# Patient Record
Sex: FEMALE | Race: WHITE | Employment: OTHER | URBAN - NONMETROPOLITAN AREA
[De-identification: names, ages, dates, MRNs, and addresses within clinical notes are randomized per-mention and may not be internally consistent; named-entity substitution may affect disease eponyms.]

---

## 2017-01-23 ENCOUNTER — NURSING HOME VISIT (OUTPATIENT)
Dept: FAMILY MEDICINE | Facility: OTHER | Age: 82
End: 2017-01-23
Payer: MEDICARE

## 2017-01-23 VITALS
HEART RATE: 67 BPM | OXYGEN SATURATION: 96 % | TEMPERATURE: 98.1 F | BODY MASS INDEX: 23.66 KG/M2 | WEIGHT: 129.4 LBS | RESPIRATION RATE: 16 BRPM | SYSTOLIC BLOOD PRESSURE: 107 MMHG | DIASTOLIC BLOOD PRESSURE: 57 MMHG

## 2017-01-23 DIAGNOSIS — Z78.9 NURSING HOME RESIDENT: Primary | ICD-10-CM

## 2017-01-23 PROCEDURE — 99308 SBSQ NF CARE LOW MDM 20: CPT | Performed by: FAMILY MEDICINE

## 2017-01-26 NOTE — PROGRESS NOTES
HISTORY OF PRESENT ILLNESS:  Britney is a 86 year old female (12/29/1929)  resident of [facility]  who is being seen today for a routine 30 day follow up. Patient has Parkinson's disease and she states she states she feels her trembling is getting worse. Her Sinemet was recently increased and she notes worsening nausea.  She requests second Neurology opinion. Patient offers no other complaint.  Staff notes no other issues.    Current medications, allergies, and interdisciplinary care plan are reviewed.      Patient Active Problem List    Diagnosis Date Noted     Parkinson disease (H) 08/10/2015     Priority: High     CHD (coronary heart disease) 06/15/2015     Priority: High     03/2015  NSTEMI S/P angioplasty and stenting (ELIZABETH) LAD and D1       HF (heart failure) (H) 06/15/2015     Priority: High     TIA (transient ischemic attack) 03/16/2015     Priority: High     Neuralgia, post-herpetic 03/19/2014     Priority: High     Cerebrovascular disease 04/10/2013     Priority: High     HTN (hypertension) 09/24/2006     Priority: High     Glaucoma 12/27/2016     Priority: Medium     History of poliomyelitis 12/27/2016     Priority: Medium     Osteoarthritis, multiple joints 12/13/2015     Priority: Medium     Fibrocystic breast disease, left 12/13/2015     Priority: Medium     Constipation, chronic 04/20/2015     Priority: Medium     Hiatal hernia 03/16/2015     Priority: Medium     Chronic cough 07/15/2014     Priority: Medium     OAB (overactive bladder) 03/19/2014     Priority: Medium     S/P InterStim placement and multiple botox injections       Dyslipidemia 04/10/2013     Priority: Medium     Hemorrhoids 04/10/2013     Priority: Medium     Perennial allergic rhinitis 04/10/2013     Priority: Medium     Asthma, mild persistent 04/10/2013     Priority: Medium              GERD (gastroesophageal reflux disease) 04/10/2013     Priority: Medium     Osteoporosis 04/10/2013     Priority: Medium      Kyphoscoliosis and scoliosis 04/10/2013     Priority: Medium     Insomnia, medical condition 04/10/2013     Priority: Medium     History of colonic polyps 04/10/2013     Priority: Medium     Nursing Home Visit 01/19/2016     Priority: Low     Health Care Home 12/09/2015     Priority: Low     Class: Chronic          Social History     Social History     Marital Status:      Spouse Name: N/A     Number of Children: N/A     Years of Education: N/A     Occupational History     Retired [employer]     Social History Main Topics     Smoking status: Passive Smoke Exposure - Never Smoker     Smokeless tobacco: Never Used      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 No     Social History Narrative        Current Outpatient Prescriptions   Medication Sig     metoprolol (LOPRESSOR) 25 MG tablet Take 0.5 tablets (12.5 mg) by mouth 2 times daily     pantoprazole (PROTONIX) 40 MG enteric coated tablet Take 1 tablet (40 mg) by mouth daily     calcium carbonate (TUMS) 500 MG chewable tablet Take 2 chew tab by mouth 2 times daily     mirabegron (MYRBETRIQ) 50 MG 24 hr tablet Take 1 tablet (50 mg) by mouth daily     clopidogrel (PLAVIX) 75 MG tablet Take 1 tablet (75 mg) by mouth daily     lidocaine (LIDODERM) 5 % patch APPLY UP TO 3 PATCHES TO PAINFUL AREA AT ONCE FOR UP TO 12 HOURS WITHIN A 24 HOURS PERIOD. REMOVE  AFTER 12 HOURS     Multiple Vitamins-Minerals (PRESERVISION AREDS) TABS Take 1 tablet by mouth 2 times daily     gabapentin (NEURONTIN) 100 MG capsule Take 1 capsule (100 mg) by mouth 2 times daily     olopatadine (PATANOL) 0.1 % ophthalmic solution INSTILL 1 DROP INTO BOTH EYES 2 TIMES DAILY     atorvastatin (LIPITOR) 40 MG tablet Take 1 tablet (40 mg) by mouth daily     aspirin 81 MG chewable tablet Take 1 tablet (81 mg) by mouth daily     Cholecalciferol (VITAMIN D3) 2000 UNITS CAPS Take 2,000 Units by mouth daily     spironolactone (ALDACTONE) 25 MG tablet Take 0.5 tablets (12.5 mg) by mouth daily     loratadine (ALLERGY RELIEF) 10 MG tablet Take 1 tablet (10 mg) by mouth daily     polyethylene glycol (MIRALAX) powder Take 17 g by mouth daily (Patient taking differently: Take 17 g by mouth 2 times daily )     carbidopa-levodopa (SINEMET)  MG per tablet Take 1 tablet by mouth 3 times daily (Patient taking differently: 2 tablets at 8am, 1 tablet at 1200, 1600, and 2000)     senna-docusate (SENNA PLUS) 8.6-50 MG per tablet Take 1 tablet by mouth 2 times daily (Patient taking differently: Take 2 tablets by mouth 2 times daily )     acetaminophen (TYLENOL) 500 MG tablet Take 2 tablets (1,000 mg) by mouth 2 times daily as needed for mild pain As needed     artificial saliva, BIOTENE MT, (BIOTENE MT) SOLN Swish and spit 5 mLs in mouth as needed for dry mouth     furosemide (LASIX) 20 MG tablet Take 1 tablet (20 mg) by mouth as needed     ondansetron (ZOFRAN) 4 MG tablet TAKE 1 TABLET BY MOUTH EVERY 8 HOURS AS NEEDED FOR NAUSEA AND VOMITING     polyethylene glycol 0.4%- propylene glycol 0.3% (SYSTANE) 0.4-0.3 % SOLN ophthalmic solution Place 2 drops into both eyes 4 times daily as needed for dry eyes     ketoconazole (NIZORAL) 2 % shampoo Wash hair, let set for 5 minutes, rinse 1 time per day every Sunday     nystatin (MYCOSTATIN) cream APPLY TO AFFECTED AREA 2 TIMES DAILY AS NEEDED     Lanolin (CORONA  MULTI-PURPOSE) 30 % OINT Externally apply 1 Application topically 2 times daily     cycloSPORINE (RESTASIS) 0.05 % ophthalmic emulsion Place 1 drop into both eyes 2 times daily     guaiFENesin-codeine (GUAIFENESIN AC) 100-10 MG/5ML SOLN TAKE 2 TEASPOONFULS (10ML) BY MOUTH EVERY 4 HOURS AS NEEDED FOR COUGH     order for DME Equipment being ordered: neoprene knee sleeve     No current facility-administered medications for this visit.       Allergies   Allergen Reactions     Ambien      Zolpidem Tartrate     Tramadol        I have reviewed the care plan and do agree with the plan.    ROS:  No chest pain, shortness of breath, fever, chills, headache, nausea, vomiting, dysuria, or changes in bowel habits.  Appetite is normal.  Left shoulder/back  pain noted.          OBJECTIVE:  /57 mmHg  Pulse 67  Temp(Src) 98.1  F (36.7  C)  Resp 16  Wt 129 lb 6.4 oz (58.695 kg)  SpO2 96%    GENERAL:  Chronically ill appearing, alert, and in no acute distress  RESP:  Lungs clear.  No rales, rhonchi, or wheezing  CV:  RRR.  S1 S2 without murmur. No clicks or rubs.  SKIN:  Age-related changes.  No suspicious lesions or rashes.  PSYCH:  Mentation intact, affect bright, and orientation intact.  EXTREM:  trace edema.  Pulses palpable.          Lab/Diagnostic data:    none    ASSESSMENT/ORDERS:  Nursing Home Visit  Will decrease Sinemet to previous dosage.  Other issues stable.  No changes in current medications or care plan.      Total time spent with patient visit was 25 min including patient visit, review of pertinent clinical information, and treatment plan.      Mario Fofana MD

## 2017-01-31 ENCOUNTER — TELEPHONE (OUTPATIENT)
Dept: FAMILY MEDICINE | Facility: OTHER | Age: 82
End: 2017-01-31

## 2017-01-31 NOTE — TELEPHONE ENCOUNTER
12:12 PM    Reason for Call: Phone Call    Description: Pt would like nurse to call. Has a bad cold for a couple of days and would like to know if Dr Fofana can prescribe something or if she needs to be seen.    Was an appointment offered for this call? Yes - pt declined    Preferred method for responding to this message: Telephone Call    If we cannot reach you directly, may we leave a detailed response at the number you provided? Yes    Can this message wait until your PCP/provider returns, if available today? N/a, provider is in today     Ramya Ortega

## 2017-02-01 ENCOUNTER — TRANSFERRED RECORDS (OUTPATIENT)
Dept: HEALTH INFORMATION MANAGEMENT | Facility: HOSPITAL | Age: 82
End: 2017-02-01

## 2017-02-06 ENCOUNTER — TELEPHONE (OUTPATIENT)
Dept: FAMILY MEDICINE | Facility: OTHER | Age: 82
End: 2017-02-06

## 2017-03-06 ENCOUNTER — NURSING HOME VISIT (OUTPATIENT)
Dept: FAMILY MEDICINE | Facility: OTHER | Age: 82
End: 2017-03-06
Payer: MEDICARE

## 2017-03-06 VITALS
RESPIRATION RATE: 16 BRPM | DIASTOLIC BLOOD PRESSURE: 44 MMHG | SYSTOLIC BLOOD PRESSURE: 109 MMHG | HEART RATE: 56 BPM | BODY MASS INDEX: 22.81 KG/M2 | WEIGHT: 124.7 LBS | TEMPERATURE: 97.6 F

## 2017-03-06 DIAGNOSIS — Z78.9 NURSING HOME RESIDENT: Primary | ICD-10-CM

## 2017-03-06 PROCEDURE — 99308 SBSQ NF CARE LOW MDM 20: CPT | Performed by: FAMILY MEDICINE

## 2017-03-06 RX ORDER — SODIUM FLUORIDE 5 MG/G
1 GEL, DENTIFRICE DENTAL AT BEDTIME
COMMUNITY
End: 2018-04-02

## 2017-03-06 NOTE — MR AVS SNAPSHOT
"              After Visit Summary   3/6/2017    Britney Schaeffer    MRN: [id number]           Patient Information     Date Of Birth          1929        Visit Information        Provider Department      3/6/2017 2:05 PM Mario Fofana MD [facility]        Today's [facility]     Nursing Home Visit    -  1       Follow-ups after your visit        Who to contact     If you have questions or need follow up information about today's clinic visit or your schedule please [facility] at [phone].  Normal or non-critical lab and imaging results will be communicated to you by MyChart, letter or phone within 4 business days after the clinic has received the results. If you do not hear from us within 7 days, please contact the clinic through [facility] or phone. If you have a critical or abnormal lab result, we will notify you by phone as soon as possible.  Submit refill requests through [employer] or call your pharmacy and they will forward the refill request to us. Please allow 3 business days for your refill to be completed.          Additional Information About Your Visit        MyChart Information     [employer] lets you send messages to your doctor, view your test results, renew your prescriptions, schedule appointments and more. To sign up, go to [email or web address] . Click on \"Log in\" on the left side of the screen, which will take you to the Welcome page. Then click on \"Sign up Now\" on the right side of the page.     You will be asked to enter the access code listed below, as well as some personal information. Please follow the directions to create your username and password.     Your access code is: [email or web address]  Expires: 3/27/2017  6:57 AM     Your access code will  in 90 days. If you need help or a new code, please call your [facility] or [phone].        Care EveryWhere ID     This is your Care EveryWhere ID. This could be used by other " organizations to access your [facility] medical records  [id number]        Your Vitals Were     Pulse Temperature Respirations BMI (Body Mass Index)          56 97.6  F (36.4  C) 16 22.81 kg/m2         Blood Pressure from Last 3 Encounters:   03/06/17 109/44   01/23/17 107/57   12/21/16 116/58    Weight from Last 3 Encounters:   03/06/17 124 lb 11.2 oz (56.6 kg)   01/23/17 129 lb 6.4 oz (58.7 kg)   12/21/16 128 lb 8 oz (58.3 kg)              Today, you had the following     No orders found for display         Today's Medication Changes          These changes are accurate as of: 3/6/17 11:59 PM.  If you have any questions, ask your nurse or doctor.               These medicines have changed or have updated prescriptions.        Dose/Directions    carbidopa-levodopa  MG per tablet   Commonly known as:  SINEMET   This may have changed:    - how much to take  - when to take this  - additional instructions   Used for:  Parkinsons (H)        Dose:  1 tablet   Take 1 tablet by mouth 3 times daily   Quantity:  270 tablet   Refills:  3       polyethylene glycol powder   Commonly known as:  MIRALAX   This may have changed:  when to take this   Used for:  Constipation        Dose:  17 g   Take 17 g by mouth daily   Quantity:  500 g   Refills:  3       senna-docusate 8.6-50 MG per tablet   Commonly known as:  SENNA PLUS   This may have changed:  how much to take   Used for:  Constipation        Dose:  1 tablet   Take 1 tablet by mouth 2 times daily   Quantity:  180 tablet   Refills:  3                Primary Care Provider Office Phone # Fax #    Mario Fofana [phone]       [facility] [address]  [place] [address]        Thank you!     Thank you for choosing [facility]  for your care. Our goal is always to provide you with excellent care. Hearing back from our patients is one way we can continue to improve our services. Please take a few minutes to complete the written survey  that you may receive in the mail after your visit with us. Thank you!             Your Updated Medication List - Protect others around you: Learn how to safely use, store and throw away your medicines at www.disposemymeds.org.          This list is accurate as of: 3/6/17 11:59 PM.  Always use your most recent med list.                   Brand Name Dispense Instructions for use    acetaminophen 500 MG tablet    TYLENOL    360 tablet    Take 2 tablets (1,000 mg) by mouth 2 times daily as needed for mild pain As needed       artificial saliva Soln solution     3 Bottle    Swish and spit 5 mLs in mouth as needed for dry mouth       aspirin 81 MG chewable tablet     90 tablet    Take 1 tablet (81 mg) by mouth daily       atorvastatin 40 MG tablet    LIPITOR    90 tablet    Take 1 tablet (40 mg) by mouth daily       calcium carbonate 500 MG chewable tablet    TUMS     Take 2 chew tab by mouth 2 times daily       carbidopa-levodopa  MG per tablet    SINEMET    270 tablet    Take 1 tablet by mouth 3 times daily       clopidogrel 75 MG tablet    PLAVIX    90 tablet    Take 1 tablet (75 mg) by mouth daily       cycloSPORINE 0.05 % ophthalmic emulsion    RESTASIS    3 each    Place 1 drop into both eyes 2 times daily       furosemide 20 MG tablet    LASIX    90 tablet    Take 1 tablet (20 mg) by mouth as needed       gabapentin 100 MG capsule    NEURONTIN    180 capsule    Take 1 capsule (100 mg) by mouth 2 times daily       guaiFENesin-codeine 100-10 MG/5ML Soln solution    guaiFENesin AC    473 mL    TAKE 2 TEASPOONFULS (10ML) BY MOUTH EVERY 4 HOURS AS NEEDED FOR COUGH       ketoconazole 2 % shampoo    NIZORAL    120 mL    Wash hair, let set for 5 minutes, rinse 1 time per day every Sunday       Lanolin 30 % Oint    CORONA MULTI-PURPOSE    3 Tube    Externally apply 1 Application topically 2 times daily       lidocaine 5 % Patch    LIDODERM    270 patch    APPLY UP TO 3 PATCHES TO PAINFUL AREA AT ONCE FOR UP TO 12  HOURS WITHIN A 24 HOURS PERIOD. REMOVE AFTER 12 HOURS       loratadine 10 MG tablet    ALLERGY RELIEF    90 tablet    Take 1 tablet (10 mg) by mouth daily       metoprolol 25 MG tablet    LOPRESSOR    90 tablet    Take 0.5 tablets (12.5 mg) by mouth 2 times daily       mirabegron 50 MG 24 hr tablet    MYRBETRIQ    90 tablet    Take 1 tablet (50 mg) by mouth daily       nystatin cream    MYCOSTATIN    30 g    APPLY TO AFFECTED AREA 2 TIMES DAILY AS NEEDED       olopatadine 0.1 % ophthalmic solution    PATANOL    3 Bottle    INSTILL 1 DROP INTO BOTH EYES 2 TIMES DAILY       ondansetron 4 MG tablet    ZOFRAN    30 tablet    TAKE 1 TABLET BY MOUTH EVERY 8 HOURS AS NEEDED FOR NAUSEA AND VOMITING       order for DME     1 Device    Equipment being ordered: neoprene knee sleeve       pantoprazole 40 MG EC tablet    PROTONIX    90 tablet    Take 1 tablet (40 mg) by mouth daily       polyethylene glycol 0.4%- propylene glycol 0.3% 0.4-0.3 % Soln ophthalmic solution    SYSTANE    3 Bottle    Place 2 drops into both eyes 4 times daily as needed for dry eyes       polyethylene glycol powder    MIRALAX    500 g    Take 17 g by mouth daily       PRESERVISION AREDS Tabs     180 tablet    Take 1 tablet by mouth 2 times daily       senna-docusate 8.6-50 MG per tablet    SENNA PLUS    180 tablet    Take 1 tablet by mouth 2 times daily       sodium fluoride dental gel 1.1 % Gel topical gel    PREVIDENT     Apply 1 applicator to affected area At Bedtime       spironolactone 25 MG tablet    ALDACTONE    45 tablet    Take 0.5 tablets (12.5 mg) by mouth daily       vitamin D3 2000 UNITS Caps     90 capsule    Take 2,000 Units by mouth daily

## 2017-03-07 NOTE — PROGRESS NOTES
HISTORY OF PRESENT ILLNESS:  Britney is a 87 year old female (12/29/1929)  resident of [facility]  who is being seen today for a routine 30 day follow up. Patient has Parkinson's disease and she states she states she feels her trembling is getting worse. Her Sinemet was recently increased and she notes worsening nausea.  She requests second Neurology opinion. Her Sinemet was reduced  and her nausea is improved. Patient offers no other complaint.  Staff notes no other issues.    Current medications, allergies, and interdisciplinary care plan are reviewed.      Patient Active Problem List    Diagnosis Date Noted     Parkinson disease (H) 08/10/2015     Priority: High     CHD (coronary heart disease) 06/15/2015     Priority: High     03/2015  NSTEMI S/P angioplasty and stenting (ELIZABETH) LAD and D1       HF (heart failure) (H) 06/15/2015     Priority: High     TIA (transient ischemic attack) 03/16/2015     Priority: High     Neuralgia, post-herpetic 03/19/2014     Priority: High     Cerebrovascular disease 04/10/2013     Priority: High     HTN (hypertension) 09/24/2006     Priority: High     Glaucoma 12/27/2016     Priority: Medium     History of poliomyelitis 12/27/2016     Priority: Medium     Osteoarthritis, multiple joints 12/13/2015     Priority: Medium     Fibrocystic breast disease, left 12/13/2015     Priority: Medium     Constipation, chronic 04/20/2015     Priority: Medium     Hiatal hernia 03/16/2015     Priority: Medium     Chronic cough 07/15/2014     Priority: Medium     OAB (overactive bladder) 03/19/2014     Priority: Medium     S/P InterStim placement and multiple botox injections       Dyslipidemia 04/10/2013     Priority: Medium     Hemorrhoids 04/10/2013     Priority: Medium     Perennial allergic rhinitis 04/10/2013     Priority: Medium     Asthma, mild persistent 04/10/2013     Priority: Medium              GERD (gastroesophageal reflux disease) 04/10/2013     Priority: Medium      Osteoporosis 04/10/2013     Priority: Medium     Kyphoscoliosis and scoliosis 04/10/2013     Priority: Medium     Insomnia, medical condition 04/10/2013     Priority: Medium     History of colonic polyps 04/10/2013     Priority: Medium     Nursing Home Visit 01/19/2016     Priority: Low     Health Care Home 12/09/2015     Priority: Low     Class: Chronic          Social History     Social History     Marital status:      Spouse name: N/A     Number of children: N/A     Years of education: N/A     Occupational History     Retired [employer]     Social History Main Topics     Smoking status: Passive Smoke Exposure - Never Smoker     Smokeless tobacco: Never Used      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 No     Social History Narrative        Current Outpatient Prescriptions   Medication Sig     sodium fluoride dental gel (PREVIDENT) 1.1 % GEL topical gel Apply 1 applicator to affected area At Bedtime     metoprolol (LOPRESSOR) 25 MG tablet Take 0.5 tablets (12.5 mg) by mouth 2 times daily     pantoprazole (PROTONIX) 40 MG enteric coated tablet Take 1 tablet (40 mg) by mouth daily     calcium carbonate (TUMS) 500 MG chewable tablet Take 2 chew tab by mouth 2 times daily     mirabegron (MYRBETRIQ) 50 MG 24 hr tablet Take 1 tablet (50 mg) by mouth daily     clopidogrel (PLAVIX) 75 MG tablet Take  1 tablet (75 mg) by mouth daily     lidocaine (LIDODERM) 5 % patch APPLY UP TO 3 PATCHES TO PAINFUL AREA AT ONCE FOR UP TO 12 HOURS WITHIN A 24 HOURS PERIOD. REMOVE AFTER 12 HOURS     Multiple Vitamins-Minerals (PRESERVISION AREDS) TABS Take 1 tablet by mouth 2 times daily     gabapentin (NEURONTIN) 100 MG capsule Take 1 capsule (100 mg) by mouth 2 times daily     olopatadine (PATANOL) 0.1 % ophthalmic solution INSTILL 1 DROP INTO BOTH EYES 2 TIMES DAILY     atorvastatin (LIPITOR) 40 MG tablet Take 1 tablet (40 mg) by mouth daily     aspirin 81 MG chewable tablet Take 1 tablet (81 mg) by mouth daily     Cholecalciferol (VITAMIN D3) 2000 UNITS CAPS Take 2,000 Units by mouth daily     spironolactone (ALDACTONE) 25 MG tablet Take 0.5 tablets (12.5 mg) by mouth daily     loratadine (ALLERGY RELIEF) 10 MG tablet Take 1 tablet (10 mg) by mouth daily     polyethylene glycol (MIRALAX) powder Take 17 g by mouth daily (Patient taking differently: Take 17 g by mouth 2 times daily )     carbidopa-levodopa (SINEMET)  MG per tablet Take 1 tablet by mouth 3 times daily (Patient taking differently: 2 tablets at 8am, 1 tablet at 1200, 1600, and 2000)     senna-docusate (SENNA PLUS) 8.6-50 MG per tablet Take 1 tablet by mouth 2 times daily (Patient taking differently: Take 2 tablets by mouth 2 times daily )     acetaminophen (TYLENOL) 500 MG tablet Take 2 tablets (1,000 mg) by mouth 2 times daily as needed for mild pain As needed     artificial saliva, BIOTENE MT, (BIOTENE MT) SOLN Swish and spit 5 mLs in mouth as needed for dry mouth     furosemide (LASIX) 20 MG tablet Take 1 tablet (20 mg) by mouth as needed     ondansetron (ZOFRAN) 4 MG tablet TAKE 1 TABLET BY MOUTH EVERY 8 HOURS AS NEEDED FOR NAUSEA AND VOMITING     polyethylene glycol 0.4%- propylene glycol 0.3% (SYSTANE) 0.4-0.3 % SOLN ophthalmic solution Place 2 drops into both eyes 4 times daily as needed for dry eyes     ketoconazole (NIZORAL) 2 % shampoo Wash hair,  let set for 5 minutes, rinse 1 time per day every Sunday     nystatin (MYCOSTATIN) cream APPLY TO AFFECTED AREA 2 TIMES DAILY AS NEEDED     Lanolin (CORONA MULTI-PURPOSE) 30 % OINT Externally apply 1 Application topically 2 times daily     cycloSPORINE (RESTASIS) 0.05 % ophthalmic emulsion Place 1 drop into both eyes 2 times daily     guaiFENesin-codeine (GUAIFENESIN AC) 100-10 MG/5ML SOLN TAKE 2 TEASPOONFULS (10ML) BY MOUTH EVERY 4 HOURS AS NEEDED FOR COUGH     order for DME Equipment being ordered: neoprene knee sleeve     No current facility-administered medications for this visit.        Allergies   Allergen Reactions     Ambien      Zolpidem Tartrate     Tramadol        I have reviewed the care plan and do agree with the plan.    ROS:  No chest pain, shortness of breath, fever, chills, headache, nausea, vomiting, dysuria, or changes in bowel habits.  Appetite is normal.  Left shoulder/back  pain noted.          OBJECTIVE:  /44  Pulse 56  Temp 97.6  F (36.4  C)  Resp 16  Wt 124 lb 11.2 oz (56.6 kg)  BMI 22.81 kg/m2    GENERAL:  Chronically ill appearing, alert, and in no acute distress  RESP:  Lungs clear.  No rales, rhonchi, or wheezing  CV:  RRR.  S1 S2 without murmur. No clicks or rubs.  SKIN:  Age-related changes.  No suspicious lesions or rashes.  PSYCH:  Mentation intact, affect bright, and orientation intact.  EXTREM:  trace edema.  Pulses palpable.          Lab/Diagnostic data:    none    ASSESSMENT/ORDERS:  Nursing Home Visit  Will decrease Sinemet to previous dosage.  Other issues stable.  No changes in current medications or care plan.      Total time spent with patient visit was 25 min including patient visit, review of pertinent clinical information, and treatment plan.      Mario Fofana MD

## 2017-03-24 DIAGNOSIS — N32.81 OAB (OVERACTIVE BLADDER): ICD-10-CM

## 2017-03-24 DIAGNOSIS — G20.A1 PARKINSONS (H): ICD-10-CM

## 2017-03-27 RX ORDER — CARBIDOPA AND LEVODOPA 25; 100 MG/1; MG/1
TABLET ORAL
Qty: 270 TABLET | Refills: 1 | Status: SHIPPED | OUTPATIENT
Start: 2017-03-27 | End: 2017-08-20

## 2017-03-27 RX ORDER — MIRABEGRON 50 MG/1
TABLET, FILM COATED, EXTENDED RELEASE ORAL
Qty: 31 TABLET | Refills: 0 | Status: SHIPPED | OUTPATIENT
Start: 2017-03-27 | End: 2018-08-20 | Stop reason: DRUGHIGH

## 2017-04-03 ENCOUNTER — HOSPITAL ENCOUNTER (OUTPATIENT)
Dept: ULTRASOUND IMAGING | Facility: HOSPITAL | Age: 82
Discharge: HOME | End: 2017-04-03
Attending: SURGERY | Admitting: SURGERY
Payer: MEDICARE

## 2017-04-03 PROCEDURE — 76642 ULTRASOUND BREAST LIMITED: CPT | Mod: TC,LT

## 2017-04-03 PROCEDURE — G0206 DX MAMMO INCL CAD UNI: HCPCS | Mod: TC,LT

## 2017-04-03 PROCEDURE — G0279 TOMOSYNTHESIS, MAMMO: HCPCS | Mod: TC,

## 2017-04-04 DIAGNOSIS — I50.1 LEFT HEART FAILURE (H): ICD-10-CM

## 2017-04-04 DIAGNOSIS — E78.2 MIXED HYPERLIPIDEMIA: Primary | ICD-10-CM

## 2017-04-04 DIAGNOSIS — E78.5 HYPERLIPIDEMIA: ICD-10-CM

## 2017-04-04 RX ORDER — SPIRONOLACTONE 25 MG/1
12.5 TABLET ORAL DAILY
Qty: 45 TABLET | Refills: 3 | Status: SHIPPED | OUTPATIENT
Start: 2017-04-04 | End: 2018-02-01

## 2017-04-04 RX ORDER — ATORVASTATIN CALCIUM 40 MG/1
40 TABLET, FILM COATED ORAL DAILY
Qty: 90 TABLET | Refills: 3 | Status: ON HOLD | OUTPATIENT
Start: 2017-04-04 | End: 2017-10-12

## 2017-04-10 ENCOUNTER — NURSING HOME VISIT (OUTPATIENT)
Dept: FAMILY MEDICINE | Facility: OTHER | Age: 82
End: 2017-04-10
Payer: MEDICARE

## 2017-04-10 VITALS
BODY MASS INDEX: 22.42 KG/M2 | SYSTOLIC BLOOD PRESSURE: 120 MMHG | RESPIRATION RATE: 16 BRPM | WEIGHT: 122.6 LBS | TEMPERATURE: 96.3 F | DIASTOLIC BLOOD PRESSURE: 60 MMHG | OXYGEN SATURATION: 97 % | HEART RATE: 76 BPM

## 2017-04-10 DIAGNOSIS — Z78.9 NURSING HOME RESIDENT: Primary | ICD-10-CM

## 2017-04-10 PROCEDURE — 99308 SBSQ NF CARE LOW MDM 20: CPT | Performed by: NURSE PRACTITIONER

## 2017-04-10 NOTE — MR AVS SNAPSHOT
"              After Visit Summary   4/10/2017    Britney Schaeffer    MRN: [id number]           Patient Information     Date Of Birth          1929        Visit Information        Provider Department      4/10/2017 4:04 PM Minerva Jimenez APRN [facility]NP [facility]        Today's Diagnoses     Nursing home resident    -  1       Follow-ups after your visit        Who to contact     If you have questions or need follow up information about today's clinic visit or your schedule please contact [facility] directly at [phone].  Normal or non-critical lab and imaging results will be communicated to you by Worldrathart, letter or phone within 4 business days after the clinic has received the results. If you do not hear from us within 7 days, please contact the clinic through Worldrathart or phone. If you have a critical or abnormal lab result, we will notify you by phone as soon as possible.  Submit refill requests through IdleAir or call your pharmacy and they will forward the refill request to us. Please allow 3 business days for your refill to be completed.          Additional Information About Your Visit        MyChart Information     IdleAir lets you send messages to your doctor, view your test results, renew your prescriptions, schedule appointments and more. To sign up, go to [email or web address] . Click on \"Log in\" on the left side of the screen, which will take you to the Welcome page. Then click on \"Sign up Now\" on the right side of the page.     You will be asked to enter the access code listed below, as well as some personal information. Please follow the directions to create your username and password.     Your access code is: [email or web address]  Expires: 2017  8:48 AM     Your access code will  in 90 days. If you need help or a new code, please call your [facility] or [phone].        Care EveryWhere ID     This is your Care EveryWhere ID. This could be used by " other organizations to access your [place] medical records  [id number]        Your Vitals Were     Pulse Temperature Respirations Pulse Oximetry BMI (Body Mass Index)       76 96.3  F (35.7  C) 16 97% 22.42 kg/m2        Blood Pressure from Last 3 Encounters:   04/10/17 120/60   03/06/17 109/44   01/23/17 107/57    Weight from Last 3 Encounters:   04/10/17 122 lb 9.6 oz (55.6 kg)   03/06/17 124 lb 11.2 oz (56.6 kg)   01/23/17 129 lb 6.4 oz (58.7 kg)              Today, you had the following     No orders found for display         Today's Medication Changes          These changes are accurate as of: 4/10/17 11:59 PM.  If you have any questions, ask your nurse or doctor.               These medicines have changed or have updated prescriptions.        Dose/Directions    carbidopa-levodopa  MG per tablet   Commonly known as:  SINEMET   This may have changed:  See the new instructions.   Used for:  Parkinsons (H)        TAKE 1 TABLET THREE TIMES A DAY   Quantity:  270 tablet   Refills:  1       polyethylene glycol powder   Commonly known as:  MIRALAX   This may have changed:  when to take this   Used for:  Constipation        Dose:  17 g   Take 17 g by mouth daily   Quantity:  500 g   Refills:  3       senna-docusate 8.6-50 MG per tablet   Commonly known as:  SENNA PLUS   This may have changed:  how much to take   Used for:  Constipation        Dose:  1 tablet   Take 1 tablet by mouth 2 times daily   Quantity:  180 tablet   Refills:  3                Primary Care Provider Office Phone # Fax #    Mario Fofana [phone]       [facility] [address] [place] [address]        Thank you!     Thank you for choosing [facility]  for your care. Our goal is always to provide you with excellent care. Hearing back from our patients is one way we can continue to improve our services. Please take a few minutes to complete the written survey that you may receive in the mail after  your visit with us. Thank you!             Your Updated Medication List - Protect others around you: Learn how to safely use, store and throw away your medicines at www.disposemymeds.org.          This list is accurate as of: 4/10/17 11:59 PM.  Always use your most recent med list.                   Brand Name Dispense Instructions for use    acetaminophen 500 MG tablet    TYLENOL    360 tablet    Take 2 tablets (1,000 mg) by mouth 2 times daily as needed for mild pain As needed       artificial saliva Soln solution     3 Bottle    Swish and spit 5 mLs in mouth as needed for dry mouth       aspirin 81 MG chewable tablet     90 tablet    Take 1 tablet (81 mg) by mouth daily       atorvastatin 40 MG tablet    LIPITOR    90 tablet    Take 1 tablet (40 mg) by mouth daily       calcium carbonate 500 MG chewable tablet    TUMS     Take 2 chew tab by mouth 2 times daily       carbidopa-levodopa  MG per tablet    SINEMET    270 tablet    TAKE 1 TABLET THREE TIMES A DAY       clopidogrel 75 MG tablet    PLAVIX    90 tablet    Take 1 tablet (75 mg) by mouth daily       cycloSPORINE 0.05 % ophthalmic emulsion    RESTASIS    3 each    Place 1 drop into both eyes 2 times daily       furosemide 20 MG tablet    LASIX    90 tablet    Take 1 tablet (20 mg) by mouth as needed       gabapentin 100 MG capsule    NEURONTIN    180 capsule    Take 1 capsule (100 mg) by mouth 2 times daily       guaiFENesin-codeine 100-10 MG/5ML Soln solution    guaiFENesin AC    473 mL    TAKE 2 TEASPOONFULS (10ML) BY MOUTH EVERY 4 HOURS AS NEEDED FOR COUGH       ketoconazole 2 % shampoo    NIZORAL    120 mL    Wash hair, let set for 5 minutes, rinse 1 time per day every Sunday       Lanolin 30 % Oint    CORONA MULTI-PURPOSE    3 Tube    Externally apply 1 Application topically 2 times daily       lidocaine 5 % Patch    LIDODERM    270 patch    APPLY UP TO 3 PATCHES TO PAINFUL AREA AT ONCE FOR UP TO 12 HOURS WITHIN A 24 HOURS PERIOD. REMOVE AFTER 12  HOURS       loratadine 10 MG tablet    ALLERGY RELIEF    90 tablet    Take 1 tablet (10 mg) by mouth daily       metoprolol 25 MG tablet    LOPRESSOR    90 tablet    Take 0.5 tablets (12.5 mg) by mouth 2 times daily       MYRBETRIQ 50 MG 24 hr tablet   Generic drug:  mirabegron     31 tablet    Take one (1) tablet BY MOUTH daily       nystatin cream    MYCOSTATIN    30 g    APPLY TO AFFECTED AREA 2 TIMES DAILY AS NEEDED       olopatadine 0.1 % ophthalmic solution    PATANOL    3 Bottle    INSTILL 1 DROP INTO BOTH EYES 2 TIMES DAILY       ondansetron 4 MG tablet    ZOFRAN    30 tablet    TAKE 1 TABLET BY MOUTH EVERY 8 HOURS AS NEEDED FOR NAUSEA AND VOMITING       order for DME     1 Device    Equipment being ordered: neoprene knee sleeve       pantoprazole 40 MG EC tablet    PROTONIX    90 tablet    Take 1 tablet (40 mg) by mouth daily       polyethylene glycol 0.4%- propylene glycol 0.3% 0.4-0.3 % Soln ophthalmic solution    SYSTANE    3 Bottle    Place 2 drops into both eyes 4 times daily as needed for dry eyes       polyethylene glycol powder    MIRALAX    500 g    Take 17 g by mouth daily       PRESERVISION AREDS Tabs     180 tablet    Take 1 tablet by mouth 2 times daily       senna-docusate 8.6-50 MG per tablet    SENNA PLUS    180 tablet    Take 1 tablet by mouth 2 times daily       sodium fluoride dental gel 1.1 % Gel topical gel    PREVIDENT     Apply 1 applicator to affected area At Bedtime       spironolactone 25 MG tablet    ALDACTONE    45 tablet    Take 0.5 tablets (12.5 mg) by mouth daily       vitamin D3 2000 UNITS Caps     90 capsule    Take 2,000 Units by mouth daily

## 2017-04-13 NOTE — PROGRESS NOTES
HISTORY OF PRESENT ILLNESS:  Britney is a 87 year old female (12/29/1929)  resident of [facility]  who is being seen today for a routine 30 day follow up. Patient has Parkinson's disease. She has been using Restasis and is now complaining burning in left eye. Patient offers no other complaint.  Staff notes no other issues.    Current medications, allergies, and interdisciplinary care plan are reviewed.      Patient Active Problem List    Diagnosis Date Noted     Glaucoma 12/27/2016     Priority: Medium     History of poliomyelitis 12/27/2016     Priority: Medium     Nursing Home Visit 01/19/2016     Priority: Medium     Osteoarthritis, multiple joints 12/13/2015     Priority: Medium     Fibrocystic breast disease, left 12/13/2015     Priority: Medium     Parkinson disease (H) 08/10/2015     Priority: Medium     Health Care Home 12/09/2015     Priority: Low     Class: Chronic     CHD (coronary heart disease) 06/15/2015     03/2015  NSTEMI S/P angioplasty and stenting (ELIZABETH) LAD and D1       HF (heart failure) (H) 06/15/2015     Constipation, chronic 04/20/2015     Hiatal hernia 03/16/2015     TIA (transient ischemic attack) 03/16/2015     Chronic cough 07/15/2014     OAB (overactive bladder) 03/19/2014     S/P InterStim placement and multiple botox injections       Neuralgia, post-herpetic 03/19/2014     Dyslipidemia 04/10/2013     Cerebrovascular disease 04/10/2013     Hemorrhoids 04/10/2013     Perennial allergic rhinitis 04/10/2013     Asthma, mild persistent 04/10/2013              GERD (gastroesophageal reflux disease) 04/10/2013     Osteoporosis 04/10/2013     Kyphoscoliosis and scoliosis 04/10/2013     Insomnia, medical condition 04/10/2013     History of colonic polyps 04/10/2013     HTN (hypertension) 09/24/2006          Social History     Social History     Marital status:      Spouse name: N/A     Number of children: N/A     Years of education: N/A     Occupational History      Retired [employer]     Social History Main Topics     Smoking status: Passive Smoke Exposure - Never Smoker     Smokeless tobacco: Never Used      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 No     Social History Narrative        Current Outpatient Prescriptions   Medication Sig     atorvastatin (LIPITOR) 40 MG tablet Take 1 tablet (40 mg) by mouth daily     spironolactone (ALDACTONE) 25 MG tablet Take 0.5 tablets (12.5 mg) by mouth daily     MYRBETRIQ 50 MG 24 hr tablet Take one (1) tablet BY MOUTH daily     carbidopa-levodopa (SINEMET)  MG per tablet TAKE 1 TABLET THREE TIMES A DAY (Patient taking differently: TAKE 1 TABLET FOUR TIMES A DAY)     sodium fluoride dental gel (PREVIDENT) 1.1 % GEL topical gel Apply 1 applicator to affected area At Bedtime     metoprolol (LOPRESSOR) 25 MG tablet Take 0.5 tablets (12.5 mg) by mouth 2 times daily     pantoprazole (PROTONIX) 40 MG enteric coated tablet Take 1 tablet (40 mg) by mouth daily     calcium carbonate (TUMS) 500 MG chewable tablet Take 2 chew tab by mouth 2 times daily     clopidogrel (PLAVIX) 75 MG tablet Take 1 tablet (75 mg) by mouth daily     lidocaine (LIDODERM) 5 % patch APPLY UP TO 3 PATCHES TO PAINFUL AREA AT ONCE FOR UP TO 12 HOURS WITHIN A 24 HOURS PERIOD. REMOVE AFTER 12 HOURS     Multiple Vitamins-Minerals (PRESERVISION AREDS) TABS Take 1  tablet by mouth 2 times daily     gabapentin (NEURONTIN) 100 MG capsule Take 1 capsule (100 mg) by mouth 2 times daily     olopatadine (PATANOL) 0.1 % ophthalmic solution INSTILL 1 DROP INTO BOTH EYES 2 TIMES DAILY     aspirin 81 MG chewable tablet Take 1 tablet (81 mg) by mouth daily     Cholecalciferol (VITAMIN D3) 2000 UNITS CAPS Take 2,000 Units by mouth daily     loratadine (ALLERGY RELIEF) 10 MG tablet Take 1 tablet (10 mg) by mouth daily     polyethylene glycol (MIRALAX) powder Take 17 g by mouth daily (Patient taking differently: Take 17 g by mouth 2 times daily )     senna-docusate (SENNA PLUS) 8.6-50 MG per tablet Take 1 tablet by mouth 2 times daily (Patient taking differently: Take 2 tablets by mouth 2 times daily )     acetaminophen (TYLENOL) 500 MG tablet Take 2 tablets (1,000 mg) by mouth 2 times daily as needed for mild pain As needed     artificial saliva, BIOTENE MT, (BIOTENE MT) SOLN Swish and spit 5 mLs in mouth as needed for dry mouth     furosemide (LASIX) 20 MG tablet Take 1 tablet (20 mg) by mouth as needed     ondansetron (ZOFRAN) 4 MG tablet TAKE 1 TABLET BY MOUTH EVERY 8 HOURS AS NEEDED FOR NAUSEA AND VOMITING     polyethylene glycol 0.4%- propylene glycol 0.3% (SYSTANE) 0.4-0.3 % SOLN ophthalmic solution Place 2 drops into both eyes 4 times daily as needed for dry eyes     ketoconazole (NIZORAL) 2 % shampoo Wash hair, let set for 5 minutes, rinse 1 time per day every Sunday     nystatin (MYCOSTATIN) cream APPLY TO AFFECTED AREA 2 TIMES DAILY AS NEEDED     Lanolin (CORONA MULTI-PURPOSE) 30 % OINT Externally apply 1 Application topically 2 times daily     cycloSPORINE (RESTASIS) 0.05 % ophthalmic emulsion Place 1 drop into both eyes 2 times daily     guaiFENesin-codeine (GUAIFENESIN AC) 100-10 MG/5ML SOLN TAKE 2 TEASPOONFULS (10ML) BY MOUTH EVERY 4 HOURS AS NEEDED FOR COUGH     order for DME Equipment being ordered: neoprene knee sleeve     No current facility-administered medications for  this visit.        Allergies   Allergen Reactions     Ambien      Zolpidem Tartrate     Tramadol        I have reviewed the care plan and do agree with the plan.      ROS:  No chest pain, shortness of breath, fever, chills, headache, nausea, vomiting, dysuria, or changes in bowel habits.  Appetite is normal.  No pain noted.          OBJECTIVE:  /60  Pulse 76  Temp 96.3  F (35.7  C)  Resp 16  Wt 122 lb 9.6 oz (55.6 kg)  SpO2 97%  BMI 22.42 kg/m2    GENERAL:  Chronically ill appearing, alert, and in no acute distress  RESP:  Lungs clear.  No rales, rhonchi, or wheezing  CV:  RRR.  S1 S2 without murmur. No clicks or rubs.  SKIN:  Age-related changes.  No suspicious lesions or rashes.  PSYCH:  Mentation intact, affect bright, and orientation intact.  EXTREM:  no edema.  Pulses palpable.          Lab/Diagnostic data:    none    ASSESSMENT/ORDERS:  Nursing home visit  Will hold Restasis for the next two days due to left eye irritation/burtning since starting medication. Staff to assess for improvement of symptoms.    Above issues stable.  No changes in current medications or care plan.      Total time spent with patient visit was 25 min including patient visit, review of pertinent clinical information, and treatment plan.      Minerva BLAIR, CNP

## 2017-05-03 DIAGNOSIS — I25.10 CORONARY ARTERY DISEASE INVOLVING NATIVE CORONARY ARTERY OF NATIVE HEART WITHOUT ANGINA PECTORIS: Primary | ICD-10-CM

## 2017-05-03 DIAGNOSIS — I25.10 CHD (CORONARY HEART DISEASE): ICD-10-CM

## 2017-05-03 DIAGNOSIS — M79.2 NEURALGIA AND NEURITIS: ICD-10-CM

## 2017-05-05 NOTE — TELEPHONE ENCOUNTER
gabapentin      Last Written Prescription Date: 2/23/16  Last Fill Quantity: 180,  # refills: 3   Last Office Visit with [facility], P or [facility] prescribing provider: 4/10/17

## 2017-05-06 RX ORDER — CLOPIDOGREL BISULFATE 75 MG/1
TABLET ORAL
Qty: 90 TABLET | Refills: 2 | Status: ON HOLD | OUTPATIENT
Start: 2017-05-06 | End: 2017-10-12

## 2017-05-06 RX ORDER — GABAPENTIN 100 MG/1
CAPSULE ORAL
Qty: 180 CAPSULE | Refills: 2 | Status: SHIPPED | OUTPATIENT
Start: 2017-05-06 | End: 2018-01-02

## 2017-05-15 ENCOUNTER — NURSING HOME VISIT (OUTPATIENT)
Dept: FAMILY MEDICINE | Facility: OTHER | Age: 82
End: 2017-05-15
Payer: MEDICARE

## 2017-05-15 VITALS
BODY MASS INDEX: 22.79 KG/M2 | SYSTOLIC BLOOD PRESSURE: 105 MMHG | TEMPERATURE: 97.6 F | DIASTOLIC BLOOD PRESSURE: 53 MMHG | HEART RATE: 77 BPM | WEIGHT: 124.6 LBS | RESPIRATION RATE: 16 BRPM | OXYGEN SATURATION: 91 %

## 2017-05-15 DIAGNOSIS — Z78.9 NURSING HOME RESIDENT: Primary | ICD-10-CM

## 2017-05-15 PROCEDURE — 99308 SBSQ NF CARE LOW MDM 20: CPT | Performed by: FAMILY MEDICINE

## 2017-05-15 NOTE — MR AVS SNAPSHOT
"              After Visit Summary   5/15/2017    Britney Schaeffer    MRN: [id number]           Patient Information     Date Of Birth          1929        Visit Information        Provider Department      5/15/2017 12:35 PM Mario Fofana MD [facility]        Today's [facility]     Nursing Home Visit    -  1       Follow-ups after your visit        Who to contact     If you have questions or need follow up information about today's clinic visit or your schedule [facility] at [phone].  Normal or non-critical lab and imaging results will be communicated to you by MyChart, letter or phone within 4 business days after the clinic has received the results. If you do not hear from us within 7 days, please contact the clinic through Icon Technologieshart or phone. If you have a critical or abnormal lab result, we will notify you by phone as soon as possible.  Submit refill requests through Front Desk HQ or call your pharmacy and they will forward the refill request to us. Please allow 3 business days for your refill to be completed.          Additional Information About Your Visit        MyChart Information     Front Desk HQ lets you send messages to your doctor, view your test results, renew your prescriptions, schedule appointments and more. To sign up, go to [email or web address] . Click on \"Log in\" on the left side of the screen, which will take you to the Welcome page. Then click on \"Sign up Now\" on the right side of the page.     You will be asked to enter the access code listed below, as well as some personal information. Please follow the directions to create your username and password.     Your access code is: [email or web address]  Expires: 2017  8:48 AM     Your access code will  in 90 days. If you need help or a new code, please call your [facility] or [phone].        Care EveryWhere ID     This is your Care EveryWhere ID. This could be used by other " organizations to access your [place] medical records  [id number]        Your Vitals Were     Pulse Temperature Respirations Pulse Oximetry BMI (Body Mass Index)       77 97.6  F (36.4  C) 16 91% 22.79 kg/m2        Blood Pressure from Last 3 Encounters:   05/15/17 105/53   04/10/17 120/60   03/06/17 109/44    Weight from Last 3 Encounters:   05/15/17 124 lb 9.6 oz (56.5 kg)   04/10/17 122 lb 9.6 oz (55.6 kg)   03/06/17 124 lb 11.2 oz (56.6 kg)              Today, you had the following     No orders found for display         Today's Medication Changes          These changes are accurate as of: 5/15/17 11:59 PM.  If you have any questions, ask your nurse or doctor.               These medicines have changed or have updated prescriptions.        Dose/Directions    carbidopa-levodopa  MG per tablet   Commonly known as:  SINEMET   This may have changed:  See the new instructions.   Used for:  Parkinsons (H)        TAKE 1 TABLET THREE TIMES A DAY   Quantity:  270 tablet   Refills:  1       polyethylene glycol powder   Commonly known as:  MIRALAX   This may have changed:  when to take this   Used for:  Constipation        Dose:  17 g   Take 17 g by mouth daily   Quantity:  500 g   Refills:  3       senna-docusate 8.6-50 MG per tablet   Commonly known as:  SENNA PLUS   This may have changed:  how much to take   Used for:  Constipation        Dose:  1 tablet   Take 1 tablet by mouth 2 times daily   Quantity:  180 tablet   Refills:  3                Primary Care Provider Office Phone # Fax #    Mario Fofana [phone]       [facility] [address] [place] [address]        Thank you!     Thank you for choosing [facility]  for your care. Our goal is always to provide you with excellent care. Hearing back from our patients is one way we can continue to improve our services. Please take a few minutes to complete the written survey that you may receive in the mail after your  visit with us. Thank you!             Your Updated Medication List - Protect others around you: Learn how to safely use, store and throw away your medicines at www.disposemymeds.org.          This list is accurate as of: 5/15/17 11:59 PM.  Always use your most recent med list.                   Brand Name Dispense Instructions for use    acetaminophen 500 MG tablet    TYLENOL    360 tablet    Take 2 tablets (1,000 mg) by mouth 2 times daily as needed for mild pain As needed       artificial saliva Soln solution     3 Bottle    Swish and spit 5 mLs in mouth as needed for dry mouth       aspirin 81 MG chewable tablet     90 tablet    Take 1 tablet (81 mg) by mouth daily       atorvastatin 40 MG tablet    LIPITOR    90 tablet    Take 1 tablet (40 mg) by mouth daily       calcium carbonate 500 MG chewable tablet    TUMS     Take 2 chew tab by mouth 2 times daily       carbidopa-levodopa  MG per tablet    SINEMET    270 tablet    TAKE 1 TABLET THREE TIMES A DAY       clopidogrel 75 MG tablet    PLAVIX    90 tablet    TAKE 1 TABLET DAILY       cycloSPORINE 0.05 % ophthalmic emulsion    RESTASIS    3 each    Place 1 drop into both eyes 2 times daily       furosemide 20 MG tablet    LASIX    90 tablet    Take 1 tablet (20 mg) by mouth as needed       gabapentin 100 MG capsule    NEURONTIN    180 capsule    TAKE 1 CAPSULE TWICE A DAY       guaiFENesin-codeine 100-10 MG/5ML Soln solution    guaiFENesin AC    473 mL    TAKE 2 TEASPOONFULS (10ML) BY MOUTH EVERY 4 HOURS AS NEEDED FOR COUGH       ketoconazole 2 % shampoo    NIZORAL    120 mL    Wash hair, let set for 5 minutes, rinse 1 time per day every Sunday       Lanolin 30 % Oint    CORONA MULTI-PURPOSE    3 Tube    Externally apply 1 Application topically 2 times daily       lidocaine 5 % Patch    LIDODERM    270 patch    APPLY UP TO 3 PATCHES TO PAINFUL AREA AT ONCE FOR UP TO 12 HOURS WITHIN A 24 HOURS PERIOD. REMOVE AFTER 12 HOURS       loratadine 10 MG tablet     ALLERGY RELIEF    90 tablet    Take 1 tablet (10 mg) by mouth daily       metoprolol 25 MG tablet    LOPRESSOR    90 tablet    Take 0.5 tablets (12.5 mg) by mouth 2 times daily       MYRBETRIQ 50 MG 24 hr tablet   Generic drug:  mirabegron     31 tablet    Take one (1) tablet BY MOUTH daily       nystatin cream    MYCOSTATIN    30 g    APPLY TO AFFECTED AREA 2 TIMES DAILY AS NEEDED       olopatadine 0.1 % ophthalmic solution    PATANOL    3 Bottle    INSTILL 1 DROP INTO BOTH EYES 2 TIMES DAILY       ondansetron 4 MG tablet    ZOFRAN    30 tablet    TAKE 1 TABLET BY MOUTH EVERY 8 HOURS AS NEEDED FOR NAUSEA AND VOMITING       order for DME     1 Device    Equipment being ordered: neoprene knee sleeve       pantoprazole 40 MG EC tablet    PROTONIX    90 tablet    Take 1 tablet (40 mg) by mouth daily       polyethylene glycol 0.4%- propylene glycol 0.3% 0.4-0.3 % Soln ophthalmic solution    SYSTANE    3 Bottle    Place 2 drops into both eyes 4 times daily as needed for dry eyes       polyethylene glycol powder    MIRALAX    500 g    Take 17 g by mouth daily       PRESERVISION AREDS Tabs     180 tablet    Take 1 tablet by mouth 2 times daily       senna-docusate 8.6-50 MG per tablet    SENNA PLUS    180 tablet    Take 1 tablet by mouth 2 times daily       sodium fluoride dental gel 1.1 % Gel topical gel    PREVIDENT     Apply 1 applicator to affected area At Bedtime       spironolactone 25 MG tablet    ALDACTONE    45 tablet    Take 0.5 tablets (12.5 mg) by mouth daily       vitamin D3 2000 UNITS Caps     90 capsule    Take 2,000 Units by mouth daily

## 2017-05-16 NOTE — PROGRESS NOTES
HISTORY OF PRESENT ILLNESS:  Britney is a 87 year old female (12/29/1929)  resident of [facility]  who is being seen today for a routine 30 day follow up. Patient has Parkinson's disease and she states she states she feels her trembling is getting worse. Her Sinemet was recently increased and she notes worsening nausea.  Her Sinemet was reduced  and her nausea is improved. Patient offers no other complaint.  Staff notes no other issues.    Current medications, allergies, and interdisciplinary care plan are reviewed.      Patient Active Problem List    Diagnosis Date Noted     Parkinson disease (H) 08/10/2015     Priority: High     CHD (coronary heart disease) 06/15/2015     Priority: High     03/2015  NSTEMI S/P angioplasty and stenting (ELIZABETH) LAD and D1       HF (heart failure) (H) 06/15/2015     Priority: High     TIA (transient ischemic attack) 03/16/2015     Priority: High     Neuralgia, post-herpetic 03/19/2014     Priority: High     Cerebrovascular disease 04/10/2013     Priority: High     HTN (hypertension) 09/24/2006     Priority: High     Glaucoma 12/27/2016     Priority: Medium     History of poliomyelitis 12/27/2016     Priority: Medium     Osteoarthritis, multiple joints 12/13/2015     Priority: Medium     Fibrocystic breast disease, left 12/13/2015     Priority: Medium     Constipation, chronic 04/20/2015     Priority: Medium     Hiatal hernia 03/16/2015     Priority: Medium     Chronic cough 07/15/2014     Priority: Medium     OAB (overactive bladder) 03/19/2014     Priority: Medium     S/P InterStim placement and multiple botox injections       Dyslipidemia 04/10/2013     Priority: Medium     Hemorrhoids 04/10/2013     Priority: Medium     Perennial allergic rhinitis 04/10/2013     Priority: Medium     Asthma, mild persistent 04/10/2013     Priority: Medium              GERD (gastroesophageal reflux disease) 04/10/2013     Priority: Medium     Osteoporosis 04/10/2013     Priority:  Medium     Kyphoscoliosis and scoliosis 04/10/2013     Priority: Medium     Insomnia, medical condition 04/10/2013     Priority: Medium     History of colonic polyps 04/10/2013     Priority: Medium     Nursing Home Visit 01/19/2016     Priority: Low     Health Care Home 12/09/2015     Priority: Low     Class: Chronic          Social History     Social History     Marital status:      Spouse name: N/A     Number of children: N/A     Years of education: N/A     Occupational History     Retired [employer]     Social History Main Topics     Smoking status: Passive Smoke Exposure - Never Smoker     Smokeless tobacco: Never Used      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 No     Social History Narrative        Current Outpatient Prescriptions   Medication Sig     gabapentin (NEURONTIN) 100 MG capsule TAKE 1 CAPSULE TWICE A DAY     clopidogrel (PLAVIX) 75 MG tablet TAKE 1 TABLET DAILY     atorvastatin (LIPITOR) 40 MG tablet Take 1 tablet (40 mg) by mouth daily     spironolactone (ALDACTONE) 25 MG tablet Take 0.5 tablets (12.5 mg) by mouth daily     MYRBETRIQ 50 MG 24 hr tablet Take one (1) tablet BY MOUTH daily     carbidopa-levodopa (SINEMET)  MG per tablet TAKE 1 TABLET THREE TIMES A DAY (Patient taking differently: TAKE 1 TABLET FOUR TIMES A DAY)     sodium fluoride dental gel (PREVIDENT) 1.1 %  GEL topical gel Apply 1 applicator to affected area At Bedtime     metoprolol (LOPRESSOR) 25 MG tablet Take 0.5 tablets (12.5 mg) by mouth 2 times daily     pantoprazole (PROTONIX) 40 MG enteric coated tablet Take 1 tablet (40 mg) by mouth daily     calcium carbonate (TUMS) 500 MG chewable tablet Take 2 chew tab by mouth 2 times daily     lidocaine (LIDODERM) 5 % patch APPLY UP TO 3 PATCHES TO PAINFUL AREA AT ONCE FOR UP TO 12 HOURS WITHIN A 24 HOURS PERIOD. REMOVE AFTER 12 HOURS     Multiple Vitamins-Minerals (PRESERVISION AREDS) TABS Take 1 tablet by mouth 2 times daily     olopatadine (PATANOL) 0.1 % ophthalmic solution INSTILL 1 DROP INTO BOTH EYES 2 TIMES DAILY     aspirin 81 MG chewable tablet Take 1 tablet (81 mg) by mouth daily     Cholecalciferol (VITAMIN D3) 2000 UNITS CAPS Take 2,000 Units by mouth daily     loratadine (ALLERGY RELIEF) 10 MG tablet Take 1 tablet (10 mg) by mouth daily     polyethylene glycol (MIRALAX) powder Take 17 g by mouth daily (Patient taking differently: Take 17 g by mouth 2 times daily )     senna-docusate (SENNA PLUS) 8.6-50 MG per tablet Take 1 tablet by mouth 2 times daily (Patient taking differently: Take 2 tablets by mouth 2 times daily )     acetaminophen (TYLENOL) 500 MG tablet Take 2 tablets (1,000 mg) by mouth 2 times daily as needed for mild pain As needed     artificial saliva, BIOTENE MT, (BIOTENE MT) SOLN Swish and spit 5 mLs in mouth as needed for dry mouth     furosemide (LASIX) 20 MG tablet Take 1 tablet (20 mg) by mouth as needed     ondansetron (ZOFRAN) 4 MG tablet TAKE 1 TABLET BY MOUTH EVERY 8 HOURS AS NEEDED FOR NAUSEA AND VOMITING     polyethylene glycol 0.4%- propylene glycol 0.3% (SYSTANE) 0.4-0.3 % SOLN ophthalmic solution Place 2 drops into both eyes 4 times daily as needed for dry eyes     ketoconazole (NIZORAL) 2 % shampoo Wash hair, let set for 5 minutes, rinse 1 time per day every Sunday     nystatin (MYCOSTATIN) cream APPLY TO AFFECTED AREA 2 TIMES  DAILY AS NEEDED     Lanolin (CORONA MULTI-PURPOSE) 30 % OINT Externally apply 1 Application topically 2 times daily     cycloSPORINE (RESTASIS) 0.05 % ophthalmic emulsion Place 1 drop into both eyes 2 times daily     guaiFENesin-codeine (GUAIFENESIN AC) 100-10 MG/5ML SOLN TAKE 2 TEASPOONFULS (10ML) BY MOUTH EVERY 4 HOURS AS NEEDED FOR COUGH     order for DME Equipment being ordered: neoprene knee sleeve     No current facility-administered medications for this visit.        Allergies   Allergen Reactions     Ambien      Zolpidem Tartrate     Tramadol        I have reviewed the care plan and do agree with the plan.    ROS:  No chest pain, shortness of breath, fever, chills, headache, nausea, vomiting, dysuria, or changes in bowel habits.  Appetite is normal.  Left shoulder/back  pain noted.          OBJECTIVE:  /53  Pulse 77  Temp 97.6  F (36.4  C)  Resp 16  Wt 124 lb 9.6 oz (56.5 kg)  SpO2 91%  BMI 22.79 kg/m2    GENERAL:  Chronically ill appearing, alert, and in no acute distress  RESP:  Lungs clear.  No rales, rhonchi, or wheezing  CV:  RRR.  S1 S2 without murmur. No clicks or rubs.  SKIN:  Age-related changes.  No suspicious lesions or rashes.  PSYCH:  Mentation intact, affect bright, and orientation intact.  EXTREM:  trace edema.  Pulses palpable.          Lab/Diagnostic data:    none    ASSESSMENT/ORDERS:  Nursing Home Visit  Will continue Sinemet at previous dosage.  Other issues stable.  No changes in current medications or care plan.      Total time spent with patient visit was 25 min including patient visit, review of pertinent clinical information, and treatment plan.      Mario Fofana MD

## 2017-06-07 DIAGNOSIS — A80.9 POLIO: ICD-10-CM

## 2017-06-07 DIAGNOSIS — K21.9 GASTROESOPHAGEAL REFLUX DISEASE, ESOPHAGITIS PRESENCE NOT SPECIFIED: ICD-10-CM

## 2017-06-07 RX ORDER — LIDOCAINE 50 MG/G
PATCH TOPICAL
Qty: 270 PATCH | Refills: 3 | Status: SHIPPED | OUTPATIENT
Start: 2017-06-07 | End: 2018-01-29

## 2017-06-08 ENCOUNTER — HOSPITAL ENCOUNTER (EMERGENCY)
Facility: HOSPITAL | Age: 82
Discharge: HOME | End: 2017-06-08
Attending: PHYSICIAN ASSISTANT | Admitting: PHYSICIAN ASSISTANT
Payer: MEDICARE

## 2017-06-08 ENCOUNTER — MEDICAL CORRESPONDENCE (OUTPATIENT)
Dept: HEALTH INFORMATION MANAGEMENT | Facility: HOSPITAL | Age: 82
End: 2017-06-08

## 2017-06-08 VITALS
SYSTOLIC BLOOD PRESSURE: 137 MMHG | RESPIRATION RATE: 16 BRPM | HEIGHT: 61 IN | OXYGEN SATURATION: 97 % | DIASTOLIC BLOOD PRESSURE: 57 MMHG | TEMPERATURE: 97.9 F

## 2017-06-08 DIAGNOSIS — N30.01 ACUTE CYSTITIS WITH HEMATURIA: ICD-10-CM

## 2017-06-08 LAB
ALBUMIN SERPL-MCNC: 3 G/DL (ref 3.4–5)
ALBUMIN UR-MCNC: 300 MG/DL
ALP SERPL-CCNC: 64 U/L (ref 40–150)
ALT SERPL W P-5'-P-CCNC: 8 U/L (ref 0–50)
ANION GAP SERPL CALCULATED.3IONS-SCNC: 6 MMOL/L (ref 3–14)
APPEARANCE UR: ABNORMAL
AST SERPL W P-5'-P-CCNC: 16 U/L (ref 0–45)
BACTERIA #/AREA URNS HPF: ABNORMAL /HPF
BASOPHILS # BLD AUTO: 0 10E9/L (ref 0–0.2)
BASOPHILS NFR BLD AUTO: 0.3 %
BILIRUB SERPL-MCNC: 0.4 MG/DL (ref 0.2–1.3)
BILIRUB UR QL STRIP: NEGATIVE
BUN SERPL-MCNC: 19 MG/DL (ref 7–30)
CALCIUM SERPL-MCNC: 8.7 MG/DL (ref 8.5–10.1)
CHLORIDE SERPL-SCNC: 108 MMOL/L (ref 94–109)
CO2 SERPL-SCNC: 28 MMOL/L (ref 20–32)
COLOR UR AUTO: YELLOW
CREAT SERPL-MCNC: 0.8 MG/DL (ref 0.52–1.04)
DIFFERENTIAL METHOD BLD: ABNORMAL
EOSINOPHIL # BLD AUTO: 0.2 10E9/L (ref 0–0.7)
EOSINOPHIL NFR BLD AUTO: 1.2 %
ERYTHROCYTE [DISTWIDTH] IN BLOOD BY AUTOMATED COUNT: 14 % (ref 10–15)
GFR SERPL CREATININE-BSD FRML MDRD: 68 ML/MIN/1.7M2
GLUCOSE SERPL-MCNC: 89 MG/DL (ref 70–99)
GLUCOSE UR STRIP-MCNC: NEGATIVE MG/DL
HCT VFR BLD AUTO: 36.1 % (ref 35–47)
HGB BLD-MCNC: 12.1 G/DL (ref 11.7–15.7)
HGB UR QL STRIP: ABNORMAL
IMM GRANULOCYTES # BLD: 0.1 10E9/L (ref 0–0.4)
IMM GRANULOCYTES NFR BLD: 0.4 %
KETONES UR STRIP-MCNC: 10 MG/DL
LEUKOCYTE ESTERASE UR QL STRIP: ABNORMAL
LYMPHOCYTES # BLD AUTO: 1.4 10E9/L (ref 0.8–5.3)
LYMPHOCYTES NFR BLD AUTO: 9.6 %
MCH RBC QN AUTO: 30.6 PG (ref 26.5–33)
MCHC RBC AUTO-ENTMCNC: 33.5 G/DL (ref 31.5–36.5)
MCV RBC AUTO: 91 FL (ref 78–100)
MONOCYTES # BLD AUTO: 0.9 10E9/L (ref 0–1.3)
MONOCYTES NFR BLD AUTO: 6 %
MUCOUS THREADS #/AREA URNS LPF: PRESENT /LPF
NEUTROPHILS # BLD AUTO: 11.7 10E9/L (ref 1.6–8.3)
NEUTROPHILS NFR BLD AUTO: 82.5 %
NITRATE UR QL: NEGATIVE
NRBC # BLD AUTO: 0 10*3/UL
NRBC BLD AUTO-RTO: 0 /100
PH UR STRIP: 5.5 PH (ref 4.7–8)
PLATELET # BLD AUTO: 137 10E9/L (ref 150–450)
POTASSIUM SERPL-SCNC: 3.9 MMOL/L (ref 3.4–5.3)
PROT SERPL-MCNC: 6.2 G/DL (ref 6.8–8.8)
RBC # BLD AUTO: 3.96 10E12/L (ref 3.8–5.2)
RBC #/AREA URNS AUTO: >182 /HPF (ref 0–2)
SODIUM SERPL-SCNC: 142 MMOL/L (ref 133–144)
SP GR UR STRIP: 1.02 (ref 1–1.03)
URN SPEC COLLECT METH UR: ABNORMAL
UROBILINOGEN UR STRIP-MCNC: NORMAL MG/DL (ref 0–2)
WBC # BLD AUTO: 14.2 10E9/L (ref 4–11)
WBC #/AREA URNS AUTO: >182 /HPF (ref 0–2)

## 2017-06-08 PROCEDURE — 80053 COMPREHEN METABOLIC PANEL: CPT | Performed by: PHYSICIAN ASSISTANT

## 2017-06-08 PROCEDURE — 87186 SC STD MICRODIL/AGAR DIL: CPT | Performed by: PHYSICIAN ASSISTANT

## 2017-06-08 PROCEDURE — 87086 URINE CULTURE/COLONY COUNT: CPT | Performed by: PHYSICIAN ASSISTANT

## 2017-06-08 PROCEDURE — 36415 COLL VENOUS BLD VENIPUNCTURE: CPT | Performed by: PHYSICIAN ASSISTANT

## 2017-06-08 PROCEDURE — 99284 EMERGENCY DEPT VISIT MOD MDM: CPT | Performed by: PHYSICIAN ASSISTANT

## 2017-06-08 PROCEDURE — 25000128 H RX IP 250 OP 636: Performed by: PHYSICIAN ASSISTANT

## 2017-06-08 PROCEDURE — 99284 EMERGENCY DEPT VISIT MOD MDM: CPT | Mod: 25

## 2017-06-08 PROCEDURE — 87088 URINE BACTERIA CULTURE: CPT | Performed by: PHYSICIAN ASSISTANT

## 2017-06-08 PROCEDURE — 81001 URINALYSIS AUTO W/SCOPE: CPT | Performed by: PHYSICIAN ASSISTANT

## 2017-06-08 PROCEDURE — 85025 COMPLETE CBC W/AUTO DIFF WBC: CPT | Performed by: PHYSICIAN ASSISTANT

## 2017-06-08 PROCEDURE — 96365 THER/PROPH/DIAG IV INF INIT: CPT

## 2017-06-08 RX ORDER — SODIUM CHLORIDE/ALOE VERA
GEL (GRAM) NASAL AT BEDTIME
Status: ON HOLD | COMMUNITY
End: 2017-10-12

## 2017-06-08 RX ORDER — CEFTRIAXONE SODIUM 1 G/50ML
1 INJECTION, SOLUTION INTRAVENOUS ONCE
Status: COMPLETED | OUTPATIENT
Start: 2017-06-08 | End: 2017-06-08

## 2017-06-08 RX ORDER — SULFAMETHOXAZOLE/TRIMETHOPRIM 800-160 MG
1 TABLET ORAL 2 TIMES DAILY
Qty: 10 TABLET | Refills: 0 | Status: SHIPPED | OUTPATIENT
Start: 2017-06-08 | End: 2017-06-13

## 2017-06-08 RX ORDER — PANTOPRAZOLE SODIUM 40 MG/1
TABLET, DELAYED RELEASE ORAL
Qty: 90 TABLET | Refills: 1 | Status: SHIPPED | OUTPATIENT
Start: 2017-06-08 | End: 2018-05-07

## 2017-06-08 RX ADMIN — SODIUM CHLORIDE 500 ML: 9 INJECTION, SOLUTION INTRAVENOUS at 14:10

## 2017-06-08 RX ADMIN — CEFTRIAXONE SODIUM 1 G: 1 INJECTION, SOLUTION INTRAVENOUS at 14:55

## 2017-06-08 ASSESSMENT — ENCOUNTER SYMPTOMS
ANAL BLEEDING: 0
APPETITE CHANGE: 0
FREQUENCY: 0
HEMATURIA: 0
CHILLS: 0
SORE THROAT: 0
ABDOMINAL DISTENTION: 0
DIARRHEA: 0
BLOOD IN STOOL: 0
NAUSEA: 0
MUSCULOSKELETAL NEGATIVE: 1
SHORTNESS OF BREATH: 0
DYSURIA: 1
VOMITING: 0
FEVER: 0
DIFFICULTY URINATING: 0
ABDOMINAL PAIN: 1
UNEXPECTED WEIGHT CHANGE: 0
CONSTIPATION: 0
FLANK PAIN: 0
NEUROLOGICAL NEGATIVE: 1

## 2017-06-08 NOTE — ED PROVIDER NOTES
History     Chief Complaint   Patient presents with     Dysuria     HPI  Britney Schaeffer is a 87 year old female who was sent from the nursing home for dysuria x 2 days. A urinalysis has not been done yet at the nursing home. Pt states it burns when she urinates. She denies fevers/chills or n/v. She has some mild suprapubic pain as well.     I have reviewed the Medications, Allergies, Past Medical and Surgical History, and Social History in the Epic system.    Review of Systems   Constitutional: Negative for appetite change, chills, fever and unexpected weight change.   HENT: Negative for sore throat.    Respiratory: Negative for shortness of breath.    Cardiovascular: Negative for chest pain.   Gastrointestinal: Positive for abdominal pain (Suprapubic. ). Negative for abdominal distention, anal bleeding, blood in stool, constipation, diarrhea, nausea and vomiting.   Genitourinary: Positive for dysuria. Negative for difficulty urinating, dyspareunia, flank pain, frequency, genital sores, hematuria, pelvic pain, urgency, vaginal bleeding, vaginal discharge and vaginal pain.   Musculoskeletal: Negative.    Skin: Negative.    Neurological: Negative.    All other systems reviewed and are negative.    Past Medical History:   Past Medical History:   Diagnosis Date     Allergic rhinitis, Seasonal 06/19/2000     Colonic polyps 09/28/2000     Corns and callosities 01/20/2004     dyslipidemia 09/28/2000     Fibrocystic Breast Disease 03/01/2011     GERD 03/01/2011     hemorrhoids 03/01/2011     hypertension, benign 11/22/1999     Insomnia, unspecified 02/05/2004     Osteoarthritis, generalized 11/07/2008     Osteoporosis, unspecified 04/20/2009     Polio 1931     Scoliosis (and kyphoscoliosis), idiopathic 03/01/2011     TIA 03/09/2005     Unspecified asthma(493.90) 03/11/2003       Past Surgical History:   Procedure Laterality Date     BREAST BIOPSY, RT/LT       BUNIONECTOMY      Bunion     COLONOSCOPY  2007      GENITOURINARY SURGERY      botox     JOINT REPLACEMENT      LT     JOINT REPLACEMENT, HIP RT/LT  2010     KERATOPLASTY PENETRATING, VITRECTOMY ANTERIOR, EXTRACAPSULAR CATARACT EXTRACTION, COMBINED  2010    LT, Cataracts       Social History     Social History     Marital status:      Spouse name: N/A     Number of children: N/A     Years of education: N/A     Occupational History     Retired [employer]     Social History Main Topics     Smoking status: Passive Smoke Exposure - Never Smoker     Smokeless tobacco: Never Used      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 No     Social History Narrative       Patient's Medications   New Prescriptions    SULFAMETHOXAZOLE-TRIMETHOPRIM (BACTRIM DS) 800-160 MG PER TABLET    Take 1 tablet by mouth 2 times daily for 5 days   Previous Medications    ACETAMINOPHEN (TYLENOL) 500 MG TABLET    Take 2 tablets (1,000 mg) by mouth 2 times daily as needed for mild pain As needed    ARTIFICIAL SALIVA, BIOTENE MT, (BIOTENE MT) SOLN    Swish and spit 5 mLs in mouth as needed for dry mouth    ASPIRIN 81 MG CHEWABLE TABLET    Take 1 tablet (81 mg) by mouth daily    ATORVASTATIN (LIPITOR) 40 MG TABLET    Take 1 tablet (40 mg) by mouth daily    CALCIUM CARBONATE (TUMS) 500 MG CHEWABLE TABLET    Take 2 chew tab by mouth 2 times daily    CARBIDOPA-LEVODOPA (SINEMET)  MG  PER TABLET    TAKE 1 TABLET THREE TIMES A DAY    CHOLECALCIFEROL (VITAMIN D3) 2000 UNITS CAPS    Take 2,000 Units by mouth daily    CLOPIDOGREL (PLAVIX) 75 MG TABLET    TAKE 1 TABLET DAILY    CYCLOSPORINE (RESTASIS) 0.05 % OPHTHALMIC EMULSION    Place 1 drop into both eyes 2 times daily    FUROSEMIDE (LASIX) 20 MG TABLET    Take 1 tablet (20 mg) by mouth as needed    GABAPENTIN (NEURONTIN) 100 MG CAPSULE    TAKE 1 CAPSULE TWICE A DAY    GUAIFENESIN-CODEINE (GUAIFENESIN AC) 100-10 MG/5ML SOLN    TAKE 2 TEASPOONFULS (10ML) BY MOUTH EVERY 4 HOURS AS NEEDED FOR COUGH    KETOCONAZOLE (NIZORAL) 2 % SHAMPOO    Wash hair, let set for 5 minutes, rinse 1 time per day every Sunday    LANOLIN (CORONA MULTI-PURPOSE) 30 % OINT    Externally apply 1 Application topically 2 times daily    LIDOCAINE (LIDODERM) 5 % PATCH    APPLY UP TO 3 PATCHES TO PAINFUL AREA AT ONCE FOR UP TO 12 HOURS WITHIN A 24 HOURS PERIOD. REMOVE AFTER 12 HOURS    LORATADINE (ALLERGY RELIEF) 10 MG TABLET    Take 1 tablet (10 mg) by mouth daily    METOPROLOL (LOPRESSOR) 25 MG TABLET    Take 0.5 tablets (12.5 mg) by mouth 2 times daily    MULTIPLE VITAMINS-MINERALS (PRESERVISION AREDS) TABS    Take 1 tablet by mouth 2 times daily    MYRBETRIQ 50 MG 24 HR TABLET    Take one (1) tablet BY MOUTH daily    NYSTATIN (MYCOSTATIN) CREAM    APPLY TO AFFECTED AREA 2 TIMES DAILY AS NEEDED    OLOPATADINE (PATANOL) 0.1 % OPHTHALMIC SOLUTION    INSTILL 1 DROP INTO BOTH EYES 2 TIMES DAILY    ONDANSETRON (ZOFRAN) 4 MG TABLET    TAKE 1 TABLET BY MOUTH EVERY 8 HOURS AS NEEDED FOR NAUSEA AND VOMITING    ORDER FOR DME    Equipment being ordered: neoprene knee sleeve    PANTOPRAZOLE (PROTONIX) 40 MG EC TABLET    TAKE 1 TABLET DAILY    POLYETHYLENE GLYCOL (MIRALAX) POWDER    Take 17 g by mouth daily    POLYETHYLENE GLYCOL 0.4%- PROPYLENE GLYCOL 0.3% (SYSTANE) 0.4-0.3 % SOLN OPHTHALMIC SOLUTION    Place 2 drops into both eyes 4 times daily as needed for dry eyes    SALINE NASAL (AYR  "SALINE) GEL TOPICAL GEL    Apply into each nare At Bedtime    SENNA-DOCUSATE (SENNA PLUS) 8.6-50 MG PER TABLET    Take 1 tablet by mouth 2 times daily    SODIUM FLUORIDE DENTAL GEL (PREVIDENT) 1.1 % GEL TOPICAL GEL    Apply 1 applicator to affected area At Bedtime    SPIRONOLACTONE (ALDACTONE) 25 MG TABLET    Take 0.5 tablets (12.5 mg) by mouth daily   Modified Medications    No medications on file   Discontinued Medications    AMOXICILLIN PO    Take 500 mg by mouth 4 times daily as needed       Allergies: Ambien and Tramadol    Physical Exam   BP: (!) 127/49  Heart Rate: 52  Temp: 97.6  F (36.4  C)  Resp: 16  Height: 154.9 cm (5' 1\")  SpO2: 95 %  Physical Exam   Constitutional: She is oriented to person, place, and time. Vital signs are normal. She appears well-developed and well-nourished.  Non-toxic appearance. She does not have a sickly appearance. She does not appear ill. No distress.   HENT:   Head: Normocephalic and atraumatic.   Right Ear: External ear normal.   Left Ear: External ear normal.   Nose: Nose normal.   Mouth/Throat: Oropharynx is clear and moist. No oropharyngeal exudate.   Eyes: Conjunctivae are normal. Pupils are equal, round, and reactive to light. Right eye exhibits no discharge. Left eye exhibits no discharge. No scleral icterus.   Neck: Normal range of motion. Neck supple.   Cardiovascular: Normal rate, regular rhythm, normal heart sounds and intact distal pulses.  Exam reveals no gallop and no friction rub.    No murmur heard.  Pulmonary/Chest: Effort normal and breath sounds normal. No respiratory distress. She has no wheezes. She has no rales. She exhibits no tenderness.   Abdominal: Soft. Bowel sounds are normal. She exhibits no distension and no mass. There is tenderness in the suprapubic area. There is no rebound and no guarding.   Musculoskeletal: Normal range of motion. She exhibits no edema.   Lymphadenopathy:     She has no cervical adenopathy.   Neurological: She is alert and " oriented to person, place, and time. No cranial nerve deficit.   Skin: Skin is warm and dry. No rash noted. She is not diaphoretic. No erythema. No pallor.   Psychiatric: She has a normal mood and affect. Her behavior is normal. Judgment and thought content normal.   Nursing note and vitals reviewed.      ED Course     ED Course     Procedures             Labs Ordered and Resulted from Time of ED Arrival Up to the Time of Departure from the ED   UA MACROSCOPIC WITH REFLEX TO MICRO AND CULTURE - Abnormal; Notable for the following:        Result Value    Ketones Urine 10 (*)     Blood Urine Moderate (*)     Protein Albumin Urine 300 (*)     Leukocyte Esterase Urine Large (*)     RBC Urine >182 (*)     WBC Urine >182 (*)     Bacteria Urine None (*)     Mucous Urine Present (*)     All other components within normal limits   CBC WITH PLATELETS DIFFERENTIAL - Abnormal; Notable for the following:     WBC 14.2 (*)     Platelet Count 137 (*)     Absolute Neutrophil 11.7 (*)     All other components within normal limits   COMPREHENSIVE METABOLIC PANEL - Abnormal; Notable for the following:     Albumin 3.0 (*)     Protein Total 6.2 (*)     All other components within normal limits   PERIPHERAL IV CATHETER   URINE CULTURE AEROBIC BACTERIAL       Assessments & Plan (with Medical Decision Making)   Britney is in NAD and is non-toxic appearing. VS are WNL, afebrile. She has some mild suprapubic tenderness on exam and UA is consistent with UTI. She was given a 500ml bolus of NS and Rocephin 1g IV in ED for her UTI. RX for Bactrim DS BID x 5 days (sensitive on her last urine culture in 2015), and f/u with PCP in 5 days for re-check. She was discharged back to the nursing home in stable condition.         Plan: Increase fluids. Take the Bactrim as prescribed for you bladder infection. Return here with any new or worsening symptoms.     I have reviewed the nursing notes.    I have reviewed the findings, diagnosis, plan and need for  follow up with the patient.    New Prescriptions    SULFAMETHOXAZOLE-TRIMETHOPRIM (BACTRIM DS) 800-160 MG PER TABLET    Take 1 tablet by mouth 2 times daily for 5 days       Final diagnoses:   Acute cystitis with hematuria       6/8/2017   [facility]     Sofía Kohli PA-C  06/08/17 [id number]

## 2017-06-08 NOTE — ED AVS SNAPSHOT
[facility]    [address]    [facility] [address]    Phone:  [phone]                                       Britney Schaeffer   MRN: [id number]    Department:  [facility]   Date of Visit:  6/8/2017           Patient Information     Date Of Birth          12/29/1929        Your diagnoses for this visit were:     Acute cystitis with hematuria        You were seen by Sofía Kohli PA-C.      Follow-up Information     Follow up with Mario Fofana MD In 5 days.    Specialty:  Family Practice    Contact information:    [facility]  [address]  [facility] [address] [phone]          Follow up with [facility].    Specialty:  EMERGENCY MEDICINE    Why:  If symptoms worsen    Contact information:    [address]  [facility] [address] [phone]    Additional information:    From [facility]: Take [address]. Turn left at [address]. Turn left at the first stoplight on [address]. At the first stop sign, take a right onto [address]. Take a left into the parking lot and continue through until you reach the North enterance of the building.       From [place]: Take [address]. Take the [address] ramp towards [place]. Turn left onto [address]. Take a slight right onto [address]. Turn left at the first stoplight on [address]. At the first stop sign, take a right onto [address]. Take a left into the parking lot and continue through until you reach the North enterance of the building.       From [place]: Take [address]. Take a right at [address]. At the first stop sign, take a right onto [address]. Take a left into the parking lot and continue through until you reach the North enterance of the building.         Discharge Instructions       Increase fluids. Take the Bactrim as prescribed for you bladder infection. Return here with any new or worsening  symptoms.     Understanding Urinary Tract Infections (UTIs)  Most UTIs are caused by bacteria, although they may also be caused by viruses or fungi. Bacteria from the bowel are the most common source of infection. The infection may begin because of any of the following:    Sexual activity. During sex, germs can travel from the penis, vagina, or rectum into the urethra.     Germs on the skin outside the rectum may travel into the urethra. This is more common in women since the rectum and urethra are closer to each other than in men. Wiping from front to back after using the toilet and keeping the area clean can help prevent germs from getting to the urethra.    Blockage of urine flow through the urinary tract. If urine sits too long, germs may begin to grow out of control.      Parts of the urinary tract  The infection can occur in any part of the urinary tract.    The kidneys collect and store urine.    The ureters carry urine from the kidneys to the bladder.    The bladder holds urine until you are ready to let it out.    The urethra carries urine from the bladder out of the body. It is shorter in women, so bacteria can move through it more easily. The urethra is longer in men, so a UTI is less likely to reach the bladder or kidneys in men.    0467-5861 [employer]. [address] [place] [address], [place] [address]. All rights reserved. This information is not intended as a substitute for professional medical care. Always follow your healthcare professional's instructions.             Review of your medicines      START taking        Dose / Directions Last dose taken    sulfamethoxazole-trimethoprim 800-160 MG per tablet   Commonly known as:  BACTRIM DS   Dose:  1 tablet   Quantity:  10 tablet        Take 1 tablet by mouth 2 times daily for 5 days   Refills:  0          Our records show that you are taking the medicines listed below. If these are incorrect, please call your family doctor or clinic.         Dose / Directions Last dose taken    acetaminophen 500 MG tablet   Commonly known as:  TYLENOL   Dose:  1000 mg   Quantity:  360 tablet        Take 2 tablets (1,000 mg) by mouth 2 times daily as needed for mild pain As needed   Refills:  1        artificial saliva Soln solution   Dose:  5 mL   Quantity:  3 Bottle        Swish and spit 5 mLs in mouth as needed for dry mouth   Refills:  3        aspirin 81 MG chewable tablet   Dose:  81 mg   Quantity:  90 tablet        Take 1 tablet (81 mg) by mouth daily   Refills:  3        atorvastatin 40 MG tablet   Commonly known as:  LIPITOR   Dose:  40 mg   Quantity:  90 tablet        Take 1 tablet (40 mg) by mouth daily   Refills:  3        calcium carbonate 500 MG chewable tablet   Commonly known as:  TUMS   Dose:  2 chew tab        Take 2 chew tab by mouth 2 times daily   Refills:  0        carbidopa-levodopa  MG per tablet   Commonly known as:  SINEMET   Quantity:  270 tablet        TAKE 1 TABLET THREE TIMES A DAY   Refills:  1        clopidogrel 75 MG tablet   Commonly known as:  PLAVIX   Quantity:  90 tablet        TAKE 1 TABLET DAILY   Refills:  2        cycloSPORINE 0.05 % ophthalmic emulsion   Commonly known as:  RESTASIS   Dose:  1 drop   Quantity:  3 each        Place 1 drop into both eyes 2 times daily   Refills:  3        furosemide 20 MG tablet   Commonly known as:  LASIX   Dose:  20 mg   Quantity:  90 tablet        Take 1 tablet (20 mg) by mouth as needed   Refills:  1        gabapentin 100 MG capsule   Commonly known as:  NEURONTIN   Quantity:  180 capsule        TAKE 1 CAPSULE TWICE A DAY   Refills:  2        guaiFENesin-codeine 100-10 MG/5ML Soln solution   Commonly known as:  guaiFENesin AC   Quantity:  473 mL        TAKE 2 TEASPOONFULS (10ML) BY MOUTH EVERY 4 HOURS AS NEEDED FOR COUGH   Refills:  0        ketoconazole 2 % shampoo   Commonly known as:  NIZORAL   Quantity:  120 mL        Wash hair, let set for 5 minutes, rinse 1 time per day every Sunday    Refills:  2        Lanolin 30 % Oint   Commonly known as:  CORONA MULTI-PURPOSE   Dose:  1 Application   Quantity:  3 Tube        Externally apply 1 Application topically 2 times daily   Refills:  3        lidocaine 5 % Patch   Commonly known as:  LIDODERM   Quantity:  270 patch        APPLY UP TO 3 PATCHES TO PAINFUL AREA AT ONCE FOR UP TO 12 HOURS WITHIN A 24 HOURS PERIOD. REMOVE AFTER 12 HOURS   Refills:  3        loratadine 10 MG tablet   Commonly known as:  ALLERGY RELIEF   Dose:  1 tablet   Quantity:  90 tablet        Take 1 tablet (10 mg) by mouth daily   Refills:  3        metoprolol 25 MG tablet   Commonly known as:  LOPRESSOR   Dose:  12.5 mg   Quantity:  90 tablet        Take 0.5 tablets (12.5 mg) by mouth 2 times daily   Refills:  3        MYRBETRIQ 50 MG 24 hr tablet   Quantity:  31 tablet   Generic drug:  mirabegron        Take one (1) tablet BY MOUTH daily   Refills:  0        nystatin cream   Commonly known as:  MYCOSTATIN   Quantity:  30 g        APPLY TO AFFECTED AREA 2 TIMES DAILY AS NEEDED   Refills:  2        olopatadine 0.1 % ophthalmic solution   Commonly known as:  PATANOL   Quantity:  3 Bottle        INSTILL 1 DROP INTO BOTH EYES 2 TIMES DAILY   Refills:  3        ondansetron 4 MG tablet   Commonly known as:  ZOFRAN   Quantity:  30 tablet        TAKE 1 TABLET BY MOUTH EVERY 8 HOURS AS NEEDED FOR NAUSEA AND VOMITING   Refills:  2        order for DME   Quantity:  1 Device        Equipment being ordered: neoprene knee sleeve   Refills:  0        pantoprazole 40 MG EC tablet   Commonly known as:  PROTONIX   Quantity:  90 tablet        TAKE 1 TABLET DAILY   Refills:  1        polyethylene glycol 0.4%- propylene glycol 0.3% 0.4-0.3 % Soln ophthalmic solution   Commonly known as:  SYSTANE   Dose:  2 drop   Quantity:  3 Bottle        Place 2 drops into both eyes 4 times daily as needed for dry eyes   Refills:  1        polyethylene glycol powder   Commonly known as:  MIRALAX   Dose:  17 g    Quantity:  500 g        Take 17 g by mouth daily   Refills:  3        PRESERVISION AREDS Tabs   Dose:  1 tablet   Quantity:  180 tablet        Take 1 tablet by mouth 2 times daily   Refills:  3        saline nasal Gel topical gel        Apply into each nare At Bedtime   Refills:  0        senna-docusate 8.6-50 MG per tablet   Commonly known as:  SENNA PLUS   Dose:  1 tablet   Quantity:  180 tablet        Take 1 tablet by mouth 2 times daily   Refills:  3        sodium fluoride dental gel 1.1 % Gel topical gel   Commonly known as:  PREVIDENT   Dose:  1 applicator        Apply 1 applicator to affected area At Bedtime   Refills:  0        spironolactone 25 MG tablet   Commonly known as:  ALDACTONE   Dose:  12.5 mg   Quantity:  45 tablet        Take 0.5 tablets (12.5 mg) by mouth daily   Refills:  3        vitamin D3 2000 UNITS Caps   Dose:  2000 Units   Quantity:  90 capsule        Take 2,000 Units by mouth daily   Refills:  3                Prescriptions were sent or printed at these locations (1 Prescription)                   [facility]  [address]   [address] [place] [address]    Telephone:  [phone]   Fax:  [phone]   Hours:                  Printed at Department/Unit printer (1 of 1)         sulfamethoxazole-trimethoprim (BACTRIM DS) 800-160 MG per tablet                Procedures and tests performed during your visit     CBC with platelets differential    Comprehensive metabolic panel    Peripheral IV catheter    UA reflex to Microscopic and Culture    Urine Culture Aerobic Bacterial      Orders Needing Specimen Collection     None      Pending Results     Date and Time Order Name Status Description    6/8/2017 1436 Urine Culture Aerobic Bacterial In process             Pending Culture Results     Date and Time Order Name Status Description    6/8/2017 1436 Urine Culture Aerobic Bacterial In process             Thank you for choosing [facility]       Thank you for  "choosing [facility] for your care. Our goal is always to provide you with excellent care. Hearing back from our patients is one way we can continue to improve our services. Please take a few minutes to complete the written survey that you may receive in the mail after you visit with us. Thank you!        [facility] Information     [employer] lets you send messages to your doctor, view your test results, renew your prescriptions, schedule appointments and more. To sign up, go to [email or web address] . Click on \"Log in\" on the left side of the screen, which will take you to the Welcome page. Then click on \"Sign up Now\" on the right side of the page.     You will be asked to enter the access code listed below, as well as some personal information. Please follow the directions to create your username and password.     Your access code is: [email or web address]  Expires: 2017  8:48 AM     Your access code will  in 90 days. If you need help or a new code, please call your [facility] clinic or [phone].        Care EveryWhere ID     This is your Care EveryWhere ID. This could be used by other organizations to access your [facility] medical records  [id number]        After Visit Summary       This is your record. Keep this with you and show to your community pharmacist(s) and doctor(s) at your next visit.                  "

## 2017-06-08 NOTE — ED NOTES
Pt brought in by [facility]. Pt lives at [facility] and has been c/o pain with urination starting on 6/7/2017. BP 80/60's and feels she is dehydrated and needs fluids and pt requested to be seen in ER.

## 2017-06-08 NOTE — DISCHARGE INSTRUCTIONS
Increase fluids. Take the Bactrim as prescribed for you bladder infection. Return here with any new or worsening symptoms.     Understanding Urinary Tract Infections (UTIs)  Most UTIs are caused by bacteria, although they may also be caused by viruses or fungi. Bacteria from the bowel are the most common source of infection. The infection may begin because of any of the following:    Sexual activity. During sex, germs can travel from the penis, vagina, or rectum into the urethra.     Germs on the skin outside the rectum may travel into the urethra. This is more common in women since the rectum and urethra are closer to each other than in men. Wiping from front to back after using the toilet and keeping the area clean can help prevent germs from getting to the urethra.    Blockage of urine flow through the urinary tract. If urine sits too long, germs may begin to grow out of control.      Parts of the urinary tract  The infection can occur in any part of the urinary tract.    The kidneys collect and store urine.    The ureters carry urine from the kidneys to the bladder.    The bladder holds urine until you are ready to let it out.    The urethra carries urine from the bladder out of the body. It is shorter in women, so bacteria can move through it more easily. The urethra is longer in men, so a UTI is less likely to reach the bladder or kidneys in men.    5155-8391 [employer]. [address] [place] [address], [place] [address]. All rights reserved. This information is not intended as a substitute for professional medical care. Always follow your healthcare professional's instructions.

## 2017-06-08 NOTE — ED AVS SNAPSHOT
[facility]    [address]    [place] [address]    Phone:  [phone]                                       Britney Schaeffer   MRN: [id number]    Department:  [facility]   Date of Visit:  6/8/2017           After Visit Summary Signature Page     I have received my discharge instructions, and my questions have been answered. I have discussed any challenges I see with this plan with the nurse or doctor.    ..........................................................................................................................................  Patient/Patient Representative Signature      ..........................................................................................................................................  Patient Representative Print Name and Relationship to Patient    ..................................................               ................................................  Date                                            Time    ..........................................................................................................................................  Reviewed by Signature/Title    ...................................................              ..............................................  Date                                                            Time

## 2017-06-08 NOTE — ED NOTES
Per the nurse at nursing home. Pt with burning with urination must be offered fluids for 5 days before urine is checked.

## 2017-06-12 LAB
BACTERIA SPEC CULT: ABNORMAL
MICRO REPORT STATUS: ABNORMAL
MICROORGANISM SPEC CULT: ABNORMAL
SPECIMEN SOURCE: ABNORMAL

## 2017-06-12 NOTE — PROGRESS NOTES
Urine culture report routed to Dr Fofana and reviewed with Dr Dianelys Oneil. Per Dr Oneil, no change in antibiotic required as Bactrim DS had been prescribed.   DATE:  6/12/2017   TIME OF RECEIPT FROM LAB:  0803  LAB TEST:  0820  LAB VALUE:  Positive urine culture for E-Coli ESBL.   RESULTS GIVEN WITH READ-BACK TO (PROVIDER):  Dr Dianelys Oneil  TIME LAB VALUE REPORTED TO PROVIDER: 0820

## 2017-06-19 ENCOUNTER — NURSING HOME VISIT (OUTPATIENT)
Dept: FAMILY MEDICINE | Facility: OTHER | Age: 82
End: 2017-06-19
Payer: MEDICARE

## 2017-06-19 DIAGNOSIS — Z78.9 NURSING HOME RESIDENT: Primary | ICD-10-CM

## 2017-06-19 PROCEDURE — 99308 SBSQ NF CARE LOW MDM 20: CPT | Performed by: NURSE PRACTITIONER

## 2017-06-19 NOTE — MR AVS SNAPSHOT
"              After Visit Summary   2017    Britney Schaeffer    MRN: [id number]           Patient Information     Date Of Birth          1929        Visit Information        Provider Department      2017 9:45 AM Minerva Jimenez APRN CNP [facility]        Today's Diagnoses     Nursing home resident    -  1       Follow-ups after your visit        Follow-up notes from your care team     Return in about 4 weeks (around 2017).      Who to contact     If you have questions or need follow up information about today's clinic visit or your schedule please [facility] at [phone].  Normal or non-critical lab and imaging results will be communicated to you by MyChart, letter or phone within 4 business days after the clinic has received the results. If you do not hear from us within 7 days, please contact the clinic through MyChart or phone. If you have a critical or abnormal lab result, we will notify you by phone as soon as possible.  Submit refill requests through [employer] or call your pharmacy and they will forward the refill request to us. Please allow 3 business days for your refill to be completed.          Additional Information About Your Visit        MyChart Information     [employer] lets you send messages to your doctor, view your test results, renew your prescriptions, schedule appointments and more. To sign up, go to [email or web address] . Click on \"Log in\" on the left side of the screen, which will take you to the Welcome page. Then click on \"Sign up Now\" on the right side of the page.     You will be asked to enter the access code listed below, as well as some personal information. Please follow the directions to create your username and password.     Your access code is: [email or web address]  Expires: 2017  8:48 AM     Your access code will  in 90 days. If you need help or a new code, please call your [facility] clinic or " [phone].        Care EveryWhere ID     This is your Care EveryWhere ID. This could be used by other organizations to access your [facility] medical records  [id number]        Your Vitals Were     Pulse Temperature Respirations Pulse Oximetry BMI (Body Mass Index)       56 97.2  F (36.2  C) 16 92% 24.34 kg/m2        Blood Pressure from Last 3 Encounters:   06/21/17 98/52   06/08/17 137/57   05/15/17 105/53    Weight from Last 3 Encounters:   06/21/17 128 lb 12.8 oz (58.4 kg)   05/15/17 124 lb 9.6 oz (56.5 kg)   04/10/17 122 lb 9.6 oz (55.6 kg)              Today, you had the following     No orders found for display         Today's Medication Changes          These changes are accurate as of: 6/19/17 11:59 PM.  If you have any questions, ask your nurse or doctor.               These medicines have changed or have updated prescriptions.        Dose/Directions    carbidopa-levodopa  MG per tablet   Commonly known as:  SINEMET   This may have changed:  See the new instructions.   Used for:  Parkinsons (H)        TAKE 1 TABLET THREE TIMES A DAY   Quantity:  270 tablet   Refills:  1       polyethylene glycol powder   Commonly known as:  MIRALAX   This may have changed:  when to take this   Used for:  Constipation        Dose:  17 g   Take 17 g by mouth daily   Quantity:  500 g   Refills:  3       senna-docusate 8.6-50 MG per tablet   Commonly known as:  SENNA PLUS   This may have changed:  how much to take   Used for:  Constipation        Dose:  1 tablet   Take 1 tablet by mouth 2 times daily   Quantity:  180 tablet   Refills:  3                Primary Care Provider Office Phone # Fax #    Mario Fofana [phone]       [facility] [address]  [facility] [address]        Equal Access to Services     DES LEES AH: Amy Carrillo, wapaulda luqminnie, qaybta kaalmada tana, celena sandoval. So [facility] [phone].    ATENCIÓN: Si fran wade,  tiene a [place] disposición servicios gratuitos de asistencia lingüística. Lucia ng [phone].    We comply with applicable federal civil rights laws and [place] laws. We do not discriminate on the basis of race, color, national origin, age, disability sex, sexual orientation or gender identity.            Thank you!     Thank you for choosing [facility]  for your care. Our goal is always to provide you with excellent care. Hearing back from our patients is one way we can continue to improve our services. Please take a few minutes to complete the written survey that you may receive in the mail after your visit with us. Thank you!             Your Updated Medication List - Protect others around you: Learn how to safely use, store and throw away your medicines at www.disposemymeds.org.          This list is accurate as of: 6/19/17 11:59 PM.  Always use your most recent med list.                   Brand Name Dispense Instructions for use Diagnosis    acetaminophen 500 MG tablet    TYLENOL    360 tablet    Take 2 tablets (1,000 mg) by mouth 2 times daily as needed for mild pain As needed    Osteoarthritis       artificial saliva Soln solution     3 Bottle    Swish and spit 5 mLs in mouth as needed for dry mouth    Dry mouth       aspirin 81 MG chewable tablet     90 tablet    Take 1 tablet (81 mg) by mouth daily    Health care maintenance       atorvastatin 40 MG tablet    LIPITOR    90 tablet    Take 1 tablet (40 mg) by mouth daily    Mixed hyperlipidemia       calcium carbonate 500 MG chewable tablet    TUMS     Take 2 chew tab by mouth 2 times daily        carbidopa-levodopa  MG per tablet    SINEMET    270 tablet    TAKE 1 TABLET THREE TIMES A DAY    Parkinsons (H)       clopidogrel 75 MG tablet    PLAVIX    90 tablet    TAKE 1 TABLET DAILY    Coronary artery disease involving native coronary artery of native heart without angina pectoris       cycloSPORINE 0.05 % ophthalmic emulsion    RESTASIS     3 each    Place 1 drop into both eyes 2 times daily    Dry eyes, bilateral       furosemide 20 MG tablet    LASIX    90 tablet    Take 1 tablet (20 mg) by mouth as needed    Edema       gabapentin 100 MG capsule    NEURONTIN    180 capsule    TAKE 1 CAPSULE TWICE A DAY    Neuralgia and neuritis       guaiFENesin-codeine 100-10 MG/5ML Soln solution    guaiFENesin AC    473 mL    TAKE 2 TEASPOONFULS (10ML) BY MOUTH EVERY 4 HOURS AS NEEDED FOR COUGH    Cough       ketoconazole 2 % shampoo    NIZORAL    120 mL    Wash hair, let set for 5 minutes, rinse 1 time per day every Sunday    Seborrheic dermatitis       Lanolin 30 % Oint    CORONA MULTI-PURPOSE    3 Tube    Externally apply 1 Application topically 2 times daily    Skin irritation       lidocaine 5 % Patch    LIDODERM    270 patch    APPLY UP TO 3 PATCHES TO PAINFUL AREA AT ONCE FOR UP TO 12 HOURS WITHIN A 24 HOURS PERIOD. REMOVE AFTER 12 HOURS    Polio       loratadine 10 MG tablet    ALLERGY RELIEF    90 tablet    Take 1 tablet (10 mg) by mouth daily    Allergic rhinitis, unspecified allergic rhinitis type       metoprolol 25 MG tablet    LOPRESSOR    90 tablet    Take 0.5 tablets (12.5 mg) by mouth 2 times daily    NSTEMI (non-ST elevated myocardial infarction) (H)       MYRBETRIQ 50 MG 24 hr tablet   Generic drug:  mirabegron     31 tablet    Take one (1) tablet BY MOUTH daily    OAB (overactive bladder)       nystatin cream    MYCOSTATIN    30 g    APPLY TO AFFECTED AREA 2 TIMES DAILY AS NEEDED    Candidiasis of skin and nails       olopatadine 0.1 % ophthalmic solution    PATANOL    3 Bottle    INSTILL 1 DROP INTO BOTH EYES 2 TIMES DAILY    Allergic conjunctivitis, unspecified laterality       ondansetron 4 MG tablet    ZOFRAN    30 tablet    TAKE 1 TABLET BY MOUTH EVERY 8 HOURS AS NEEDED FOR NAUSEA AND VOMITING    Nausea       order for DME     1 Device    Equipment being ordered: neoprene knee sleeve    Primary osteoarthritis involving multiple joints        pantoprazole 40 MG EC tablet    PROTONIX    90 tablet    TAKE 1 TABLET DAILY    Gastroesophageal reflux disease, esophagitis presence not specified       polyethylene glycol 0.4%- propylene glycol 0.3% 0.4-0.3 % Soln ophthalmic solution    SYSTANE    3 Bottle    Place 2 drops into both eyes 4 times daily as needed for dry eyes    Allergic conjunctivitis, unspecified laterality       polyethylene glycol powder    MIRALAX    500 g    Take 17 g by mouth daily    Constipation       PRESERVISION AREDS Tabs     180 tablet    Take 1 tablet by mouth 2 times daily    Health care maintenance       saline nasal Gel topical gel      Apply into each nare At Bedtime        senna-docusate 8.6-50 MG per tablet    SENNA PLUS    180 tablet    Take 1 tablet by mouth 2 times daily    Constipation       sodium fluoride dental gel 1.1 % Gel topical gel    PREVIDENT     Apply 1 applicator to affected area At Bedtime        spironolactone 25 MG tablet    ALDACTONE    45 tablet    Take 0.5 tablets (12.5 mg) by mouth daily    Left heart failure (H)       vitamin D3 2000 UNITS Caps     90 capsule    Take 2,000 Units by mouth daily    Osteoporosis

## 2017-06-21 VITALS
WEIGHT: 128.8 LBS | SYSTOLIC BLOOD PRESSURE: 98 MMHG | HEART RATE: 56 BPM | RESPIRATION RATE: 16 BRPM | OXYGEN SATURATION: 92 % | TEMPERATURE: 97.2 F | BODY MASS INDEX: 24.34 KG/M2 | DIASTOLIC BLOOD PRESSURE: 52 MMHG

## 2017-06-22 NOTE — PROGRESS NOTES
HISTORY OF PRESENT ILLNESS:  Britney is a 87 year old female (12/29/1929)  resident of [facility]  who is being seen today for a routine 30 day follow up. Patient has Parkinson's disease. She feels her trembling is worse, however at todays visit her trembling is almost absent. Patient states she was recently seen at the oral surgeon and had a left upper tooth extracted. She was also recently seen in the ER and treated for a urinary tract infection. She states she is feeling better today. She has had some pain in the area of her tooth extraction and will be following up with the oral surgeon. She continues with chronic left shoulder pain. Patient offers no other complaint.  Staff notes no other issues.    Current medications, allergies, and interdisciplinary care plan are reviewed.      Patient Active Problem List    Diagnosis Date Noted     Glaucoma 12/27/2016     Priority: Medium     History of poliomyelitis 12/27/2016     Priority: Medium     Nursing Home Visit 01/19/2016     Priority: Medium     Osteoarthritis, multiple joints 12/13/2015     Priority: Medium     Fibrocystic breast disease, left 12/13/2015     Priority: Medium     Parkinson disease (H) 08/10/2015     Priority: Medium     Health Care Home 12/09/2015     Priority: Low     Class: Chronic     CHD (coronary heart disease) 06/15/2015     03/2015  NSTEMI S/P angioplasty and stenting (ELIZABETH) LAD and D1       HF (heart failure) (H) 06/15/2015     Constipation, chronic 04/20/2015     Hiatal hernia 03/16/2015     TIA (transient ischemic attack) 03/16/2015     Chronic cough 07/15/2014     OAB (overactive bladder) 03/19/2014     S/P InterStim placement and multiple botox injections       Neuralgia, post-herpetic 03/19/2014     Dyslipidemia 04/10/2013     Cerebrovascular disease 04/10/2013     Hemorrhoids 04/10/2013     Perennial allergic rhinitis 04/10/2013     Asthma, mild persistent 04/10/2013              GERD (gastroesophageal reflux  disease) 04/10/2013     Osteoporosis 04/10/2013     Kyphoscoliosis and scoliosis 04/10/2013     Insomnia, medical condition 04/10/2013     History of colonic polyps 04/10/2013     HTN (hypertension) 09/24/2006          Social History     Social History     Marital status:      Spouse name: N/A     Number of children: N/A     Years of education: N/A     Occupational History     Retired [employer]     Social History Main Topics     Smoking status: Passive Smoke Exposure - Never Smoker     Smokeless tobacco: Never Used      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 No     Social History Narrative        Current Outpatient Prescriptions   Medication Sig     pantoprazole (PROTONIX) 40 MG EC tablet TAKE 1 TABLET DAILY     saline nasal (AYR SALINE) GEL topical gel Apply into each nare At Bedtime     lidocaine (LIDODERM) 5 % Patch APPLY UP TO 3 PATCHES TO PAINFUL AREA AT ONCE FOR UP TO 12 HOURS WITHIN A 24 HOURS PERIOD. REMOVE AFTER 12 HOURS     gabapentin (NEURONTIN) 100 MG capsule TAKE 1 CAPSULE TWICE A DAY     clopidogrel (PLAVIX) 75 MG tablet TAKE 1 TABLET DAILY     atorvastatin (LIPITOR) 40 MG tablet Take 1 tablet (40 mg) by mouth daily     spironolactone (ALDACTONE) 25 MG tablet Take 0.5 tablets (12.5 mg) by mouth daily     MYRBETRIQ 50 MG 24 hr tablet Take one (1) tablet BY MOUTH daily      carbidopa-levodopa (SINEMET)  MG per tablet TAKE 1 TABLET THREE TIMES A DAY (Patient taking differently: TAKE 1 TABLET FOUR TIMES A DAY)     sodium fluoride dental gel (PREVIDENT) 1.1 % GEL topical gel Apply 1 applicator to affected area At Bedtime     metoprolol (LOPRESSOR) 25 MG tablet Take 0.5 tablets (12.5 mg) by mouth 2 times daily     calcium carbonate (TUMS) 500 MG chewable tablet Take 2 chew tab by mouth 2 times daily     Multiple Vitamins-Minerals (PRESERVISION AREDS) TABS Take 1 tablet by mouth 2 times daily     olopatadine (PATANOL) 0.1 % ophthalmic solution INSTILL 1 DROP INTO BOTH EYES 2 TIMES DAILY     aspirin 81 MG chewable tablet Take 1 tablet (81 mg) by mouth daily     Cholecalciferol (VITAMIN D3) 2000 UNITS CAPS Take 2,000 Units by mouth daily     loratadine (ALLERGY RELIEF) 10 MG tablet Take 1 tablet (10 mg) by mouth daily     polyethylene glycol (MIRALAX) powder Take 17 g by mouth daily (Patient taking differently: Take 17 g by mouth 2 times daily )     senna-docusate (SENNA PLUS) 8.6-50 MG per tablet Take 1 tablet by mouth 2 times daily (Patient taking differently: Take 2 tablets by mouth 2 times daily )     acetaminophen (TYLENOL) 500 MG tablet Take 2 tablets (1,000 mg) by mouth 2 times daily as needed for mild pain As needed     artificial saliva, BIOTENE MT, (BIOTENE MT) SOLN Swish and spit 5 mLs in mouth as needed for dry mouth     furosemide (LASIX) 20 MG tablet Take 1 tablet (20 mg) by mouth as needed     ondansetron (ZOFRAN) 4 MG tablet TAKE 1 TABLET BY MOUTH EVERY 8 HOURS AS NEEDED FOR NAUSEA AND VOMITING     polyethylene glycol 0.4%- propylene glycol 0.3% (SYSTANE) 0.4-0.3 % SOLN ophthalmic solution Place 2 drops into both eyes 4 times daily as needed for dry eyes     ketoconazole (NIZORAL) 2 % shampoo Wash hair, let set for 5 minutes, rinse 1 time per day every Sunday     nystatin (MYCOSTATIN) cream APPLY TO AFFECTED AREA 2 TIMES DAILY AS NEEDED     Lanolin (CORONA MULTI-PURPOSE)  30 % OINT Externally apply 1 Application topically 2 times daily     cycloSPORINE (RESTASIS) 0.05 % ophthalmic emulsion Place 1 drop into both eyes 2 times daily     guaiFENesin-codeine (GUAIFENESIN AC) 100-10 MG/5ML SOLN TAKE 2 TEASPOONFULS (10ML) BY MOUTH EVERY 4 HOURS AS NEEDED FOR COUGH     order for DME Equipment being ordered: neoprene knee sleeve     No current facility-administered medications for this visit.        Allergies   Allergen Reactions     Ambien      Zolpidem Tartrate     Tramadol        I have reviewed the care plan and do agree with the plan.      ROS:  No chest pain, shortness of breath, fever, chills, headache, nausea, vomiting, dysuria, or changes in bowel habits.  Appetite is normal.  Left shoulder pain noted.          OBJECTIVE:  BP 98/52  Pulse 56  Temp 97.2  F (36.2  C)  Resp 16  Wt 128 lb 12.8 oz (58.4 kg)  SpO2 92%  BMI 24.34 kg/m2    GENERAL:  Chronically ill appearing, alert, and in no acute distress  RESP:  Lungs clear.  No rales, rhonchi, or wheezing  CV:  RRR.  S1 S2 without murmur. No clicks or rubs.  SKIN:  Age-related changes.  No suspicious lesions or rashes.  PSYCH:  Mentation intact, affect bright, and orientation intact.  EXTREM:  Trace lower extremity edema.  Pulses palpable.          Lab/Diagnostic data:    none    ASSESSMENT/ORDERS:  Nursing home visit    Above issues stable.  No changes in current medications or care plan.      Total time spent with patient visit was 25 min including patient visit, review of pertinent clinical information, and treatment plan.      Minerva Jimenez APRCHELO, CNP

## 2017-07-06 ENCOUNTER — TRANSFERRED RECORDS (OUTPATIENT)
Dept: HEALTH INFORMATION MANAGEMENT | Facility: HOSPITAL | Age: 82
End: 2017-07-06

## 2017-07-06 LAB
CREAT SERPL-MCNC: 0.81 MG/DL (ref 0.4–1)
GLUCOSE SERPL-MCNC: 75 MG/DL (ref 70–100)
POTASSIUM SERPL-SCNC: 3.9 MEQ/L (ref 3.4–5.1)

## 2017-07-07 DIAGNOSIS — H04.123 BILATERAL DRY EYES: ICD-10-CM

## 2017-07-07 RX ORDER — CYCLOSPORINE 0.5 MG/ML
EMULSION OPHTHALMIC
Qty: 30 EACH | Refills: 0 | Status: SHIPPED | OUTPATIENT
Start: 2017-07-07 | End: 2017-09-29

## 2017-07-07 NOTE — TELEPHONE ENCOUNTER
Restasis      Last Written Prescription Date: 2/23/16  Last Fill Quantity: 30,  # refills: 0   Last Office Visit with G, P or [facility] prescribing provider: 5/15/17

## 2017-07-24 ENCOUNTER — NURSING HOME VISIT (OUTPATIENT)
Dept: FAMILY MEDICINE | Facility: OTHER | Age: 82
End: 2017-07-24
Payer: MEDICARE

## 2017-07-24 VITALS
WEIGHT: 128.4 LBS | HEART RATE: 56 BPM | DIASTOLIC BLOOD PRESSURE: 60 MMHG | TEMPERATURE: 98 F | OXYGEN SATURATION: 95 % | SYSTOLIC BLOOD PRESSURE: 116 MMHG | RESPIRATION RATE: 16 BRPM | BODY MASS INDEX: 24.26 KG/M2

## 2017-07-24 DIAGNOSIS — Z78.9 NURSING HOME RESIDENT: Primary | ICD-10-CM

## 2017-07-24 DIAGNOSIS — K59.00 CONSTIPATION: ICD-10-CM

## 2017-07-24 PROCEDURE — 99308 SBSQ NF CARE LOW MDM 20: CPT | Performed by: FAMILY MEDICINE

## 2017-07-24 RX ORDER — CHLORHEXIDINE GLUCONATE ORAL RINSE 1.2 MG/ML
15 SOLUTION DENTAL 2 TIMES DAILY
COMMUNITY
End: 2017-10-31

## 2017-07-24 NOTE — MR AVS SNAPSHOT
"              After Visit Summary   2017    Britney Schaeffer    MRN: [id number]           Patient Information     Date Of Birth          1929        Visit Information        Provider Department      2017 3:14 PM Mario Fofana MD [facility]        Today's [facility]     Nursing Home Visit    -  1       Follow-ups after your visit        Who to contact     If you have questions or need follow up information about today's clinic visit or your schedule please contact [facility] directly at [phone].  Normal or non-critical lab and imaging results will be communicated to you by MyChart, letter or phone within 4 business days after the clinic has received the results. If you do not hear from us within 7 days, please contact the clinic through [email or web address] or phone. If you have a critical or abnormal lab result, we will notify you by phone as soon as possible.  Submit refill requests through YaKlass or call your pharmacy and they will forward the refill request to us. Please allow 3 business days for your refill to be completed.          Additional Information About Your Visit        MyChart Information     YaKlass lets you send messages to your doctor, view your test results, renew your prescriptions, schedule appointments and more. To sign up, go to [email or web address] . Click on \"Log in\" on the left side of the screen, which will take you to the Welcome page. Then click on \"Sign up Now\" on the right side of the page.     You will be asked to enter the access code listed below, as well as some personal information. Please follow the directions to create your username and password.     Your access code is: [email or web address]  Expires: 10/24/2017  5:35 AM     Your access code will  in 90 days. If you need help or a new code, please call your [facility] or [phone].        Care EveryWhere ID     This is your Care EveryWhere ID. This could be used by other " organizations to access your [place] medical records  [id number]        Your Vitals Were     Pulse Temperature Respirations Pulse Oximetry BMI (Body Mass Index)       56 98  F (36.7  C) 16 95% 24.26 kg/m2        Blood Pressure from Last 3 Encounters:   07/24/17 116/60   06/21/17 98/52   06/08/17 137/57    Weight from Last 3 Encounters:   07/24/17 128 lb 6.4 oz (58.2 kg)   06/21/17 128 lb 12.8 oz (58.4 kg)   05/15/17 124 lb 9.6 oz (56.5 kg)              Today, you had the following     No orders found for display         Today's Medication Changes          These changes are accurate as of: 7/24/17 11:59 PM.  If you have any questions, ask your nurse or doctor.               These medicines have changed or have updated prescriptions.        Dose/Directions    carbidopa-levodopa  MG per tablet   Commonly known as:  SINEMET   This may have changed:  See the new instructions.   Used for:  Parkinsons (H)        TAKE 1 TABLET THREE TIMES A DAY   Quantity:  270 tablet   Refills:  1       polyethylene glycol powder   Commonly known as:  MIRALAX   This may have changed:  when to take this   Used for:  Constipation        Dose:  17 g   Take 17 g by mouth daily   Quantity:  500 g   Refills:  3       senna-docusate 8.6-50 MG per tablet   Commonly known as:  SENNA PLUS   This may have changed:  how much to take   Used for:  Constipation        Dose:  1 tablet   Take 1 tablet by mouth 2 times daily   Quantity:  180 tablet   Refills:  3                Primary Care Provider Office Phone # Fax #    Mario Fofana [phone]       [place] [address] [facility] [address]        Equal Access to Services     [facility] NABEEL AH: Hadii josé farfan hadjacinta Soad, waaxda luqadaha, qaybta kaalmada tana, celena sandoval. So [facility] [phone].    ATENCIÓN: Si habla español, tiene a holbrook disposición servicios gratuitos de asistencia lingüística. Llame al [phone].    We comply  with applicable federal civil rights laws and [place] laws. We do not discriminate on the basis of race, color, national origin, age, disability sex, sexual orientation or gender identity.            Thank you!     [facility] you for [facility]  [facility] your [facility]. Our goal is always to provide you with excellent care. Hearing back from our patients is one way we can continue to improve our services. Please take a few minutes to complete the written survey that you may receive in the mail after your visit with us. Thank you!             Your Updated Medication List - Protect others around you: Learn how to safely use, store and throw away your medicines at www.disposemymeds.org.          This list is accurate as of: 7/24/17 11:59 PM.  Always use your most recent med list.                   Brand Name Dispense Instructions for use Diagnosis    acetaminophen 500 MG tablet    TYLENOL    360 tablet    Take 2 tablets (1,000 mg) by mouth 2 times daily as needed for mild pain As needed    Osteoarthritis       artificial saliva Soln solution     3 Bottle    Swish and spit 5 mLs in mouth as needed for dry mouth    Dry mouth       aspirin 81 MG chewable tablet     90 tablet    Take 1 tablet (81 mg) by mouth daily    Health care maintenance       atorvastatin 40 MG tablet    LIPITOR    90 tablet    Take 1 tablet (40 mg) by mouth daily    Mixed hyperlipidemia       calcium carbonate 500 MG chewable tablet    TUMS     Take 2 chew tab by mouth 2 times daily        carbidopa-levodopa  MG per tablet    SINEMET    270 tablet    TAKE 1 TABLET THREE TIMES A DAY    Parkinsons (H)       chlorhexidine 0.12 % solution    PERIDEX     Swish and spit 15 mLs in mouth 2 times daily        clopidogrel 75 MG tablet    PLAVIX    90 tablet    TAKE 1 TABLET DAILY    Coronary artery disease involving native coronary artery of native heart without angina pectoris       * cycloSPORINE 0.05 % ophthalmic emulsion    RESTASIS    3  each    Place 1 drop into both eyes 2 times daily    Dry eyes, bilateral       * RESTASIS 0.05 % ophthalmic emulsion   Generic drug:  cycloSPORINE     30 each    INSTILL 1 DROP INTO AFFECTED EYE EVERY 12 HOURS    Bilateral dry eyes       furosemide 20 MG tablet    LASIX    90 tablet    Take 1 tablet (20 mg) by mouth as needed    Edema       gabapentin 100 MG capsule    NEURONTIN    180 capsule    TAKE 1 CAPSULE TWICE A DAY    Neuralgia and neuritis       guaiFENesin-codeine 100-10 MG/5ML Soln solution    guaiFENesin AC    473 mL    TAKE 2 TEASPOONFULS (10ML) BY MOUTH EVERY 4 HOURS AS NEEDED FOR COUGH    Cough       ketoconazole 2 % shampoo    NIZORAL    120 mL    Wash hair, let set for 5 minutes, rinse 1 time per day every Sunday    Seborrheic dermatitis       Lanolin 30 % Oint    CORONA MULTI-PURPOSE    3 Tube    Externally apply 1 Application topically 2 times daily    Skin irritation       lidocaine 5 % Patch    LIDODERM    270 patch    APPLY UP TO 3 PATCHES TO PAINFUL AREA AT ONCE FOR UP TO 12 HOURS WITHIN A 24 HOURS PERIOD. REMOVE AFTER 12 HOURS    Polio       loratadine 10 MG tablet    ALLERGY RELIEF    90 tablet    Take 1 tablet (10 mg) by mouth daily    Allergic rhinitis, unspecified allergic rhinitis type       metoprolol 25 MG tablet    LOPRESSOR    90 tablet    Take 0.5 tablets (12.5 mg) by mouth 2 times daily    NSTEMI (non-ST elevated myocardial infarction) (H)       MYRBETRIQ 50 MG 24 hr tablet   Generic drug:  mirabegron     31 tablet    Take one (1) tablet BY MOUTH daily    OAB (overactive bladder)       nystatin cream    MYCOSTATIN    30 g    APPLY TO AFFECTED AREA 2 TIMES DAILY AS NEEDED    Candidiasis of skin and nails       olopatadine 0.1 % ophthalmic solution    PATANOL    3 Bottle    INSTILL 1 DROP INTO BOTH EYES 2 TIMES DAILY    Allergic conjunctivitis, unspecified laterality       ondansetron 4 MG tablet    ZOFRAN    30 tablet    TAKE 1 TABLET BY MOUTH EVERY 8 HOURS AS NEEDED FOR NAUSEA AND  VOMITING    Nausea       order for DME     1 Device    Equipment being ordered: neoprene knee sleeve    Primary osteoarthritis involving multiple joints       pantoprazole 40 MG EC tablet    PROTONIX    90 tablet    TAKE 1 TABLET DAILY    Gastroesophageal reflux disease, esophagitis presence not specified       polyethylene glycol 0.4%- propylene glycol 0.3% 0.4-0.3 % Soln ophthalmic solution    SYSTANE    3 Bottle    Place 2 drops into both eyes 4 times daily as needed for dry eyes    Allergic conjunctivitis, unspecified laterality       polyethylene glycol powder    MIRALAX    500 g    Take 17 g by mouth daily    Constipation       PRESERVISION AREDS Tabs     180 tablet    Take 1 tablet by mouth 2 times daily    Health care maintenance       saline nasal Gel topical gel      Apply into each nare At Bedtime        senna-docusate 8.6-50 MG per tablet    SENNA PLUS    180 tablet    Take 1 tablet by mouth 2 times daily    Constipation       sodium fluoride dental gel 1.1 % Gel topical gel    PREVIDENT     Apply 1 applicator to affected area At Bedtime        spironolactone 25 MG tablet    ALDACTONE    45 tablet    Take 0.5 tablets (12.5 mg) by mouth daily    Left heart failure (H)       vitamin D3 2000 UNITS Caps     90 capsule    Take 2,000 Units by mouth daily    Osteoporosis       * Notice:  This list has 2 medication(s) that are the same as other medications prescribed for you. Read the directions carefully, and ask your doctor or other care provider to review them with you.

## 2017-07-26 NOTE — PROGRESS NOTES
HISTORY OF PRESENT ILLNESS:  Britney is a 87 year old female (12/29/1929)  resident of [facility]  who is being seen today for a routine 30 day follow up. Previously her Sinemet was reduced  and her nausea is improved. She has questions about her tremor.  In addition, she notes left foot pain.Patient offers no other complaint.  Staff notes no other issues.    Current medications, allergies, and interdisciplinary care plan are reviewed.      Patient Active Problem List    Diagnosis Date Noted     Parkinson disease (H) 08/10/2015     Priority: High     CHD (coronary heart disease) 06/15/2015     Priority: High     03/2015  NSTEMI S/P angioplasty and stenting (ELIZABETH) LAD and D1       HF (heart failure) (H) 06/15/2015     Priority: High     TIA (transient ischemic attack) 03/16/2015     Priority: High     Neuralgia, post-herpetic 03/19/2014     Priority: High     Cerebrovascular disease 04/10/2013     Priority: High     HTN (hypertension) 09/24/2006     Priority: High     Glaucoma 12/27/2016     Priority: Medium     History of poliomyelitis 12/27/2016     Priority: Medium     Osteoarthritis, multiple joints 12/13/2015     Priority: Medium     Fibrocystic breast disease, left 12/13/2015     Priority: Medium     Constipation, chronic 04/20/2015     Priority: Medium     Hiatal hernia 03/16/2015     Priority: Medium     Chronic cough 07/15/2014     Priority: Medium     OAB (overactive bladder) 03/19/2014     Priority: Medium     S/P InterStim placement and multiple botox injections       Dyslipidemia 04/10/2013     Priority: Medium     Hemorrhoids 04/10/2013     Priority: Medium     Perennial allergic rhinitis 04/10/2013     Priority: Medium     Asthma, mild persistent 04/10/2013     Priority: Medium              GERD (gastroesophageal reflux disease) 04/10/2013     Priority: Medium     Osteoporosis 04/10/2013     Priority: Medium     Kyphoscoliosis and scoliosis 04/10/2013     Priority: Medium      Insomnia, medical condition 04/10/2013     Priority: Medium     History of colonic polyps 04/10/2013     Priority: Medium     Nursing Home Visit 01/19/2016     Priority: Low     Health Care Home 12/09/2015     Priority: Low     Class: Chronic          Social History     Social History     Marital status:      Spouse name: N/A     Number of children: N/A     Years of education: N/A     Occupational History     Retired [employer]     Social History Main Topics     Smoking status: Passive Smoke Exposure - Never Smoker     Smokeless tobacco: Never Used      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 No     Social History Narrative        Current Outpatient Prescriptions   Medication Sig     chlorhexidine (PERIDEX) 0.12 % solution Swish and spit 15 mLs in mouth 2 times daily     RESTASIS 0.05 % ophthalmic emulsion INSTILL 1 DROP INTO AFFECTED EYE EVERY 12 HOURS     pantoprazole (PROTONIX) 40 MG EC tablet TAKE 1 TABLET DAILY     saline nasal (AYR SALINE) GEL topical gel Apply into each nare At Bedtime     lidocaine (LIDODERM) 5 % Patch APPLY UP TO 3 PATCHES TO PAINFUL AREA AT ONCE FOR UP TO 12 HOURS WITHIN A 24 HOURS PERIOD. REMOVE AFTER 12 HOURS     gabapentin (NEURONTIN) 100 MG capsule TAKE 1 CAPSULE TWICE A DAY     clopidogrel (PLAVIX) 75 MG tablet TAKE 1 TABLET DAILY     atorvastatin (LIPITOR) 40 MG  tablet Take 1 tablet (40 mg) by mouth daily     spironolactone (ALDACTONE) 25 MG tablet Take 0.5 tablets (12.5 mg) by mouth daily     MYRBETRIQ 50 MG 24 hr tablet Take one (1) tablet BY MOUTH daily     carbidopa-levodopa (SINEMET)  MG per tablet TAKE 1 TABLET THREE TIMES A DAY (Patient taking differently: TAKE 1 TABLET FOUR TIMES A DAY)     sodium fluoride dental gel (PREVIDENT) 1.1 % GEL topical gel Apply 1 applicator to affected area At Bedtime     metoprolol (LOPRESSOR) 25 MG tablet Take 0.5 tablets (12.5 mg) by mouth 2 times daily     calcium carbonate (TUMS) 500 MG chewable tablet Take 2 chew tab by mouth 2 times daily     Multiple Vitamins-Minerals (PRESERVISION AREDS) TABS Take 1 tablet by mouth 2 times daily     olopatadine (PATANOL) 0.1 % ophthalmic solution INSTILL 1 DROP INTO BOTH EYES 2 TIMES DAILY     aspirin 81 MG chewable tablet Take 1 tablet (81 mg) by mouth daily     Cholecalciferol (VITAMIN D3) 2000 UNITS CAPS Take 2,000 Units by mouth daily     loratadine (ALLERGY RELIEF) 10 MG tablet Take 1 tablet (10 mg) by mouth daily     polyethylene glycol (MIRALAX) powder Take 17 g by mouth daily (Patient taking differently: Take 17 g by mouth 2 times daily )     senna-docusate (SENNA PLUS) 8.6-50 MG per tablet Take 1 tablet by mouth 2 times daily (Patient taking differently: Take 2 tablets by mouth 2 times daily )     acetaminophen (TYLENOL) 500 MG tablet Take 2 tablets (1,000 mg) by mouth 2 times daily as needed for mild pain As needed     artificial saliva, BIOTENE MT, (BIOTENE MT) SOLN Swish and spit 5 mLs in mouth as needed for dry mouth     furosemide (LASIX) 20 MG tablet Take 1 tablet (20 mg) by mouth as needed     ondansetron (ZOFRAN) 4 MG tablet TAKE 1 TABLET BY MOUTH EVERY 8 HOURS AS NEEDED FOR NAUSEA AND VOMITING     polyethylene glycol 0.4%- propylene glycol 0.3% (SYSTANE) 0.4-0.3 % SOLN ophthalmic solution Place 2 drops into both eyes 4 times daily as needed for dry eyes     ketoconazole  (NIZORAL) 2 % shampoo Wash hair, let set for 5 minutes, rinse 1 time per day every Sunday     nystatin (MYCOSTATIN) cream APPLY TO AFFECTED AREA 2 TIMES DAILY AS NEEDED     Lanolin (CORONA MULTI-PURPOSE) 30 % OINT Externally apply 1 Application topically 2 times daily     cycloSPORINE (RESTASIS) 0.05 % ophthalmic emulsion Place 1 drop into both eyes 2 times daily     guaiFENesin-codeine (GUAIFENESIN AC) 100-10 MG/5ML SOLN TAKE 2 TEASPOONFULS (10ML) BY MOUTH EVERY 4 HOURS AS NEEDED FOR COUGH     order for DME Equipment being ordered: neoprene knee sleeve     No current facility-administered medications for this visit.        Allergies   Allergen Reactions     Ambien      Zolpidem Tartrate     Tramadol        I have reviewed the care plan and do agree with the plan.    ROS:  No chest pain, shortness of breath, fever, chills, headache, nausea, vomiting, dysuria, or changes in bowel habits.  Appetite is normal.  Left shoulder/back  pain noted.          OBJECTIVE:  /60  Pulse 56  Temp 98  F (36.7  C)  Resp 16  Wt 128 lb 6.4 oz (58.2 kg)  SpO2 95%  BMI 24.26 kg/m2    GENERAL:  Chronically ill appearing, alert, and in no acute distress  RESP:  Lungs clear.  No rales, rhonchi, or wheezing  CV:  RRR.  S1 S2 without murmur. No clicks or rubs.  SKIN:  Age-related changes.  No suspicious lesions or rashes.  PSYCH:  Mentation intact, affect bright, and orientation intact.  EXTREM:  trace edema.  Pulses palpable.          Lab/Diagnostic data:    none    ASSESSMENT/ORDERS:  Nursing Home Visit  Will continue Sinemet at previous dosage.  Other issues stable.  No changes in current medications or care plan.      Total time spent with patient visit was 25 min including patient visit, review of pertinent clinical information, and treatment plan.      Mraio Fofana MD

## 2017-08-19 ENCOUNTER — MEDICAL CORRESPONDENCE (OUTPATIENT)
Dept: HEALTH INFORMATION MANAGEMENT | Facility: HOSPITAL | Age: 82
End: 2017-08-19

## 2017-08-20 DIAGNOSIS — G20.A1 PARKINSONS (H): ICD-10-CM

## 2017-08-21 NOTE — TELEPHONE ENCOUNTER
carbidopa-levodopa (SINEMET)  MG per tablet     Last Written Prescription Date:  3/27/17  Last Fill Quantity: 270,   # refills: 0  Last Office Visit with [facility], [facility] or [facility] prescribing provider: 7/24/17  Future Office visit:       Routing refill request to provider for review/approval because:  Drug not on the [facility], [facility] or [facility] refill protocol or controlled substance

## 2017-08-22 RX ORDER — CARBIDOPA AND LEVODOPA 25; 100 MG/1; MG/1
TABLET ORAL
Qty: 270 TABLET | Refills: 1 | Status: SHIPPED | OUTPATIENT
Start: 2017-08-22 | End: 2018-02-01

## 2017-08-24 DIAGNOSIS — Z00.00 HEALTH CARE MAINTENANCE: ICD-10-CM

## 2017-08-24 RX ORDER — VIT A/VIT C/VIT E/ZINC/COPPER 2148-113
TABLET ORAL
Qty: 120 TABLET | Refills: 5 | Status: SHIPPED | OUTPATIENT
Start: 2017-08-24 | End: 2018-04-02

## 2017-08-28 ENCOUNTER — NURSING HOME VISIT (OUTPATIENT)
Dept: FAMILY MEDICINE | Facility: OTHER | Age: 82
End: 2017-08-28

## 2017-08-28 ENCOUNTER — NURSING HOME VISIT (OUTPATIENT)
Dept: FAMILY MEDICINE | Facility: OTHER | Age: 82
End: 2017-08-28
Payer: MEDICARE

## 2017-08-28 VITALS
TEMPERATURE: 98 F | BODY MASS INDEX: 24.43 KG/M2 | SYSTOLIC BLOOD PRESSURE: 118 MMHG | DIASTOLIC BLOOD PRESSURE: 60 MMHG | WEIGHT: 129.3 LBS | OXYGEN SATURATION: 95 % | HEART RATE: 56 BPM | RESPIRATION RATE: 16 BRPM

## 2017-08-28 DIAGNOSIS — Z53.9 ERRONEOUS ENCOUNTER--DISREGARD: Primary | ICD-10-CM

## 2017-08-28 DIAGNOSIS — Z78.9 NURSING HOME RESIDENT: Primary | ICD-10-CM

## 2017-08-28 PROCEDURE — 99308 SBSQ NF CARE LOW MDM 20: CPT | Performed by: NURSE PRACTITIONER

## 2017-08-28 NOTE — MR AVS SNAPSHOT
"              After Visit Summary   2017    Britney Schaeffer    MRN: [id number]           Patient Information     Date Of Birth          1929        Visit Information        Provider Department      2017 3:35 PM [facility]        Today's Diagnoses     ERRONEOUS ENCOUNTER--DISREGARD    -  1       Follow-ups after your visit        Who to contact     If you have questions or need follow up information about today's clinic visit or your schedule please [facility] at [phone].  Normal or non-critical lab and imaging results will be communicated to you by LiftMet[employer]hart, letter or phone within 4 business days after the clinic has received the results. If you do not hear from us within 7 days, please contact the clinic through L[employer]Met[employer] or phone. If you have a critical or abnormal lab result, we will notify you by phone as soon as possible.  Submit refill requests through [employer] or call your pharmacy and they will forward the refill request to us. Please allow 3 business days for your refill to be completed.          Additional Information About Your Visit        MyChart Information     [employer] lets you send messages to your doctor, view your test results, renew your prescriptions, schedule appointments and more. To sign up, go to [email or web address] . Click on \"Log in\" on the left side of the screen, which will take you to the Welcome page. Then click on \"Sign up Now\" on the right side of the page.     You will be asked to enter the access code listed below, as well as some personal information. Please follow the directions to create your username and password.     Your access code is: [email or web address]  Expires: 10/24/2017  5:35 AM     Your access code will  in 90 days. If you need help or a new code, please call your [facility] or [phone].        Care EveryWhere ID     This is your Care EveryWhere ID. This could be used by " other organizations to access your [place] medical records  [id number]        Your Vitals Were     Pulse Temperature Respirations Pulse Oximetry BMI (Body Mass Index)       56 98  F (36.7  C) (Tympanic) 16 95% 24.43 kg/m2        Blood Pressure from Last 3 Encounters:   08/28/17 118/60   09/01/17 118/60   07/24/17 116/60    Weight from Last 3 Encounters:   08/28/17 129 lb 4.8 oz (58.7 kg)   09/01/17 129 lb (58.5 kg)   07/24/17 128 lb 6.4 oz (58.2 kg)              Today, you had the following     No orders found for display         Today's Medication Changes          These changes are accurate as of: 8/28/17 11:59 PM.  If you have any questions, ask your nurse or doctor.               These medicines have changed or have updated prescriptions.        Dose/Directions    polyethylene glycol powder   Commonly known as:  MIRALAX   This may have changed:  when to take this   Used for:  Constipation        Dose:  17 g   Take 17 g by mouth daily   Quantity:  500 g   Refills:  3                Primary Care Provider Office Phone # Fax #    Mario Fofana [phone]       [facility] [address] [address]        Equal Access to Services     DES LEES AH: Hadii josé colono Somichelleali, waaxda luqadaha, qaybta kaalmada adeegyada, celena sandoval. So [facility] [phone].    ATENCIÓN: Si habla español, tiene a holbrook disposición servicios gratuitos de asistencia lingüística. Llame al [phone].    We comply with applicable federal civil rights laws and [place] laws. We do not discriminate on the basis of race, color, national origin, age, disability sex, sexual orientation or gender identity.            Thank you!     Thank you for choosing [facility]  for your care. Our goal is always to provide you with excellent care. Hearing back from our patients is one way we can continue to improve our services. Please take a few minutes to complete the written  survey that you may receive in the mail after your visit with us. Thank you!             Your Updated Medication List - Protect others around you: Learn how to safely use, store and throw away your medicines at www.disposemymeds.org.          This list is accurate as of: 8/28/17 11:59 PM.  Always use your most recent med list.                   Brand Name Dispense Instructions for use Diagnosis    acetaminophen 500 MG tablet    TYLENOL    360 tablet    Take 2 tablets (1,000 mg) by mouth 2 times daily as needed for mild pain As needed    Osteoarthritis       artificial saliva Soln solution     3 Bottle    Swish and spit 5 mLs in mouth as needed for dry mouth    Dry mouth       aspirin 81 MG chewable tablet     90 tablet    Take 1 tablet (81 mg) by mouth daily    Health care maintenance       atorvastatin 40 MG tablet    LIPITOR    90 tablet    Take 1 tablet (40 mg) by mouth daily    Mixed hyperlipidemia       calcium carbonate 500 MG chewable tablet    TUMS     Take 2 chew tab by mouth 2 times daily        carbidopa-levodopa  MG per tablet    SINEMET    270 tablet    TAKE 1 TABLET THREE TIMES A DAY    Parkinsons (H)       chlorhexidine 0.12 % solution    PERIDEX     Swish and spit 15 mLs in mouth 2 times daily        clopidogrel 75 MG tablet    PLAVIX    90 tablet    TAKE 1 TABLET DAILY    Coronary artery disease involving native coronary artery of native heart without angina pectoris       * cycloSPORINE 0.05 % ophthalmic emulsion    RESTASIS    3 each    Place 1 drop into both eyes 2 times daily    Dry eyes, bilateral       * RESTASIS 0.05 % ophthalmic emulsion   Generic drug:  cycloSPORINE     30 each    INSTILL 1 DROP INTO AFFECTED EYE EVERY 12 HOURS    Bilateral dry eyes       furosemide 20 MG tablet    LASIX    90 tablet    Take 1 tablet (20 mg) by mouth as needed    Edema       gabapentin 100 MG capsule    NEURONTIN    180 capsule    TAKE 1 CAPSULE TWICE A DAY    Neuralgia and neuritis        guaiFENesin-codeine 100-10 MG/5ML Soln solution    guaiFENesin AC    473 mL    TAKE 2 TEASPOONFULS (10ML) BY MOUTH EVERY 4 HOURS AS NEEDED FOR COUGH    Cough       ketoconazole 2 % shampoo    NIZORAL    120 mL    Wash hair, let set for 5 minutes, rinse 1 time per day every Sunday    Seborrheic dermatitis       Lanolin 30 % Oint    CORONA MULTI-PURPOSE    3 Tube    Externally apply 1 Application topically 2 times daily    Skin irritation       lidocaine 5 % Patch    LIDODERM    270 patch    APPLY UP TO 3 PATCHES TO PAINFUL AREA AT ONCE FOR UP TO 12 HOURS WITHIN A 24 HOURS PERIOD. REMOVE AFTER 12 HOURS    Polio       loratadine 10 MG tablet    ALLERGY RELIEF    90 tablet    Take 1 tablet (10 mg) by mouth daily    Allergic rhinitis, unspecified allergic rhinitis type       metoprolol 25 MG tablet    LOPRESSOR    90 tablet    Take 0.5 tablets (12.5 mg) by mouth 2 times daily    NSTEMI (non-ST elevated myocardial infarction) (H)       MYRBETRIQ 50 MG 24 hr tablet   Generic drug:  mirabegron     31 tablet    Take one (1) tablet BY MOUTH daily    OAB (overactive bladder)       nystatin cream    MYCOSTATIN    30 g    APPLY TO AFFECTED AREA 2 TIMES DAILY AS NEEDED    Candidiasis of skin and nails       olopatadine 0.1 % ophthalmic solution    PATANOL    3 Bottle    INSTILL 1 DROP INTO BOTH EYES 2 TIMES DAILY    Allergic conjunctivitis, unspecified laterality       ondansetron 4 MG tablet    ZOFRAN    30 tablet    TAKE 1 TABLET BY MOUTH EVERY 8 HOURS AS NEEDED FOR NAUSEA AND VOMITING    Nausea       order for DME     1 Device    Equipment being ordered: neoprene knee sleeve    Primary osteoarthritis involving multiple joints       pantoprazole 40 MG EC tablet    PROTONIX    90 tablet    TAKE 1 TABLET DAILY    Gastroesophageal reflux disease, esophagitis presence not specified       polyethylene glycol 0.4%- propylene glycol 0.3% 0.4-0.3 % Soln ophthalmic solution    SYSTANE    3 Bottle    Place 2 drops into both eyes 4 times  daily as needed for dry eyes    Allergic conjunctivitis, unspecified laterality       polyethylene glycol powder    MIRALAX    500 g    Take 17 g by mouth daily    Constipation       PRESERVISION AREDS Tabs     120 tablet    TAKE 1 TABLET BY MOUTH 2 TIMES DAILY    Health care maintenance       saline nasal Gel topical gel      Apply into each nare At Bedtime        SENNALAX-S 8.6-50 MG per tablet   Generic drug:  senna-docusate     100 tablet    TAKE 2 TABLETS BY MOUTH 2 TIMES PER DAY    Constipation       sodium fluoride dental gel 1.1 % Gel topical gel    PREVIDENT     Apply 1 applicator to affected area At Bedtime        spironolactone 25 MG tablet    ALDACTONE    45 tablet    Take 0.5 tablets (12.5 mg) by mouth daily    Left heart failure (H)       vitamin D3 2000 UNITS Caps     90 capsule    Take 2,000 Units by mouth daily    Osteoporosis       * Notice:  This list has 2 medication(s) that are the same as other medications prescribed for you. Read the directions carefully, and ask your doctor or other care provider to review them with you.

## 2017-08-28 NOTE — MR AVS SNAPSHOT
"              After Visit Summary   2017    Britney Schaeffer    MRN: [id number]           Patient Information     Date Of Birth          1929        Visit Information        Provider Department      2017 10:37 AM Minerva Bhand[facility]        Today's Diagnoses     Nursing home resident    -  1       Follow-ups after your visit        Who to contact     If you have questions or need follow up information about today's clinic visit or your schedule please contact [facility] directly at [phone].  Normal or non-critical lab and imaging results will be communicated to you by Ship[employer], letter or phone within 4 business days after the clinic has received the results. If you do not hear from us within 7 days, please contact the clinic through Shipsterhart or phone. If you have a critical or abnormal lab result, we will notify you by phone as soon as possible.  Submit refill requests through [employer] or call your pharmacy and they will forward the refill request to us. Please allow 3 business days for your refill to be completed.          Additional Information About Your Visit        MyChart Information     [employer] lets you send messages to your doctor, view your test results, renew your prescriptions, schedule appointments and more. To sign up, go to [email or web address] . Click on \"Log in\" on the left side of the screen, which will take you to the Welcome page. Then click on \"Sign up Now\" on the right side of the page.     You will be asked to enter the access code listed below, as well as some personal information. Please follow the directions to create your username and password.     Your access code is: [email or web address]  Expires: 10/24/2017  5:35 AM     Your access code will  in 90 days. If you need help or a new code, please call your [facility] or [phone].        Care EveryWhere ID     This is your Care EveryWhere ID. This could be used by " other organizations to access your [place] medical records  [id number]        Your Vitals Were     Pulse Temperature Respirations Pulse Oximetry BMI (Body Mass Index)       56 98  F (36.7  C) (Tympanic) 16 95% 24.37 kg/m2        Blood Pressure from Last 3 Encounters:   08/28/17 118/60   09/01/17 118/60   07/24/17 116/60    Weight from Last 3 Encounters:   08/28/17 129 lb 4.8 oz (58.7 kg)   09/01/17 129 lb (58.5 kg)   07/24/17 128 lb 6.4 oz (58.2 kg)              Today, you had the following     No orders found for display         Today's Medication Changes          These changes are accurate as of: 8/28/17 11:59 PM.  If you have any questions, ask your nurse or doctor.               These medicines have changed or have updated prescriptions.        Dose/Directions    polyethylene glycol powder   Commonly known as:  MIRALAX   This may have changed:  when to take this   Used for:  Constipation        Dose:  17 g   Take 17 g by mouth daily   Quantity:  500 g   Refills:  3                Primary Care Provider Office Phone # Fax #    Mario Fofana [phone]       [facility] [address] [address]        Equal Access to Services     DES LEES AH: Hadii josé colono Somichelleali, waaxda luqadaha, qaybta kaalmada adeegyada, celena sandoval. So [facility] [phone].    ATENCIÓN: Si habla español, tiene a holbroko disposición servicios gratuitos de asistencia lingüística. Llame al [phone].    We comply with applicable federal civil rights laws and [place] laws. We do not discriminate on the basis of race, color, national origin, age, disability sex, sexual orientation or gender identity.            Thank you!     Thank you for choosing [facility]  for your care. Our goal is always to provide you with excellent care. Hearing back from our patients is one way we can continue to improve our services. Please take a few minutes to complete the written  survey that you may receive in the mail after your visit with us. Thank you!             Your Updated Medication List - Protect others around you: Learn how to safely use, store and throw away your medicines at www.disposemymeds.org.          This list is accurate as of: 8/28/17 11:59 PM.  Always use your most recent med list.                   Brand Name Dispense Instructions for use Diagnosis    acetaminophen 500 MG tablet    TYLENOL    360 tablet    Take 2 tablets (1,000 mg) by mouth 2 times daily as needed for mild pain As needed    Osteoarthritis       artificial saliva Soln solution     3 Bottle    Swish and spit 5 mLs in mouth as needed for dry mouth    Dry mouth       aspirin 81 MG chewable tablet     90 tablet    Take 1 tablet (81 mg) by mouth daily    Health care maintenance       atorvastatin 40 MG tablet    LIPITOR    90 tablet    Take 1 tablet (40 mg) by mouth daily    Mixed hyperlipidemia       calcium carbonate 500 MG chewable tablet    TUMS     Take 2 chew tab by mouth 2 times daily        carbidopa-levodopa  MG per tablet    SINEMET    270 tablet    TAKE 1 TABLET THREE TIMES A DAY    Parkinsons (H)       chlorhexidine 0.12 % solution    PERIDEX     Swish and spit 15 mLs in mouth 2 times daily        clopidogrel 75 MG tablet    PLAVIX    90 tablet    TAKE 1 TABLET DAILY    Coronary artery disease involving native coronary artery of native heart without angina pectoris       * cycloSPORINE 0.05 % ophthalmic emulsion    RESTASIS    3 each    Place 1 drop into both eyes 2 times daily    Dry eyes, bilateral       * RESTASIS 0.05 % ophthalmic emulsion   Generic drug:  cycloSPORINE     30 each    INSTILL 1 DROP INTO AFFECTED EYE EVERY 12 HOURS    Bilateral dry eyes       furosemide 20 MG tablet    LASIX    90 tablet    Take 1 tablet (20 mg) by mouth as needed    Edema       gabapentin 100 MG capsule    NEURONTIN    180 capsule    TAKE 1 CAPSULE TWICE A DAY    Neuralgia and neuritis        guaiFENesin-codeine 100-10 MG/5ML Soln solution    guaiFENesin AC    473 mL    TAKE 2 TEASPOONFULS (10ML) BY MOUTH EVERY 4 HOURS AS NEEDED FOR COUGH    Cough       ketoconazole 2 % shampoo    NIZORAL    120 mL    Wash hair, let set for 5 minutes, rinse 1 time per day every Sunday    Seborrheic dermatitis       Lanolin 30 % Oint    CORONA MULTI-PURPOSE    3 Tube    Externally apply 1 Application topically 2 times daily    Skin irritation       lidocaine 5 % Patch    LIDODERM    270 patch    APPLY UP TO 3 PATCHES TO PAINFUL AREA AT ONCE FOR UP TO 12 HOURS WITHIN A 24 HOURS PERIOD. REMOVE AFTER 12 HOURS    Polio       loratadine 10 MG tablet    ALLERGY RELIEF    90 tablet    Take 1 tablet (10 mg) by mouth daily    Allergic rhinitis, unspecified allergic rhinitis type       metoprolol 25 MG tablet    LOPRESSOR    90 tablet    Take 0.5 tablets (12.5 mg) by mouth 2 times daily    NSTEMI (non-ST elevated myocardial infarction) (H)       MYRBETRIQ 50 MG 24 hr tablet   Generic drug:  mirabegron     31 tablet    Take one (1) tablet BY MOUTH daily    OAB (overactive bladder)       nystatin cream    MYCOSTATIN    30 g    APPLY TO AFFECTED AREA 2 TIMES DAILY AS NEEDED    Candidiasis of skin and nails       olopatadine 0.1 % ophthalmic solution    PATANOL    3 Bottle    INSTILL 1 DROP INTO BOTH EYES 2 TIMES DAILY    Allergic conjunctivitis, unspecified laterality       ondansetron 4 MG tablet    ZOFRAN    30 tablet    TAKE 1 TABLET BY MOUTH EVERY 8 HOURS AS NEEDED FOR NAUSEA AND VOMITING    Nausea       order for DME     1 Device    Equipment being ordered: neoprene knee sleeve    Primary osteoarthritis involving multiple joints       pantoprazole 40 MG EC tablet    PROTONIX    90 tablet    TAKE 1 TABLET DAILY    Gastroesophageal reflux disease, esophagitis presence not specified       polyethylene glycol 0.4%- propylene glycol 0.3% 0.4-0.3 % Soln ophthalmic solution    SYSTANE    3 Bottle    Place 2 drops into both eyes 4 times  daily as needed for dry eyes    Allergic conjunctivitis, unspecified laterality       polyethylene glycol powder    MIRALAX    500 g    Take 17 g by mouth daily    Constipation       PRESERVISION AREDS Tabs     120 tablet    TAKE 1 TABLET BY MOUTH 2 TIMES DAILY    Health care maintenance       saline nasal Gel topical gel      Apply into each nare At Bedtime        SENNALAX-S 8.6-50 MG per tablet   Generic drug:  senna-docusate     100 tablet    TAKE 2 TABLETS BY MOUTH 2 TIMES PER DAY    Constipation       sodium fluoride dental gel 1.1 % Gel topical gel    PREVIDENT     Apply 1 applicator to affected area At Bedtime        spironolactone 25 MG tablet    ALDACTONE    45 tablet    Take 0.5 tablets (12.5 mg) by mouth daily    Left heart failure (H)       vitamin D3 2000 UNITS Caps     90 capsule    Take 2,000 Units by mouth daily    Osteoporosis       * Notice:  This list has 2 medication(s) that are the same as other medications prescribed for you. Read the directions carefully, and ask your doctor or other care provider to review them with you.

## 2017-09-01 VITALS
DIASTOLIC BLOOD PRESSURE: 60 MMHG | HEART RATE: 56 BPM | SYSTOLIC BLOOD PRESSURE: 118 MMHG | OXYGEN SATURATION: 95 % | TEMPERATURE: 98 F | RESPIRATION RATE: 16 BRPM | BODY MASS INDEX: 24.37 KG/M2 | WEIGHT: 129 LBS

## 2017-09-07 NOTE — PROGRESS NOTES
HISTORY OF PRESENT ILLNESS:  Britney is a 87 year old female (12/29/1929)  resident of [facility]  who is being seen today for a routine 30 day follow up. Patient has Parkinson's disease. She feels her trembling is worse, however at todays visit her trembling seems to be stable. Patient would like to increase her Sinlemet. This was done in the past and patient experienced nausea, however patient is concerned about her tremors and would like to try this increase again. Patient has a scabbed area on the left lateral foot. She has a padded dressing over the area.  Patient offers no other complaints.  Staff notes no other issues.    Current medications, allergies, and interdisciplinary care plan are reviewed.      Patient Active Problem List    Diagnosis Date Noted     Glaucoma 12/27/2016     Priority: Medium     History of poliomyelitis 12/27/2016     Priority: Medium     Nursing Home Visit 01/19/2016     Priority: Medium     Osteoarthritis, multiple joints 12/13/2015     Priority: Medium     Fibrocystic breast disease, left 12/13/2015     Priority: Medium     Parkinson disease (H) 08/10/2015     Priority: Medium     CHD (coronary heart disease) 06/15/2015     Priority: Medium     03/2015  NSTEMI S/P angioplasty and stenting (ELIZABETH) LAD and D1       HF (heart failure) (H) 06/15/2015     Priority: Medium     Constipation, chronic 04/20/2015     Priority: Medium     Hiatal hernia 03/16/2015     Priority: Medium     TIA (transient ischemic attack) 03/16/2015     Priority: Medium     Chronic cough 07/15/2014     Priority: Medium     OAB (overactive bladder) 03/19/2014     Priority: Medium     S/P InterStim placement and multiple botox injections       Neuralgia, post-herpetic 03/19/2014     Priority: Medium     Dyslipidemia 04/10/2013     Priority: Medium     Cerebrovascular disease 04/10/2013     Priority: Medium     Hemorrhoids 04/10/2013     Priority: Medium     Perennial allergic rhinitis 04/10/2013      Priority: Medium     Asthma, mild persistent 04/10/2013     Priority: Medium              GERD (gastroesophageal reflux disease) 04/10/2013     Priority: Medium     Osteoporosis 04/10/2013     Priority: Medium     Kyphoscoliosis and scoliosis 04/10/2013     Priority: Medium     Insomnia, medical condition 04/10/2013     Priority: Medium     History of colonic polyps 04/10/2013     Priority: Medium     HTN (hypertension) 09/24/2006     Priority: Medium     Health Care Home 12/09/2015     Priority: Low     Class: Chronic          Social History     Social History     Marital status:      Spouse name: N/A     Number of children: N/A     Years of education: N/A     Occupational History     Retired [employer]     Social History Main Topics     Smoking status: Passive Smoke Exposure - Never Smoker     Smokeless tobacco: Never Used      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 No     Social History Narrative        Current Outpatient Prescriptions   Medication Sig     Multiple Vitamins-Minerals (PRESERVISION AREDS) TABS TAKE 1 TABLET BY MOUTH 2 TIMES DAILY     carbidopa-levodopa (SINEMET)  MG per tablet TAKE 1 TABLET THREE TIMES A DAY     SENNALAX-S 8.6-50 MG per tablet TAKE 2 TABLETS BY MOUTH 2 TIMES PER DAY     chlorhexidine (PERIDEX) 0.12 % solution Swish and spit 15 mLs in mouth 2 times daily      RESTASIS 0.05 % ophthalmic emulsion INSTILL 1 DROP INTO AFFECTED EYE EVERY 12 HOURS     pantoprazole (PROTONIX) 40 MG EC tablet TAKE 1 TABLET DAILY     saline nasal (AYR SALINE) GEL topical gel Apply into each nare At Bedtime     lidocaine (LIDODERM) 5 % Patch APPLY UP TO 3 PATCHES TO PAINFUL AREA AT ONCE FOR UP TO 12 HOURS WITHIN A 24 HOURS PERIOD. REMOVE AFTER 12 HOURS     gabapentin (NEURONTIN) 100 MG capsule TAKE 1 CAPSULE TWICE A DAY     clopidogrel (PLAVIX) 75 MG tablet TAKE 1 TABLET DAILY     atorvastatin (LIPITOR) 40 MG tablet Take 1 tablet (40 mg) by mouth daily     spironolactone (ALDACTONE) 25 MG tablet Take 0.5 tablets (12.5 mg) by mouth daily     MYRBETRIQ 50 MG 24 hr tablet Take one (1) tablet BY MOUTH daily     sodium fluoride dental gel (PREVIDENT) 1.1 % GEL topical gel Apply 1 applicator to affected area At Bedtime     metoprolol (LOPRESSOR) 25 MG tablet Take 0.5 tablets (12.5 mg) by mouth 2 times daily     calcium carbonate (TUMS) 500 MG chewable tablet Take 2 chew tab by mouth 2 times daily     olopatadine (PATANOL) 0.1 % ophthalmic solution INSTILL 1 DROP INTO BOTH EYES 2 TIMES DAILY     aspirin 81 MG chewable tablet Take 1 tablet (81 mg) by mouth daily     Cholecalciferol (VITAMIN D3) 2000 UNITS CAPS Take 2,000 Units by mouth daily     loratadine (ALLERGY RELIEF) 10 MG tablet Take 1 tablet (10 mg) by mouth daily     polyethylene glycol (MIRALAX) powder Take 17 g by mouth daily (Patient taking differently: Take 17 g by mouth 2 times daily )     acetaminophen (TYLENOL) 500 MG tablet Take 2 tablets (1,000 mg) by mouth 2 times daily as needed for mild pain As needed     artificial saliva, BIOTENE MT, (BIOTENE MT) SOLN Swish and spit 5 mLs in mouth as needed for dry mouth     furosemide (LASIX) 20 MG tablet Take 1 tablet (20 mg) by mouth as needed     ondansetron (ZOFRAN) 4 MG tablet TAKE 1 TABLET BY MOUTH EVERY 8 HOURS AS NEEDED FOR NAUSEA AND VOMITING     polyethylene glycol 0.4%- propylene  glycol 0.3% (SYSTANE) 0.4-0.3 % SOLN ophthalmic solution Place 2 drops into both eyes 4 times daily as needed for dry eyes     ketoconazole (NIZORAL) 2 % shampoo Wash hair, let set for 5 minutes, rinse 1 time per day every Sunday     nystatin (MYCOSTATIN) cream APPLY TO AFFECTED AREA 2 TIMES DAILY AS NEEDED     Lanolin (CORONA MULTI-PURPOSE) 30 % OINT Externally apply 1 Application topically 2 times daily     cycloSPORINE (RESTASIS) 0.05 % ophthalmic emulsion Place 1 drop into both eyes 2 times daily     guaiFENesin-codeine (GUAIFENESIN AC) 100-10 MG/5ML SOLN TAKE 2 TEASPOONFULS (10ML) BY MOUTH EVERY 4 HOURS AS NEEDED FOR COUGH     order for DME Equipment being ordered: neoprene knee sleeve     No current facility-administered medications for this visit.        Allergies   Allergen Reactions     Ambien      Zolpidem Tartrate     Tramadol        I have reviewed the care plan and do agree with the plan.      ROS:  No chest pain, shortness of breath, fever, chills, headache, nausea, vomiting, dysuria, or changes in bowel habits.  Appetite is normal.  Left shoulder pain noted.          OBJECTIVE:  /60  Pulse 56  Temp 98  F (36.7  C) (Tympanic)  Resp 16  Wt 129 lb (58.5 kg)  SpO2 95%  BMI 24.37 kg/m2    GENERAL:  Chronically ill appearing, alert, and in no acute distress  RESP:  Lungs clear.  No rales, rhonchi, or wheezing  CV:  RRR.  S1 S2 without murmur. No clicks or rubs.  SKIN:  Age-related changes.  No suspicious lesions or rashes.  PSYCH:  Mentation intact, affect bright, and orientation intact.  EXTREM:  no edema.  Pulses palapable. Small scabbed area on left lateal foot. No erythema, warmth or signs of infection          Lab/Diagnostic data:    none    ASSESSMENT/ORDERS:  Nursing Home Visit  Will increase Sinemet per previous orders and have patient take 2 tabs at her morning dose and continue with 1 tab all other doses. Order was written on MAR. Staff to monitor for for side effects of increase  Will  have staff continue to monitor scab on left lateral foot and notify provider of any changes    Total time spent with patient visit was 25 min including patient visit, review of pertinent clinical information, and treatment plan    Minerva BLAIR CNP

## 2017-09-29 VITALS
HEART RATE: 57 BPM | OXYGEN SATURATION: 95 % | DIASTOLIC BLOOD PRESSURE: 58 MMHG | TEMPERATURE: 97.4 F | SYSTOLIC BLOOD PRESSURE: 122 MMHG | BODY MASS INDEX: 24.01 KG/M2 | WEIGHT: 127.06 LBS | RESPIRATION RATE: 16 BRPM

## 2017-09-29 RX ORDER — CARBIDOPA AND LEVODOPA 25; 100 MG/1; MG/1
TABLET ORAL
Status: ON HOLD | COMMUNITY
Start: 2015-12-10 | End: 2017-10-08

## 2017-10-02 ENCOUNTER — NURSING HOME VISIT (OUTPATIENT)
Dept: FAMILY MEDICINE | Facility: OTHER | Age: 82
End: 2017-10-02
Payer: MEDICARE

## 2017-10-02 DIAGNOSIS — Z78.9 NURSING HOME RESIDENT: Primary | ICD-10-CM

## 2017-10-02 PROCEDURE — 99308 SBSQ NF CARE LOW MDM 20: CPT | Performed by: FAMILY MEDICINE

## 2017-10-03 ENCOUNTER — MEDICAL CORRESPONDENCE (OUTPATIENT)
Dept: HEALTH INFORMATION MANAGEMENT | Facility: HOSPITAL | Age: 82
End: 2017-10-03

## 2017-10-04 DIAGNOSIS — M25.562 LEFT KNEE PAIN: Primary | ICD-10-CM

## 2017-10-05 ENCOUNTER — HOSPITAL ENCOUNTER (OUTPATIENT)
Dept: GENERAL RADIOLOGY | Facility: HOSPITAL | Age: 82
Discharge: HOME | DRG: 392 | End: 2017-10-05
Attending: FAMILY MEDICINE | Admitting: FAMILY MEDICINE
Payer: MEDICARE

## 2017-10-05 DIAGNOSIS — M25.562 LEFT KNEE PAIN: ICD-10-CM

## 2017-10-06 ENCOUNTER — TELEPHONE (OUTPATIENT)
Dept: FAMILY MEDICINE | Facility: OTHER | Age: 82
End: 2017-10-06

## 2017-10-06 NOTE — TELEPHONE ENCOUNTER
Please schedule patient for date/time: Open appointment for xray results .... Please call after 1200    Have patient go to ER/Urgent Care Center. Urgent Care hours are 9:30 am to 8 pm, open 7 days a week. No.    Provider will call patient.No.    Other:

## 2017-10-07 ENCOUNTER — HOSPITAL ENCOUNTER (INPATIENT)
Facility: HOSPITAL | Age: 82
LOS: 5 days | Discharge: NURSING, REHAB OR LONG-TERM CARE | DRG: 392 | End: 2017-10-12
Attending: PHYSICIAN ASSISTANT | Admitting: HOSPITALIST
Payer: MEDICARE

## 2017-10-07 ENCOUNTER — APPOINTMENT (OUTPATIENT)
Dept: CT IMAGING | Facility: HOSPITAL | Age: 82
DRG: 392 | End: 2017-10-07
Attending: PHYSICIAN ASSISTANT
Payer: MEDICARE

## 2017-10-07 ENCOUNTER — MEDICAL CORRESPONDENCE (OUTPATIENT)
Dept: HEALTH INFORMATION MANAGEMENT | Facility: HOSPITAL | Age: 82
End: 2017-10-07

## 2017-10-07 ENCOUNTER — APPOINTMENT (OUTPATIENT)
Dept: GENERAL RADIOLOGY | Facility: HOSPITAL | Age: 82
DRG: 392 | End: 2017-10-07
Attending: PHYSICIAN ASSISTANT
Payer: MEDICARE

## 2017-10-07 DIAGNOSIS — R05.3 CHRONIC COUGH: Primary | ICD-10-CM

## 2017-10-07 DIAGNOSIS — Z00.00 HEALTH CARE MAINTENANCE: ICD-10-CM

## 2017-10-07 DIAGNOSIS — M19.90 OSTEOARTHRITIS: ICD-10-CM

## 2017-10-07 DIAGNOSIS — R11.10 VOMITING AND DIARRHEA: ICD-10-CM

## 2017-10-07 DIAGNOSIS — R19.7 VOMITING AND DIARRHEA: ICD-10-CM

## 2017-10-07 DIAGNOSIS — I25.10 CORONARY ARTERY DISEASE INVOLVING NATIVE CORONARY ARTERY OF NATIVE HEART WITHOUT ANGINA PECTORIS: ICD-10-CM

## 2017-10-07 DIAGNOSIS — K51.00 PANCOLITIS (H): ICD-10-CM

## 2017-10-07 PROBLEM — K52.9 COLITIS: Status: ACTIVE | Noted: 2017-10-07

## 2017-10-07 LAB
ALBUMIN SERPL-MCNC: 3.8 G/DL (ref 3.4–5)
ALP SERPL-CCNC: 55 U/L (ref 40–150)
ALT SERPL W P-5'-P-CCNC: 9 U/L (ref 0–50)
ANION GAP SERPL CALCULATED.3IONS-SCNC: 6 MMOL/L (ref 3–14)
AST SERPL W P-5'-P-CCNC: 17 U/L (ref 0–45)
BASOPHILS # BLD AUTO: 0 10E9/L (ref 0–0.2)
BASOPHILS NFR BLD AUTO: 0.3 %
BILIRUB SERPL-MCNC: 0.5 MG/DL (ref 0.2–1.3)
BUN SERPL-MCNC: 24 MG/DL (ref 7–30)
C DIFF TOX B STL QL: NEGATIVE
CALCIUM SERPL-MCNC: 9.2 MG/DL (ref 8.5–10.1)
CHLORIDE SERPL-SCNC: 108 MMOL/L (ref 94–109)
CO2 SERPL-SCNC: 27 MMOL/L (ref 20–32)
CREAT SERPL-MCNC: 0.68 MG/DL (ref 0.52–1.04)
CRP SERPL-MCNC: <2.9 MG/L (ref 0–8)
DIFFERENTIAL METHOD BLD: ABNORMAL
EOSINOPHIL # BLD AUTO: 0.1 10E9/L (ref 0–0.7)
EOSINOPHIL NFR BLD AUTO: 0.8 %
ERYTHROCYTE [DISTWIDTH] IN BLOOD BY AUTOMATED COUNT: 13.5 % (ref 10–15)
GFR SERPL CREATININE-BSD FRML MDRD: 82 ML/MIN/1.7M2
GLUCOSE SERPL-MCNC: 129 MG/DL (ref 70–99)
HCT VFR BLD AUTO: 41.6 % (ref 35–47)
HEMOCCULT STL QL IA: POSITIVE
HGB BLD-MCNC: 13.5 G/DL (ref 11.7–15.7)
IMM GRANULOCYTES # BLD: 0 10E9/L (ref 0–0.4)
IMM GRANULOCYTES NFR BLD: 0.3 %
LIPASE SERPL-CCNC: 199 U/L (ref 73–393)
LYMPHOCYTES # BLD AUTO: 1 10E9/L (ref 0.8–5.3)
LYMPHOCYTES NFR BLD AUTO: 8.1 %
MCH RBC QN AUTO: 30.2 PG (ref 26.5–33)
MCHC RBC AUTO-ENTMCNC: 32.5 G/DL (ref 31.5–36.5)
MCV RBC AUTO: 93 FL (ref 78–100)
MONOCYTES # BLD AUTO: 0.4 10E9/L (ref 0–1.3)
MONOCYTES NFR BLD AUTO: 3.6 %
NEUTROPHILS # BLD AUTO: 10.3 10E9/L (ref 1.6–8.3)
NEUTROPHILS NFR BLD AUTO: 86.9 %
NRBC # BLD AUTO: 0 10*3/UL
NRBC BLD AUTO-RTO: 0 /100
PLATELET # BLD AUTO: 165 10E9/L (ref 150–450)
POTASSIUM SERPL-SCNC: 3.7 MMOL/L (ref 3.4–5.3)
PROT SERPL-MCNC: 7.2 G/DL (ref 6.8–8.8)
RBC # BLD AUTO: 4.47 10E12/L (ref 3.8–5.2)
SODIUM SERPL-SCNC: 141 MMOL/L (ref 133–144)
SPECIMEN SOURCE: NORMAL
WBC # BLD AUTO: 11.9 10E9/L (ref 4–11)

## 2017-10-07 PROCEDURE — 99285 EMERGENCY DEPT VISIT HI MDM: CPT | Mod: 25

## 2017-10-07 PROCEDURE — 74177 CT ABD & PELVIS W/CONTRAST: CPT | Mod: TC

## 2017-10-07 PROCEDURE — 96361 HYDRATE IV INFUSION ADD-ON: CPT

## 2017-10-07 PROCEDURE — 25000128 H RX IP 250 OP 636: Performed by: PHYSICIAN ASSISTANT

## 2017-10-07 PROCEDURE — 86140 C-REACTIVE PROTEIN: CPT | Performed by: PHYSICIAN ASSISTANT

## 2017-10-07 PROCEDURE — 12000000 ZZH R&B MED SURG/OB

## 2017-10-07 PROCEDURE — 25000128 H RX IP 250 OP 636: Performed by: HOSPITALIST

## 2017-10-07 PROCEDURE — 96375 TX/PRO/DX INJ NEW DRUG ADDON: CPT

## 2017-10-07 PROCEDURE — 96376 TX/PRO/DX INJ SAME DRUG ADON: CPT

## 2017-10-07 PROCEDURE — 82274 ASSAY TEST FOR BLOOD FECAL: CPT | Performed by: PHYSICIAN ASSISTANT

## 2017-10-07 PROCEDURE — 87045 FECES CULTURE AEROBIC BACT: CPT | Performed by: PHYSICIAN ASSISTANT

## 2017-10-07 PROCEDURE — 87046 STOOL CULTR AEROBIC BACT EA: CPT | Performed by: PHYSICIAN ASSISTANT

## 2017-10-07 PROCEDURE — 99284 EMERGENCY DEPT VISIT MOD MDM: CPT | Performed by: PHYSICIAN ASSISTANT

## 2017-10-07 PROCEDURE — 40000786 ZZHCL STATISTIC ACTIVE MRSA SURVEILLANCE CULTURE: Performed by: HOSPITALIST

## 2017-10-07 PROCEDURE — 85025 COMPLETE CBC W/AUTO DIFF WBC: CPT | Performed by: PHYSICIAN ASSISTANT

## 2017-10-07 PROCEDURE — 96374 THER/PROPH/DIAG INJ IV PUSH: CPT

## 2017-10-07 PROCEDURE — 87493 C DIFF AMPLIFIED PROBE: CPT | Performed by: PHYSICIAN ASSISTANT

## 2017-10-07 PROCEDURE — 74020 XR ABDOMEN 2 VW: CPT | Mod: TC

## 2017-10-07 PROCEDURE — 99223 1ST HOSP IP/OBS HIGH 75: CPT | Mod: AI | Performed by: HOSPITALIST

## 2017-10-07 PROCEDURE — 87015 SPECIMEN INFECT AGNT CONCNTJ: CPT | Performed by: PHYSICIAN ASSISTANT

## 2017-10-07 PROCEDURE — 80053 COMPREHEN METABOLIC PANEL: CPT | Performed by: PHYSICIAN ASSISTANT

## 2017-10-07 PROCEDURE — 83690 ASSAY OF LIPASE: CPT | Performed by: PHYSICIAN ASSISTANT

## 2017-10-07 PROCEDURE — 87899 AGENT NOS ASSAY W/OPTIC: CPT | Performed by: PHYSICIAN ASSISTANT

## 2017-10-07 RX ORDER — OLOPATADINE HYDROCHLORIDE 1 MG/ML
1 SOLUTION/ DROPS OPHTHALMIC 2 TIMES DAILY
Status: DISCONTINUED | OUTPATIENT
Start: 2017-10-07 | End: 2017-10-12 | Stop reason: HOSPADM

## 2017-10-07 RX ORDER — LIDOCAINE 50 MG/G
1 PATCH TOPICAL
Status: DISCONTINUED | OUTPATIENT
Start: 2017-10-08 | End: 2017-10-12 | Stop reason: HOSPADM

## 2017-10-07 RX ORDER — FENTANYL CITRATE 50 UG/ML
25 INJECTION, SOLUTION INTRAMUSCULAR; INTRAVENOUS ONCE
Status: COMPLETED | OUTPATIENT
Start: 2017-10-07 | End: 2017-10-07

## 2017-10-07 RX ORDER — HYDROMORPHONE HYDROCHLORIDE 1 MG/ML
.3-.5 INJECTION, SOLUTION INTRAMUSCULAR; INTRAVENOUS; SUBCUTANEOUS
Status: DISCONTINUED | OUTPATIENT
Start: 2017-10-07 | End: 2017-10-12 | Stop reason: HOSPADM

## 2017-10-07 RX ORDER — NALOXONE HYDROCHLORIDE 0.4 MG/ML
.1-.4 INJECTION, SOLUTION INTRAMUSCULAR; INTRAVENOUS; SUBCUTANEOUS
Status: DISCONTINUED | OUTPATIENT
Start: 2017-10-07 | End: 2017-10-12 | Stop reason: HOSPADM

## 2017-10-07 RX ORDER — ONDANSETRON 2 MG/ML
4 INJECTION INTRAMUSCULAR; INTRAVENOUS EVERY 30 MIN PRN
Status: DISCONTINUED | OUTPATIENT
Start: 2017-10-07 | End: 2017-10-07

## 2017-10-07 RX ORDER — CIPROFLOXACIN 2 MG/ML
INJECTION, SOLUTION INTRAVENOUS
Status: COMPLETED
Start: 2017-10-07 | End: 2017-10-08

## 2017-10-07 RX ORDER — CARBIDOPA AND LEVODOPA 25; 100 MG/1; MG/1
1 TABLET ORAL 4 TIMES DAILY
Status: DISCONTINUED | OUTPATIENT
Start: 2017-10-08 | End: 2017-10-12 | Stop reason: HOSPADM

## 2017-10-07 RX ORDER — CIPROFLOXACIN 2 MG/ML
400 INJECTION, SOLUTION INTRAVENOUS EVERY 12 HOURS
Status: DISCONTINUED | OUTPATIENT
Start: 2017-10-07 | End: 2017-10-11

## 2017-10-07 RX ORDER — AMOXICILLIN 250 MG
1 CAPSULE ORAL 2 TIMES DAILY
COMMUNITY

## 2017-10-07 RX ORDER — IOPAMIDOL 612 MG/ML
100 INJECTION, SOLUTION INTRAVASCULAR ONCE
Status: COMPLETED | OUTPATIENT
Start: 2017-10-07 | End: 2017-10-07

## 2017-10-07 RX ORDER — ONDANSETRON 2 MG/ML
4 INJECTION INTRAMUSCULAR; INTRAVENOUS EVERY 6 HOURS PRN
Status: DISCONTINUED | OUTPATIENT
Start: 2017-10-07 | End: 2017-10-12 | Stop reason: HOSPADM

## 2017-10-07 RX ORDER — SODIUM CHLORIDE/ALOE VERA
GEL (GRAM) NASAL AT BEDTIME
Status: DISCONTINUED | OUTPATIENT
Start: 2017-10-07 | End: 2017-10-09 | Stop reason: RX

## 2017-10-07 RX ORDER — NYSTATIN 100000 U/G
CREAM TOPICAL 2 TIMES DAILY
Status: DISCONTINUED | OUTPATIENT
Start: 2017-10-07 | End: 2017-10-12 | Stop reason: HOSPADM

## 2017-10-07 RX ORDER — SODIUM CHLORIDE 9 MG/ML
INJECTION, SOLUTION INTRAVENOUS CONTINUOUS
Status: ACTIVE | OUTPATIENT
Start: 2017-10-07 | End: 2017-10-09

## 2017-10-07 RX ORDER — GABAPENTIN 100 MG/1
100 CAPSULE ORAL 2 TIMES DAILY
Status: DISCONTINUED | OUTPATIENT
Start: 2017-10-07 | End: 2017-10-12 | Stop reason: HOSPADM

## 2017-10-07 RX ORDER — DIPHENHYDRAMINE HYDROCHLORIDE 50 MG/ML
12.5 INJECTION INTRAMUSCULAR; INTRAVENOUS ONCE
Status: COMPLETED | OUTPATIENT
Start: 2017-10-07 | End: 2017-10-07

## 2017-10-07 RX ADMIN — ONDANSETRON 4 MG: 2 INJECTION INTRAMUSCULAR; INTRAVENOUS at 16:40

## 2017-10-07 RX ADMIN — SODIUM CHLORIDE 1000 ML: 9 INJECTION, SOLUTION INTRAVENOUS at 21:20

## 2017-10-07 RX ADMIN — FENTANYL CITRATE 25 MCG: 50 INJECTION, SOLUTION INTRAMUSCULAR; INTRAVENOUS at 15:28

## 2017-10-07 RX ADMIN — SODIUM CHLORIDE: 9 INJECTION, SOLUTION INTRAVENOUS at 23:40

## 2017-10-07 RX ADMIN — ONDANSETRON 4 MG: 2 INJECTION INTRAMUSCULAR; INTRAVENOUS at 15:22

## 2017-10-07 RX ADMIN — DIPHENHYDRAMINE HYDROCHLORIDE 12.5 MG: 50 INJECTION, SOLUTION INTRAMUSCULAR; INTRAVENOUS at 18:58

## 2017-10-07 RX ADMIN — IOPAMIDOL 100 ML: 612 INJECTION, SOLUTION INTRAVENOUS at 19:23

## 2017-10-07 RX ADMIN — DIATRIZOATE MEGLUMINE AND DIATRIZOATE SODIUM 30 ML: 660; 100 SOLUTION ORAL; RECTAL at 19:22

## 2017-10-07 RX ADMIN — PROCHLORPERAZINE EDISYLATE 5 MG: 5 INJECTION INTRAMUSCULAR; INTRAVENOUS at 19:02

## 2017-10-07 RX ADMIN — FENTANYL CITRATE 25 MCG: 50 INJECTION, SOLUTION INTRAMUSCULAR; INTRAVENOUS at 16:47

## 2017-10-07 ASSESSMENT — ENCOUNTER SYMPTOMS
FLANK PAIN: 0
FEVER: 0
CONSTITUTIONAL NEGATIVE: 1
VOMITING: 1
FREQUENCY: 0
RESPIRATORY NEGATIVE: 1
CARDIOVASCULAR NEGATIVE: 1
CHILLS: 0
DIFFICULTY URINATING: 0
DIARRHEA: 1
DYSURIA: 1
ABDOMINAL PAIN: 1
NAUSEA: 1
HEMATURIA: 0

## 2017-10-07 ASSESSMENT — ACTIVITIES OF DAILY LIVING (ADL)
CHANGE_IN_FUNCTIONAL_STATUS_SINCE_ONSET_OF_CURRENT_ILLNESS/INJURY: NO
EATING: 0 - INDEPENDENT
AMBULATION: 3-->ASSISTIVE EQUIPMENT AND PERSON
BATHING: 2-->ASSISTIVE PERSON
RETIRED_COMMUNICATION: 0-->UNDERSTANDS/COMMUNICATES WITHOUT DIFFICULTY
COGNITION: 1 - ATTENTION OR MEMORY DEFICITS
SWALLOWING: 0-->SWALLOWS FOODS/LIQUIDS WITHOUT DIFFICULTY
SWALLOWING: 0 - SWALLOWS FOODS/LIQUIDS WITHOUT DIFFICULTY
COMMUNICATION: 0 - UNDERSTANDS/COMMUNICATES WITHOUT DIFFICULTY
DRESS: 3 - ASSISTIVE EQUIPMENT AND PERSON
NUMBER_OF_TIMES_PATIENT_HAS_FALLEN_WITHIN_LAST_SIX_MONTHS: 1
RETIRED_EATING: 0-->INDEPENDENT
FALL_HISTORY_WITHIN_LAST_SIX_MONTHS: YES
TRANSFERRING: 3-->ASSISTIVE EQUIPMENT AND PERSON
DRESS: 2-->ASSISTIVE PERSON
TOILETING: 3-->ASSISTIVE EQUIPMENT AND PERSON
TOILETING: 3 - ASSISTIVE EQUIPMENT AND PERSON
AMBULATION: 3 - ASSISTIVE EQUIPMENT AND PERSON
TRANSFERRING: 3 - ASSISTIVE EQUIPMENT AND PERSON
BATHING: 3 - ASSISTIVE EQUIPMENT AND PERSON

## 2017-10-07 NOTE — ED NOTES
Patient being evaluated today for nausea, vomiting, diarrhea, and abdominal pain. Patient resides at a nursing home. Her symptoms started today. Patient was constipated yesterday and received and oral stool softener. This morning she reports a large BM. Patient alert and oriented. She is resting on ED cart. Call light in reach.

## 2017-10-07 NOTE — IP AVS SNAPSHOT
` ` Patient Information     Patient Name Sex     Britney Schaeffer ([id number]) Female 1929       Room Bed    3214 3214-1      Patient Demographics     Address Phone    [address]  [facility] [address] [facility] [address] [phone] (Home) *Preferred*  [phone] (Mobile)      Patient Ethnicity & Race     Ethnic Group Patient Race    American White      Emergency Contact(s)     Name Relation Home Work Mobile    JACK SHETH Daughter [phone]      NO SECONDARY CONTACT Other NONE        Documents on File        Status Date Received Description       Documents for the Patient    Insurance Card Received 06 TRI/BXMN    Face Sheet  07     Privacy Notice - [place]  06     Consent for Services - Hospital/Clinic-ESign Received () 14     [place] Privacy Notice-ESign Received 13     Patient ID Received () 13 MN DL    Consent for EHR Access-Received-ESign       Consent for EHR Access-Decline-ESign  13     Consent for EHR Access Received 13     Consent for Services - Hospital/Clinic Received () 13     HIM ELIZABETH Authorization  10/09/13     HIM ELIZABETH Authorization  14     Physical Therapy Certification Received 04/15/14 4-14-14    Physical Therapy Re-Certification Received () 14 ESIGN 14    Physical Therapy Certification Received 10/08/14 10-7-14    Consent to Communicate   AUTHORIZATION TO DISCUSS PROTECTED HEALTH INFORMATION    HIM ELIZABETH Authorization - File Only   AUTHORIZATION FOR RELEASE OF PROTECTED HEALTH INFORMATION    HIM ELIZABETH Authorization - File Only  () 14 AUTHORIZATION FOR RELEASE OF PHI    Physical Therapy Certification Received 01/29/15     HIM ELIZABETH Authorization  01/23/15 [facility]    HIM ELIZABETH Authorization  03/09/15 [facility]    Consent for Services - Hospital/Clinic Received () 03/27/15     HIM ELIZABETH Authorization  05/26/15 [facility]    Advance Directives and Living Will Received  07/21/15 POLST 2015    Consent for Services/Privacy Notice - Hospital/Clinic-[facility]sign Received () 16     HIM ELIZABETH Authorization  16 [facility]    HIM ELIZABETH Authorization  16 [facility]    HIM ELIZABETH Authorization  10/28/16 [facility]    Consent for Services/Privacy Notice - Hospital/Clinic Received 17     HIM ELIZABETH Authorization  17     Advance Directives and Living Will Received 17 POLST 2016    HIM ELIZABETH Authorization  17     Power of  Received (Deleted) 13     Advance Directives and Living Will Received (Deleted) 13        Documents for the Encounter    CMS IM for Patient Signature Received 10/08/17 reviewed /w dtr Joslyn kirby/t cognition    CMS IM for Patient Signature -  Received 10/08/17 reviewed /w dtr Joslyn kirby/hollis cognition    Assessment/Questionnaire  10/09/17 CONTRAST QUESTIONNAIRE      Admission Information     Attending Provider Admitting Provider Admission Type Admission Date/Time    Wu Do MD Pitafi, Ali, MD Emergency 10/07/17  1445    Discharge Date Hospital Service Auth/Cert Status Service Area     General Medicine Incomplete [facility]    Unit Room/Bed Admission Status       [facility] 3214/3214-1 Admission (Confirmed)       Admission     Complaint    Colitis      Hospital Account     Name Acct ID Class Status Primary Coverage    Britney Schaeffer [id number] Inpatient Open MEDICARE - MEDICARE            Guarantor Account (for Hospital Account #[id number])     Name Relation to Pt Service Area Active? Acct Type    Britney Schaeffer Self [facility] Yes Personal/Family    Address Phone          [address]  [address] [place] [address] [phone](H)              Coverage Information (for Hospital Account #[id number])     1. MEDICARE/MEDICARE     F/O Payor/Plan Precert #    MEDICARE/MEDICARE     Subscriber Subscriber #    Britney Schaeffer [id number]     Address Phone    [employer]  [address]  [place] [address] [phone]          2. // FOR LIFE     F/O Payor/Plan Precert #    // FOR LIFE     Subscriber Subscriber #    Britney Schaeffer [id number]    Address Phone     [employer]  [id number]  [place] [address]

## 2017-10-07 NOTE — IP AVS SNAPSHOT
Britney Schaeffer #[id number] (CSN: [id number])  (87 year old F)  (Adm: 10/07/17)     [id number]               [facility]: [phone]            Patient Demographics     Patient Name Sex          Age SSN Address Phone    LouannBritney jenkins Female 1929 (87 year old) [id number] [facility]  [address] [place] [id number] [phone] (Home) *Preferred*  [phone] (Mobile)      Emergency Contact(s)     Name Relation Home Work Mobile    JACK SHETH Daughter [phone]      NO SECONDARY CONTACT Other NONE        Admission Information     Attending Provider Admitting Provider Admission Type Admission Date/Time    Wu Do MD Pitafi, Ali, MD Emergency 10/07/17  1445    Discharge Date Hospital Service Auth/Cert Status Service Area     General Medicine Incomplete [facility]    Unit Room/Bed Admission Status       [facility] 3214-1 Admission (Confirmed)       Admission     Complaint    Colitis      Hospital Account     Name Acct ID Class Status Primary Coverage    Britney Schaeffer [id number] Inpatient Open MEDICARE - MEDICARE            Guarantor Account (for Hospital Account #[id number])     Name Relation to Pt Service Area Active? Acct Type    Britney Schaeffer Self [facility] Yes Personal/Family    Address Phone          [facility]  [address] [place] [id number] [phone](H)              Coverage Information (for Hospital Account #[id number])     1. MEDICARE/MEDICARE     F/O Payor/Plan Precert #    MEDICARE/MEDICARE     Subscriber Subscriber #    Britney Schaeffer [id number]    Address Phone    ATTN CLAIMS  [address]  [facility] [place] [address] [phone]          2. // FOR LIFE     F/O Payor/Plan Precert #    // FOR LIFE     Subscriber Subscriber #    Britney Schaeffer [id number]    Address Phone     FOR LIFE  [address]  [place]  [place] [address]                                                       INTERAGENCY TRANSFER FORM - PHYSICIAN ORDERS   10/7/2017                       [facility]: [phone]            Attending Provider: Wu Do MD     Allergies:  Ambien, Tramadol    Infection:  ESBL   Service:  GENERAL MEDI    Ht:  1.575 m (5' 2\")   Wt:  60.8 kg (134 lb 0.6 oz)   Admission Wt:  57.4 kg (126 lb 8.7 oz)    BMI:  24.52 kg/m 2   BSA:  1.63 m 2            ED Clinical Impression     Diagnosis Description Comment Added By Time Added    Pancolitis (H) [K51.00] Pancolitis (H) [K51.00]  Carlos Doll PA 10/7/2017  8:32 PM    Vomiting and diarrhea [R11.10, R19.7] Vomiting and diarrhea [R11.10, R19.7]  Carlos Doll PA 10/7/2017  8:50 PM      Hospital Problems as of 10/12/2017              Priority Class Noted POA    HTN (hypertension) Medium  9/24/2006 Yes    Dyslipidemia Medium  4/10/2013 Yes    Cerebrovascular disease Medium  4/10/2013 Yes    Asthma, mild persistent Medium  4/10/2013 Yes    GERD (gastroesophageal reflux disease) Medium  4/10/2013 Yes    HF (heart failure) (H) Medium  6/15/2015 Yes    Parkinson disease (H) Medium  8/10/2015 Yes    * (Principal)Colitis Medium  10/7/2017 Unknown      Non-Hospital Problems as of 10/12/2017              Priority Class Noted    Hemorrhoids Medium  4/10/2013    Perennial allergic rhinitis Medium  4/10/2013    Osteoporosis Medium  4/10/2013    Kyphoscoliosis and scoliosis Medium  4/10/2013    Insomnia, medical condition Medium  4/10/2013    History of colonic polyps Medium  4/10/2013    OAB (overactive bladder) Medium  3/19/2014    Neuralgia, post-herpetic Medium  3/19/2014    Chronic cough Medium  7/15/2014    Hiatal hernia Medium  3/16/2015    TIA (transient ischemic attack) Medium  3/16/2015    Constipation, chronic Medium  4/20/2015    CHD (coronary heart disease) Medium  6/15/2015    Health Care Home Low Chronic 12/9/2015    Osteoarthritis, multiple joints Medium  " 12/13/2015    Fibrocystic breast disease, left Medium  12/13/2015    Nursing Home Visit Medium  1/19/2016    Glaucoma Medium  12/27/2016    History of poliomyelitis Medium  12/27/2016      Code Status History     Date Active Date Inactive Code Status Order ID Comments User Context    10/12/2017 11:17 AM  DNR/DNI [id number]  Wu Do MD Outpatient    10/7/2017 11:20 PM 10/12/2017 11:17 AM DNR/DNI [id number]  Sally Mabry MD Inpatient    12/16/2015 10:44 AM 10/7/2017 11:20 PM DNR [id number]  Cresencio Sanchez MD Outpatient    12/11/2015  8:33 PM 12/16/2015 10:44 AM DNR [id number]  Amberly Lopez MD Inpatient    3/16/2015  4:15 PM 3/16/2015  9:07 PM DNR/DNI [id number]  Cresencio Sanchez MD Inpatient      Current Code Status     Date Active Code Status Order ID Comments User Context       Prior      Summary of Visit     Reason for your hospital stay       Britney Schaeffer is a 87 year old woman who was admitted on 10/7/2017. Her history is signficant for hypertension, Parkinson's disease, TIA, and CAD  She was sent to the ED with a complaint of abdominal pain, nausea vomiting and diarrhea after several days' constipation and abdominal pain; she received some extra stool softeners. She had a large bowel movement the morning of admission followed by nausea vomiting and diarrhea.  The patient had diffuse crampy abdominal pain. Abdominal CT showed colitis with regions of colon and small bowel involved.  She was treated preemptively use for an infectious process, although no specific cause identified.  She slowly improved been able to tolerate a soft diet by the time of discharge.  In other respects, she showed diffuse weakness and gait disturbance.  Physical therapy will be continued.  Aspirin/Plavix to be resumed in approximately 10 days as she had some  Occult blood in her stool.  Imaging also showed the possibility of a sigmoid mass, although whether further investigation would be fruitful is uncertain and  deferred to outpatient reevaluation.                Medication Review      CONTINUE these medications which may have CHANGED, or have new prescriptions. If we are uncertain of the size of tablets/capsules you have at home, strength may be listed as something that might have changed.        Dose / Directions Comments    * ACETAMINOPHEN PO   This may have changed:  Another medication with the same name was changed. Make sure you understand how and when to take each.        Dose:  1000 mg   Take 1,000 mg by mouth daily as needed for pain IN ADDITION TO BID DOSE   Refills:  0        * acetaminophen 500 MG tablet   Commonly known as:  TYLENOL   This may have changed:    - when to take this  - reasons to take this  - additional instructions   Used for:  Health care maintenance        Dose:  1000 mg   Take 2 tablets (1,000 mg) by mouth 2 times daily as needed for mild pain As needed   Quantity:  360 tablet   Refills:  1        * carbidopa-levodopa  MG per tablet   Commonly known as:  SINEMET   Indication:  Parkinson's Disease   This may have changed:  Another medication with the same name was changed. Make sure you understand how and when to take each.        Dose:  2 tablet   Take 2 tablets by mouth every morning   Refills:  0        * carbidopa-levodopa  MG per tablet   Commonly known as:  SINEMET   This may have changed:  See the new instructions.   Used for:  Parkinsons (H)        TAKE 1 TABLET THREE TIMES A DAY   Quantity:  270 tablet   Refills:  1        clopidogrel 75 MG tablet   Commonly known as:  PLAVIX   This may have changed:  See the new instructions.   Used for:  Coronary artery disease involving native coronary artery of native heart without angina pectoris        Dose:  75 mg   Start taking on:  10/23/2017   Take 1 tablet (75 mg) by mouth daily   Quantity:  90 tablet   Refills:  2        furosemide 20 MG tablet   Commonly known as:  LASIX   This may have changed:    - when to take this  -  reasons to take this   Used for:  Edema        Dose:  20 mg   Take 1 tablet (20 mg) by mouth as needed   Quantity:  90 tablet   Refills:  1        lidocaine 5 % Patch   Commonly known as:  LIDODERM   This may have changed:    - how much to take  - how to take this  - when to take this  - additional instructions   Used for:  Polio        APPLY UP TO 3 PATCHES TO PAINFUL AREA AT ONCE FOR UP TO 12 HOURS WITHIN A 24 HOURS PERIOD. REMOVE AFTER 12 HOURS   Quantity:  270 patch   Refills:  3        polyethylene glycol 0.4%- propylene glycol 0.3% 0.4-0.3 % Soln ophthalmic solution   Commonly known as:  SYSTANE   This may have changed:  when to take this   Used for:  Allergic conjunctivitis, unspecified laterality        Dose:  2 drop   Place 2 drops into both eyes 4 times daily as needed for dry eyes   Quantity:  3 Bottle   Refills:  1        polyethylene glycol powder   Commonly known as:  MIRALAX   This may have changed:    - when to take this  - reasons to take this   Used for:  Constipation        Dose:  17 g   Take 17 g by mouth daily   Quantity:  500 g   Refills:  3        saline nasal Gel topical gel   This may have changed:    - how to take this  - when to take this  - additional instructions   Used for:  Chronic cough        Apply into each naris At Bedtime   Refills:  0        * Notice:  This list has 4 medication(s) that are the same as other medications prescribed for you. Read the directions carefully, and ask your doctor or other care provider to review them with you.      CONTINUE these medications which have NOT CHANGED        Dose / Directions Comments    AMOXICILLIN PO        Dose:  500 mg   Take 500 mg by mouth as needed (GIVE ONE HOUR PRIOIR TO DENTAL APTS FOR ISCHEMIC HEART DISEASE) Give 4 tablets as needed   Refills:  0        artificial saliva Soln solution   Used for:  Dry mouth        Dose:  5 mL   Swish and spit 5 mLs in mouth as needed for dry mouth   Quantity:  3 Bottle   Refills:  3         aspirin 81 MG chewable tablet   Used for:  Health care maintenance        Dose:  81 mg   Start taking on:  10/23/2017   Take 1 tablet (81 mg) by mouth daily   Quantity:  90 tablet   Refills:  3        calcium carbonate 500 MG chewable tablet   Commonly known as:  TUMS   Indication:  OSTEOPOROSIS        Dose:  2 chew tab   Take 2 chew tab by mouth 2 times daily   Refills:  0        chlorhexidine 0.12 % solution   Commonly known as:  PERIDEX        Dose:  15 mL   Swish and spit 15 mLs in mouth 2 times daily   Refills:  0        gabapentin 100 MG capsule   Commonly known as:  NEURONTIN   Used for:  Neuralgia and neuritis        TAKE 1 CAPSULE TWICE A DAY   Quantity:  180 capsule   Refills:  2        guaiFENesin-codeine 100-10 MG/5ML Soln solution   Commonly known as:  guaiFENesin AC   Used for:  Cough        TAKE 2 TEASPOONFULS (10ML) BY MOUTH EVERY 4 HOURS AS NEEDED FOR COUGH   Quantity:  473 mL   Refills:  0        ketoconazole 2 % shampoo   Commonly known as:  NIZORAL   Used for:  Seborrheic dermatitis        Wash hair, let set for 5 minutes, rinse 1 time per day every Sunday   Quantity:  120 mL   Refills:  2        Lanolin 30 % Oint   Commonly known as:  CORONA MULTI-PURPOSE   Used for:  Skin irritation        Dose:  1 Application   Externally apply 1 Application topically 2 times daily   Quantity:  3 Tube   Refills:  3        loratadine 10 MG tablet   Commonly known as:  ALLERGY RELIEF   Used for:  Allergic rhinitis, unspecified allergic rhinitis type        Dose:  1 tablet   Take 1 tablet (10 mg) by mouth daily   Quantity:  90 tablet   Refills:  3        metoprolol 25 MG tablet   Commonly known as:  LOPRESSOR   Used for:  NSTEMI (non-ST elevated myocardial infarction) (H)        Dose:  12.5 mg   Take 0.5 tablets (12.5 mg) by mouth 2 times daily   Quantity:  90 tablet   Refills:  3        MYRBETRIQ 50 MG 24 hr tablet   Used for:  OAB (overactive bladder)   Generic drug:  mirabegron        Take one (1) tablet BY  MOUTH daily   Quantity:  31 tablet   Refills:  0        nystatin cream   Commonly known as:  MYCOSTATIN   Used for:  Candidiasis of skin and nails        APPLY TO AFFECTED AREA 2 TIMES DAILY AS NEEDED   Quantity:  30 g   Refills:  2        olopatadine 0.1 % ophthalmic solution   Commonly known as:  PATANOL   Used for:  Allergic conjunctivitis, unspecified laterality        INSTILL 1 DROP INTO BOTH EYES 2 TIMES DAILY   Quantity:  3 Bottle   Refills:  3        ondansetron 4 MG tablet   Commonly known as:  ZOFRAN   Used for:  Nausea        TAKE 1 TABLET BY MOUTH EVERY 8 HOURS AS NEEDED FOR NAUSEA AND VOMITING   Quantity:  30 tablet   Refills:  2        order for DME   Used for:  Primary osteoarthritis involving multiple joints        Equipment being ordered: neoprene knee sleeve   Quantity:  1 Device   Refills:  0        pantoprazole 40 MG EC tablet   Commonly known as:  PROTONIX   Used for:  Gastroesophageal reflux disease, esophagitis presence not specified        TAKE 1 TABLET DAILY   Quantity:  90 tablet   Refills:  1        PRESERVISION AREDS Tabs   Used for:  Health care maintenance        TAKE 1 TABLET BY MOUTH 2 TIMES DAILY   Quantity:  120 tablet   Refills:  5        senna-docusate 8.6-50 MG per tablet   Commonly known as:  SENOKOT-S;PERICOLACE        Dose:  1 tablet   Take 1 tablet by mouth 2 times daily   Refills:  0        sodium fluoride dental gel 1.1 % Gel topical gel   Commonly known as:  PREVIDENT        Dose:  1 applicator   Apply 1 applicator to affected area At Bedtime   Refills:  0        spironolactone 25 MG tablet   Commonly known as:  ALDACTONE   Used for:  Left heart failure (H)        Dose:  12.5 mg   Take 0.5 tablets (12.5 mg) by mouth daily   Quantity:  45 tablet   Refills:  3        vitamin D3 2000 UNITS Caps   Used for:  Osteoporosis        Dose:  2000 Units   Take 2,000 Units by mouth daily   Quantity:  90 capsule   Refills:  3          STOP taking     atorvastatin 40 MG tablet   Commonly  "known as:  LIPITOR                   After Care     Activity - Up with assistive device           Activity - Up with nursing assistance           Advance Diet as Tolerated       Follow this diet upon discharge: Orders Placed This Encounter      Begin with soft diet, advanding to Regular Diet as tolerated       Fall precautions           General info for SNF       Length of Stay Estimate: Long Term Care  Condition at Discharge: Improving  Level of care:skilled   Rehabilitation Potential: Fair  Admission H&P remains valid and up-to-date: Yes  Recent Chemotherapy: N/A  Use Nursing Home Standing Orders: Yes       Mantoux instructions       Give two-step Mantoux (PPD) Per Facility Policy Yes             Referrals     Occupational Therapy Adult Consult       Evaluate and treat as clinically indicated.    Reason:  generalized weakness, gait disturbance, balance limitation. 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    Reason:  generalized weakness, gait disturbance, balance limitation. 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  EFFECTIVE NOW     Approved and electronically signed by:  Wu Do MD                                                 INTERAGENCY TRANSFER FORM - NURSING   10/7/2017                       [facility]: [phone]            Attending Provider: Wu Do MD     Allergies:  Ambien, Tramadol    Infection:  ESBL   Service:  GENERAL [facility]    Ht:  1.575 m (5' 2\")   Wt:  60.8 kg (134 lb 0.6 oz)   Admission Wt:  57.4 kg (126 lb 8.7 oz)    BMI:  24.52 kg/m 2   BSA:  1.63 m 2            Advance Directives        Does patient have a scanned Advance Directive/ACP document in EPIC?           Yes        Immunizations     Name " Date      Influenza (High Dose) 3 valent vaccine 10/09/17     Influenza (High Dose) 3 valent vaccine 10/21/15     Influenza (High Dose) 3 valent vaccine 10/29/14     Influenza (High Dose) 3 valent vaccine 10/16/13     Influenza (High Dose) 3 valent vaccine 10/16/13     Influenza (High Dose) 3 valent vaccine 09/28/11     Influenza (IIV3) 10/03/12     Influenza (IIV3) 09/23/09     Influenza (IIV3) 10/25/07     Influenza (IIV3) 10/19/06     Influenza (IIV3) 11/15/05     Influenza (IIV3) 10/21/04     Influenza (IIV3) 11/04/03     Influenza (IIV3) 11/20/01     Influenza (IIV3) 11/22/00     Pneumococcal 23 valent 12/02/02     Pneumococcal 23 valent 12/09/99     Poliovirus, inactivated (IPV) 04/13/61     Poliovirus, inactivated (IPV) 09/02/59     Poliovirus, inactivated (IPV) 07/28/57     Poliovirus, inactivated (IPV) 03/25/57     TD (ADULT, 7+) 06/12/07     TD (ADULT, 7+) 08/05/96     TD (ADULT, 7+) 08/27/75     TD (ADULT, 7+) 12/08/58       ASSESSMENT     Discharge Profile Flowsheet     DISCHARGE NEEDS ASSESSMENT     All Quadrants Palpation  tender 10/07/17 1613    Concerns Comments  -- (Admitted to hospital 12/11/2015) 12/14/15 1606   Last Bowel Movement  10/10/17 10/10/17 1704    Equipment Currently Used at Home  walker, rolling 10/11/17 0936   GI Signs/Symptoms  abdominal discomfort 10/12/17 0005    Transportation Available  agency transportation 10/11/17 0936   Passing flatus  yes 10/10/17 1025    Equipment Used at Home  walker, rolling 12/12/15 1010   COMMUNICATION ASSESSMENT      FUNCTIONAL LEVEL CURRENT     Patient's communication style  spoken language (English or Bilingual) 10/07/17 1449    Ambulation  3 - assistive equipment and person 10/11/17 0852   SKIN      Transferring  3 - assistive equipment and person 10/11/17 0852   Inspection of bony prominences  Full except (identify areas not inspected) 10/12/17 0847    Toileting  3 - assistive equipment and person 10/11/17 0852   Full except areas not inspected    "Hip, left;Hip, right;Buttock, left;Buttock, right;Sacrum;Coccyx 10/12/17 0847    Bathing  2 - assistive person 10/11/17 0852   Inspection under devices  Full 10/11/17 1832    Dressing  2 - assistive person 10/11/17 0852   Skin WDL  ex 10/12/17 0847    Eating  0 - independent 10/11/17 0852   Skin Color/Characteristics  bruised (ecchymotic) 10/12/17 0847    Communication  0 - understands/communicates without difficulty 10/11/17 0852   Skin Temperature  warm 10/12/17 0847    Swallowing  0 - swallows foods/liquids without difficulty 10/11/17 0852   Skin Moisture  dry 10/12/17 0847    Change in Functional Status Since Onset of Current Illness/Injury  no 10/07/17 2327   Skin Elasticity  quick return to original state 10/12/17 0847    GASTROINTESTINAL (ADULT,PEDIATRIC,OB)     Skin Integrity  bruise(s) 10/12/17 0847    GI WDL  WDL 10/12/17 0847   SAFETY      Abdominal Appearance  nondistended 10/12/17 0005   Safety WDL  WDL 10/12/17 0847    All Quadrants Bowel Sounds  audible and active in all quadrants 10/12/17 0847   All Alarms  alarm(s) activated and audible 10/12/17 0847                 Assessment WDL (Within Defined Limits) Definitions           Safety WDL     Effective: 09/28/15    Row Information: <b>WDL Definition:</b> Bed in low position, wheels locked; call light in reach; upper side rails up x 2; ID band on<br> <font color=\"gray\"><i>Item=AS safety wdl>>List=AS safety wdl>>Version=F14</i></font>      Skin WDL     Effective: 09/28/15    Row Information: <b>WDL Definition:</b> Warm; dry; intact; elastic; without discoloration; pressure points without redness<br> <font color=\"gray\"><i>Item=AS skin wdl>>List=AS skin wdl>>Version=F14</i></font>      Vitals     Vital Signs Flowsheet     VITAL SIGNS     ANALGESIA SIDE EFFECTS MONITORING      Temp  98  F (36.7  C) 10/12/17 0747   Side Effects Monitoring: Respiratory Quality  R 10/10/17 1045    Temp src  Tympanic 10/12/17 0747   Side Effects Monitoring: Respiratory Depth  " "N 10/10/17 1045    Resp  16 10/12/17 0231   Side Effects Monitoring: Sedation Level  1 10/10/17 1045    Pulse  53 10/10/17 1147   HEIGHT AND WEIGHT      Heart Rate  64 10/12/17 0750   Height  1.575 m (5' 2\") 10/07/17 2322    Pulse/Heart Rate Source  Monitor 10/12/17 0231   Weight  60.8 kg (134 lb 0.6 oz) 10/12/17 0614    BP  162/71 10/12/17 0750   Weight Method  Bed scale 10/12/17 0614    OXYGEN THERAPY     Bed Scale  Standard (fitted sheet, draw sheet/ pad, cover/flat sheet, blanket, two pillows) 10/09/17 0614    SpO2  95 % 10/12/17 0750   BSA (Calculated - sq m)  1.58 10/07/17 2322    O2 Device  None (Room air) 10/12/17 0747   BMI (Calculated)  23.19 10/07/17 2322    PAIN/COMFORT     YENNIFER COMA SCALE      Patient Currently in Pain  denies 10/12/17 0747   Best Eye Response  4-->(E4) spontaneous 10/12/17 0847    Preferred Pain Scale  word (verbal rating pain scale) 10/11/17 0022   Best Motor Response  6-->(M6) obeys commands 10/12/17 0847    Patient's Stated Pain Goal  No pain 10/10/17 1045   Best Verbal Response  5-->(V5) oriented 10/12/17 0847    0-10 Pain Scale  3 10/11/17 0452   [place] Coma Scale Score  15 10/12/17 0847    Word Pain Scale  2 10/11/17 0022   POSITIONING      FACES Pain Rating  2-->hurts little bit 10/09/17 0459   Body Position  supine, head elevated 10/12/17 0842    Pain Location  Abdomen 10/11/17 0452   Head of Bed (HOB)  HOB at 60-90 degrees 10/12/17 0842    Pain Orientation  -- (all over) 10/10/17 1045   Positioning/Transfer Devices  pillows 10/12/17 0211    Pain Descriptors  Cramping;Aching 10/11/17 0452   DAILY CARE      Pain Intervention(s)  Medication (See eMAR) 10/11/17 0451   Activity Management  bedrest 10/12/17 0211    Response to Interventions  Decrease in pain 10/11/17 0452   Activity Assistance Provided  assistance, 2 people 10/12/17 0211            Patient Lines/Drains/Airways Status    Active LINES/DRAINS/AIRWAYS     Name: Placement date: Placement time: Site: Days: Last " dressing change:    Peripheral IV 10/09/17 Right Lower forearm 10/09/17   0556   Lower forearm   3             Patient Lines/Drains/Airways Status    Active PICC/CVC     None            Intake/Output Detail Report     Date Intake     Output Net    Shift P.O. I.V. IV Piggyback Total Urine Total       Noc 10/10/17 2300 - 10/11/17 0659 60 407 -- 467 -- -- 467    Day 10/11/17 0700 - 10/11/17 1459 -- -- -- -- -- -- 0    Corrine 10/11/17 1500 - 10/11/17 2259 100 -- -- 100 -- -- 100    Noc 10/11/17 2300 - 10/12/17 0659 -- -- -- -- -- -- 0    Day 10/12/17 0700 - 10/12/17 1459 -- -- -- -- -- -- 0      Last Void/BM       Most Recent Value    Urine Occurrence 1 at 10/12/2017 0600    Stool Occurrence 1 at 10/12/2017 0600      Case Management/Discharge Planning     Case Management/Discharge Planning Flowsheet     REFERRAL INFORMATION     Support Assessment  Adequate family and caregiver support;Adequate social supports 10/08/17 0957    Did the Initial Social Work Assessment result in a Social Work Case?  No 10/08/17 0957   COPING/STRESS      Admission Type  inpatient 10/08/17 0957   Major Change/Loss/Stressor  denies 10/07/17 2331    Arrived From  nursing facility 10/08/17 0957   ASSESSMENT/CONCERNS TO BE ADDRESSED      Referral Source  admission list 10/08/17 0957   Concerns Comments  -- (Admitted to hospital 12/11/2015) 12/14/15 1606    Reason For Consult  discharge planning 10/08/17 0957   DISCHARGE PLANNING      Record Reviewed  medical record 10/08/17 0957   Transportation Available  agency transportation 10/11/17 0936    CTS Assigned to Case  Diane Love RN 10/08/17 0957   Equipment Used at Home  walker, rolling 12/12/15 1010    Primary Care Clinic Name  [facility] 10/08/17 0957   FINAL RESOURCES      Primary Care MD Name  Dr Fofana 10/08/17 0957   Equipment Currently Used at Home  walker, rolling 10/11/17 0936    LIVING ENVIRONMENT     ABUSE RISK SCREEN      Lives With  facility resident 10/11/17 0936   QUESTION TO PATIENT:  Has  a member of your family or a partner(now or in the past) intimidated, hurt, manipulated, or controlled you in any way?  no 10/07/17 2328    Living Arrangements  extended care facility 10/11/17 0936   QUESTION TO PATIENT: Do you feel safe going back to the place where you are living?  yes 10/07/17 2328    Able to Return to Prior Living Arrangements  yes 10/08/17 0957   OBSERVATION: Is there reason to believe there has been maltreatment of a vulnerable adult (ie. Physical/Sexual/Emotional abuse, self neglect, lack of adequate food, shelter, medical care, or financial exploitation)?  no 10/07/17 2328    ASSESSMENT OF FAMILY/SOCIAL SUPPORT     (R) MENTAL HEALTH SUICIDE RISK      Who is your support system?  Facility resident(s)/Staff 10/08/17 0957   Are you depressed or being treated for depression?  No 10/07/17 2328    Description of Support System  Supportive;Involved 10/08/17 0957                       [facility]: [phone]            Medication Administration Report for Britney Schaeffer as of 10/12/17 1149   Legend:    Given Hold Not Given Due Canceled Entry Other Actions    Time Time (Time) Time  Time-Action       Inactive    Active    Linked        Medications 10/06/17 10/07/17 10/08/17 10/09/17 10/10/17 10/11/17 10/12/17    acetaminophen (TYLENOL) tablet 650 mg  Dose: 650 mg Freq: EVERY 4 HOURS PRN Route: PO  PRN Reason: mild pain  Start: 10/08/17 1952   Admin Instructions: Maximum acetaminophen dose from all sources = 75 mg/kg/day not to exceed 4 grams/day.       2015 (650 mg)-Given        0103 (650 mg)-Given       2045 (650 mg)-Given        0915 (650 mg)-Given       2057 (650 mg)-Given            Or  acetaminophen (TYLENOL) Suppository 650 mg  Dose: 650 mg Freq: EVERY 4 HOURS PRN Route: RE  PRN Reason: mild pain  Start: 10/08/17 1952   Admin Instructions: Maximum acetaminophen dose from all sources = 75 mg/kg/day not to exceed 4 grams/day.                                                   carbidopa-levodopa (SINEMET)  MG per tablet 1 tablet  Dose: 1 tablet Freq: 4 TIMES DAILY Route: PO  Start: 10/08/17 0800      0945 (1 tablet)-Given       1305 (1 tablet)-Given       1732 (1 tablet)-Given       2139 (1 tablet)-Given        0850 (1 tablet)-Given       1421 (1 tablet)-Given       1804 (1 tablet)-Given       2045 (1 tablet)-Given        0915 (1 tablet)-Given       1212 (1 tablet)-Given       1705 (1 tablet)-Given       2057 (1 tablet)-Given        0913 (1 tablet)-Given       1212 (1 tablet)-Given              1622 (1 tablet)-Given       2153 (1 tablet)-Given        0750 (1 tablet)-Given       [ ] 1200       [ ] 1600       [ ] 2000           gabapentin (NEURONTIN) capsule 100 mg  Dose: 100 mg Freq: 2 TIMES DAILY Route: PO  Start: 10/07/17 2330      (0415)-Not Given [C]       0945 (100 mg)-Given       2015 (100 mg)-Given        0850 (100 mg)-Given       2045 (100 mg)-Given        0914 (100 mg)-Given       2057 (100 mg)-Given        0913 (100 mg)-Given       2153 (100 mg)-Given        0751 (100 mg)-Given              [ ] 2100           HYDROmorphone (PF) (DILAUDID) injection 0.3-0.5 mg  Dose: 0.3-0.5 mg Freq: EVERY 2 HOURS PRN Route: IV  PRN Reason: severe pain  Start: 10/07/17 2320   Admin Instructions: Hold while on PCA.       2034 (0.5 mg)-Given        0608 (0.5 mg)-Given       0850 (0.5 mg)-Given       1438 (0.5 mg)-Given       2119 (0.5 mg)-Given        1039 (0.5 mg)-Given       1705 (0.5 mg)-Given        0218 (0.5 mg)-Given            lidocaine (LIDODERM) 5 % Patch 1 patch  Dose: 1 patch Freq: EVERY 24 HOURS 0800 Route: TD  Start: 10/08/17 0800   Admin Instructions: Apply patch(s) to the affected site. To prevent lidocaine toxicity, patient should be patch free for 12 hrs daily. Patches may be cut to smaller size prior to removing release liner.  NEVER APPLY HEAT OVER PATCH which will increase absorption and may lead to risk of local anesthetic toxicity.  Do not apply over area where liposomal  bupivacaine was injected for 96 hours post injection.               0849 (1 patch)-Given        0917 (1 patch)-Given        0913 (1 patch)-Given        0751 (1 patch)-Given           lidocaine (LIDODERM) patch in PLACE  Freq: EVERY 8 HOURS Route: TD  Start: 10/07/17 2345   Admin Instructions: Chart every shift, confirming that patch is still in place on patient (no barcode scan needed). See patch order for dose information.  NEVER APPLY HEAT OVER PATCH which will increase absorption and may lead to risk of local anesthetic toxicity. Do not apply over area where liposomal bupivacaine injected for 96 hours.      2354 ( )-Patch in Place [C]        0951 ( )-Given       1610 ( )-Patch in Place        (0134)-Not Given [C]       0849 ( )-Patch in Place       1619 ( )-Patch in Place        0102 ( )-Patch in Place       0913 ( )-Given       1645 ( )-Patch in Place       (2353)-Not Given [C]        0919 ( )-Given       1547 ( )-Patch in Place        (0005)-Not Given [C]       0754 ( )-Patch in Place       [ ] 1600           lidocaine (LIDODERM) patch REMOVAL  Freq: EVERY 24 HOURS 2000 Route: TD  Start: 10/08/17 2000   Admin Instructions: Remove lidocaine Patch.       2022 ( )-Patch Removed        2045 ( )-Patch Removed        2108 ( )-Patch Removed        2158 ( )-Patch Removed        [ ] 2000           metoprolol (LOPRESSOR) half-tab 12.5 mg  Dose: 12.5 mg Freq: 2 TIMES DAILY Route: PO  Start: 10/07/17 2330      (0416)-Not Given [C]       0945 (12.5 mg)-Given       2015 (12.5 mg)-Given        0850 (12.5 mg)-Given       2045 (12.5 mg)-Given        0913 (12.5 mg)-Given       2056 (12.5 mg)-Given        0913 (12.5 mg)-Given       2153 (12.5 mg)-Given        0750 (12.5 mg)-Given              [ ] 2100           naloxone (NARCAN) injection 0.1-0.4 mg  Dose: 0.1-0.4 mg Freq: EVERY 2 MIN PRN Route: IV  PRN Reason: opioid reversal  Start: 10/07/17 2320   Admin Instructions: For respiratory rate LESS than or EQUAL to 8.  Partial  reversal dose:  0.1 mg titrated q 2 minutes for Analgesia Side Effects Monitoring Sedation Level of 3 (frequently drowsy, arousable, drifts to sleep during conversation).Full reversal dose:  0.4 mg bolus for Analgesia Side Effects Monitoring Sedation Level of 4 (somnolent, minimal or no response to stimulation).               nystatin (MYCOSTATIN) cream  Freq: 2 TIMES DAILY Route: Top  Start: 10/07/17 2330   Admin Instructions: Apply to the affected site      (2354)-Not Given        1104 ( )-Given       2141 ( )-Given        1004 ( )-Given       2052 ( )-Given        0920 ( )-Given       2109 ( )-Given        0919 ( )-Given       2158 ( )-Given        0908 ( )-Given       [ ] 2100           olopatadine (PATANOL) 0.1 % ophthalmic solution 1 drop  Dose: 1 drop Freq: 2 TIMES DAILY Route: Both Eyes  Start: 10/07/17 2330     (2354)-Not Given        1305 (1 drop)-Given       2023 (1 drop)-Given        1004 (1 drop)-Given       2052 (1 drop)-Given        0920 (1 drop)-Given       2109 (1 drop)-Given        0918 (1 drop)-Given       2159 (1 drop)-Given        0753 (1 drop)-Given              [ ] 2100           ondansetron (ZOFRAN) injection 4 mg  Dose: 4 mg Freq: EVERY 6 HOURS PRN Route: IV  PRN Reasons: nausea,vomiting  Start: 10/07/17 2320   Admin Instructions: This is Step 1 of nausea and vomiting management.  If nausea not resolved in 15 minutes, go to Step 2 prochlorperazine (COMPAZINE).  Irritant.       1447 (4 mg)-Given        0103 (4 mg)-Given       1438 (4 mg)-Given         0218 (4 mg)-Given            pantoprazole (PROTONIX) EC tablet 40 mg  Dose: 40 mg Freq: EVERY MORNING Route: PO  Start: 10/12/17 0730   Admin Instructions: DO NOT CRUSH.           0751 (40 mg)-Given           potassium chloride SA (K-DUR/KLOR-CON M) CR tablet 40 mEq  Dose: 40 mEq Freq: 3 TIMES DAILY Route: PO  Start: 10/11/17 1400   End: 10/13/17 1359   Admin Instructions: DO NOT CRUSH          1353 (40 mEq)-Given       2153 (10 mEq)-Given  [C]        0755 (40 mEq)-Given              [ ] 1400       [ ] 2100           sodium chloride (OCEAN) 0.65 % nasal spray 1 spray  Dose: 1 spray Freq: AT BEDTIME Route: BOTH NOSTRIL  Start: 10/09/17 2200   Admin Instructions: Alternative for saline nasal gel into each nare at bedtime (Patient's Home dose).        2122 (1 spray)-Given        2109 (1 spray)-Given        (2159)-Not Given        [ ] 2200           spironolactone (ALDACTONE) half-tab 12.5 mg  Dose: 12.5 mg Freq: DAILY Route: PO  Start: 10/12/17 0900          0908 (12.5 mg)-Given          Completed Medications  Medications 10/06/17 10/07/17 10/08/17 10/09/17 10/10/17 10/11/17 10/12/17         Dose: 0.5 mL Freq: PRIOR TO DISCHARGE Route: IM  Start: 10/09/17 1000   End: 10/09/17 1439   Admin Instructions: Administer when afebrile (less than 100.4F) x 24 hours, or Prior to Discharge.  If not administering when scheduled , change the due time by following the instructions in the reference link below.  If patient refuses vaccine, chart as Vaccine Refused.        1439 (0.5 mL)-Given [C]             Discontinued Medications  Medications 10/06/17 10/07/17 10/08/17 10/09/17 10/10/17 10/11/17 10/12/17         Rate: 100 mL/hr Freq: CONTINUOUS Route: IV  Start: 10/07/17 2330   End: 10/09/17 2329     2340 ( )-New Bag        1404 ( )-New Bag        0551 ( )-New Bag       1804 ( )-New Bag       2329-Med Discontinued            Dose: 400 mg Freq: EVERY 12 HOURS Route: IV  Indications Comment: Colitis  Last Dose: 400 mg (10/09/17 0147)  Start: 10/07/17 2330   End: 10/11/17 0631   Admin Instructions: Irritant.       0135 (400 mg)-New Bag       1404 (400 mg)-New Bag        0147 (400 mg)-New Bag       1421 (400 mg)-New Bag        0053 (400 mg)-New Bag       1343 (400 mg)-New Bag        0127 (400 mg)-New Bag       0631-Med Discontinued          Dose: 500 mg Freq: EVERY 6 HOURS Route: IV  Indications Comment: Colitis  Last Dose: 500 mg (10/09/17 1230)  Start: 10/07/17 7911    End: 10/09/17 1554      0009 (500 mg)-New Bag       0636 (500 mg)-New Bag       1247 (500 mg)-New Bag       1732 (500 mg)-New Bag        0039 (500 mg)-New Bag       0608 (500 mg)-New Bag       1230 (500 mg)-New Bag       1554-Med Discontinued            Dose: 40 mg Freq: EVERY MORNING BEFORE BREAKFAST Route: IV  Start: 10/08/17 0730   End: 10/11/17 1336   Admin Instructions: Reconstitute vial with 10mLs Saline and administer IV Push  Irritant.       0636 (40 mg)-Given        0640 (40 mg)-Given        0809 (40 mg)-Given        0639 (40 mg)-Given       1336-Med Discontinued                 INTERAGENCY TRANSFER FORM - NOTES (H&P, Discharge Summary, Consults, Procedures, Therapies)   10/7/2017                       [facility]: [phone]               History & Physicals      H&P by Sally Mabry MD at 10/7/2017  9:22 PM     Author:  Sally Mabry MD Service:  Hospitalist Author Type:  Physician    Filed:  10/8/2017  2:06 AM Date of Service:  10/7/2017  9:22 PM Creation Time:  10/7/2017  9:21 PM    Status:  Signed :  Sally Mabry MD (Physician)         [facility]    History and Physical  Hospitalist       Date of Admission:  10/7/2017[AP1.1]    Assessment & Plan[AP1.2]   Britney Schaeffer is a 87 year old female[AP1.1] History of hypertension, Parkinson's disease, TIA, CAD  Was sent to the ED with a complaint of abdominal pain, nausea vomiting and diarrhea was found to have colitis with a possible mass in the descending colon. Her expected length of stay will be > 2 midnights. Her issues are the following:     Colitis: In the differential diagnoses I would include infectious colitis, ischemic colitis and malignancy.  There is a suspicion of mass in the descending colon highly suspicious for malignancy.  The patient is covered with ciprofloxacin and Flagyl.  C. Difficile is negative.  Stool cultures have been sent.  I will keep the patient nothing by mouth for now as she is still having  abdominal pain.  She will probably need a colonoscopy with biopsy of the inflamed part once infection is ruled out.  At the moment I will hold aspirin and Plavix due to bloody stools.  Hemoglobin is relatively stable.  I will recheck hemoglobin in the morning.    Parkinson's disease: Continue Sinemet.    Hypertension: continue on Lopressor.  Monitor blood pressure closely.  The patient is nothing by mouth    Hyperlipidemia: Continue statins but hold for now as the patient is nothing by mouth.    Coronary artery disease: Hold aspirin and Plavix due to increased risk of bleeding.  Continue on beta blockers.    GERD: Continue on IV Protonixduring inpatient stay.    History of CVA: Holding aspirin and Plavix[AP1.3]    DVT Prophylaxis:[AP1.1] Pneumatic Compression Devices[AP1.3]  Code Status:[AP1.1] DNR / DNI[AP1.3]    Disposition: Expected discharge in[AP1.1] 2-3[AP1.3] days once[AP1.1] abdominal pain and colitis resolves.[AP1.3]     Sally Mabry    Primary Care Physician   Mario Fofana    Chief Complaint[AP1.2]   Abdominal pain, Nausea, vomiting and diarrhea    History is obtained from the patient and medical records[AP1.3]    History of Present Illness[AP1.2]   Britney Schaeffer is a 87 year old female[AP1.1] History of hypertension, Parkinson's disease, TIA, CAD  Was sent to the ED with a complaint of abdominal pain, nausea vomiting and diarrhea.  The patient had been complaining about constipation and abdominal pain for couple of days received some extra stool softeners had a large bowel movement this morning followed by nausea vomiting and diarrhea.  The patient had diffuse abdominal pain, crampy to sharp in nature.  Also specks of blood were noted in the stools.  There were no complaints of fevers or chills.  The patient probably had received some antibiotics during the last 2 months.I'm not sure whether she had recent weight loss.  The patient was given pain medications and at the time of history taking she is  feeling much better with improvement in abdominal pain.  Still feels a bit nauseous.  Denies chest pain or shortness of breath.  No urinary complaints.  No complaint of significant cough.      [AP1.3]  Past Medical History[AP1.2]    I have reviewed this patient's medical history and updated it with pertinent information if needed.[AP1.1]   Past Medical History:   Diagnosis Date     Allergic rhinitis, Seasonal 06/19/2000     Colonic polyps 09/28/2000     Corns and callosities 01/20/2004     dyslipidemia 09/28/2000     Fibrocystic Breast Disease 03/01/2011     GERD 03/01/2011     hemorrhoids 03/01/2011     hypertension, benign 11/22/1999     Insomnia, unspecified 02/05/2004     Osteoarthritis, generalized 11/07/2008     Osteoporosis, unspecified 04/20/2009     Polio 1931     Scoliosis (and kyphoscoliosis), idiopathic 03/01/2011     TIA 03/09/2005     Unspecified asthma(493.90) 03/11/2003       Past Surgical History[AP1.2]   I have reviewed this patient's surgical history and updated it with pertinent information if needed.[AP1.1]  Past Surgical History:   Procedure Laterality Date     BREAST BIOPSY, RT/LT       BUNIONECTOMY      Bunion     COLONOSCOPY  2007     GENITOURINARY SURGERY      botox     JOINT REPLACEMENT      LT     JOINT REPLACEMENT, HIP RT/LT  2010     KERATOPLASTY PENETRATING, VITRECTOMY ANTERIOR, EXTRACAPSULAR CATARACT EXTRACTION, COMBINED  2010    LT, Cataracts       Prior to Admission Medications   Prior to Admission Medications   Prescriptions Last Dose Informant Patient Reported? Taking?   AMOXICILLIN PO   Yes Yes   Sig: Take 500 mg by mouth Give 4 tablets as needed   Cholecalciferol (VITAMIN D3) 2000 UNITS CAPS   No Yes   Sig: Take 2,000 Units by mouth daily   Lanolin (CORONA MULTI-PURPOSE) 30 % OINT   No Yes   Sig: Externally apply 1 Application topically 2 times daily   MYRBETRIQ 50 MG 24 hr tablet   No Yes   Sig: Take one (1) tablet BY MOUTH daily   Multiple Vitamins-Minerals (PRESERVISION  AREDS) TABS   No No   Sig: TAKE 1 TABLET BY MOUTH 2 TIMES DAILY   acetaminophen (TYLENOL) 500 MG tablet   No Yes   Sig: Take 2 tablets (1,000 mg) by mouth 2 times daily as needed for mild pain As needed   Patient taking differently: Take 1,000 mg by mouth 2 times daily And 2 tabs PRN one time a day   artificial saliva, BIOTENE MT, (BIOTENE MT) SOLN   No Yes   Sig: Swish and spit 5 mLs in mouth as needed for dry mouth   aspirin 81 MG chewable tablet   No Yes   Sig: Take 1 tablet (81 mg) by mouth daily   atorvastatin (LIPITOR) 40 MG tablet   No Yes   Sig: Take 1 tablet (40 mg) by mouth daily   calcium carbonate (TUMS) 500 MG chewable tablet   Yes Yes   Sig: Take 2 chew tab by mouth 2 times daily   carbidopa-levodopa (SINEMET)  MG per tablet   No Yes   Sig: TAKE 1 TABLET THREE TIMES A DAY   Patient taking differently: TAKE 1 TABLET THREE TIMES A DAY at 1200,1600 and 2000 and 1 tablet at 0800   carbidopa-levodopa (SINEMET)  MG per tablet   Yes Yes   Sig: TAKE 1 TABLET THREE TIMES A DAY   chlorhexidine (PERIDEX) 0.12 % solution   Yes Yes   Sig: Swish and spit 15 mLs in mouth 2 times daily   clopidogrel (PLAVIX) 75 MG tablet 10/7/2017 at Unknown time  No Yes   Sig: TAKE 1 TABLET DAILY   furosemide (LASIX) 20 MG tablet   No Yes   Sig: Take 1 tablet (20 mg) by mouth as needed   gabapentin (NEURONTIN) 100 MG capsule   No Yes   Sig: TAKE 1 CAPSULE TWICE A DAY   guaiFENesin-codeine (GUAIFENESIN AC) 100-10 MG/5ML SOLN   No Yes   Sig: TAKE 2 TEASPOONFULS (10ML) BY MOUTH EVERY 4 HOURS AS NEEDED FOR COUGH   ketoconazole (NIZORAL) 2 % shampoo Unknown at Unknown time  No No   Sig: Wash hair, let set for 5 minutes, rinse 1 time per day every Sunday   lidocaine (LIDODERM) 5 % Patch   No Yes   Sig: APPLY UP TO 3 PATCHES TO PAINFUL AREA AT ONCE FOR UP TO 12 HOURS WITHIN A 24 HOURS PERIOD. REMOVE AFTER 12 HOURS   Patient taking differently: Place 1 patch onto the skin Apply BID at 0800 and 2000   loratadine (ALLERGY RELIEF)  10 MG tablet   No Yes   Sig: Take 1 tablet (10 mg) by mouth daily   metoprolol (LOPRESSOR) 25 MG tablet   No Yes   Sig: Take 0.5 tablets (12.5 mg) by mouth 2 times daily   nystatin (MYCOSTATIN) cream Unknown at Unknown time  No No   Sig: APPLY TO AFFECTED AREA 2 TIMES DAILY AS NEEDED   olopatadine (PATANOL) 0.1 % ophthalmic solution   No Yes   Sig: INSTILL 1 DROP INTO BOTH EYES 2 TIMES DAILY   ondansetron (ZOFRAN) 4 MG tablet   No Yes   Sig: TAKE 1 TABLET BY MOUTH EVERY 8 HOURS AS NEEDED FOR NAUSEA AND VOMITING   order for DME   No Yes   Sig: Equipment being ordered: neoprene knee sleeve   pantoprazole (PROTONIX) 40 MG EC tablet   No Yes   Sig: TAKE 1 TABLET DAILY   polyethylene glycol (MIRALAX) powder   No Yes   Sig: Take 17 g by mouth daily   Patient taking differently: Take 17 g by mouth 2 times daily    polyethylene glycol 0.4%- propylene glycol 0.3% (SYSTANE) 0.4-0.3 % SOLN ophthalmic solution   No Yes   Sig: Place 2 drops into both eyes 4 times daily as needed for dry eyes   Patient taking differently: Place 2 drops into both eyes daily    saline nasal (AYR SALINE) GEL topical gel   Yes Yes   Sig: Apply into each nare At Bedtime   senna-docusate (SENOKOT-S;PERICOLACE) 8.6-50 MG per tablet   Yes Yes   Sig: Take 1 tablet by mouth 2 times daily   sodium fluoride dental gel (PREVIDENT) 1.1 % GEL topical gel Unknown at Unknown time  Yes No   Sig: Apply 1 applicator to affected area At Bedtime   spironolactone (ALDACTONE) 25 MG tablet   No Yes   Sig: Take 0.5 tablets (12.5 mg) by mouth daily      Facility-Administered Medications: None     Allergies   Allergies   Allergen Reactions     Ambien      Zolpidem Tartrate     Tramadol        Social History[AP1.2]   I have reviewed this patient's social history and updated it with pertinent information if needed. Britney PATEL Maksim[AP1.1]  reports that she is a non-smoker but has been exposed to tobacco smoke. She has never used smokeless tobacco. She reports that she  drinks alcohol. She reports that she does not use illicit drugs.    Family History[AP1.2]   I have reviewed this patient's family history and updated it with pertinent information if needed.[AP1.1]   Family History   Problem Relation Age of Onset     CEREBROVASCULAR DISEASE Mother      CVA (cause of death)     CEREBROVASCULAR DISEASE Father      CVA     Asthma No family hx of        Review of Systems[AP1.2]   GENERAL/CONSTITUTIONAL: The patient denies fever,weight gain or weight loss.  HEAD, EYES, EARS, NOSE AND THROAT: Eyes - The patient denies pain, redness, loss of vision,,Ears, nose, mouth and throat. The patient denies ringing in the ears, loss of hearing,   CARDIOVASCULAR: The patient denies chest pain, irregular heartbeats, palpitation, shortness of breath, orthopnea or PND  RESPIRATORY: The patient denies chronic dry cough, Hemoptysis,  repeated pneumonias, wheezing or night sweats.  GASTROINTESTINAL:[AP1.1] as per HPI otherwise negative[AP1.3]  GENITOURINARY: The patient denies difficult urination, pain or burning with urination, blood in the urine,  MUSCULOSKELETAL: The patient denies muscle cramps.   SKIN : The patient denies easy bruising, skin redness, skin rash,   NEUROLOGIC:[AP1.1] the patient has Parkinson's disease due to which she has rigidity, tremors and bradykinesia.[AP1.3]  PSYCHIATRIC: The patient denies depression with thoughts of suicide,  ENDOCRINE: The patient denies intolerance to hot or cold temperature,  HEMATOLOGIC/LYMPHATIC:[AP1.1] reported blood in stool[AP1.3]  ALLERGIC/IMMUNOLOGIC: The patient denies rhinitis, skin sensitivity,[AP1.1]    Physical Exam   Temp: 97.5  F (36.4  C) Temp src: Oral BP: 169/84 Pulse: 69 Heart Rate: 91 Resp: 16 SpO2: 97 % O2 Device: None (Room air)[AP1.2]    Vital Signs with Ranges[AP1.1]  Temp:  [97.5  F (36.4  C)] 97.5  F (36.4  C)  Pulse:  [69] 69  Heart Rate:  [66-96] 91  Resp:  [16] 16  BP: (127-176)/(54-85) 169/84  SpO2:  [93 %-98 %] 97 %  0 lbs 0  oz[AP1.2]  GENERAL APPEARANCE:[AP1.1] he said is an elderly lean built white female who has resting tremors otherwise in no acute distress[AP1.3]  HEENT: Normocephalic and atraumatic. No scleral icterus.PERRLA. No conjunctival injection is noted. No oropharyngeal lesions.  NECK: Supple. Trachea is midline. No evidence of thyroid enlargement. No lymphadenopathy or tenderness.  CHEST: Symmetric. Nontender to palpation.  LUNGS: Breath sounds are equal and clear bilaterally. No wheezes, rhonchi, or rales.  HEART: Regular rate and rhythm with normal S1 and S2. No murmurs, gallops, or rubs.  ABDOMEN: Soft, flat,[AP1.1]diffusely tender, bowel sounds are normal[AP1.3]  RECTAL: Deferred  EXTREMITIES: No cyanosis, clubbing, or edema.  NEUROLOGIC:[AP1.1] resting tremors, rigidity and bradykinesia[AP1.3]  PSYCHIATRIC:[AP1.1] flat affect due to Parkinson's[AP1.3]  SKIN: Warm, dry, and well perfused. Good turgor. No lesions, nodules or rashes are noted.[AP1.1]       Data[AP1.2]   Data reviewed today:  I personally reviewed[AP1.1] CT scan report[AP1.3]    Recent Labs  Lab 10/07/17  1516   WBC 11.9*   HGB 13.5   MCV 93         POTASSIUM 3.7   CHLORIDE 108   CO2 27   BUN 24   CR 0.68   ANIONGAP 6   BAKARI 9.2   *   ALBUMIN 3.8   PROTTOTAL 7.2   BILITOTAL 0.5   ALKPHOS 55   ALT 9   AST 17   LIPASE 199       Recent Results (from the past 24 hour(s))   XR Abdomen 2 Views    Narrative    EXAM:XR ABDOMEN 2 VW     CLINICAL HISTORY: Patient Age  87 years Additional clinical info:  n/v/d, r/o obstruction    COMPARISON: 7/2/2015      TECHNIQUE: 2 views      Impression    IMPRESSION: Gas in normal caliber loops of large and small bowel. No  free air. Very large esophageal hiatal hernia. Gas is seen in the  rectum. Elevated right hemidiaphragm. Right KE. Implanted electrode  with electrode pack projecting over the left lower quadrant. Vascular  calcifications.    LUCHO LUJAN MD   CT Abdomen Pelvis w Contrast    Narrative     EXAMINATION: CT ABDOMEN PELVIS W CONTRAST, 10/7/2017 7:35 PM    HISTORY: r/o obstruction GI bleed.    COMPARISON: 8/9/2016    TECHNIQUE:  Helical CT images from the lung bases through the  symphysis pubis were obtained with IV contrast. Contrast dose: isovue  300, 100 ml    FINDINGS:    Pancreatico hepatobiliary: 1.3 cm peripherally enhancing area in the  inferior right hepatic lobe, adjacent to the gallbladder fossa. This  is best seen on axial series 2 images 41 through 5. There is mild  associated bile duct dilatation associated with this. This was present  on the ninth 2016 exam but is slightly more conspicuous today due to  differences in the phase of contrast. It likely represents transient  perfusion anomaly.    Gallbladder is contracted. Bile ducts are slightly prominent, likely  normal for age.. Spleen appears normal.    Genitourinary:  Areas of cortical scarring in both kidneys. 2 small,  approximately 4 mm nonobstructing stones in lower pole of the left  kidney. The adrenal glands, kidneys, ureters, and bladder otherwise  appear normal. Bladder is obscured by metallic artifact from a right  KE. Uterus and adnexa appear grossly normal.    Gastrointestinal:  Huge hiatal hernia. Most of the stomach is  herniated above the diaphragm on the left posteriorly. The upper  portion of the stomach is incompletely visualized.    Mild thickening and enhancement of the wall colon extending from the  descending colon down to the sigmoid colon. The wall of the sigmoid  colon appears more irregular but there is a more focal area of  thickening and irregularity of the upper to midportion of the sigmoid,  measuring approximately 3 cm in length, best seen on axial series 2  images 102 through 105. The findings of the colon are suspicious for  colitis but this focal area of thickening of the sigmoid could be a  mass. There is a small amount of high density material in the lower  sigmoid near the rectosigmoid junction best  seen on axial series 2  image 112. This could represent acute blood.    There are several loops of small bowel in the right lower quadrant  which demonstrates soft tissue stranding around the suspicious for  enteritis.    No evidence of obstruction. No fluid collection identified and no free  air identified    Lymph nodes:  No enlarged lymph nodes are identified but there is mild  mesenteric stranding in the mesentery in the right lower quadrant  surrounding several loops of small bowel.    Vasculature:  Very dense vascular calcifications including coronary  artery calcifications. No aneurysm identified. Very dense  calcifications at the origin of the renal arteries.    Lung bases: Atelectasis left lower lobe around the large esophageal  hiatal hernia. No infiltrate identified. Small left pleural effusion    Musculoskeletal: Advanced degenerative arthritis throughout the spine  and pelvis. The bones are demineralized. Right KE causes artifact  that obscures detail in the pelvis.. Electrode pack embedded in the  subcutaneous fat of the left buttock stimulator wires anterior to the  mid sacrum. Benign-appearing calcification in the right breast.      Impression    IMPRESSION:   1. There is bowel wall thickening and enhancement of the descending  colon, especially the sigmoid colon, suspicious for colitis with  possible mass in the upper to mid sigmoid colon. Portions of the  sigmoid colon and rectum are difficult to visualize due to metallic  artifact from right KE.  2. Small amount of high density material within the lumen of the lower  sigmoid and rectum, suspicious for acute hemorrhage  3. Probable focal enteritis several loops of small bowel in the right  lower quadrant  4. Other incidental findings as described.    Findings were discussed with Dr. Carlos Doll at 2015 hours on  10/7/2017    LUCHO LUJAN MD[AP1.2]       Total time spent in patient care was[AP1.1] 55[AP1.3] minutes. >  50% of time was spent in  coordination of care and patient counseling.[AP1.1]     Revision History        User Key Date/Time User Provider Type Action    > AP1.3 10/8/2017  2:06 AM Sally Mabry MD Physician Sign     AP1.2 10/7/2017  9:22 PM Sally Mabry MD Physician      AP1.1 10/7/2017  9:21 PM Sally Mabry MD Physician                      Discharge Summaries      Discharge Summaries by Wu Bunch MD at 10/12/2017 11:44 AM     Author:  Wu Bunch MD Service:  Hospitalist Author Type:  Physician    Filed:  10/12/2017 11:44 AM Date of Service:  10/12/2017 11:44 AM Creation Time:  10/12/2017 11:29 AM    Status:  Signed :  Wu Bunch MD (Physician)         [facility]    Discharge Summary  Hospitalist    Date of Admission:  10/7/2017  Date of Discharge:[AM1.1]  10/12/2017[AM1.2]  Discharging Provider:[AM1.1] Wu Bunch[AM1.3]  Date of Service (when I saw the patient):[AM1.1] 10/12/17[AM1.2]    Discharge Diagnoses[AM1.3]   Sigmoid colitis and small bowel enteritis, possibly infectious.  Generalized weakness and gait disturbance with limited balance.  Established post polio syndrome  Established large hiatal hernia.  Established Gastroesophageal reflux  Established dyslipidemia.  Established coronary disease and prior CVA.[AM1.1]      History of Present Illness[AM1.3]   Britnye Schaeffer is a 87 year old woman who was admitted on 10/7/2017. Her history is signficant for hypertension, Parkinson's disease, TIA, and CAD  She was sent to the ED with a complaint of abdominal pain, nausea vomiting and diarrhea; she had noted constipation and abdominal pain for couple of days and received some extra stool softeners. She had a large bowel movement the morning of admission followed by nausea vomiting and diarrhea.  The patient had diffuse abdominal pain, crampy to sharp in nature.[AM1.1]    Hospital Course[AM1.3]   Britney Schaeffer was admitted on 10/7/2017.  The following problems were addressed  during her hospitalization:     1.  Colitis:     Slow resolution of symptoms including nausea, vomiting, and abdominal pain.  She had persistent abdominal pain, although in the latter several days of hospitalization.  Impression was more of abdominal wall pain then of true intestinal viscus abnormality or any peritoneal irritation.  Preemptively treated with antibiotics with subsequent pro calcitonin being low, suggesting at least resolution of any bacterial process. Total of 3 days of antibiotic treatment. May be reasonable to consider a possible viral colitis or a noninfectious generally pancolitis, although more marked in colon.  in any event, she has shown slow although real improvement largely with supportive management. Doubt ischemia or other surgical concern.     She initially presented to ER with N/V and some diarrhea along with abdominal pain.  She had been having some constipation issues which is not uncommon.  She had taken extra stool softener  and a big BM followed by the above symptoms.  No fevers.  WBC mildly elevated.  CT done which shows suspicious colitis wit thickened walls of the sigmoid colon. As above, possible mass but a lot of artifact from the KE.  Also some small gut enteritis findings R lower quadrant.  Stool sent off and C diff negative. Others pending. Lives in [place]  no outbreaks there.  Was put on cipro flagyl on admission.      2.  Possible proximal sigmoid mass  Possible mid to high sigmoid mass. Somewhat difficult to distinguish from signal artifact from her total hip prosthesis.  Depending on her course, could consider further evaluation such as endoscopy, although it is unlikely that any finding result in a meaningful intervention even if malignancy is similar process were identified.   2.  Parkinson's disease:   Continue Sinemet.  Has tremor but otherwise relatively stable.      3.  Primary Hypertension:   Continuing metoprolol.  BP stable.  Appears euvolemic at the time of  discharge.      4.  Hyperlipidemia:   Has chronically been treated with Lipitor.  Most recent lipid panel showed  Quite low.  Lipid levels.  At her age its increasingly less likely that statins.  Provide  Benefit greater than risk and therefore Lipitor discontinued. If indicated later, a lower dose might be considered      5.  Coronary artery disease:   Have held aspirin and Plavix due to increased risk of bleeding through this hospitalization.  Likely any GI bleeding was from her enteritis or colitis.  Have requested resumption of both of these in approximately 10 days.  Continued beta blockers with spironolactone resumed at the day of discharge .Her last stent was greater than 1 year ago in 2015, and Plavix unlikely to be needed on this basis, although with a background history of stroke resumption not unreasonable.  If she does have further episodes of bleeding.  However, discontinuation of both her antiplatelet agents would be quite reasonable..      6.  GERD:   Changed IV to oral Protonix during inpatient stay. Has a tremendous hiatal hernia with most of her stomach in  thoracic cavity.      7.  History of CVA:   As above resumed aspirin and Plavix in approximately 10 days. No neurological issues.      8.  Generalized weakness.  Suspect a degree of this is been present for longer than this hospitalization. Likely combination of generalized deconditioning.  She also carries a history of post polio syndrome.  She alsorelates some concern with left hip pain especially in the region of her total hip replacement. Evaluated by PT who recommended continued physical therapy.  This arranged to be continued after discharge.    Wu Bunch[AM1.1]    Significant Results and Procedures[AM1.3]   CT of abdomen/pelvis showing both a localized area of enteritis as well as sigmoid colitis.  Possible proximal sigmoid mass.[AM1.1]      Code Status[AM1.3]   DNR / DNI[AM1.1]       Primary Care Physician   Mario  "Brigido[AM1.3]    Vital signs:[AM1.1]  Temp: 98  F (36.7  C) Temp src: Tympanic BP: 162/71   Heart Rate: 64 Resp: 16 SpO2: 95 % O2 Device: None (Room air)   Height: 157.5 cm (5' 2\") Weight: 60.8 kg (134 lb 0.6 oz)  Estimated body mass index is 24.52 kg/(m^2) as calculated from the following:    Height as of this encounter: 1.575 m (5' 2\").    Weight as of this encounter: 60.8 kg (134 lb 0.6 oz).[AM1.3]        Awake, alert, frail woman lying in bed, a medical wards.  Speech is clear.  Pleasant and interactive.HEENT: Pupils equal, conjugate. No icterus or nystagmus. Oral mucosa moist. No facial asymmetry.   Neck: Supple, jugular veins not elevated. Trachea midline   Chest: No chest wall movement asymmetry. Aeration preserved to bases. Accessory muscles not in use. Expiratory time not increased. No tidal wheezes. Few scattered rhonchi. No discrete crackles.   Cardiac: PMI not displaced. S1, S2 unremarkable. No S3, S4. P2 not accentuated. No murmurs. Carotid upstroke preserved. Carotid amplitude preserved.   Abdomen: Soft. No deep palpation or percussion tenderness. Minimal palpation tenderness isolated to right mid abdominal wall. No distention. Normoactive bowel sounds. Liver and spleen not increased in size. No bruits, masses, or pulsations.   Extremities: No lower extremity edema. Extremities warm distally.No eccymoses, clubbing.   Neurologic: Mental state above. Motor 5/5 and bilaterally equal. DTR 2/4 and bilaterally equal.[AM1.1]     Discharge Disposition[AM1.3]   Discharged to nursing home  Condition at discharge: Stable[AM1.1]    Consultations This Hospital Stay   PHYSICAL THERAPY ADULT IP CONSULT  PHYSICAL THERAPY ADULT IP CONSULT  OCCUPATIONAL THERAPY ADULT IP CONSULT    Time Spent on this Encounter[AM1.3]   Wu ESCOBAR, personally saw the patient today and spent greater than 30 minutes discharging this patient.[AM1.1]    Discharge Orders     General info for SNF   Length of Stay Estimate: Long Term " Care  Condition at Discharge: Improving  Level of care:skilled   Rehabilitation Potential: Fair  Admission H&P remains valid and up-to-date: Yes  Recent Chemotherapy: N/A  Use Nursing Home Standing Orders: Yes     Mantoux instructions   Give two-step Mantoux (PPD) Per Facility Policy Yes     Reason for your hospital stay   Britney Schaeffer is a 87 year old woman who was admitted on 10/7/2017. Her history is signficant for hypertension, Parkinson's disease, TIA, and CAD  She was sent to the ED with a complaint of abdominal pain, nausea vomiting and diarrhea after several days' constipation and abdominal pain; she received some extra stool softeners. She had a large bowel movement the morning of admission followed by nausea vomiting and diarrhea.  The patient had diffuse crampy abdominal pain. Abdominal CT showed colitis with regions of colon and small bowel involved.  She was treated preemptively use for an infectious process, although no specific cause identified.  She slowly improved been able to tolerate a soft diet by the time of discharge.  In other respects, she showed diffuse weakness and gait disturbance.  Physical therapy will be continued.  Aspirin/Plavix to be resumed in approximately 10 days as she had some  Occult blood in her stool.  Imaging also showed the possibility of a sigmoid mass, although whether further investigation would be fruitful is uncertain and deferred to outpatient reevaluation.     Activity - Up with assistive device     Activity - Up with nursing assistance     DNR/DNI     Physical Therapy Adult Consult   Evaluate and treat as clinically indicated.    Reason:  generalized weakness, gait disturbance, balance limitation. Chronic weakness related to post-polio syndrome     Occupational Therapy Adult Consult   Evaluate and treat as clinically indicated.    Reason:  generalized weakness, gait disturbance, balance limitation. Chronic weakness related to post-polio syndrome     Fall  precautions     Advance Diet as Tolerated   Follow this diet upon discharge: Orders Placed This Encounter     Begin with soft diet, advanding to Regular Diet as tolerated       Discharge Medications   Current Discharge Medication List      CONTINUE these medications which have CHANGED    Details   !! acetaminophen (TYLENOL) 500 MG tablet Take 2 tablets (1,000 mg) by mouth 2 times daily as needed for mild pain As needed  Qty: 360 tablet, Refills: 1    Associated Diagnoses: Health care maintenance      aspirin 81 MG chewable tablet Take 1 tablet (81 mg) by mouth daily  Qty: 90 tablet, Refills: 3    Associated Diagnoses: Health care maintenance      saline nasal (AYR SALINE) GEL topical gel Apply into each naris At Bedtime  Refills: 0    Associated Diagnoses: Chronic cough      clopidogrel (PLAVIX) 75 MG tablet Take 1 tablet (75 mg) by mouth daily  Qty: 90 tablet, Refills: 2    Associated Diagnoses: Coronary artery disease involving native coronary artery of native heart without angina pectoris       !! - Potential duplicate medications found. Please discuss with provider.      CONTINUE these medications which have NOT CHANGED    Details   !! ACETAMINOPHEN PO Take 1,000 mg by mouth daily as needed for pain IN ADDITION TO BID DOSE      !! carbidopa-levodopa (SINEMET)  MG per tablet Take 2 tablets by mouth every morning      senna-docusate (SENOKOT-S;PERICOLACE) 8.6-50 MG per tablet Take 1 tablet by mouth 2 times daily      Multiple Vitamins-Minerals (PRESERVISION AREDS) TABS TAKE 1 TABLET BY MOUTH 2 TIMES DAILY  Qty: 120 tablet, Refills: 5    Associated Diagnoses: Health care maintenance      !! carbidopa-levodopa (SINEMET)  MG per tablet TAKE 1 TABLET THREE TIMES A DAY  Qty: 270 tablet, Refills: 1    Associated Diagnoses: Parkinsons (H)      chlorhexidine (PERIDEX) 0.12 % solution Swish and spit 15 mLs in mouth 2 times daily      pantoprazole (PROTONIX) 40 MG EC tablet TAKE 1 TABLET DAILY  Qty: 90 tablet,  Refills: 1    Associated Diagnoses: Gastroesophageal reflux disease, esophagitis presence not specified      lidocaine (LIDODERM) 5 % Patch APPLY UP TO 3 PATCHES TO PAINFUL AREA AT ONCE FOR UP TO 12 HOURS WITHIN A 24 HOURS PERIOD. REMOVE AFTER 12 HOURS  Qty: 270 patch, Refills: 3    Associated Diagnoses: Polio      gabapentin (NEURONTIN) 100 MG capsule TAKE 1 CAPSULE TWICE A DAY  Qty: 180 capsule, Refills: 2    Associated Diagnoses: Neuralgia and neuritis      spironolactone (ALDACTONE) 25 MG tablet Take 0.5 tablets (12.5 mg) by mouth daily  Qty: 45 tablet, Refills: 3    Associated Diagnoses: Left heart failure (H)      MYRBETRIQ 50 MG 24 hr tablet Take one (1) tablet BY MOUTH daily  Qty: 31 tablet, Refills: 0    Associated Diagnoses: OAB (overactive bladder)      sodium fluoride dental gel (PREVIDENT) 1.1 % GEL topical gel Apply 1 applicator to affected area At Bedtime      metoprolol (LOPRESSOR) 25 MG tablet Take 0.5 tablets (12.5 mg) by mouth 2 times daily  Qty: 90 tablet, Refills: 3    Associated Diagnoses: NSTEMI (non-ST elevated myocardial infarction) (H)      calcium carbonate (TUMS) 500 MG chewable tablet Take 2 chew tab by mouth 2 times daily       olopatadine (PATANOL) 0.1 % ophthalmic solution INSTILL 1 DROP INTO BOTH EYES 2 TIMES DAILY  Qty: 3 Bottle, Refills: 3    Associated Diagnoses: Allergic conjunctivitis, unspecified laterality      Cholecalciferol (VITAMIN D3) 2000 UNITS CAPS Take 2,000 Units by mouth daily  Qty: 90 capsule, Refills: 3    Associated Diagnoses: Osteoporosis      loratadine (ALLERGY RELIEF) 10 MG tablet Take 1 tablet (10 mg) by mouth daily  Qty: 90 tablet, Refills: 3    Associated Diagnoses: Allergic rhinitis, unspecified allergic rhinitis type      polyethylene glycol (MIRALAX) powder Take 17 g by mouth daily  Qty: 500 g, Refills: 3    Associated Diagnoses: Constipation      artificial saliva, BIOTENE MT, (BIOTENE MT) SOLN Swish and spit 5 mLs in mouth as needed for dry mouth  Qty:  3 Bottle, Refills: 3    Associated Diagnoses: Dry mouth      furosemide (LASIX) 20 MG tablet Take 1 tablet (20 mg) by mouth as needed  Qty: 90 tablet, Refills: 1    Associated Diagnoses: Edema      polyethylene glycol 0.4%- propylene glycol 0.3% (SYSTANE) 0.4-0.3 % SOLN ophthalmic solution Place 2 drops into both eyes 4 times daily as needed for dry eyes  Qty: 3 Bottle, Refills: 1    Associated Diagnoses: Allergic conjunctivitis, unspecified laterality      Lanolin (CORONA MULTI-PURPOSE) 30 % OINT Externally apply 1 Application topically 2 times daily  Qty: 3 Tube, Refills: 3    Associated Diagnoses: Skin irritation      order for DME Equipment being ordered: neoprene knee sleeve  Qty: 1 Device, Refills: 0    Associated Diagnoses: Primary osteoarthritis involving multiple joints      AMOXICILLIN PO Take 500 mg by mouth as needed (GIVE ONE HOUR PRIOIR TO DENTAL APTS FOR ISCHEMIC HEART DISEASE) Give 4 tablets as needed       ondansetron (ZOFRAN) 4 MG tablet TAKE 1 TABLET BY MOUTH EVERY 8 HOURS AS NEEDED FOR NAUSEA AND VOMITING  Qty: 30 tablet, Refills: 2    Associated Diagnoses: Nausea      ketoconazole (NIZORAL) 2 % shampoo Wash hair, let set for 5 minutes, rinse 1 time per day every Sunday  Qty: 120 mL, Refills: 2    Associated Diagnoses: Seborrheic dermatitis      nystatin (MYCOSTATIN) cream APPLY TO AFFECTED AREA 2 TIMES DAILY AS NEEDED  Qty: 30 g, Refills: 2    Associated Diagnoses: Candidiasis of skin and nails      guaiFENesin-codeine (GUAIFENESIN AC) 100-10 MG/5ML SOLN TAKE 2 TEASPOONFULS (10ML) BY MOUTH EVERY 4 HOURS AS NEEDED FOR COUGH  Qty: 473 mL, Refills: 0    Associated Diagnoses: Cough       !! - Potential duplicate medications found. Please discuss with provider.      STOP taking these medications       atorvastatin (LIPITOR) 40 MG tablet Comments:   Reason for Stopping:             Allergies   Allergies   Allergen Reactions     Ambien      Zolpidem Tartrate     Tramadol      Data[AM1.3]   Most Recent  3 CBC's:[AM1.1]  Recent Labs   Lab Test  10/12/17   0516  10/11/17   0535  10/10/17   0535   WBC  8.1  8.5  8.8   HGB  12.1  12.0  11.7   MCV  90  91  91   PLT  144*  119*  119*[AM1.3]      Most Recent 3 BMP's:[AM1.1]  Recent Labs   Lab Test  10/12/17   0516  10/11/17   0535  10/10/17   0535   NA  145*  144  141   POTASSIUM  3.5  3.0*  3.0*   CHLORIDE  113*  112*  113*   CO2  24  23  20   BUN  6*  8  11   CR  0.58  0.59  0.55   ANIONGAP  8  9  8   BAKARI  8.2*  8.0*  7.5*   GLC  81  78  76[AM1.3]     Most Recent 2 LFT's:[AM1.1]  Recent Labs   Lab Test  10/10/17   0535  10/07/17   1516   AST  10  17   ALT  <6  9   ALKPHOS  41  55   BILITOTAL  0.4  0.5[AM1.3]     Most Recent INR's and Anticoagulation Dosing History:  Anticoagulation Dose History     Recent Dosing and Labs Latest Ref Rng & Units 3/25/2013 3/16/2015 5/6/2015 12/11/2015    INR 0.80 - 1.20 1.09 1.02 1.13 1.26(H)        Most Recent 3 Troponin's:[AM1.1]  Recent Labs   Lab Test  12/13/15   0702  12/12/15   1252  12/12/15   1040   TROPI  0.259*  0.505*  0.515*[AM1.3]     Most Recent Cholesterol Panel:[AM1.1]  Recent Labs   Lab Test  12/12/15   0758   CHOL  83   LDL  17   HDL  53   TRIG  65[AM1.3]     Most Recent 6 Bacteria Isolates From Any Culture (See EPIC Reports for Culture Details):[AM1.1]  Recent Labs   Lab Test  10/07/17   2349  10/07/17   1535  06/08/17   1355  12/11/15   2021  12/11/15   1840  12/11/15   1836   CULT  No MRSA isolated  No Salmonella, Shigella, Campylobacter, E. coli O157, Aeromonas, or Plesiomonas isolated.  No Yersinia enterocolitica isolated    50,000 to 100,000 colonies/mL Escherichia coli ESBL  Critical Value called to and read back by Rhea Jacobson 0803 6/12/17 by Ivone Herman  *  No MRSA isolated  2 of 2 bottles Klebsiella pneumoniae  Critical Value: Gram stain bottle one called to and read back by Tierney Ramírez at 0744 on 12/12/15 by YAMILEX  Critical Value: Gram stain bottle two called to and read back by Danny Manriquez at    0944 on 12/12/15 by YAMILEX  *  Duplicate request  Canceled, Test credited[AM1.3]       Most Recent TSH, T4 and A1c Labs:[AM1.1]  Recent Labs   Lab Test  03/13/15   1547   TSH  2.44     Results for orders placed or performed during the hospital encounter of 10/07/17   XR Abdomen 2 Views    Narrative    EXAM:XR ABDOMEN 2 VW     CLINICAL HISTORY: Patient Age  87 years Additional clinical info:  n/v/d, r/o obstruction    COMPARISON: 7/2/2015      TECHNIQUE: 2 views      Impression    IMPRESSION: Gas in normal caliber loops of large and small bowel. No  free air. Very large esophageal hiatal hernia. Gas is seen in the  rectum. Elevated right hemidiaphragm. Right KE. Implanted electrode  with electrode pack projecting over the left lower quadrant. Vascular  calcifications.    LUCHO LUJAN MD   CT Abdomen Pelvis w Contrast    Narrative    EXAMINATION: CT ABDOMEN PELVIS W CONTRAST, 10/7/2017 7:35 PM    HISTORY: r/o obstruction GI bleed.    COMPARISON: 8/9/2016    TECHNIQUE:  Helical CT images from the lung bases through the  symphysis pubis were obtained with IV contrast. Contrast dose: isovue  300, 100 ml    FINDINGS:    Pancreatico hepatobiliary: 1.3 cm peripherally enhancing area in the  inferior right hepatic lobe, adjacent to the gallbladder fossa. This  is best seen on axial series 2 images 41 through 5. There is mild  associated bile duct dilatation associated with this. This was present  on the ninth 2016 exam but is slightly more conspicuous today due to  differences in the phase of contrast. It likely represents transient  perfusion anomaly.    Gallbladder is contracted. Bile ducts are slightly prominent, likely  normal for age.. Spleen appears normal.    Genitourinary:  Areas of cortical scarring in both kidneys. 2 small,  approximately 4 mm nonobstructing stones in lower pole of the left  kidney. The adrenal glands, kidneys, ureters, and bladder otherwise  appear normal. Bladder is obscured by metallic artifact  from a right  KE. Uterus and adnexa appear grossly normal.    Gastrointestinal:  Huge hiatal hernia. Most of the stomach is  herniated above the diaphragm on the left posteriorly. The upper  portion of the stomach is incompletely visualized.    Mild thickening and enhancement of the wall colon extending from the  descending colon down to the sigmoid colon. The wall of the sigmoid  colon appears more irregular but there is a more focal area of  thickening and irregularity of the upper to midportion of the sigmoid,  measuring approximately 3 cm in length, best seen on axial series 2  images 102 through 105. The findings of the colon are suspicious for  colitis but this focal area of thickening of the sigmoid could be a  mass. There is a small amount of high density material in the lower  sigmoid near the rectosigmoid junction best seen on axial series 2  image 112. This could represent acute blood.    There are several loops of small bowel in the right lower quadrant  which demonstrates soft tissue stranding around the suspicious for  enteritis.    No evidence of obstruction. No fluid collection identified and no free  air identified    Lymph nodes:  No enlarged lymph nodes are identified but there is mild  mesenteric stranding in the mesentery in the right lower quadrant  surrounding several loops of small bowel.    Vasculature:  Very dense vascular calcifications including coronary  artery calcifications. No aneurysm identified. Very dense  calcifications at the origin of the renal arteries.    Lung bases: Atelectasis left lower lobe around the large esophageal  hiatal hernia. No infiltrate identified. Small left pleural effusion    Musculoskeletal: Advanced degenerative arthritis throughout the spine  and pelvis. The bones are demineralized. Right KE causes artifact  that obscures detail in the pelvis.. Electrode pack embedded in the  subcutaneous fat of the left buttock stimulator wires anterior to the  mid  sacrum. Benign-appearing calcification in the right breast.      Impression    IMPRESSION:   1. There is bowel wall thickening and enhancement of the descending  colon, especially the sigmoid colon, suspicious for colitis with  possible mass in the upper to mid sigmoid colon. Portions of the  sigmoid colon and rectum are difficult to visualize due to metallic  artifact from right KE.  2. Small amount of high density material within the lumen of the lower  sigmoid and rectum, suspicious for acute hemorrhage  3. Probable focal enteritis several loops of small bowel in the right  lower quadrant  4. Other incidental findings as described.    Findings were discussed with Dr. Carlos Doll at 2015 hours on  10/7/2017    LUCHO LUJAN MD   XR Abdomen 2 Views    Narrative    PROCEDURE:  XR ABDOMEN 2 VW    HISTORY:  Colitis, diarrhea.    TECHNIQUE:  Upright and supine views of the abdomen were obtained.    COMPARISON:  CT abdomen and pelvis 10/7/2017    FINDINGS:     There is a nonspecific, but nonobstructive bowel gas pattern. No free  air is seen. Stimulator device is present in the sacrum. Right hip  prosthesis is noted. There are degenerative changes in the spine.      Impression    IMPRESSION:    Nonspecific but nonobstructive bowel gas pattern.    CYNDI JEFFERSON MD   XR Abdomen Series Portable    Narrative    XR ABDOMEN SERIES PORTABLE   10/12/2017 9:03 AM    History:Female,age  87 years, colitis    Comparison: 10/9/2017    FINDINGS: Two views are submitted. Moderate volume of gas is seen  within the large and small bowel. Gaseous distention has decreased  when compared to the earlier study. There is no evidence of free air  or evidence of obstruction.       Impression    IMPRESSION:  1.   Moderate volume of gas and stool, no acute abnormality.    MARISA REYES MD[AM1.3]        Revision History        User Key Date/Time User Provider Type Action    > AM1.2 10/12/2017 11:44 AM Wu Do MD Physician Sign      AM1.3 10/12/2017 11:30 AM Wu Do MD Physician      AM1.1 10/12/2017 11:29 AM Wu Do MD Physician                   Consult Notes     No notes of this type exist for this encounter.         Progress Notes - Physician (Notes for yesterday and today)      Progress Notes by Wu Do MD at 10/11/2017  2:38 PM     Author:  Wu Do MD Service:  Hospitalist Author Type:  Physician    Filed:  10/11/2017  2:43 PM Date of Service:  10/11/2017  2:38 PM Creation Time:  10/11/2017  2:38 PM    Status:  Signed :  Wu Do MD (Physician)         [facility]    Hospitalist Progress Note    Date of Service (when I saw the patient): 10/11/2017    Assessment & Plan   Britney Schaeffer is a 87 year old female who was admitted on 10/7/2017. Her history is signficant for hypertension, Parkinson's disease, TIA, and CAD  She was sent to the ED with a complaint of abdominal pain, nausea vomiting and diarrhea; she had noted constipation and abdominal pain for couple of days and received some extra stool softeners. She had a large bowel movement the morning of admission followed by nausea vomiting and diarrhea.  The patient had diffuse abdominal pain, crampy to sharp in nature.       1.  Colitis:     Low pro-calcitonin.  We will discontinue ciprofloxacin today.  Still may be reasonable to consider a possible viral colitis or a noninfectious generally pancolitis, although more marked in colon.  In general, however, she shows slow although real improvement.  Some abdominal discomfort although it appears more  Isolated to abdominal wall and certainly does not appear to be peritoneal discomfort.  Trial of advancing oral intake cautiously.   As above.  Not certainly infectious, although appropriately she was treated preemptively for a possible infectious bacterial colitis with ciprofloxacin.  We will continue this. Doubt ischemia or other surgical concern. Possible mid  to high sigmoid mass.  Depending on her course, could consider further evaluation such as endoscopy, although it is unlikely that any finding result in a meaningful intervention even if malignancy is similar process were identified. She does have some persistent abdominal pain, although her report of this is somewhat variable.  Possible percussion tenderness but difficult to be certain her discomfort is not from abdominal wall rather than true viscous pain.    She initially presented to ER with N/V and some diarrhea along with abdominal pain.  She had been having some constipation issues which is not uncommon.  She had taken extra stool softener  and a big BM followed by the above symptoms.  No fevers.  WBC mildly elevated.  CT done which shows suspicious colitis wit thickened walls of the sigmoid colon. As above, possible mass but a lot of artifact from the KE.  Also some small gut enteritis findings R lower quadrant.  Stool sent off and C diff negative. Others pending. Lives [place]  no outbreaks there.  Was put on cipro flagyl on admission.       2.  Parkinson's disease:   Continue Sinemet.  Has tremor but otherwise relatively stable.      3.  Hypertension:   Continuing metoprolol.  BP stable.  Volume appears controlled.     4.  Hyperlipidemia:   Continue statins but hold for now as the patient is nothing by mouth.      5.  Coronary artery disease:   Have held aspirin and Plavix due to increased risk of bleeding.  Continue on beta blockers .Her last stent was grater than 1 year ago in 2015.      6.  GERD:   Changed IV to oral Protonix during inpatient stay. Has a tremendous hiatal hernia with most of her stomach in  thoracic cavity.      7.  History of CVA: H  Holding aspirin and Plavix. No neurological issues. At this point, would be included to discontinue antilplatelet agents at least until abdominal issues under good control for several weeks. Best approach may be to discontinue permanantly    8.  Generalized  weakness.  Suspect a degree of this is been present for longer than this hospitalization.   She relates some concern with left hip pain especially in the region of her total hip replacement.She has been able to stand and walk several steps.  Have asked PT involvement for more precise evaluation of her functional status and requirements.  Would plan continued physical therapy at the time of her discharge.       DVT Prophylaxis: Pneumatic Compression Devices  Code Status: DNR/DNI    Disposition: Expected discharge once abdominal pain resolved and eating anticipate at least another 1-2 days hospitalization.    Wu ETIENNEFabio Bunch    Interval History   Awake and alert. Pleasant and interactive.  Mildly distractible. Moderate abdominal discomfort but tolerating clear liquids without difficulty.  No dyspnea.  -Data reviewed today: I reviewed all new labs over the last 24 hours.    Physical Exam   Temp: 97.7  F (36.5  C) Temp src: Tympanic BP: 153/56   Heart Rate: 63 Resp: 20 SpO2: 93 % O2 Device: None (Room air)    Vitals:    10/10/17 0652 10/11/17 0610 10/11/17 0806   Weight: 60.5 kg (133 lb 6.1 oz) 61.8 kg (136 lb 3.9 oz) 60.8 kg (134 lb 0.6 oz)     Vital Signs with Ranges  Temp:  [97.7  F (36.5  C)-98.8  F (37.1  C)] 97.7  F (36.5  C)  Heart Rate:  [55-63] 63  Resp:  [16-20] 20  BP: (115-153)/(47-56) 153/56  SpO2:  [93 %-95 %] 93 %  I/O last 3 completed shifts:  In: 647 [P.O.:240; I.V.:407]  Out: -     Peripheral IV 10/09/17 Right Lower forearm (Active)   Site Assessment WDL 10/11/2017  2:00 PM   Line Status Saline locked 10/11/2017  2:00 PM   Phlebitis Scale 0-->no symptoms 10/11/2017  2:00 PM   Infiltration Scale 0 10/11/2017  2:00 PM   Dressing Intervention New dressing  10/10/2017  4:45 PM   Number of days:2         Constitutional: Alert and oriented x3. No distress    HEENT: NC/AT, PERRL, EOMI, mouth moist , neck supple, no LN.     Cardiovascular: RRR. no Murmur, no  rubs, or gallops.  PMI not displaced. JVD flat.   Bruits no.  Pulses 2+    Respiratory: Aeration to bases. Clear to auscultation bilaterally    Abdomen: Soft, with bowel sounds in all quadrants.  No masses  No organomegaly. Abdominal tenderness appears isolated to abdominal wall.  Right greater than left.  No clear percussion tenderness.  No consistent percussion tenderness.    Extremities: Warm/dry. Brisk capillary refill distally.No edema    Neuro:   Non focal, cranial nerves intact, Moves all extremities.    Medications        potassium chloride  40 mEq Oral TID     [START ON 10/12/2017] pantoprazole  40 mg Oral QAM     sodium chloride  1 spray Both Nostrils At Bedtime     carbidopa-levodopa  1 tablet Oral 4x Daily     gabapentin  100 mg Oral BID     lidocaine  1 patch Transdermal Q24H     metoprolol  12.5 mg Oral BID     nystatin   Topical BID     olopatadine  1 drop Both Eyes BID     lidocaine   Transdermal Q24h     lidocaine   Transdermal Q8H       Data     Recent Labs  Lab 10/11/17  0535 10/10/17  0535 10/09/17  0555  10/07/17  1516   WBC 8.5 8.8 8.0  < > 11.9*   HGB 12.0 11.7 11.9  < > 13.5   MCV 91 91 92  < > 93   * 119* 114*  < > 165    141 143  < > 141   POTASSIUM 3.0* 3.0* 3.3*  < > 3.7   CHLORIDE 112* 113* 114*  < > 108   CO2 23 20 19*  < > 27   BUN 8 11 15  < > 24   CR 0.59 0.55 0.56  < > 0.68   ANIONGAP 9 8 10  < > 6   BAKARI 8.0* 7.5* 8.0*  < > 9.2   GLC 78 76 77  < > 129*   ALBUMIN  --  2.5*  --   --  3.8   PROTTOTAL  --  5.1*  --   --  7.2   BILITOTAL  --  0.4  --   --  0.5   ALKPHOS  --  41  --   --  55   ALT  --  <6  --   --  9   AST  --  10  --   --  17   LIPASE  --   --   --   --  199   < > = values in this interval not displayed.  Lactic Acid   Date Value Ref Range Status   12/14/2015 0.9 0.4 - 2.0 mmol/L Final   12/12/2015 1.6 0.4 - 2.0 mmol/L Final   12/12/2015 2.2 (H) 0.4 - 2.0 mmol/L Final       No results found for this or any previous visit (from the past 24 hour(s)).[AM1.1]           Revision History        User Key Date/Time  User Provider Type Action    > AM1.1 10/11/2017  2:43 PM Wu Do MD Physician Sign            Progress Notes by Wu Do MD at 10/10/2017  4:37 PM     Author:  Wu Do MD Service:  Hospitalist Author Type:  Physician    Filed:  10/11/2017  2:38 PM Date of Service:  10/10/2017  4:37 PM Creation Time:  10/10/2017  4:37 PM    Status:  Signed :  Wu Do MD (Physician)         [facility]    Hospitalist Progress Note    Date of Service (when I saw the patient): 10/10/2017    Assessment & Plan   Britney Schaeffer is a 87 year old female who was admitted on 10/7/2017.[AM1.1] Her history is signficant for hypertension, Parkinson's disease, TIA, and CAD  She was sent to the ED with a complaint of abdominal pain, nausea vomiting and diarrhea; she had noted constipation and abdominal pain for couple of days and received some extra stool softeners. She had a large bowel movement the morning of admission followed by nausea vomiting and diarrhea.  The patient had diffuse abdominal pain, crampy to sharp in nature.[AM1.2]       1.  Colitis:[AM1.1]     Not, certainly infectious, although appropriately[AM1.2] s[AM1.3]he was treated preemptively for a possible infectious bacterial colitis with ciprofloxacin.  We will continue this.  If pro-calcitonin and another day is low  De-escalation or discontinuation may be reasonable.  Suspect noninfectious inflammatory colitis or possibly a viral process.  In any event, all her pain has been persistent although somewhat variable.  Overall clinically she appears to show some improvement.  Doubt ischemia or other surgical concern. Possible mid to high sigmoid mass.  Depending on her course.  Could consider further evaluation such as endoscopy, although it is unlikely that any finding result in a meaningful intervention even if malignancy is similar process were identified. She does have some persistent abdominal pain, although her  report of this is somewhat variable.  Possible percussion tenderness but difficult to be certain her discomfort is not from abdominal wall rather than true viscous pain.    She initially[AM1.2] presented to ER with N/V and some diarrhea along with abdominal pain.  She had been having some constipation issues which is not uncommon[AM1.1].  She had taken[AM1.2] extra stool softener  and a big BM followed by the above symptoms.  No fevers.  WBC mildly elevated.  CT done which shows suspicious colitis wit thickened walls of the sigmoid colon[AM1.1]. As above, possible[AM1.2] mass but a lot of artifact from the KE.  Also some[AM1.1] small gut[AM1.2] enteritis findings R lower quadrant.  Stool sent off and C diff negative. Others pending. Lives [place]  no outbreaks there.  Was put on cipro flagyl on admission.       2.  Parkinson's disease:   Continue Sinemet.  Has tremor but otherwise relatively stable[AM1.1].[AM1.2]      3.  Hypertension:   Continuing[AM1.1] metoprolol[AM1.3].  BP stable.[AM1.1]  Volume appears controlled[AM1.3].     4.  Hyperlipidemia:   Continue statins but hold for now as the patient is nothing by mouth.      5.  Coronary artery disease:[AM1.1]   Have held[AM1.3] aspirin and Plavix due to increased risk of bleeding.  Continue on beta blockers .Her last stent was grater than 1 year ago in 2015.      6.  GERD:[AM1.1]   Changed IV to oral[AM1.3] Protonix during inpatient stay. Has a tremendous hiatal hernia with most of her stomach in  thoracic cavity.      7.  History of CVA: H[AM1.1]  H[AM1.3]olding aspirin and Plavix. No neurological issues.[AM1.1] At this point, would be included to discontinue antilplatelet agents at least until abdominal issues under good control for several weeks. Best approach may be to discontinue permanantly[AM1.3]       DVT Prophylaxis: Pneumatic Compression Devices  Code Status: DNR/DNI    Disposition: Expected discharge once abdominal pain resolved and eating[AM1.1]  anticipate at least[AM1.3].    Wu CLIFTONFabio Alivia    Interval History[AM1.1]   Awake and alert. Pleasant and interactive.  Mildly distractible. Moderate abdominal discomfort but tolerating clear liquids without difficulty.  No dyspnea.[AM1.3]  -Data reviewed today: I reviewed all new labs over the last 24 hours.    Physical Exam   Temp: 99  F (37.2  C) Temp src: Tympanic BP: 116/52 Pulse: 53 Heart Rate: 69 Resp: 18 SpO2: 94 % (pt O2 sat range 92-94. pt did deep breathing and coughing) O2 Device: None (Room air)    Vitals:    10/08/17 0416 10/09/17 0614 10/10/17 0652   Weight: 57 kg (125 lb 10.6 oz) 59.2 kg (130 lb 8.2 oz) 60.5 kg (133 lb 6.1 oz)     Vital Signs with Ranges  Temp:  [96  F (35.6  C)-99.3  F (37.4  C)] 99  F (37.2  C)  Pulse:  [53-81] 53  Heart Rate:  [69] 69  Resp:  [16-20] 18  BP: (116-171)/(46-76) 116/52  SpO2:  [92 %-96 %] 94 %  I/O last 3 completed shifts:  In: 854 [P.O.:60; I.V.:794]  Out: -     Peripheral IV 10/09/17 Right Lower forearm (Active)   Site Assessment WDL 10/10/2017  8:00 AM   Line Status Saline locked 10/10/2017  8:00 AM   Phlebitis Scale 0-->no symptoms 10/10/2017  8:00 AM   Infiltration Scale 0 10/10/2017  8:00 AM   Number of days:1         Constitutional: Alert and oriented x3. No distress    HEENT: NC/AT, PERRL, EOMI, mouth moist , neck supple, no LN.     Cardiovascular: RRR. no Murmur, no  rubs, or gallops.[AM1.1]  PMI not displaced.[AM1.3] JVD flat.  Bruits no.  Pulses 2+    Respiratory:[AM1.1] Aeration to bases.[AM1.3] Clear to auscultation bilaterally    Abdomen: Soft, tender Right lower abdomen No masses[AM1.1]  Minimal percussion tenderness with lower quadrant palpation tenderness, right greater than left. N[AM1.3]o organomegaly. Bowel sounds present    Extremities: Warm/dry. No edema    Neuro:   Non focal, cranial nerves intact, Moves all extremities.    Medications        sodium chloride  1 spray Both Nostrils At Bedtime     carbidopa-levodopa  1 tablet Oral 4x Daily      gabapentin  100 mg Oral BID     lidocaine  1 patch Transdermal Q24H     metoprolol  12.5 mg Oral BID     nystatin   Topical BID     olopatadine  1 drop Both Eyes BID     pantoprazole  40 mg Intravenous QAM AC     ciprofloxacin  400 mg Intravenous Q12H     lidocaine   Transdermal Q24h     lidocaine   Transdermal Q8H       Data     Recent Labs  Lab 10/10/17  0535 10/09/17  0555 10/08/17  0555 10/07/17  1516   WBC 8.8 8.0 15.4* 11.9*   HGB 11.7 11.9 13.1 13.5   MCV 91 92 90 93   * 114* 135* 165    143 144 141   POTASSIUM 3.0* 3.3* 3.4 3.7   CHLORIDE 113* 114* 109 108   CO2 20 19* 22 27   BUN 11 15 21 24   CR 0.55 0.56 0.60 0.68   ANIONGAP 8 10 13 6   BAKARI 7.5* 8.0* 8.6 9.2   GLC 76 77 120* 129*   ALBUMIN 2.5*  --   --  3.8   PROTTOTAL 5.1*  --   --  7.2   BILITOTAL 0.4  --   --  0.5   ALKPHOS 41  --   --  55   ALT <6  --   --  9   AST 10  --   --  17   LIPASE  --   --   --  199     Lactic Acid   Date Value Ref Range Status   12/14/2015 0.9 0.4 - 2.0 mmol/L Final   12/12/2015 1.6 0.4 - 2.0 mmol/L Final   12/12/2015 2.2 (H) 0.4 - 2.0 mmol/L Final       No results found for this or any previous visit (from the past 24 hour(s)).[AM1.1]           Revision History        User Key Date/Time User Provider Type Action    > AM1.3 10/11/2017  2:38 PM Wu Do MD Physician Sign     AM1.2 10/11/2017  1:37 PM Wu oD MD Physician      AM1.1 10/10/2017  4:37 PM Wu Do MD Physician                   Procedure Notes     No notes of this type exist for this encounter.         Progress Notes - Therapies (Notes from 10/09/17 through 10/12/17)      Progress Notes by Natividad Joshi PT at 10/11/2017 11:30 AM     Author:  Natividad Joshi, PT Service:  (none) Author Type:  Physical Therapist    Filed:  10/11/2017 11:32 AM Date of Service:  10/11/2017 11:30 AM Creation Time:  10/11/2017 11:30 AM    Status:  Signed :  Natividad Joshi PT (Physical Therapist)         Discharge Planner  PT   Patient plan for discharge: Back to [facility]  Current status: Ambulated 50ft x4 c FWW and min A with verbal cues. Demonstrates kyphosis, flexed posture, needed cues for safety. Distance limited by weakness and fatigue.Demonstrates c poor balance  Barriers to return to prior living situation: None  Recommendations for discharge: Back to [facility] with PT  Rationale for recommendations: Would benefit from PT for weakness, balance, impaired gait        Entered by: Natividad Joshi 10/11/2017 11:30 AM[NK1.1]          Revision History        User Key Date/Time User Provider Type Action    > NK1.1 10/11/2017 11:32 AM Natividad Joshi, PT Physical Therapist Sign            Progress Notes by Natividad Joshi PT at 10/11/2017 11:27 AM     Author:  Natividad Joshi PT Service:  (none) Author Type:  Physical Therapist    Filed:  10/11/2017 11:27 AM Date of Service:  10/11/2017 11:27 AM Creation Time:  10/11/2017 11:27 AM    Status:  Signed :  Natividad Joshi PT (Physical Therapist)          10/11/17 0900   Quick Adds   Type of Visit Initial PT Evaluation   Living Environment   Lives With facility resident   Living Arrangements extended care facility   Home Accessibility no concerns   Number of Stairs to Enter Home 0   Number of Stairs Within Home 0   Transportation Available agency transportation   Living Environment Comment Lives at [facility]   Self-Care   Usual Activity Tolerance fair   Current Activity Tolerance fair   Equipment Currently Used at Home walker, rolling   Functional Level Prior   Ambulation 3-->assistive equipment and person   Transferring 3-->assistive equipment and person   Toileting 3-->assistive equipment and person   Bathing 3-->assistive equipment and person   Cognition 1 - attention or memory deficits   Fall history within last six months yes   Number of times patient has fallen within last six months 1   Which of the above functional risks had a recent onset or change?  ambulation   Prior Functional Level Comment Reports she walks short distances at [facility] with assistance. She wants to do PT when she gets back   General Information   Onset of Illness/Injury or Date of Surgery - Date 10/07/17   Referring Physician Dr. Do   Pertinent History of Current Problem (include personal factors and/or comorbidities that impact the POC) Pt hosptitalized c Collitis. PMH includes Parkinsons, HTN, hyperlipidemia, CAD/GERD, CVA   Precautions/Limitations fall precautions   Weight-Bearing Status - LLE weight-bearing as tolerated   Weight-Bearing Status - RLE weight-bearing as tolerated   Cognitive Status Examination   Orientation orientation to person, place and time   Level of Consciousness alert   Follows Commands and Answers Questions 100% of the time   Personal Safety and Judgment at risk behaviors demonstrated   Memory intact   Pain Assessment   Patient Currently in Pain Yes, see Vital Sign flowsheet  (c/o abdominal pain)   Integumentary/Edema   Integumentary/Edema no deficits were identifed   Posture    Posture Kyphosis;Protracted shoulders   Range of Motion (ROM)   ROM Quick Adds No deficits were identified   Strength   Strength Comments Gross BLE weakness 3+ to 4-/5   Transfer Skills   Transfer Transfer Skill: Sit to Stand   Transfer Skill:  Sit to Stand   Level of [place]: Sit/Stand minimum assist (75% patients effort)   Physical Assist/Nonphysical Assist: Sit/Stand 1 person assist   Weightbearing Restrictions: Sit/Stand full weight-bearing   Assistive Device for Transfer: Sit/Stand rolling walker   Gait   Gait Gait Skill   Gait Skills   Level of [place]: Gait minimum assist (75% patients effort)   Physical Assist/Nonphysical Assist: Gait 1 person + 1 person to manage equipment   Weight-Bearing Restrictions: Gait full weight-bearing   Assistive Device for Transfer: Gait rolling walker   Gait Distance 50 feet   Balance   Balance other (describe)   Sit-to-Stand Balance  "poor balance   Standing Balance: Static poor balance   Standing Balance: Dynamic poor balance   Balance Quick Add Sit to stand balance;Standing balance: static;Standing balance: dynamic   Sensory Examination   Sensory Perception no deficits were identified   Coordination   Coordination no deficits were identified   Muscle Tone   Muscle Tone no deficits were identified   General Therapy Interventions   Planned Therapy Interventions balance training;bed mobility training;gait training;neuromuscular re-education;strengthening;transfer training   Clinical Impression   Criteria for Skilled Therapeutic Intervention yes, treatment indicated   PT Diagnosis Weakness, impaired balance, impaired gait, decreased activity tolerance   Influenced by the following impairments Weakness, impaired balance, impaired gait, decreased activity tolerance   Functional limitations due to impairments Decreased safety and independence c functional mobility, fall risk   Clinical Presentation Stable/Uncomplicated   Clinical Presentation Rationale Currently stable   Clinical Decision Making (Complexity) Low complexity   Therapy Frequency` daily   Predicted Duration of Therapy Intervention (days/wks) 7 days   Anticipated Discharge Disposition Long Term Care Facility  (With PT)   Risk & Benefits of therapy have been explained Yes   Patient, Family & other staff in agreement with plan of care Yes   [facility] [employer] TM \"6 Clicks\"   2016, Trustees of [facility], under license to [employer].  All rights reserved.   6 Clicks Short Forms Basic Mobility Inpatient Short Form   [facility] [employer]  \"6 Clicks\" V.2 Basic Mobility Inpatient Short Form   1. Turning from your back to your side while in a flat bed without using bedrails? 3 - A Little   2. Moving from lying on your back to sitting on the side of a flat bed without using bedrails? 2 - A Lot   3. Moving to and from a bed to a chair (including a wheelchair)? 3 - A Little   4. " Standing up from a chair using your arms (e.g., wheelchair, or bedside chair)? 3 - A Little   5. To walk in hospital room? 3 - A Little   6. Climbing 3-5 steps with a railing? 2 - A Lot   Basic Mobility Raw Score (Score out of 24.Lower scores equate to lower levels of function) 16   Total Evaluation Time   Total Evaluation Time (Minutes) 15[NK1.1]        Revision History        User Key Date/Time User Provider Type Action    > NK1.1 10/11/2017 11:27 AM Natividad Joshi, PT Physical Therapist Sign                                                      INTERAGENCY TRANSFER FORM - LAB / IMAGING / EKG / EMG RESULTS   10/7/2017                       [facility]: [phone]            Unresulted Labs     None         Lab Results - 3 Days      Basic metabolic panel [[id number]] (Abnormal)  Resulted: 10/12/17 0540, Result status: Final result    Ordering provider: Wu Do MD  10/11/17 2300 Resulting lab: [facility]    Specimen Information    Type Source Collected On   Blood  10/12/17 0516          Components       Value Reference Range Flag Lab   Sodium 145 133 - 144 mmol/L H HI   Potassium 3.5 3.4 - 5.3 mmol/L  HI   Chloride 113 94 - 109 mmol/L H HI   Carbon Dioxide 24 20 - 32 mmol/L  HI   Anion Gap 8 3 - 14 mmol/L  HI   Glucose 81 70 - 99 mg/dL  HI   Urea Nitrogen 6 7 - 30 mg/dL L HI   Creatinine 0.58 0.52 - 1.04 mg/dL  HI   GFR Estimate >90 >60 mL/min/1.7m2  HI   Comment:  Non  GFR Calc   GFR Estimate If Black >90 >60 mL/min/1.7m2  HI   Comment:  African American GFR Calc   Calcium 8.2 8.5 - 10.1 mg/dL L HI            Magnesium [[id number]]  Resulted: 10/12/17 0540, Result status: Final result    Ordering provider: Wu Do MD  10/11/17 2300 Resulting lab: [facility]    Specimen Information    Type Source Collected On   Blood  10/12/17 0516          Components       Value Reference Range Flag Lab   Magnesium 1.7 1.6 - 2.3 mg/dL  HI             CBC with platelets differential [[id number]] (Abnormal)  Resulted: 10/12/17 0526, Result status: Final result    Ordering provider: Wu Do MD  10/11/17 2300 Resulting lab: [facility]    Specimen Information    Type Source Collected On   Blood  10/12/17 0516          Components       Value Reference Range Flag Lab   WBC 8.1 4.0 - 11.0 10e9/L  HI   RBC Count 3.99 3.8 - 5.2 10e12/L  HI   Hemoglobin 12.1 11.7 - 15.7 g/dL  HI   Hematocrit 36.0 35.0 - 47.0 %  HI   MCV 90 78 - 100 fl  HI   MCH 30.3 26.5 - 33.0 pg  HI   MCHC 33.6 31.5 - 36.5 g/dL  HI   RDW 13.6 10.0 - 15.0 %  HI   Platelet Count 144 150 - 450 10e9/L L HI   Diff Method Automated Method   HI   % Neutrophils 70.3 %  HI   % Lymphocytes 17.0 %  HI   % Monocytes 6.8 %  HI   % Eosinophils 2.4 %  HI   % Basophils 0.6 %  HI   % Immature Granulocytes 2.9 %  HI   Nucleated RBCs 0 0 /100  HI   Absolute Neutrophil 5.7 1.6 - 8.3 10e9/L  HI   Absolute Lymphocytes 1.4 0.8 - 5.3 10e9/L  HI   Absolute Monocytes 0.6 0.0 - 1.3 10e9/L  HI   Absolute Eosinophils 0.2 0.0 - 0.7 10e9/L  HI   Absolute Basophils 0.1 0.0 - 0.2 10e9/L  HI   Abs Immature Granulocytes 0.2 0 - 0.4 10e9/L  HI   Absolute Nucleated RBC 0.0   HI            CRP inflammation [[id number]] (Abnormal)  Resulted: 10/11/17 0715, Result status: Final result    Ordering provider: Wu Do MD  10/11/17 0681 Resulting lab: [facility]    Specimen Information    Type Source Collected On   Blood  10/11/17 0535          Components       Value Reference Range Flag Lab   CRP Inflammation 31.8 0.0 - 8.0 mg/L H HI            Procalcitonin [[id number]]  Resulted: 10/11/17 0620, Result status: Final result    Ordering provider: Wu Do MD  10/10/17 2300 Resulting lab: [facility]    Specimen Information    Type Source Collected On   Blood  10/11/17 0535          Components       Value Reference Range Flag Lab   Procalcitonin <0.05 ng/ml   HI   Comment:         <0.05 ng/ml  Normal  Recommendation: Very low risk of bacterial infection.   Discourage antibiotics unless strong clinical suspicion for serious infection.              Magnesium [[id number]]  Resulted: 10/11/17 0558, Result status: Final result    Ordering provider: Wu Do MD  10/10/17 2300 Resulting lab: [facility]    Specimen Information    Type Source Collected On   Blood  10/11/17 0535          Components       Value Reference Range Flag Lab   Magnesium 1.6 1.6 - 2.3 mg/dL  HI            Basic metabolic panel [[id number]] (Abnormal)  Resulted: 10/11/17 0558, Result status: Final result    Ordering provider: Wu Do MD  10/10/17 2300 Resulting lab: [facility]    Specimen Information    Type Source Collected On   Blood  10/11/17 0535          Components       Value Reference Range Flag Lab   Sodium 144 133 - 144 mmol/L  HI   Potassium 3.0 3.4 - 5.3 mmol/L L HI   Chloride 112 94 - 109 mmol/L H HI   Carbon Dioxide 23 20 - 32 mmol/L  HI   Anion Gap 9 3 - 14 mmol/L  HI   Glucose 78 70 - 99 mg/dL  HI   Urea Nitrogen 8 7 - 30 mg/dL  HI   Creatinine 0.59 0.52 - 1.04 mg/dL  HI   GFR Estimate >90 >60 mL/min/1.7m2  HI   Comment:  Non  GFR Calc   GFR Estimate If Black >90 >60 mL/min/1.7m2  HI   Comment:  African American GFR Calc   Calcium 8.0 8.5 - 10.1 mg/dL L HI            CBC with platelets differential [[id number]] (Abnormal)  Resulted: 10/11/17 0542, Result status: Final result    Ordering provider: Wu Do MD  10/10/17 2300 Resulting lab: [facility]    Specimen Information    Type Source Collected On   Blood  10/11/17 0535          Components       Value Reference Range Flag Lab   WBC 8.5 4.0 - 11.0 10e9/L  HI   RBC Count 3.92 3.8 - 5.2 10e12/L  HI   Hemoglobin 12.0 11.7 - 15.7 g/dL  HI   Hematocrit 35.6 35.0 - 47.0 %  HI   MCV 91 78 - 100 fl  HI   MCH 30.6 26.5 - 33.0 pg  HI   MCHC 33.7  31.5 - 36.5 g/dL  HI   RDW 13.6 10.0 - 15.0 %  HI   Platelet Count 119 150 - 450 10e9/L L HI   Diff Method Automated Method   HI   % Neutrophils 72.1 %  HI   % Lymphocytes 15.7 %  HI   % Monocytes 7.1 %  HI   % Eosinophils 3.2 %  HI   % Basophils 0.6 %  HI   % Immature Granulocytes 1.3 %  HI   Nucleated RBCs 0 0 /100  HI   Absolute Neutrophil 6.1 1.6 - 8.3 10e9/L  HI   Absolute Lymphocytes 1.3 0.8 - 5.3 10e9/L  HI   Absolute Monocytes 0.6 0.0 - 1.3 10e9/L  HI   Absolute Eosinophils 0.3 0.0 - 0.7 10e9/L  HI   Absolute Basophils 0.1 0.0 - 0.2 10e9/L  HI   Abs Immature Granulocytes 0.1 0 - 0.4 10e9/L  HI   Absolute Nucleated RBC 0.0   HI            Stool culture SSCE [[id number]]  Resulted: 10/10/17 0827, Result status: Final result    Ordering provider: Carlos Doll PA  10/07/17 1457 Resulting lab: [facility]    Specimen Information    Type Source Collected On   Feces  10/07/17 1535          Components       Value Reference Range Flag Lab   Specimen Description Feces      Shiga-Toxins 1&2 --   HI   Result:         Shiga toxin 1 NOT detected and Shiga toxin 2 NOT detected.              Test results are to be used in conjunction with information available from the patient   clinical evaluation and other diagnostic procedures.     Culture Micro --   HI   Result:         No Salmonella, Shigella, Campylobacter, E. coli O157, Aeromonas, or Plesiomonas isolated.  No Yersinia enterocolitica isolated              Comprehensive metabolic panel [[id number]] (Abnormal)  Resulted: 10/10/17 0604, Result status: Final result    Ordering provider: Brent Garduno MD  10/10/17 0000 Resulting lab: [facility]    Specimen Information    Type Source Collected On   Blood  10/10/17 0535          Components       Value Reference Range Flag Lab   Sodium 141 133 - 144 mmol/L  HI   Potassium 3.0 3.4 - 5.3 mmol/L L HI   Chloride 113 94 - 109 mmol/L H HI   Carbon Dioxide 20 20 - 32 mmol/L  HI   Anion Gap  8 3 - 14 mmol/L  HI   Glucose 76 70 - 99 mg/dL  HI   Urea Nitrogen 11 7 - 30 mg/dL  HI   Creatinine 0.55 0.52 - 1.04 mg/dL  HI   GFR Estimate >90 >60 mL/min/1.7m2  HI   Comment:  Non  GFR Calc   GFR Estimate If Black >90 >60 mL/min/1.7m2  HI   Comment:  African American GFR Calc   Calcium 7.5 8.5 - 10.1 mg/dL L HI   Bilirubin Total 0.4 0.2 - 1.3 mg/dL  HI   Albumin 2.5 3.4 - 5.0 g/dL L HI   Protein Total 5.1 6.8 - 8.8 g/dL L HI   Alkaline Phosphatase 41 40 - 150 U/L  HI   ALT <6 0 - 50 U/L  HI   AST 10 0 - 45 U/L  HI            CRP inflammation [[id number]] (Abnormal)  Resulted: 10/10/17 0604, Result status: Final result    Ordering provider: Brent Garduno MD  10/10/17 0000 Resulting lab: [facility]    Specimen Information    Type Source Collected On   Blood  10/10/17 0535          Components       Value Reference Range Flag Lab   CRP Inflammation 77.9 0.0 - 8.0 mg/L H HI            CBC with platelets differential [[id number]] (Abnormal)  Resulted: 10/10/17 0545, Result status: Final result    Ordering provider: Brent Garduno MD  10/10/17 0000 Resulting lab: [facility]    Specimen Information    Type Source Collected On   Blood  10/10/17 0535          Components       Value Reference Range Flag Lab   WBC 8.8 4.0 - 11.0 10e9/L  HI   RBC Count 3.84 3.8 - 5.2 10e12/L  HI   Hemoglobin 11.7 11.7 - 15.7 g/dL  HI   Hematocrit 34.8 35.0 - 47.0 % L HI   MCV 91 78 - 100 fl  HI   MCH 30.5 26.5 - 33.0 pg  HI   MCHC 33.6 31.5 - 36.5 g/dL  HI   RDW 13.7 10.0 - 15.0 %  HI   Platelet Count 119 150 - 450 10e9/L L HI   Diff Method Automated Method   HI   % Neutrophils 72.6 %  HI   % Lymphocytes 15.0 %  HI   % Monocytes 7.9 %  HI   % Eosinophils 3.5 %  HI   % Basophils 0.5 %  HI   % Immature Granulocytes 0.5 %  HI   Nucleated RBCs 0 0 /100  HI   Absolute Neutrophil 6.4 1.6 - 8.3 10e9/L  HI   Absolute Lymphocytes 1.3 0.8 - 5.3 10e9/L  HI   Absolute Monocytes 0.7 0.0 - 1.3  10e9/L  HI   Absolute Eosinophils 0.3 0.0 - 0.7 10e9/L  HI   Absolute Basophils 0.0 0.0 - 0.2 10e9/L  HI   Abs Immature Granulocytes 0.0 0 - 0.4 10e9/L  HI   Absolute Nucleated RBC 0.0   HI            CRP inflammation [[id number]] (Abnormal)  Resulted: 10/09/17 0658, Result status: Final result    Ordering provider: Brent Garduno MD  10/09/17 0000 Resulting lab: [facility]    Specimen Information    Type Source Collected On   Blood  10/09/17 0555          Components       Value Reference Range Flag Lab   CRP Inflammation 98.6 0.0 - 8.0 mg/L H HI            Basic metabolic panel [[id number]] (Abnormal)  Resulted: 10/09/17 0658, Result status: Final result    Ordering provider: Brent Garduno MD  10/09/17 0000 Resulting lab: [facility]    Specimen Information    Type Source Collected On   Blood  10/09/17 0555          Components       Value Reference Range Flag Lab   Sodium 143 133 - 144 mmol/L  HI   Potassium 3.3 3.4 - 5.3 mmol/L L HI   Chloride 114 94 - 109 mmol/L H HI   Carbon Dioxide 19 20 - 32 mmol/L L HI   Anion Gap 10 3 - 14 mmol/L  HI   Glucose 77 70 - 99 mg/dL  HI   Urea Nitrogen 15 7 - 30 mg/dL  HI   Creatinine 0.56 0.52 - 1.04 mg/dL  HI   GFR Estimate >90 >60 mL/min/1.7m2  HI   Comment:  Non  GFR Calc   GFR Estimate If Black >90 >60 mL/min/1.7m2  HI   Comment:  African American GFR Calc   Calcium 8.0 8.5 - 10.1 mg/dL L HI            Active MRSA Surveillance Culture [[id number]]  Resulted: 10/09/17 0637, Result status: Final result    Ordering provider: Sally Mabry MD  10/07/17 2349 Resulting lab: [facility]    Specimen Information    Type Source Collected On   Swab Nasopharyngeal 10/07/17 2349          Components       Value Reference Range Flag Lab   Specimen Description Swab      Culture Micro No MRSA isolated   HI            CBC with platelets differential [[id number]] (Abnormal)  Resulted: 10/09/17 0559, Result status: Final  result    Ordering provider: Brent Garduno MD  10/09/17 0000 Resulting lab: [facility]    Specimen Information    Type Source Collected On   Blood  10/09/17 0555          Components       Value Reference Range Flag Lab   WBC 8.0 4.0 - 11.0 10e9/L  HI   RBC Count 3.91 3.8 - 5.2 10e12/L  HI   Hemoglobin 11.9 11.7 - 15.7 g/dL  HI   Hematocrit 35.9 35.0 - 47.0 %  HI   MCV 92 78 - 100 fl  HI   MCH 30.4 26.5 - 33.0 pg  HI   MCHC 33.1 31.5 - 36.5 g/dL  HI   RDW 13.8 10.0 - 15.0 %  HI   Platelet Count 114 150 - 450 10e9/L L HI   Diff Method Automated Method   HI   % Neutrophils 72.8 %  HI   % Lymphocytes 14.8 %  HI   % Monocytes 10.7 %  HI   % Eosinophils 1.0 %  HI   % Basophils 0.5 %  HI   % Immature Granulocytes 0.2 %  HI   Nucleated RBCs 0 0 /100  HI   Absolute Neutrophil 5.8 1.6 - 8.3 10e9/L  HI   Absolute Lymphocytes 1.2 0.8 - 5.3 10e9/L  HI   Absolute Monocytes 0.9 0.0 - 1.3 10e9/L  HI   Absolute Eosinophils 0.1 0.0 - 0.7 10e9/L  HI   Absolute Basophils 0.0 0.0 - 0.2 10e9/L  HI   Abs Immature Granulocytes 0.0 0 - 0.4 10e9/L  HI   Absolute Nucleated RBC 0.0   HI            Testing Performed By     Lab - Abbreviation Name Director Address Valid Date Range    210 - HI [facility] Unknown [address] [place] [id number] 05/08/15 1057 - Present               Imaging Results - 3 Days      XR Abdomen Series Portable [[id number]]  Resulted: 10/12/17 1124, Result status: Final result    Ordering provider: Wu Do MD  10/12/17 0618 Resulted by: Ken Hatfield MD    Performed: 10/12/17 0803 - 10/12/17 0903 Resulting lab: RADIOLOGY RESULTS    Narrative:       XR ABDOMEN SERIES PORTABLE   10/12/2017 9:03 AM    History:Female,age  87 years, colitis    Comparison: 10/9/2017    FINDINGS: Two views are submitted. Moderate volume of gas is seen  within the large and small bowel. Gaseous distention has decreased  when compared to the earlier study. There is no evidence of  free air  or evidence of obstruction.       Impression:       IMPRESSION:  1.   Moderate volume of gas and stool, no acute abnormality.    MARISA REYES MD      XR Abdomen 2 Views [[id number]]  Resulted: 10/09/17 1101, Result status: Final result    Ordering provider: Brent Garduno MD  10/09/17 0645 Resulted by: Cyndi Jefferson MD    Performed: 10/09/17 0927 - 10/09/17 1024 Resulting lab: RADIOLOGY RESULTS    Narrative:       PROCEDURE:  XR ABDOMEN 2 VW    HISTORY:  Colitis, diarrhea.    TECHNIQUE:  Upright and supine views of the abdomen were obtained.    COMPARISON:  CT abdomen and pelvis 10/7/2017    FINDINGS:     There is a nonspecific, but nonobstructive bowel gas pattern. No free  air is seen. Stimulator device is present in the sacrum. Right hip  prosthesis is noted. There are degenerative changes in the spine.      Impression:       IMPRESSION:    Nonspecific but nonobstructive bowel gas pattern.    CYNDI JEFFERSON MD      Testing Performed By     Lab - Abbreviation Name Director Address Valid Date Range    104 - Rad [facility] RADIOLOGY RESULTS Unknown Unknown 02/16/05 1553 - Present            Encounter-Level Documents:     There are no encounter-level documents.      Order-Level Documents:     There are no order-level documents.

## 2017-10-07 NOTE — ED PROVIDER NOTES
History     Chief Complaint   Patient presents with     Abdominal Pain     Started today. Patient recieved extra dose of stool softener last night for constipation. She reports a larg BM     Nausea, Vomiting, & Diarrhea     Started today     The history is provided by the patient. No  was used.     Britney Schaeffer is a 87 year old female with Hx parkinsons, HTN, TIA, CAD who presents with acute onset of N/V/D starting today. She was treated with an extra stool softener yesterday d/t belly pain and constipation, had large BM this morning followed by the N/V/D.  She reports abdominal pain is diffuse and crampy to sharp in nature. Specks of blood noted in stool. NO fevers.    I have reviewed the Medications, Allergies, Past Medical and Surgical History, and Social History in the Epic system.    Allergies as of 10/07/2017 - Matt as Reviewed 10/07/2017   Allergen Reaction Noted     Ambien  03/24/2013     Tramadol  03/27/2013     Current Discharge Medication List      CONTINUE these medications which have NOT CHANGED    Details   senna-docusate (SENOKOT-S;PERICOLACE) 8.6-50 MG per tablet Take 1 tablet by mouth 2 times daily      !! carbidopa-levodopa (SINEMET)  MG per tablet TAKE 1 TABLET THREE TIMES A DAY      AMOXICILLIN PO Take 500 mg by mouth Give 4 tablets as needed      !! carbidopa-levodopa (SINEMET)  MG per tablet TAKE 1 TABLET THREE TIMES A DAY  Qty: 270 tablet, Refills: 1    Associated Diagnoses: Parkinsons (H)      chlorhexidine (PERIDEX) 0.12 % solution Swish and spit 15 mLs in mouth 2 times daily      pantoprazole (PROTONIX) 40 MG EC tablet TAKE 1 TABLET DAILY  Qty: 90 tablet, Refills: 1    Associated Diagnoses: Gastroesophageal reflux disease, esophagitis presence not specified      saline nasal (AYR SALINE) GEL topical gel Apply into each nare At Bedtime      lidocaine (LIDODERM) 5 % Patch APPLY UP TO 3 PATCHES TO PAINFUL AREA AT ONCE FOR UP TO 12 HOURS WITHIN A 24 HOURS  PERIOD. REMOVE AFTER 12 HOURS  Qty: 270 patch, Refills: 3    Associated Diagnoses: Polio      gabapentin (NEURONTIN) 100 MG capsule TAKE 1 CAPSULE TWICE A DAY  Qty: 180 capsule, Refills: 2    Associated Diagnoses: Neuralgia and neuritis      clopidogrel (PLAVIX) 75 MG tablet TAKE 1 TABLET DAILY  Qty: 90 tablet, Refills: 2    Associated Diagnoses: Coronary artery disease involving native coronary artery of native heart without angina pectoris      atorvastatin (LIPITOR) 40 MG tablet Take 1 tablet (40 mg) by mouth daily  Qty: 90 tablet, Refills: 3    Associated Diagnoses: Mixed hyperlipidemia      spironolactone (ALDACTONE) 25 MG tablet Take 0.5 tablets (12.5 mg) by mouth daily  Qty: 45 tablet, Refills: 3    Associated Diagnoses: Left heart failure (H)      MYRBETRIQ 50 MG 24 hr tablet Take one (1) tablet BY MOUTH daily  Qty: 31 tablet, Refills: 0    Associated Diagnoses: OAB (overactive bladder)      metoprolol (LOPRESSOR) 25 MG tablet Take 0.5 tablets (12.5 mg) by mouth 2 times daily  Qty: 90 tablet, Refills: 3    Associated Diagnoses: NSTEMI (non-ST elevated myocardial infarction) (H)      calcium carbonate (TUMS) 500 MG chewable tablet Take 2 chew tab by mouth 2 times daily      olopatadine (PATANOL) 0.1 % ophthalmic solution INSTILL 1 DROP INTO BOTH EYES 2 TIMES DAILY  Qty: 3 Bottle, Refills: 3    Associated Diagnoses: Allergic conjunctivitis, unspecified laterality      aspirin 81 MG chewable tablet Take 1 tablet (81 mg) by mouth daily  Qty: 90 tablet, Refills: 3    Associated Diagnoses: Health care maintenance      Cholecalciferol (VITAMIN D3) 2000 UNITS CAPS Take 2,000 Units by mouth daily  Qty: 90 capsule, Refills: 3    Associated Diagnoses: Osteoporosis      loratadine (ALLERGY RELIEF) 10 MG tablet Take 1 tablet (10 mg) by mouth daily  Qty: 90 tablet, Refills: 3    Associated Diagnoses: Allergic rhinitis, unspecified allergic rhinitis type      polyethylene glycol (MIRALAX) powder Take 17 g by mouth  daily  Qty: 500 g, Refills: 3    Associated Diagnoses: Constipation      acetaminophen (TYLENOL) 500 MG tablet Take 2 tablets (1,000 mg) by mouth 2 times daily as needed for mild pain As needed  Qty: 360 tablet, Refills: 1    Associated Diagnoses: Osteoarthritis      artificial saliva, BIOTENE MT, (BIOTENE MT) SOLN Swish and spit 5 mLs in mouth as needed for dry mouth  Qty: 3 Bottle, Refills: 3    Associated Diagnoses: Dry mouth      furosemide (LASIX) 20 MG tablet Take 1 tablet (20 mg) by mouth as needed  Qty: 90 tablet, Refills: 1    Associated Diagnoses: Edema      ondansetron (ZOFRAN) 4 MG tablet TAKE 1 TABLET BY MOUTH EVERY 8 HOURS AS NEEDED FOR NAUSEA AND VOMITING  Qty: 30 tablet, Refills: 2    Associated Diagnoses: Nausea      polyethylene glycol 0.4%- propylene glycol 0.3% (SYSTANE) 0.4-0.3 % SOLN ophthalmic solution Place 2 drops into both eyes 4 times daily as needed for dry eyes  Qty: 3 Bottle, Refills: 1    Associated Diagnoses: Allergic conjunctivitis, unspecified laterality      Lanolin (CORONA MULTI-PURPOSE) 30 % OINT Externally apply 1 Application topically 2 times daily  Qty: 3 Tube, Refills: 3    Associated Diagnoses: Skin irritation      guaiFENesin-codeine (GUAIFENESIN AC) 100-10 MG/5ML SOLN TAKE 2 TEASPOONFULS (10ML) BY MOUTH EVERY 4 HOURS AS NEEDED FOR COUGH  Qty: 473 mL, Refills: 0    Associated Diagnoses: Cough      order for DME Equipment being ordered: neoprene knee sleeve  Qty: 1 Device, Refills: 0    Associated Diagnoses: Primary osteoarthritis involving multiple joints      Multiple Vitamins-Minerals (PRESERVISION AREDS) TABS TAKE 1 TABLET BY MOUTH 2 TIMES DAILY  Qty: 120 tablet, Refills: 5    Associated Diagnoses: Health care maintenance      sodium fluoride dental gel (PREVIDENT) 1.1 % GEL topical gel Apply 1 applicator to affected area At Bedtime      ketoconazole (NIZORAL) 2 % shampoo Wash hair, let set for 5 minutes, rinse 1 time per day every Sunday  Qty: 120 mL, Refills: 2     Associated Diagnoses: Seborrheic dermatitis      nystatin (MYCOSTATIN) cream APPLY TO AFFECTED AREA 2 TIMES DAILY AS NEEDED  Qty: 30 g, Refills: 2    Associated Diagnoses: Candidiasis of skin and nails       !! - Potential duplicate medications found. Please discuss with provider.        Past Medical History:   Diagnosis Date     Allergic rhinitis, Seasonal 06/19/2000     Colonic polyps 09/28/2000     Corns and callosities 01/20/2004     dyslipidemia 09/28/2000     Fibrocystic Breast Disease 03/01/2011     GERD 03/01/2011     hemorrhoids 03/01/2011     hypertension, benign 11/22/1999     Insomnia, unspecified 02/05/2004     Osteoarthritis, generalized 11/07/2008     Osteoporosis, unspecified 04/20/2009     Polio 1931     Scoliosis (and kyphoscoliosis), idiopathic 03/01/2011     TIA 03/09/2005     Unspecified asthma(493.90) 03/11/2003     Past Surgical History:   Procedure Laterality Date     BREAST BIOPSY, RT/LT       BUNIONECTOMY      Bunion     COLONOSCOPY  2007     GENITOURINARY SURGERY      botox     JOINT REPLACEMENT      LT     JOINT REPLACEMENT, HIP RT/LT  2010     KERATOPLASTY PENETRATING, VITRECTOMY ANTERIOR, EXTRACAPSULAR CATARACT EXTRACTION, COMBINED  2010    LT, Cataracts     Family History   Problem Relation Age of Onset     CEREBROVASCULAR DISEASE Mother      CVA (cause of death)     CEREBROVASCULAR DISEASE Father      CVA     Asthma No family hx of      Social History     Social History     Marital status:      Spouse name: N/A     Number of children: N/A     Years of education: N/A     Occupational History     Retired [employer]     Social History Main Topics     Smoking status: Passive Smoke Exposure - Never Smoker     Smokeless tobacco: Never Used      Comment: 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 No     Social History Narrative         Review of Systems   Constitutional: Negative.  Negative for chills and fever.   Respiratory: Negative.    Cardiovascular: Negative.    Gastrointestinal: Positive for abdominal pain, diarrhea, nausea and vomiting.   Genitourinary: Positive for dysuria. Negative for difficulty urinating, flank pain, frequency, hematuria, urgency and vaginal pain.   Skin: Negative.  Negative for rash.       Physical Exam   BP: 132/60  Pulse: 69  Temp: 97.5  F (36.4  C)  Resp: 16  SpO2: 93 %  Physical Exam   Constitutional: She appears well-developed and well-nourished. No distress.   Eyes: Conjunctivae are normal.   Cardiovascular: Normal rate and normal heart sounds.    Pulmonary/Chest: Effort normal and breath sounds normal.   Abdominal: Soft. She exhibits no distension, no fluid wave, no ascites and no mass. There is generalized tenderness. There is no rigidity, no rebound and no CVA tenderness.   BS hyperactive.   Neurological: She is alert.   Skin: Skin is warm and dry.   Psychiatric: She has a normal mood and affect.   Nursing note and vitals reviewed.      ED Course     ED Course     Procedures      Labs Ordered and Resulted from Time of ED Arrival Up to the Time of Departure from the ED   CBC WITH PLATELETS DIFFERENTIAL - Abnormal; Notable for the following:        Result Value    WBC 11.9 (*)     Absolute Neutrophil 10.3 (*)     All other components within normal limits   COMPREHENSIVE METABOLIC PANEL - Abnormal; Notable for the following:     Glucose 129 (*)     All other  components within normal limits   OCCULT BLOOD FECAL HGB IMMUNO - Abnormal; Notable for the following:     Occult Blood HGB FIT Positive (*)     All other components within normal limits   LIPASE   CRP INFLAMMATION   UA MACROSCOPIC WITH REFLEX TO MICRO AND CULTURE   VITAL SIGNS   PULSE OXIMETRY NURSING   STOOL CULTURE SSCE   CLOSTRIDIUM DIFFICILE TOXIN B     Medications   ondansetron (ZOFRAN) injection 4 mg (4 mg Intravenous Given 10/7/17 1640)   0.9% sodium chloride BOLUS (not administered)   fentaNYL (PF) (SUBLIMAZE) injection 25 mcg (25 mcg Intravenous Given 10/7/17 1528)   fentaNYL (PF) (SUBLIMAZE) injection 25 mcg (25 mcg Intravenous Given 10/7/17 1647)   prochlorperazine (COMPAZINE) injection 5 mg (5 mg Intravenous Given 10/7/17 1902)   diphenhydrAMINE (BENADRYL) injection 12.5 mg (12.5 mg Intravenous Given 10/7/17 1858)   sodium chloride (PF) 0.9% PF flush 60 mL (60 mLs Intravenous Given 10/7/17 1923)   iopamidol (ISOVUE-300) IV solution 61% 100 mL (100 mLs Intravenous Given 10/7/17 1923)   diatrizoate meglumine-sodium (GASTROGRAFIN; GASTROVIEW) 66-10 % solution 30 mL (30 mLs Oral Given 10/7/17 1922)       Assessments & Plan (with Medical Decision Making)     I have reviewed the nursing notes.  I have reviewed the findings, diagnosis, plan and need for follow up with the patient.    Current Discharge Medication List        Final diagnoses:   Pancolitis (H)   Vomiting and diarrhea   Stool occult blood positive, WBC 11.9.  Pt is given 1L NS, a total of 50mcg  Fentanyl for pain. She did need 8 of zofran followed by 5 of compazine for nausea/vomiting. Concern for bowel obstruction on flat/upright. CT ordered and shows pancolitis, lower recto-sigmoid blood, liver mass vs anomaly (present in 2016). I spoke with our Hospitalist, Dr Mabry and he will accept care. Pt has been NPO. She is DNR.     10/7/2017   [facility]     Carlos Doll PA  10/07/17 [id number]

## 2017-10-07 NOTE — PROGRESS NOTES
HISTORY OF PRESENT ILLNESS:  Britney is a 87 year old female (12/29/1929)  resident of [facility]  who is being seen today for a routine 30 day follow up. Previously her Sinemet was reduced  and her nausea is improved. She has questions about her tremor.  In addition, she notes left knee pain.  She has a history of left total knee arthroplasty .Patient offers no other complaint.  Staff notes no other issues.    Current medications, allergies, and interdisciplinary care plan are reviewed.      Patient Active Problem List    Diagnosis Date Noted     Parkinson disease (H) 08/10/2015     Priority: High     CHD (coronary heart disease) 06/15/2015     Priority: High     03/2015  NSTEMI S/P angioplasty and stenting (ELIZABETH) LAD and D1       HF (heart failure) (H) 06/15/2015     Priority: High     TIA (transient ischemic attack) 03/16/2015     Priority: High     Neuralgia, post-herpetic 03/19/2014     Priority: High     Cerebrovascular disease 04/10/2013     Priority: High     HTN (hypertension) 09/24/2006     Priority: High     Glaucoma 12/27/2016     Priority: Medium     History of poliomyelitis 12/27/2016     Priority: Medium     Osteoarthritis, multiple joints 12/13/2015     Priority: Medium     Fibrocystic breast disease, left 12/13/2015     Priority: Medium     Constipation, chronic 04/20/2015     Priority: Medium     Hiatal hernia 03/16/2015     Priority: Medium     Chronic cough 07/15/2014     Priority: Medium     OAB (overactive bladder) 03/19/2014     Priority: Medium     S/P InterStim placement and multiple botox injections       Dyslipidemia 04/10/2013     Priority: Medium     Hemorrhoids 04/10/2013     Priority: Medium     Perennial allergic rhinitis 04/10/2013     Priority: Medium     Asthma, mild persistent 04/10/2013     Priority: Medium              GERD (gastroesophageal reflux disease) 04/10/2013     Priority: Medium     Osteoporosis 04/10/2013     Priority: Medium     Kyphoscoliosis  and scoliosis 04/10/2013     Priority: Medium     Insomnia, medical condition 04/10/2013     Priority: Medium     History of colonic polyps 04/10/2013     Priority: Medium     Nursing Home Visit 01/19/2016     Priority: Low     Health Care Home 12/09/2015     Priority: Low     Class: Chronic          Social History     Social History     Marital status:      Spouse name: N/A     Number of children: N/A     Years of education: N/A     Occupational History     Retired [employer]     Social History Main Topics     Smoking status: Passive Smoke Exposure - Never Smoker     Smokeless tobacco: Never Used      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 No     Social History Narrative        Current Outpatient Prescriptions   Medication Sig     carbidopa-levodopa (SINEMET)  MG per tablet TAKE 1 TABLET THREE TIMES A DAY     AMOXICILLIN PO Take 500 mg by mouth Give 4 tablets as needed     Multiple Vitamins-Minerals (PRESERVISION AREDS) TABS TAKE 1 TABLET BY MOUTH 2 TIMES DAILY     carbidopa-levodopa (SINEMET)  MG per tablet TAKE 1 TABLET THREE TIMES A DAY (Patient taking differently: TAKE 1 TABLET THREE TIMES A DAY at 1200,1600 and 2000 and 1 tablet at 0800)     chlorhexidine (PERIDEX) 0.12 % solution Swish and spit 15 mLs in mouth 2 times daily     pantoprazole (PROTONIX) 40 MG EC tablet TAKE 1  TABLET DAILY     saline nasal (AYR SALINE) GEL topical gel Apply into each nare At Bedtime     lidocaine (LIDODERM) 5 % Patch APPLY UP TO 3 PATCHES TO PAINFUL AREA AT ONCE FOR UP TO 12 HOURS WITHIN A 24 HOURS PERIOD. REMOVE AFTER 12 HOURS (Patient taking differently: Place 1 patch onto the skin Apply BID at 0800 and 2000)     gabapentin (NEURONTIN) 100 MG capsule TAKE 1 CAPSULE TWICE A DAY     clopidogrel (PLAVIX) 75 MG tablet TAKE 1 TABLET DAILY     atorvastatin (LIPITOR) 40 MG tablet Take 1 tablet (40 mg) by mouth daily     spironolactone (ALDACTONE) 25 MG tablet Take 0.5 tablets (12.5 mg) by mouth daily     MYRBETRIQ 50 MG 24 hr tablet Take one (1) tablet BY MOUTH daily     sodium fluoride dental gel (PREVIDENT) 1.1 % GEL topical gel Apply 1 applicator to affected area At Bedtime     metoprolol (LOPRESSOR) 25 MG tablet Take 0.5 tablets (12.5 mg) by mouth 2 times daily     calcium carbonate (TUMS) 500 MG chewable tablet Take 2 chew tab by mouth 2 times daily     olopatadine (PATANOL) 0.1 % ophthalmic solution INSTILL 1 DROP INTO BOTH EYES 2 TIMES DAILY     aspirin 81 MG chewable tablet Take 1 tablet (81 mg) by mouth daily     Cholecalciferol (VITAMIN D3) 2000 UNITS CAPS Take 2,000 Units by mouth daily     loratadine (ALLERGY RELIEF) 10 MG tablet Take 1 tablet (10 mg) by mouth daily     polyethylene glycol (MIRALAX) powder Take 17 g by mouth daily (Patient taking differently: Take 17 g by mouth 2 times daily )     acetaminophen (TYLENOL) 500 MG tablet Take 2 tablets (1,000 mg) by mouth 2 times daily as needed for mild pain As needed (Patient taking differently: Take 1,000 mg by mouth 2 times daily And 2 tabs PRN one time a day)     artificial saliva, BIOTENE MT, (BIOTENE MT) SOLN Swish and spit 5 mLs in mouth as needed for dry mouth     furosemide (LASIX) 20 MG tablet Take 1 tablet (20 mg) by mouth as needed     ondansetron (ZOFRAN) 4 MG tablet TAKE 1 TABLET BY MOUTH EVERY 8 HOURS AS NEEDED FOR NAUSEA AND VOMITING      polyethylene glycol 0.4%- propylene glycol 0.3% (SYSTANE) 0.4-0.3 % SOLN ophthalmic solution Place 2 drops into both eyes 4 times daily as needed for dry eyes (Patient taking differently: Place 2 drops into both eyes daily )     ketoconazole (NIZORAL) 2 % shampoo Wash hair, let set for 5 minutes, rinse 1 time per day every Sunday     nystatin (MYCOSTATIN) cream APPLY TO AFFECTED AREA 2 TIMES DAILY AS NEEDED     Lanolin (CORONA MULTI-PURPOSE) 30 % OINT Externally apply 1 Application topically 2 times daily     guaiFENesin-codeine (GUAIFENESIN AC) 100-10 MG/5ML SOLN TAKE 2 TEASPOONFULS (10ML) BY MOUTH EVERY 4 HOURS AS NEEDED FOR COUGH     order for DME Equipment being ordered: neoprene knee sleeve     No current facility-administered medications for this visit.        Allergies   Allergen Reactions     Ambien      Zolpidem Tartrate     Tramadol        I have reviewed the care plan and do agree with the plan.    ROS:  No chest pain, shortness of breath, fever, chills, headache, nausea, vomiting, dysuria, or changes in bowel habits.  Appetite is normal.  Left shoulder/back  pain noted.          OBJECTIVE:  /58  Pulse 57  Temp 97.4  F (36.3  C)  Resp 16  Wt 127 lb 1 oz (57.6 kg)  SpO2 95%  BMI 24.01 kg/m2    GENERAL:  Chronically ill appearing, alert, and in no acute distress  RESP:  Lungs clear.  No rales, rhonchi, or wheezing  CV:  RRR.  S1 S2 without murmur. No clicks or rubs.  SKIN:  Age-related changes.  No suspicious lesions or rashes.  PSYCH:  Mentation intact, affect bright, and orientation intact.  EXTREM:  Trace edema.  Pulses palpable. There is as small joint effusion noted left knee.  Some tenderness noted diffusely.            Lab/Diagnostic data:    none    ASSESSMENT/ORDERS:  Nursing Home Visit  Britney will be seen in the clinic for X rays.  Other issues stable.  No changes in current medications or care plan.      Total time spent with patient visit was 25 min including patient visit,  review of pertinent clinical information, and treatment plan.      Mario Fofana MD

## 2017-10-07 NOTE — IP AVS SNAPSHOT
MRN:[id number]                      After Visit Summary   10/7/2017    Britney Schaeffer    MRN: [id number]           Thank you!     Thank you for choosing [facility] for your care. Our goal is always to provide you with excellent care. Hearing back from our patients is one way we can continue to improve our services. Please take a few minutes to complete the written survey that you may receive in the mail after you visit with us. Thank you!        Patient Information     Date Of Birth          12/29/1929        Designated Caregiver       Most Recent Value    Caregiver    Will someone help with your care after discharge? yes      About your hospital stay     You were admitted on:  October 7, 2017 You last received care in the:  [facility]    You were discharged on:  October 12, 2017        Reason for your hospital stay       Britney Schaeffer is a 87 year old woman who was admitted on 10/7/2017. Her history is signficant for hypertension, Parkinson's disease, TIA, and CAD  She was sent to the ED with a complaint of abdominal pain, nausea vomiting and diarrhea after several days' constipation and abdominal pain; she received some extra stool softeners. She had a large bowel movement the morning of admission followed by nausea vomiting and diarrhea.  The patient had diffuse crampy abdominal pain. Abdominal CT showed colitis with regions of colon and small bowel involved.  She was treated preemptively use for an infectious process, although no specific cause identified.  She slowly improved been able to tolerate a soft diet by the time of discharge.  In other respects, she showed diffuse weakness and gait disturbance.  Physical therapy will be continued.  Aspirin/Plavix to be resumed in approximately 10 days as she had some  Occult blood in her stool.  Imaging also showed the possibility of a sigmoid mass, although whether further investigation would be fruitful is uncertain and deferred to  outpatient reevaluation.                  Who to Call     For medical emergencies, please call 911.  For non-urgent questions about your medical care, please call your primary care provider or clinic, [phone]          Attending Provider     Provider Specialty    Carlos Doll PA Physician Assistant - Medical    Sally Mabry MD --    Brent Garduno MD Internal Medicine    Wu Do MD Internal Medicine       Primary Care Provider Office Phone # Fax #    Mario Fofana [phone]      After Care Instructions     Activity - Up with assistive device           Activity - Up with nursing assistance           Advance Diet as Tolerated       Follow this diet upon discharge: Orders Placed This Encounter      Begin with soft diet, advanding to Regular Diet as tolerated            Fall precautions           General info for SNF       Length of Stay Estimate: Long Term Care  Condition at Discharge: Improving  Level of care:skilled   Rehabilitation Potential: Fair  Admission H&P remains valid and up-to-date: Yes  Recent Chemotherapy: N/A  Use Nursing Home Standing Orders: Yes            Mantoux instructions       Give two-step Mantoux (PPD) Per Facility Policy Yes                  Additional Services     Occupational Therapy Adult Consult       Evaluate and treat as clinically indicated.    Reason:  generalized weakness, gait disturbance, balance limitation. 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    Reason:  generalized weakness, gait disturbance, balance limitation. 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            Statement of Approval     Ordered          10/12/17 1117  I have reviewed and agree with all the recommendations and orders detailed in this document.  EFFECTIVE NOW     Approved and  "electronically signed by:  Wu Do MD             Admission Information     Date & Time Provider Department Dept. Phone    10/7/2017 Wu Do MD [facility] [phone]      Your Vitals Were     Blood Pressure Pulse Temperature Respirations Height Weight    162/71 53 98  F (36.7  C) (Tympanic) 16 1.575 m (5' 2\") 60.8 kg (134 lb 0.6 oz)    Pulse Oximetry BMI (Body Mass Index)                95% 24.52 kg/m2          [employer] Information     [employer] lets you send messages to your doctor, view your test results, renew your prescriptions, schedule appointments and more. To sign up, go to [email or web address] . Click on \"Log in\" on the left side of the screen, which will take you to the Welcome page. Then click on \"Sign up Now\" on the right side of the page.     You will be asked to enter the access code listed below, as well as some personal information. Please follow the directions to create your username and password.     Your access code is: [email or web address]  Expires: 10/24/2017  5:35 AM     Your access code will  in 90 days. If you need help or a new code, please call your [facility] clinic or [phone].        Care EveryWhere ID     This is your Care EveryWhere ID. This could be used by other organizations to access your [facility] medical records  [id number]        Equal Access to Services     [facility] AH: Hadii josé colono Soad, waaxda luqadaha, qaybta kaalmada tana, celena antony . So [facility] [phone].    ATENCIÓN: Si habla español, tiene a holbrook disposición servicios gratuitos de asistencia lingüística. [place]chap[place] al [phone].    We comply with applicable federal civil rights laws and [place] laws. We do not discriminate on the basis of race, color, national origin, age, disability, sex, sexual orientation, or gender identity.               Review of your medicines      CONTINUE these medicines which may have CHANGED, or have new " prescriptions. If we are uncertain of the size of tablets/capsules you have at home, strength may be listed as something that might have changed.        Dose / Directions    * ACETAMINOPHEN PO   This may have changed:  Another medication with the same name was changed. Make sure you understand how and when to take each.        Dose:  1000 mg   Take 1,000 mg by mouth daily as needed for pain IN ADDITION TO BID DOSE   Refills:  0       * acetaminophen 500 MG tablet   Commonly known as:  TYLENOL   This may have changed:    - when to take this  - reasons to take this  - additional instructions   Used for:  Health care maintenance        Dose:  1000 mg   Take 2 tablets (1,000 mg) by mouth 2 times daily as needed for mild pain As needed   Quantity:  360 tablet   Refills:  1       * carbidopa-levodopa  MG per tablet   Commonly known as:  SINEMET   Indication:  Parkinson's Disease   This may have changed:  Another medication with the same name was changed. Make sure you understand how and when to take each.        Dose:  2 tablet   Take 2 tablets by mouth every morning   Refills:  0       * carbidopa-levodopa  MG per tablet   Commonly known as:  SINEMET   This may have changed:  See the new instructions.   Used for:  Parkinsons (H)        TAKE 1 TABLET THREE TIMES A DAY   Quantity:  270 tablet   Refills:  1       clopidogrel 75 MG tablet   Commonly known as:  PLAVIX   This may have changed:  See the new instructions.   Used for:  Coronary artery disease involving native coronary artery of native heart without angina pectoris        Dose:  75 mg   Start taking on:  10/23/2017   Take 1 tablet (75 mg) by mouth daily   Quantity:  90 tablet   Refills:  2       furosemide 20 MG tablet   Commonly known as:  LASIX   This may have changed:    - when to take this  - reasons to take this   Used for:  Edema        Dose:  20 mg   Take 1 tablet (20 mg) by mouth as needed   Quantity:  90 tablet   Refills:  1       lidocaine 5  % Patch   Commonly known as:  LIDODERM   This may have changed:    - how much to take  - how to take this  - when to take this  - additional instructions   Used for:  Polio        APPLY UP TO 3 PATCHES TO PAINFUL AREA AT ONCE FOR UP TO 12 HOURS WITHIN A 24 HOURS PERIOD. REMOVE AFTER 12 HOURS   Quantity:  270 patch   Refills:  3       polyethylene glycol 0.4%- propylene glycol 0.3% 0.4-0.3 % Soln ophthalmic solution   Commonly known as:  SYSTANE   This may have changed:  when to take this   Used for:  Allergic conjunctivitis, unspecified laterality        Dose:  2 drop   Place 2 drops into both eyes 4 times daily as needed for dry eyes   Quantity:  3 Bottle   Refills:  1       polyethylene glycol powder   Commonly known as:  MIRALAX   This may have changed:    - when to take this  - reasons to take this   Used for:  Constipation        Dose:  17 g   Take 17 g by mouth daily   Quantity:  500 g   Refills:  3       saline nasal Gel topical gel   This may have changed:    - how to take this  - when to take this  - additional instructions   Used for:  Chronic cough        Apply into each naris At Bedtime   Refills:  0       * Notice:  This list has 4 medication(s) that are the same as other medications prescribed for you. Read the directions carefully, and ask your doctor or other care provider to review them with you.      CONTINUE these medicines which have NOT CHANGED        Dose / Directions    AMOXICILLIN PO        Dose:  500 mg   Take 500 mg by mouth as needed (GIVE ONE HOUR PRIOIR TO DENTAL APTS FOR ISCHEMIC HEART DISEASE) Give 4 tablets as needed   Refills:  0       artificial saliva Soln solution   Used for:  Dry mouth        Dose:  5 mL   Swish and spit 5 mLs in mouth as needed for dry mouth   Quantity:  3 Bottle   Refills:  3       aspirin 81 MG chewable tablet   Used for:  Health care maintenance        Dose:  81 mg   Start taking on:  10/23/2017   Take 1 tablet (81 mg) by mouth daily   Quantity:  90 tablet    Refills:  3       calcium carbonate 500 MG chewable tablet   Commonly known as:  TUMS   Indication:  OSTEOPOROSIS        Dose:  2 chew tab   Take 2 chew tab by mouth 2 times daily   Refills:  0       chlorhexidine 0.12 % solution   Commonly known as:  PERIDEX        Dose:  15 mL   Swish and spit 15 mLs in mouth 2 times daily   Refills:  0       gabapentin 100 MG capsule   Commonly known as:  NEURONTIN   Used for:  Neuralgia and neuritis        TAKE 1 CAPSULE TWICE A DAY   Quantity:  180 capsule   Refills:  2       guaiFENesin-codeine 100-10 MG/5ML Soln solution   Commonly known as:  guaiFENesin AC   Used for:  Cough        TAKE 2 TEASPOONFULS (10ML) BY MOUTH EVERY 4 HOURS AS NEEDED FOR COUGH   Quantity:  473 mL   Refills:  0       ketoconazole 2 % shampoo   Commonly known as:  NIZORAL   Used for:  Seborrheic dermatitis        Wash hair, let set for 5 minutes, rinse 1 time per day every Sunday   Quantity:  120 mL   Refills:  2       Lanolin 30 % Oint   Commonly known as:  CORONA MULTI-PURPOSE   Used for:  Skin irritation        Dose:  1 Application   Externally apply 1 Application topically 2 times daily   Quantity:  3 Tube   Refills:  3       loratadine 10 MG tablet   Commonly known as:  ALLERGY RELIEF   Used for:  Allergic rhinitis, unspecified allergic rhinitis type        Dose:  1 tablet   Take 1 tablet (10 mg) by mouth daily   Quantity:  90 tablet   Refills:  3       metoprolol 25 MG tablet   Commonly known as:  LOPRESSOR   Used for:  NSTEMI (non-ST elevated myocardial infarction) (H)        Dose:  12.5 mg   Take 0.5 tablets (12.5 mg) by mouth 2 times daily   Quantity:  90 tablet   Refills:  3       MYRBETRIQ 50 MG 24 hr tablet   Used for:  OAB (overactive bladder)   Generic drug:  mirabegron        Take one (1) tablet BY MOUTH daily   Quantity:  31 tablet   Refills:  0       nystatin cream   Commonly known as:  MYCOSTATIN   Used for:  Candidiasis of skin and nails        APPLY TO AFFECTED AREA 2 TIMES DAILY  AS NEEDED   Quantity:  30 g   Refills:  2       olopatadine 0.1 % ophthalmic solution   Commonly known as:  PATANOL   Used for:  Allergic conjunctivitis, unspecified laterality        INSTILL 1 DROP INTO BOTH EYES 2 TIMES DAILY   Quantity:  3 Bottle   Refills:  3       ondansetron 4 MG tablet   Commonly known as:  ZOFRAN   Used for:  Nausea        TAKE 1 TABLET BY MOUTH EVERY 8 HOURS AS NEEDED FOR NAUSEA AND VOMITING   Quantity:  30 tablet   Refills:  2       order for DME   Used for:  Primary osteoarthritis involving multiple joints        Equipment being ordered: neoprene knee sleeve   Quantity:  1 Device   Refills:  0       pantoprazole 40 MG EC tablet   Commonly known as:  PROTONIX   Used for:  Gastroesophageal reflux disease, esophagitis presence not specified        TAKE 1 TABLET DAILY   Quantity:  90 tablet   Refills:  1       PRESERVISION AREDS Tabs   Used for:  Health care maintenance        TAKE 1 TABLET BY MOUTH 2 TIMES DAILY   Quantity:  120 tablet   Refills:  5       senna-docusate 8.6-50 MG per tablet   Commonly known as:  SENOKOT-S;PERICOLACE        Dose:  1 tablet   Take 1 tablet by mouth 2 times daily   Refills:  0       sodium fluoride dental gel 1.1 % Gel topical gel   Commonly known as:  PREVIDENT        Dose:  1 applicator   Apply 1 applicator to affected area At Bedtime   Refills:  0       spironolactone 25 MG tablet   Commonly known as:  ALDACTONE   Used for:  Left heart failure (H)        Dose:  12.5 mg   Take 0.5 tablets (12.5 mg) by mouth daily   Quantity:  45 tablet   Refills:  3       vitamin D3 2000 UNITS Caps   Used for:  Osteoporosis        Dose:  2000 Units   Take 2,000 Units by mouth daily   Quantity:  90 capsule   Refills:  3         STOP taking     atorvastatin 40 MG tablet   Commonly known as:  LIPITOR                Where to get your medicines      Some of these will need a paper prescription and others can be bought over the counter. Ask your nurse if you have questions.     You  don't need a prescription for these medications     acetaminophen 500 MG tablet    aspirin 81 MG chewable tablet    clopidogrel 75 MG tablet    saline nasal Gel topical gel                Protect others around you: Learn how to safely use, store and throw away your medicines at www.disposemymeds.org.             Medication List: This is a list of all your medications and when to take them. Check marks below indicate your daily home schedule. Keep this list as a reference.      Medications           Morning Afternoon Evening Bedtime As Needed    * ACETAMINOPHEN PO   Take 1,000 mg by mouth daily as needed for pain IN ADDITION TO BID DOSE   Last time this was given:  650 mg on 10/10/2017  8:57 PM                                * acetaminophen 500 MG tablet   Commonly known as:  TYLENOL   Take 2 tablets (1,000 mg) by mouth 2 times daily as needed for mild pain As needed   Last time this was given:  650 mg on 10/10/2017  8:57 PM                                AMOXICILLIN PO   Take 500 mg by mouth as needed (GIVE ONE HOUR PRIOIR TO DENTAL APTS FOR ISCHEMIC HEART DISEASE) Give 4 tablets as needed                                artificial saliva Soln solution   Swish and spit 5 mLs in mouth as needed for dry mouth                                aspirin 81 MG chewable tablet   Take 1 tablet (81 mg) by mouth daily   Start taking on:  10/23/2017                                calcium carbonate 500 MG chewable tablet   Commonly known as:  TUMS   Take 2 chew tab by mouth 2 times daily                                * carbidopa-levodopa  MG per tablet   Commonly known as:  SINEMET   Take 2 tablets by mouth every morning   Last time this was given:  1 tablet on 10/12/2017  7:50 AM                                * carbidopa-levodopa  MG per tablet   Commonly known as:  SINEMET   TAKE 1 TABLET THREE TIMES A DAY   Last time this was given:  1 tablet on 10/12/2017  7:50 AM                                chlorhexidine 0.12  % solution   Commonly known as:  PERIDEX   Swish and spit 15 mLs in mouth 2 times daily                                clopidogrel 75 MG tablet   Commonly known as:  PLAVIX   Take 1 tablet (75 mg) by mouth daily   Start taking on:  10/23/2017                                furosemide 20 MG tablet   Commonly known as:  LASIX   Take 1 tablet (20 mg) by mouth as needed                                gabapentin 100 MG capsule   Commonly known as:  NEURONTIN   TAKE 1 CAPSULE TWICE A DAY   Last time this was given:  100 mg on 10/12/2017  7:51 AM                                guaiFENesin-codeine 100-10 MG/5ML Soln solution   Commonly known as:  guaiFENesin AC   TAKE 2 TEASPOONFULS (10ML) BY MOUTH EVERY 4 HOURS AS NEEDED FOR COUGH                                ketoconazole 2 % shampoo   Commonly known as:  NIZORAL   Wash hair, let set for 5 minutes, rinse 1 time per day every Sunday                                Lanolin 30 % Oint   Commonly known as:  CORONA MULTI-PURPOSE   Externally apply 1 Application topically 2 times daily                                lidocaine 5 % Patch   Commonly known as:  LIDODERM   APPLY UP TO 3 PATCHES TO PAINFUL AREA AT ONCE FOR UP TO 12 HOURS WITHIN A 24 HOURS PERIOD. REMOVE AFTER 12 HOURS   Last time this was given:  1 patch on 10/12/2017  7:51 AM                                loratadine 10 MG tablet   Commonly known as:  ALLERGY RELIEF   Take 1 tablet (10 mg) by mouth daily                                metoprolol 25 MG tablet   Commonly known as:  LOPRESSOR   Take 0.5 tablets (12.5 mg) by mouth 2 times daily   Last time this was given:  12.5 mg on 10/12/2017  7:50 AM                                MYRBETRIQ 50 MG 24 hr tablet   Take one (1) tablet BY MOUTH daily   Generic drug:  mirabegron                                nystatin cream   Commonly known as:  MYCOSTATIN   APPLY TO AFFECTED AREA 2 TIMES DAILY AS NEEDED   Last time this was given:  10/12/2017  9:08 AM                                 olopatadine 0.1 % ophthalmic solution   Commonly known as:  PATANOL   INSTILL 1 DROP INTO BOTH EYES 2 TIMES DAILY   Last time this was given:  1 drop on 10/12/2017  7:53 AM                                ondansetron 4 MG tablet   Commonly known as:  ZOFRAN   TAKE 1 TABLET BY MOUTH EVERY 8 HOURS AS NEEDED FOR NAUSEA AND VOMITING                                order for DME   Equipment being ordered: neoprene knee sleeve                                pantoprazole 40 MG EC tablet   Commonly known as:  PROTONIX   TAKE 1 TABLET DAILY   Last time this was given:  40 mg on 10/12/2017  7:51 AM                                polyethylene glycol 0.4%- propylene glycol 0.3% 0.4-0.3 % Soln ophthalmic solution   Commonly known as:  SYSTANE   Place 2 drops into both eyes 4 times daily as needed for dry eyes                                polyethylene glycol powder   Commonly known as:  MIRALAX   Take 17 g by mouth daily                                PRESERVISION AREDS Tabs   TAKE 1 TABLET BY MOUTH 2 TIMES DAILY                                saline nasal Gel topical gel   Apply into each naris At Bedtime                                senna-docusate 8.6-50 MG per tablet   Commonly known as:  SENOKOT-S;PERICOLACE   Take 1 tablet by mouth 2 times daily                                sodium fluoride dental gel 1.1 % Gel topical gel   Commonly known as:  PREVIDENT   Apply 1 applicator to affected area At Bedtime                                spironolactone 25 MG tablet   Commonly known as:  ALDACTONE   Take 0.5 tablets (12.5 mg) by mouth daily   Last time this was given:  12.5 mg on 10/12/2017  9:08 AM                                vitamin D3 2000 UNITS Caps   Take 2,000 Units by mouth daily                                * Notice:  This list has 4 medication(s) that are the same as other medications prescribed for you. Read the directions carefully, and ask your doctor or other care provider to review them with you.

## 2017-10-07 NOTE — IP AVS SNAPSHOT
[facility]    [address] [address]    Phone:  [phone]    Fax:  [phone]                                       After Visit Summary   10/7/2017    Britney Schaeffer    MRN: [id number]           After Visit Summary Signature Page     I have received my discharge instructions, and my questions have been answered. I have discussed any challenges I see with this plan with the nurse or doctor.    ..........................................................................................................................................  Patient/Patient Representative Signature      ..........................................................................................................................................  Patient Representative Print Name and Relationship to Patient    ..................................................               ................................................  Date                                            Time    ..........................................................................................................................................  Reviewed by Signature/Title    ...................................................              ..............................................  Date                                                            Time

## 2017-10-08 LAB
ANION GAP SERPL CALCULATED.3IONS-SCNC: 13 MMOL/L (ref 3–14)
BASOPHILS # BLD AUTO: 0 10E9/L (ref 0–0.2)
BASOPHILS NFR BLD AUTO: 0.3 %
BUN SERPL-MCNC: 21 MG/DL (ref 7–30)
CALCIUM SERPL-MCNC: 8.6 MG/DL (ref 8.5–10.1)
CHLORIDE SERPL-SCNC: 109 MMOL/L (ref 94–109)
CO2 SERPL-SCNC: 22 MMOL/L (ref 20–32)
CREAT SERPL-MCNC: 0.6 MG/DL (ref 0.52–1.04)
DIFFERENTIAL METHOD BLD: ABNORMAL
EOSINOPHIL # BLD AUTO: 0 10E9/L (ref 0–0.7)
EOSINOPHIL NFR BLD AUTO: 0 %
ERYTHROCYTE [DISTWIDTH] IN BLOOD BY AUTOMATED COUNT: 13.8 % (ref 10–15)
GFR SERPL CREATININE-BSD FRML MDRD: >90 ML/MIN/1.7M2
GLUCOSE SERPL-MCNC: 120 MG/DL (ref 70–99)
HCT VFR BLD AUTO: 38.4 % (ref 35–47)
HGB BLD-MCNC: 13.1 G/DL (ref 11.7–15.7)
IMM GRANULOCYTES # BLD: 0 10E9/L (ref 0–0.4)
IMM GRANULOCYTES NFR BLD: 0.2 %
LYMPHOCYTES # BLD AUTO: 0.7 10E9/L (ref 0.8–5.3)
LYMPHOCYTES NFR BLD AUTO: 4.5 %
MCH RBC QN AUTO: 30.6 PG (ref 26.5–33)
MCHC RBC AUTO-ENTMCNC: 34.1 G/DL (ref 31.5–36.5)
MCV RBC AUTO: 90 FL (ref 78–100)
MONOCYTES # BLD AUTO: 1.1 10E9/L (ref 0–1.3)
MONOCYTES NFR BLD AUTO: 7.2 %
NEUTROPHILS # BLD AUTO: 13.5 10E9/L (ref 1.6–8.3)
NEUTROPHILS NFR BLD AUTO: 87.8 %
NRBC # BLD AUTO: 0 10*3/UL
NRBC BLD AUTO-RTO: 0 /100
PLATELET # BLD AUTO: 135 10E9/L (ref 150–450)
POTASSIUM SERPL-SCNC: 3.4 MMOL/L (ref 3.4–5.3)
RBC # BLD AUTO: 4.28 10E12/L (ref 3.8–5.2)
SODIUM SERPL-SCNC: 144 MMOL/L (ref 133–144)
WBC # BLD AUTO: 15.4 10E9/L (ref 4–11)

## 2017-10-08 PROCEDURE — 25000125 ZZHC RX 250: Performed by: HOSPITALIST

## 2017-10-08 PROCEDURE — 36415 COLL VENOUS BLD VENIPUNCTURE: CPT | Performed by: HOSPITALIST

## 2017-10-08 PROCEDURE — 25000132 ZZH RX MED GY IP 250 OP 250 PS 637: Mod: GY | Performed by: HOSPITALIST

## 2017-10-08 PROCEDURE — 25000125 ZZHC RX 250

## 2017-10-08 PROCEDURE — A9270 NON-COVERED ITEM OR SERVICE: HCPCS | Mod: GY | Performed by: HOSPITALIST

## 2017-10-08 PROCEDURE — 25000128 H RX IP 250 OP 636: Performed by: HOSPITALIST

## 2017-10-08 PROCEDURE — 99232 SBSQ HOSP IP/OBS MODERATE 35: CPT | Performed by: INTERNAL MEDICINE

## 2017-10-08 PROCEDURE — A9270 NON-COVERED ITEM OR SERVICE: HCPCS | Mod: GY | Performed by: INTERNAL MEDICINE

## 2017-10-08 PROCEDURE — 85025 COMPLETE CBC W/AUTO DIFF WBC: CPT | Performed by: HOSPITALIST

## 2017-10-08 PROCEDURE — 25000128 H RX IP 250 OP 636

## 2017-10-08 PROCEDURE — 80048 BASIC METABOLIC PNL TOTAL CA: CPT | Performed by: HOSPITALIST

## 2017-10-08 PROCEDURE — 25000132 ZZH RX MED GY IP 250 OP 250 PS 637: Mod: GY | Performed by: INTERNAL MEDICINE

## 2017-10-08 PROCEDURE — 12000000 ZZH R&B MED SURG/OB

## 2017-10-08 RX ORDER — ACETAMINOPHEN 650 MG/1
650 SUPPOSITORY RECTAL EVERY 4 HOURS PRN
Status: DISCONTINUED | OUTPATIENT
Start: 2017-10-08 | End: 2017-10-12 | Stop reason: HOSPADM

## 2017-10-08 RX ORDER — ACETAMINOPHEN 325 MG/1
650 TABLET ORAL EVERY 4 HOURS PRN
Status: DISCONTINUED | OUTPATIENT
Start: 2017-10-08 | End: 2017-10-12 | Stop reason: HOSPADM

## 2017-10-08 RX ORDER — CARBIDOPA AND LEVODOPA 25; 100 MG/1; MG/1
2 TABLET ORAL EVERY MORNING
COMMUNITY
End: 2017-11-13

## 2017-10-08 RX ADMIN — METRONIDAZOLE 500 MG: 500 INJECTION, SOLUTION INTRAVENOUS at 17:32

## 2017-10-08 RX ADMIN — CARBIDOPA AND LEVODOPA 1 TABLET: 25; 100 TABLET ORAL at 09:45

## 2017-10-08 RX ADMIN — GABAPENTIN 100 MG: 100 CAPSULE ORAL at 20:15

## 2017-10-08 RX ADMIN — OLOPATADINE HYDROCHLORIDE 1 DROP: 1 SOLUTION/ DROPS OPHTHALMIC at 13:05

## 2017-10-08 RX ADMIN — ONDANSETRON 4 MG: 2 INJECTION INTRAMUSCULAR; INTRAVENOUS at 14:47

## 2017-10-08 RX ADMIN — CIPROFLOXACIN 400 MG: 2 INJECTION, SOLUTION INTRAVENOUS at 01:35

## 2017-10-08 RX ADMIN — METRONIDAZOLE 500 MG: 500 INJECTION, SOLUTION INTRAVENOUS at 00:09

## 2017-10-08 RX ADMIN — METOPROLOL TARTRATE 12.5 MG: 25 TABLET, FILM COATED ORAL at 20:15

## 2017-10-08 RX ADMIN — GABAPENTIN 100 MG: 100 CAPSULE ORAL at 09:45

## 2017-10-08 RX ADMIN — CARBIDOPA AND LEVODOPA 1 TABLET: 25; 100 TABLET ORAL at 17:32

## 2017-10-08 RX ADMIN — OLOPATADINE HYDROCHLORIDE 1 DROP: 1 SOLUTION/ DROPS OPHTHALMIC at 20:23

## 2017-10-08 RX ADMIN — SODIUM CHLORIDE: 9 INJECTION, SOLUTION INTRAVENOUS at 14:04

## 2017-10-08 RX ADMIN — ACETAMINOPHEN 650 MG: 325 TABLET, FILM COATED ORAL at 20:15

## 2017-10-08 RX ADMIN — CARBIDOPA AND LEVODOPA 1 TABLET: 25; 100 TABLET ORAL at 21:39

## 2017-10-08 RX ADMIN — METRONIDAZOLE 500 MG: 500 INJECTION, SOLUTION INTRAVENOUS at 06:36

## 2017-10-08 RX ADMIN — CIPROFLOXACIN 400 MG: 2 INJECTION, SOLUTION INTRAVENOUS at 14:04

## 2017-10-08 RX ADMIN — NYSTATIN: 100000 CREAM TOPICAL at 11:04

## 2017-10-08 RX ADMIN — PANTOPRAZOLE SODIUM 40 MG: 40 INJECTION, POWDER, FOR SOLUTION INTRAVENOUS at 06:36

## 2017-10-08 RX ADMIN — HYDROMORPHONE HYDROCHLORIDE 0.5 MG: 1 INJECTION, SOLUTION INTRAMUSCULAR; INTRAVENOUS; SUBCUTANEOUS at 20:34

## 2017-10-08 RX ADMIN — CARBIDOPA AND LEVODOPA 1 TABLET: 25; 100 TABLET ORAL at 13:05

## 2017-10-08 RX ADMIN — METRONIDAZOLE 500 MG: 500 INJECTION, SOLUTION INTRAVENOUS at 12:47

## 2017-10-08 RX ADMIN — METOPROLOL TARTRATE 12.5 MG: 25 TABLET, FILM COATED ORAL at 09:45

## 2017-10-08 RX ADMIN — NYSTATIN: 100000 CREAM TOPICAL at 21:41

## 2017-10-08 NOTE — PROGRESS NOTES
Spoke with Britney this morning. She is pleasant, alert and oriented to self, she asked twice where she was now, but knew she normally lives at [facility]. Remembered having abdominal discomfort last night and thinks she is feeling better today. Further assessment to be deferred to daughter and [facility] staff.

## 2017-10-08 NOTE — PLAN OF CARE
"Problem: Patient Care Overview  Goal: Plan of Care/Patient Progress Review  Outcome: No Change    10/08/17 0556   OTHER   Plan Of Care Reviewed With patient   Plan of Care Review   Progress no change         Problem: Fluid Volume Deficit (Adult)  Goal: Identify Related Risk Factors and Signs and Symptoms  Related risk factors and signs and symptoms are identified upon initiation of Human Response Clinical Practice Guideline (CPG).   Outcome: No Change    10/08/17 0940   Fluid Volume Deficit   Related Risk Factors (Fluid Volume Deficit) advanced age;vomiting/diarrhea   Signs and Symptoms (Fluid Volume Deficit) mucous membranes dry;thirst       Goal: Optimal Fluid Balance  Patient will demonstrate the desired outcomes by discharge/transition of care.   Outcome: Improving    10/08/17 0556   Fluid Volume Deficit (Adult)   Optimal Fluid Balance making progress toward outcome      Most recent vitals: /59  Pulse 69  Temp 99.7  F (37.6  C) (Tympanic)  Resp 16  Ht 1.575 m (5' 2\")  Wt 57 kg (125 lb 10.6 oz)  SpO2 92%  BMI 22.98 kg/m2     Pain Management:  Complains of abdominal discomfort. Non pharm measures taken     Orientation:  Alert, disoriented to time, place, situation     Cardiac:  WDL     Respiratory:  Lung sounds clear, room air     GI:  Complains of nausea later in shift, PRN zofran given. Advanced to clear liquid diet this afternoon, poor appetite. Loose stools this shift. Bowel sounds active and audible.     :  Incontinent of urine     Skin Issues:  Mariah area red, inflamed looking. Nystatin cream applied. Buttock area with blanchable redness, barrier cream applied. Scattered bruising and scarring.     IVF:  NS at 100ml/hr     ABX:  Cipro and Flagyl     Mobility:  Q2 hour turns in bed. Pt requires assist of 1-2     Safety:  Sometimes uses call light. Alarms active and audible        10/8/2017  3:44 PM  Sandie Flowers     Face to face report given with opportunity to observe patient.     Report given to " Rosaura Flowers   10/8/2017  3:48 PM

## 2017-10-08 NOTE — H&P
[facility]    History and Physical  Hospitalist       Date of Admission:  10/7/2017    Assessment & Plan   Britney Schaeffer is a 87 year old female History of hypertension, Parkinson's disease, TIA, CAD  Was sent to the ED with a complaint of abdominal pain, nausea vomiting and diarrhea was found to have colitis with a possible mass in the descending colon. Her expected length of stay will be > 2 midnights. Her issues are the following:     Colitis: In the differential diagnoses I would include infectious colitis, ischemic colitis and malignancy.  There is a suspicion of mass in the descending colon highly suspicious for malignancy.  The patient is covered with ciprofloxacin and Flagyl.  C. Difficile is negative.  Stool cultures have been sent.  I will keep the patient nothing by mouth for now as she is still having abdominal pain.  She will probably need a colonoscopy with biopsy of the inflamed part once infection is ruled out.  At the moment I will hold aspirin and Plavix due to bloody stools.  Hemoglobin is relatively stable.  I will recheck hemoglobin in the morning.    Parkinson's disease: Continue Sinemet.    Hypertension: continue on Lopressor.  Monitor blood pressure closely.  The patient is nothing by mouth    Hyperlipidemia: Continue statins but hold for now as the patient is nothing by mouth.    Coronary artery disease: Hold aspirin and Plavix due to increased risk of bleeding.  Continue on beta blockers.    GERD: Continue on IV Protonixduring inpatient stay.    History of CVA: Holding aspirin and Plavix    DVT Prophylaxis: Pneumatic Compression Devices  Code Status: DNR / DNI    Disposition: Expected discharge in 2-3 days once abdominal pain and colitis resolves.     Sally Mabry    Primary Care Physician   Mario Fofana    Chief Complaint   Abdominal pain, Nausea, vomiting and diarrhea    History is obtained from the patient and medical records    History of Present Illness   Britney PATEL  Maksim is a 87 year old female History of hypertension, Parkinson's disease, TIA, CAD  Was sent to the ED with a complaint of abdominal pain, nausea vomiting and diarrhea.  The patient had been complaining about constipation and abdominal pain for couple of days received some extra stool softeners had a large bowel movement this morning followed by nausea vomiting and diarrhea.  The patient had diffuse abdominal pain, crampy to sharp in nature.  Also specks of blood were noted in the stools.  There were no complaints of fevers or chills.  The patient probably had received some antibiotics during the last 2 months.I'm not sure whether she had recent weight loss.  The patient was given pain medications and at the time of history taking she is feeling much better with improvement in abdominal pain.  Still feels a bit nauseous.  Denies chest pain or shortness of breath.  No urinary complaints.  No complaint of significant cough.        Past Medical History    I have reviewed this patient's medical history and updated it with pertinent information if needed.   Past Medical History:   Diagnosis Date     Allergic rhinitis, Seasonal 06/19/2000     Colonic polyps 09/28/2000     Corns and callosities 01/20/2004     dyslipidemia 09/28/2000     Fibrocystic Breast Disease 03/01/2011     GERD 03/01/2011     hemorrhoids 03/01/2011     hypertension, benign 11/22/1999     Insomnia, unspecified 02/05/2004     Osteoarthritis, generalized 11/07/2008     Osteoporosis, unspecified 04/20/2009     Polio 1931     Scoliosis (and kyphoscoliosis), idiopathic 03/01/2011     TIA 03/09/2005     Unspecified asthma(493.90) 03/11/2003       Past Surgical History   I have reviewed this patient's surgical history and updated it with pertinent information if needed.  Past Surgical History:   Procedure Laterality Date     BREAST BIOPSY, RT/LT       BUNIONECTOMY      Bunion     COLONOSCOPY  2007     GENITOURINARY SURGERY      botox     JOINT  REPLACEMENT      LT     JOINT REPLACEMENT, HIP RT/LT  2010     KERATOPLASTY PENETRATING, VITRECTOMY ANTERIOR, EXTRACAPSULAR CATARACT EXTRACTION, COMBINED  2010    LT, Cataracts       Prior to Admission Medications   Prior to Admission Medications   Prescriptions Last Dose Informant Patient Reported? Taking?   AMOXICILLIN PO   Yes Yes   Sig: Take 500 mg by mouth Give 4 tablets as needed   Cholecalciferol (VITAMIN D3) 2000 UNITS CAPS   No Yes   Sig: Take 2,000 Units by mouth daily   Lanolin (CORONA MULTI-PURPOSE) 30 % OINT   No Yes   Sig: Externally apply 1 Application topically 2 times daily   MYRBETRIQ 50 MG 24 hr tablet   No Yes   Sig: Take one (1) tablet BY MOUTH daily   Multiple Vitamins-Minerals (PRESERVISION AREDS) TABS   No No   Sig: TAKE 1 TABLET BY MOUTH 2 TIMES DAILY   acetaminophen (TYLENOL) 500 MG tablet   No Yes   Sig: Take 2 tablets (1,000 mg) by mouth 2 times daily as needed for mild pain As needed   Patient taking differently: Take 1,000 mg by mouth 2 times daily And 2 tabs PRN one time a day   artificial saliva, BIOTENE MT, (BIOTENE MT) SOLN   No Yes   Sig: Swish and spit 5 mLs in mouth as needed for dry mouth   aspirin 81 MG chewable tablet   No Yes   Sig: Take 1 tablet (81 mg) by mouth daily   atorvastatin (LIPITOR) 40 MG tablet   No Yes   Sig: Take 1 tablet (40 mg) by mouth daily   calcium carbonate (TUMS) 500 MG chewable tablet   Yes Yes   Sig: Take 2 chew tab by mouth 2 times daily   carbidopa-levodopa (SINEMET)  MG per tablet   No Yes   Sig: TAKE 1 TABLET THREE TIMES A DAY   Patient taking differently: TAKE 1 TABLET THREE TIMES A DAY at 1200,1600 and 2000 and 1 tablet at 0800   carbidopa-levodopa (SINEMET)  MG per tablet   Yes Yes   Sig: TAKE 1 TABLET THREE TIMES A DAY   chlorhexidine (PERIDEX) 0.12 % solution   Yes Yes   Sig: Swish and spit 15 mLs in mouth 2 times daily   clopidogrel (PLAVIX) 75 MG tablet 10/7/2017 at Unknown time  No Yes   Sig: TAKE 1 TABLET DAILY   furosemide  (LASIX) 20 MG tablet   No Yes   Sig: Take 1 tablet (20 mg) by mouth as needed   gabapentin (NEURONTIN) 100 MG capsule   No Yes   Sig: TAKE 1 CAPSULE TWICE A DAY   guaiFENesin-codeine (GUAIFENESIN AC) 100-10 MG/5ML SOLN   No Yes   Sig: TAKE 2 TEASPOONFULS (10ML) BY MOUTH EVERY 4 HOURS AS NEEDED FOR COUGH   ketoconazole (NIZORAL) 2 % shampoo Unknown at Unknown time  No No   Sig: Wash hair, let set for 5 minutes, rinse 1 time per day every Sunday   lidocaine (LIDODERM) 5 % Patch   No Yes   Sig: APPLY UP TO 3 PATCHES TO PAINFUL AREA AT ONCE FOR UP TO 12 HOURS WITHIN A 24 HOURS PERIOD. REMOVE AFTER 12 HOURS   Patient taking differently: Place 1 patch onto the skin Apply BID at 0800 and 2000   loratadine (ALLERGY RELIEF) 10 MG tablet   No Yes   Sig: Take 1 tablet (10 mg) by mouth daily   metoprolol (LOPRESSOR) 25 MG tablet   No Yes   Sig: Take 0.5 tablets (12.5 mg) by mouth 2 times daily   nystatin (MYCOSTATIN) cream Unknown at Unknown time  No No   Sig: APPLY TO AFFECTED AREA 2 TIMES DAILY AS NEEDED   olopatadine (PATANOL) 0.1 % ophthalmic solution   No Yes   Sig: INSTILL 1 DROP INTO BOTH EYES 2 TIMES DAILY   ondansetron (ZOFRAN) 4 MG tablet   No Yes   Sig: TAKE 1 TABLET BY MOUTH EVERY 8 HOURS AS NEEDED FOR NAUSEA AND VOMITING   order for DME   No Yes   Sig: Equipment being ordered: neoprene knee sleeve   pantoprazole (PROTONIX) 40 MG EC tablet   No Yes   Sig: TAKE 1 TABLET DAILY   polyethylene glycol (MIRALAX) powder   No Yes   Sig: Take 17 g by mouth daily   Patient taking differently: Take 17 g by mouth 2 times daily    polyethylene glycol 0.4%- propylene glycol 0.3% (SYSTANE) 0.4-0.3 % SOLN ophthalmic solution   No Yes   Sig: Place 2 drops into both eyes 4 times daily as needed for dry eyes   Patient taking differently: Place 2 drops into both eyes daily    saline nasal (AYR SALINE) GEL topical gel   Yes Yes   Sig: Apply into each nare At Bedtime   senna-docusate (SENOKOT-S;PERICOLACE) 8.6-50 MG per tablet   Yes Yes    Sig: Take 1 tablet by mouth 2 times daily   sodium fluoride dental gel (PREVIDENT) 1.1 % GEL topical gel Unknown at Unknown time  Yes No   Sig: Apply 1 applicator to affected area At Bedtime   spironolactone (ALDACTONE) 25 MG tablet   No Yes   Sig: Take 0.5 tablets (12.5 mg) by mouth daily      Facility-Administered Medications: None     Allergies   Allergies   Allergen Reactions     Ambien      Zolpidem Tartrate     Tramadol        Social History   I have reviewed this patient's social history and updated it with pertinent information if needed. Britney Schaeffer  reports that she is a non-smoker but has been exposed to tobacco smoke. She has never used smokeless tobacco. She reports that she drinks alcohol. She reports that she does not use illicit drugs.    Family History   I have reviewed this patient's family history and updated it with pertinent information if needed.   Family History   Problem Relation Age of Onset     CEREBROVASCULAR DISEASE Mother      CVA (cause of death)     CEREBROVASCULAR DISEASE Father      CVA     Asthma No family hx of        Review of Systems   GENERAL/CONSTITUTIONAL: The patient denies fever,weight gain or weight loss.  HEAD, EYES, EARS, NOSE AND THROAT: Eyes - The patient denies pain, redness, loss of vision,,Ears, nose, mouth and throat. The patient denies ringing in the ears, loss of hearing,   CARDIOVASCULAR: The patient denies chest pain, irregular heartbeats, palpitation, shortness of breath, orthopnea or PND  RESPIRATORY: The patient denies chronic dry cough, Hemoptysis,  repeated pneumonias, wheezing or night sweats.  GASTROINTESTINAL: as per HPI otherwise negative  GENITOURINARY: The patient denies difficult urination, pain or burning with urination, blood in the urine,  MUSCULOSKELETAL: The patient denies muscle cramps.   SKIN : The patient denies easy bruising, skin redness, skin rash,   NEUROLOGIC: the patient has Parkinson's disease due to which she has rigidity,  tremors and bradykinesia.  PSYCHIATRIC: The patient denies depression with thoughts of suicide,  ENDOCRINE: The patient denies intolerance to hot or cold temperature,  HEMATOLOGIC/LYMPHATIC: reported blood in stool  ALLERGIC/IMMUNOLOGIC: The patient denies rhinitis, skin sensitivity,    Physical Exam   Temp: 97.5  F (36.4  C) Temp src: Oral BP: 169/84 Pulse: 69 Heart Rate: 91 Resp: 16 SpO2: 97 % O2 Device: None (Room air)    Vital Signs with Ranges  Temp:  [97.5  F (36.4  C)] 97.5  F (36.4  C)  Pulse:  [69] 69  Heart Rate:  [66-96] 91  Resp:  [16] 16  BP: (127-176)/(54-85) 169/84  SpO2:  [93 %-98 %] 97 %  0 lbs 0 oz  GENERAL APPEARANCE: he said is an elderly lean built white female who has resting tremors otherwise in no acute distress  HEENT: Normocephalic and atraumatic. No scleral icterus.PERRLA. No conjunctival injection is noted. No oropharyngeal lesions.  NECK: Supple. Trachea is midline. No evidence of thyroid enlargement. No lymphadenopathy or tenderness.  CHEST: Symmetric. Nontender to palpation.  LUNGS: Breath sounds are equal and clear bilaterally. No wheezes, rhonchi, or rales.  HEART: Regular rate and rhythm with normal S1 and S2. No murmurs, gallops, or rubs.  ABDOMEN: Soft, flat,diffusely tender, bowel sounds are normal  RECTAL: Deferred  EXTREMITIES: No cyanosis, clubbing, or edema.  NEUROLOGIC: resting tremors, rigidity and bradykinesia  PSYCHIATRIC: flat affect due to Parkinson's  SKIN: Warm, dry, and well perfused. Good turgor. No lesions, nodules or rashes are noted.       Data   Data reviewed today:  I personally reviewed CT scan report    Recent Labs  Lab 10/07/17  1516   WBC 11.9*   HGB 13.5   MCV 93         POTASSIUM 3.7   CHLORIDE 108   CO2 27   BUN 24   CR 0.68   ANIONGAP 6   BAKARI 9.2   *   ALBUMIN 3.8   PROTTOTAL 7.2   BILITOTAL 0.5   ALKPHOS 55   ALT 9   AST 17   LIPASE 199       Recent Results (from the past 24 hour(s))   XR Abdomen 2 Views    Narrative    EXAM:XR  ABDOMEN 2 VW     CLINICAL HISTORY: Patient Age  87 years Additional clinical info:  n/v/d, r/o obstruction    COMPARISON: 7/2/2015      TECHNIQUE: 2 views      Impression    IMPRESSION: Gas in normal caliber loops of large and small bowel. No  free air. Very large esophageal hiatal hernia. Gas is seen in the  rectum. Elevated right hemidiaphragm. Right KE. Implanted electrode  with electrode pack projecting over the left lower quadrant. Vascular  calcifications.    LUCHO LUJAN MD   CT Abdomen Pelvis w Contrast    Narrative    EXAMINATION: CT ABDOMEN PELVIS W CONTRAST, 10/7/2017 7:35 PM    HISTORY: r/o obstruction GI bleed.    COMPARISON: 8/9/2016    TECHNIQUE:  Helical CT images from the lung bases through the  symphysis pubis were obtained with IV contrast. Contrast dose: isovue  300, 100 ml    FINDINGS:    Pancreatico hepatobiliary: 1.3 cm peripherally enhancing area in the  inferior right hepatic lobe, adjacent to the gallbladder fossa. This  is best seen on axial series 2 images 41 through 5. There is mild  associated bile duct dilatation associated with this. This was present  on the ninth 2016 exam but is slightly more conspicuous today due to  differences in the phase of contrast. It likely represents transient  perfusion anomaly.    Gallbladder is contracted. Bile ducts are slightly prominent, likely  normal for age.. Spleen appears normal.    Genitourinary:  Areas of cortical scarring in both kidneys. 2 small,  approximately 4 mm nonobstructing stones in lower pole of the left  kidney. The adrenal glands, kidneys, ureters, and bladder otherwise  appear normal. Bladder is obscured by metallic artifact from a right  KE. Uterus and adnexa appear grossly normal.    Gastrointestinal:  Huge hiatal hernia. Most of the stomach is  herniated above the diaphragm on the left posteriorly. The upper  portion of the stomach is incompletely visualized.    Mild thickening and enhancement of the wall colon extending  from the  descending colon down to the sigmoid colon. The wall of the sigmoid  colon appears more irregular but there is a more focal area of  thickening and irregularity of the upper to midportion of the sigmoid,  measuring approximately 3 cm in length, best seen on axial series 2  images 102 through 105. The findings of the colon are suspicious for  colitis but this focal area of thickening of the sigmoid could be a  mass. There is a small amount of high density material in the lower  sigmoid near the rectosigmoid junction best seen on axial series 2  image 112. This could represent acute blood.    There are several loops of small bowel in the right lower quadrant  which demonstrates soft tissue stranding around the suspicious for  enteritis.    No evidence of obstruction. No fluid collection identified and no free  air identified    Lymph nodes:  No enlarged lymph nodes are identified but there is mild  mesenteric stranding in the mesentery in the right lower quadrant  surrounding several loops of small bowel.    Vasculature:  Very dense vascular calcifications including coronary  artery calcifications. No aneurysm identified. Very dense  calcifications at the origin of the renal arteries.    Lung bases: Atelectasis left lower lobe around the large esophageal  hiatal hernia. No infiltrate identified. Small left pleural effusion    Musculoskeletal: Advanced degenerative arthritis throughout the spine  and pelvis. The bones are demineralized. Right KE causes artifact  that obscures detail in the pelvis.. Electrode pack embedded in the  subcutaneous fat of the left buttock stimulator wires anterior to the  mid sacrum. Benign-appearing calcification in the right breast.      Impression    IMPRESSION:   1. There is bowel wall thickening and enhancement of the descending  colon, especially the sigmoid colon, suspicious for colitis with  possible mass in the upper to mid sigmoid colon. Portions of the  sigmoid colon  and rectum are difficult to visualize due to metallic  artifact from right KE.  2. Small amount of high density material within the lumen of the lower  sigmoid and rectum, suspicious for acute hemorrhage  3. Probable focal enteritis several loops of small bowel in the right  lower quadrant  4. Other incidental findings as described.    Findings were discussed with Dr. Carlos Doll at 2015 hours on  10/7/2017    LUCHO LUJAN MD       Total time spent in patient care was 55 minutes. >  50% of time was spent in coordination of care and patient counseling.

## 2017-10-08 NOTE — PHARMACY
[facility]    Pharmacy      Antimicrobial Stewardship Note     Current antimicrobial therapy:  Anti-infectives (Future)    Start     Dose/Rate Route Frequency Ordered Stop    10/07/17 2330  ciprofloxacin (CIPRO) infusion 400 mg      400 mg  over 1 Hours Intravenous EVERY 12 HOURS 10/07/17 2320      10/07/17 2330  metroNIDAZOLE (FLAGYL) infusion 500 mg      500 mg  100 mL/hr over 60 Minutes Intravenous EVERY 6 HOURS 10/07/17 2320            Indication: Colitis     Pertinent labs:  Creatinine   Creatinine   Date Value Ref Range Status   10/08/2017 0.60 0.52 - 1.04 mg/dL Final   10/07/2017 0.68 0.52 - 1.04 mg/dL Final   07/06/2017 0.81 0.40 - 1.00 mg/dL Final     WBC   WBC   Date Value Ref Range Status   10/08/2017 15.4 (H) 4.0 - 11.0 10e9/L Final   10/07/2017 11.9 (H) 4.0 - 11.0 10e9/L Final   06/08/2017 14.2 (H) 4.0 - 11.0 10e9/L Final     ANC No components found for: ANC     Culture Results:     Stool culture SSCE [[id number]] Collected: 10/07/17 1535       Order Status: Completed Specimen: Feces Updated: 10/08/17 1329        Specimen Description Feces        Shiga-Toxins 1&2 Shiga toxin 1 NOT detected and Shiga toxin 2 NOT detected.                   Test results are to be used in conjunction with information available from the patient   clinical evaluation and other diagnostic procedures.           Culture Micro Culture in progress       Active MRSA Surveillance Culture [[id number]] Collected: 10/07/17 2349       Order Status: Completed Specimen: Swab from Nasopharyngeal Updated: 10/07/17 8759       Clostridium difficile toxin B PCR [[id number]] Collected: 10/07/17 1535       Order Status: Completed Specimen: Feces Updated: 10/07/17 1646        Specimen Description Feces        C Diff Toxin B PCR Negative         Negative: Clostridium difficile target DNA sequences NOT detected, presumed   negative for Clostridium difficile toxin B or the number of bacteria present   may be below the limit of detection  for the test.   FDA approved assay performed using [employer] real-time PCR.   A negative result does not exclude actual disease due to Clostridium difficile    and may be due to improper collection, handling and storage of the specimen   or the number of organisms in the specimen is below the detection limit of the    assay.               Recommendations/Interventions:  1. No recommendations at this time; current antimicrobial pharmacotherapy is appropriate.    Matt Trivedi [facility]  October 8, 2017

## 2017-10-08 NOTE — PROGRESS NOTES
[facility]    Hospitalist Progress Note    Date of Service (when I saw the patient): 10/08/2017    Assessment & Plan   Britney Schaeffer is a 87 year old female who was admitted on 10/7/2017.         1.  Colitis:   Patient presented to er with N/V and some diarrhea along with abdominal pain.  She had been having some constipation issues which is not uncommon  Took extra stool softener  and a big BM followed by the above symptoms.  No fevers.  WBC mildly elevated.  CT done which shows suspicious colitis wit thickened walls of the sigmoid colon ? Of mass but a lot of artifact from the KE.  Also some SB enteritis findings R lower quadrant.  Stool sent off and C diff negative. Others pending. Lives [place]  no outbreaks there.  Was put on cipro flagyl on admission.  Today is very alert  Still some mild nausea. Still few softer stools no bleeding .  Exam has some tenderness  more so on RLQ than left. No rebound no masses  BS+.  Will continue with abx for now  continue IVF.  Try some clears  If cultures negative  Might stop abx see how does.  Whether or not needs colonoscopy this hospital stay depends on how she does  .      2.  Parkinson's disease: Continue Sinemet.  Has tremor but otherwise relatively stable.3     3.  Hypertension: Continuing on Lopressor.  BP stable.  JVD is flat.     4.  Hyperlipidemia: Continue statins but hold for now as the patient is nothing by mouth.     5.  Coronary artery disease: Hold aspirin and Plavix due to increased risk of bleeding.  Continue on beta blockers .Her last stent was grater than 1 year ago in 2015.     6.  GERD: Continue on IV Protonix during inpatient stay. Has a tremendous hiatal hernia with most of her stomach in  thoracic cavity.     7.  History of CVA: Holding aspirin and Plavix. No neurological issues.      DVT Prophylaxis: Pneumatic Compression Devices  Code Status: DNR/DNI    Disposition: Expected discharge in 2-4 days once tolerating diet and no abdominal  pain.    Brent Garduno    Interval History   Patient very alert in NAD  Has some nausea, Feels better   Some softer stools still  Is very thirsty  No other complaints. Has had no fevers.  Stil tenser abdomen  More on right side. Had colonoscopy done 10 years ago normal.  Chronic constipation due to her parkinson's disease.    -Data reviewed today: I reviewed all new labs and imaging results over the last 24 hours. I personally reviewed no images or EKG's today.    Physical Exam   Temp: 98.4  F (36.9  C) Temp src: Tympanic BP: 152/59 Pulse: 69 Heart Rate: 93 Resp: 16 SpO2: 92 % O2 Device: None (Room air)    Vitals:    10/07/17 2321 10/08/17 0416   Weight: 57.4 kg (126 lb 8.7 oz) 57 kg (125 lb 10.6 oz)     Vital Signs with Ranges  Temp:  [97.5  F (36.4  C)-99.1  F (37.3  C)] 98.4  F (36.9  C)  Pulse:  [69] 69  Heart Rate:  [66-98] 93  Resp:  [12-16] 16  BP: (127-176)/(54-85) 152/59  SpO2:  [92 %-98 %] 92 %  I/O last 3 completed shifts:  In: 1789 [I.V.:1789]  Out: -     Peripheral IV 10/07/17 Left Upper forearm (Active)   Site Assessment WDL 10/8/2017  6:39 AM   Line Status Infusing;Checked every 1-2 hour 10/8/2017  6:39 AM   Phlebitis Scale 0-->no symptoms 10/8/2017  6:39 AM   Infiltration Scale 0 10/8/2017  6:39 AM   Number of days:1     No line/device    Constitutional: Alert and oriented x3. No distress    HEENT: NC/AT, PERRL, EOMI, mouth dry, neck supple, no LN.     Cardiovascular: RRR. no Murmur, no  rubs, or gallops.  JVD falt.  Bruits no.  Pulses 2=    Respiratory: Clear to auscultation bilaterally    Abdomen: Soft, not distended,  Tender to palpation more in R lower abdomen no rebound no organomegaly. Bowel sounds present    Extremities: Warm/dry. No edema    Neuro:   Some tremor of parkinson's disease Non focal, cranial nerves intact, Moves all extremities.    Medications     NaCl 100 mL/hr at 10/07/17 2340       [START ON 10/9/2017] influenza Vac Split High-Dose  0.5 mL Intramuscular Prior to discharge      carbidopa-levodopa  1 tablet Oral 4x Daily     gabapentin  100 mg Oral BID     lidocaine  1 patch Transdermal Q24H     metoprolol  12.5 mg Oral BID     nystatin   Topical BID     olopatadine  1 drop Both Eyes BID     saline nasal   Each Nare At Bedtime     pantoprazole  40 mg Intravenous QAM AC     ciprofloxacin  400 mg Intravenous Q12H     metroNIDAZOLE  500 mg Intravenous Q6H     lidocaine   Transdermal Q24h     lidocaine   Transdermal Q8H       Data     Recent Labs  Lab 10/08/17  0555 10/07/17  1516   WBC 15.4* 11.9*   HGB 13.1 13.5   MCV 90 93   * 165    141   POTASSIUM 3.4 3.7   CHLORIDE 109 108   CO2 22 27   BUN 21 24   CR 0.60 0.68   ANIONGAP 13 6   BAKARI 8.6 9.2   * 129*   ALBUMIN  --  3.8   PROTTOTAL  --  7.2   BILITOTAL  --  0.5   ALKPHOS  --  55   ALT  --  9   AST  --  17   LIPASE  --  199     Lactic Acid   Date Value Ref Range Status   12/14/2015 0.9 0.4 - 2.0 mmol/L Final   12/12/2015 1.6 0.4 - 2.0 mmol/L Final   12/12/2015 2.2 (H) 0.4 - 2.0 mmol/L Final       Recent Results (from the past 24 hour(s))   XR Abdomen 2 Views    Narrative    EXAM:XR ABDOMEN 2 VW     CLINICAL HISTORY: Patient Age  87 years Additional clinical info:  n/v/d, r/o obstruction    COMPARISON: 7/2/2015      TECHNIQUE: 2 views      Impression    IMPRESSION: Gas in normal caliber loops of large and small bowel. No  free air. Very large esophageal hiatal hernia. Gas is seen in the  rectum. Elevated right hemidiaphragm. Right KE. Implanted electrode  with electrode pack projecting over the left lower quadrant. Vascular  calcifications.    LUCHO LUJAN MD   CT Abdomen Pelvis w Contrast    Narrative    EXAMINATION: CT ABDOMEN PELVIS W CONTRAST, 10/7/2017 7:35 PM    HISTORY: r/o obstruction GI bleed.    COMPARISON: 8/9/2016    TECHNIQUE:  Helical CT images from the lung bases through the  symphysis pubis were obtained with IV contrast. Contrast dose: isovue  300, 100 ml    FINDINGS:    Pancreatico hepatobiliary: 1.3  cm peripherally enhancing area in the  inferior right hepatic lobe, adjacent to the gallbladder fossa. This  is best seen on axial series 2 images 41 through 5. There is mild  associated bile duct dilatation associated with this. This was present  on the ninth 2016 exam but is slightly more conspicuous today due to  differences in the phase of contrast. It likely represents transient  perfusion anomaly.    Gallbladder is contracted. Bile ducts are slightly prominent, likely  normal for age.. Spleen appears normal.    Genitourinary:  Areas of cortical scarring in both kidneys. 2 small,  approximately 4 mm nonobstructing stones in lower pole of the left  kidney. The adrenal glands, kidneys, ureters, and bladder otherwise  appear normal. Bladder is obscured by metallic artifact from a right  KE. Uterus and adnexa appear grossly normal.    Gastrointestinal:  Huge hiatal hernia. Most of the stomach is  herniated above the diaphragm on the left posteriorly. The upper  portion of the stomach is incompletely visualized.    Mild thickening and enhancement of the wall colon extending from the  descending colon down to the sigmoid colon. The wall of the sigmoid  colon appears more irregular but there is a more focal area of  thickening and irregularity of the upper to midportion of the sigmoid,  measuring approximately 3 cm in length, best seen on axial series 2  images 102 through 105. The findings of the colon are suspicious for  colitis but this focal area of thickening of the sigmoid could be a  mass. There is a small amount of high density material in the lower  sigmoid near the rectosigmoid junction best seen on axial series 2  image 112. This could represent acute blood.    There are several loops of small bowel in the right lower quadrant  which demonstrates soft tissue stranding around the suspicious for  enteritis.    No evidence of obstruction. No fluid collection identified and no free  air identified    Lymph  nodes:  No enlarged lymph nodes are identified but there is mild  mesenteric stranding in the mesentery in the right lower quadrant  surrounding several loops of small bowel.    Vasculature:  Very dense vascular calcifications including coronary  artery calcifications. No aneurysm identified. Very dense  calcifications at the origin of the renal arteries.    Lung bases: Atelectasis left lower lobe around the large esophageal  hiatal hernia. No infiltrate identified. Small left pleural effusion    Musculoskeletal: Advanced degenerative arthritis throughout the spine  and pelvis. The bones are demineralized. Right KE causes artifact  that obscures detail in the pelvis.. Electrode pack embedded in the  subcutaneous fat of the left buttock stimulator wires anterior to the  mid sacrum. Benign-appearing calcification in the right breast.      Impression    IMPRESSION:   1. There is bowel wall thickening and enhancement of the descending  colon, especially the sigmoid colon, suspicious for colitis with  possible mass in the upper to mid sigmoid colon. Portions of the  sigmoid colon and rectum are difficult to visualize due to metallic  artifact from right KE.  2. Small amount of high density material within the lumen of the lower  sigmoid and rectum, suspicious for acute hemorrhage  3. Probable focal enteritis several loops of small bowel in the right  lower quadrant  4. Other incidental findings as described.    Findings were discussed with Dr. Carlos Doll at 2015 hours on  10/7/2017    LUCHO LUJAN MD

## 2017-10-08 NOTE — PLAN OF CARE
[facility] Inpatient Admission Note:    Patient admitted to 3214/3214-1 at approximately 2245 via bed accompanied by transport tech from emergency room . Report received from Maddie in SBAR format at 00506 via telephone. Patient transferred to bed via slide board.. Patient is alert and oriented X 1, denies pain; rates at 0 on 0-10 scale.  Patient oriented to room, unit, hourly rounding, and plan of care. Explained admission packet and patient handbook with patient bill of rights brochure. Will continue to monitor and document as needed.     Inpatient Nursing criteria listed below was met:      Health care directives status obtained and documented: Yes      MRSA swab completed for patient 65 years and older: Yes      Patient identifies a surrogate decision maker: No Pt's daughter is POA      Core Measure diagnosis present:No.        If initial lactic acid >2.0, repeat lactic acid drawn within one hour of arrival to unit: No. If no, state reason: Lactic Not over 2      Vaccination assessment and education completed: Yes   Vaccinations received prior to admission: Pneumovax yes  Influenza(seasonal)  NO   Vaccination(s) ordered: influenza vaccine      Clergy visit ordered if patient requests: No      Skin issues/needs documented: No      Isolation Patient: yes Education given, correct sign in place and documentation row added to PCS:  Yes      Fall Prevention Yes: Care plan updated, education given and documented, sticker and magnet in place: Yes      Care Plan initiated: Yes      Education Documented (including assessment): Yes      Patient has discharge needs : No   .

## 2017-10-08 NOTE — PLAN OF CARE
Problem: Patient Care Overview  Goal: Plan of Care/Patient Progress Review  Outcome: No Change    10/08/17 0556   OTHER   Plan Of Care Reviewed With patient   Plan of Care Review   Progress no change      Pt slept most of night without any problems.  /67 and 159/68 .  Temp of 99.1 that went to 98.4 . C/o some abdominal pain in the beginning of the shift but didn't state anything after that and appeared comfortable.  Turned every 2 hours.  Buttocks is reddened but blanchable. Mariah area is very swollen and very red with some whitish discharge. Red area goes from front all the way to the back.  Incontinent of stool and urine with shift.  Mariah cares done often.  Pt has been NPO this shift.  Pt is confused but able to state her name and .  Call light within reach and by nurses station.  Frequent checks on pt.  No change to skin integrity this shift.  IV running at 100/hr and IV anbx were hung. Pt on contact isolation for ESBL.     Problem: Fluid Volume Deficit (Adult)  Goal: Optimal Fluid Balance  Patient will demonstrate the desired outcomes by discharge/transition of care.   Outcome: No Change    10/08/17 0556   Fluid Volume Deficit (Adult)   Optimal Fluid Balance making progress toward outcome         Face to face report given with opportunity to observe patient.    Report given to Sandie ADAME and Pritesh Vega   10/8/2017  7:42 AM

## 2017-10-09 ENCOUNTER — APPOINTMENT (OUTPATIENT)
Dept: GENERAL RADIOLOGY | Facility: HOSPITAL | Age: 82
DRG: 392 | End: 2017-10-09
Attending: INTERNAL MEDICINE
Payer: MEDICARE

## 2017-10-09 LAB
ANION GAP SERPL CALCULATED.3IONS-SCNC: 10 MMOL/L (ref 3–14)
BACTERIA SPEC CULT: NORMAL
BASOPHILS # BLD AUTO: 0 10E9/L (ref 0–0.2)
BASOPHILS NFR BLD AUTO: 0.5 %
BUN SERPL-MCNC: 15 MG/DL (ref 7–30)
CALCIUM SERPL-MCNC: 8 MG/DL (ref 8.5–10.1)
CHLORIDE SERPL-SCNC: 114 MMOL/L (ref 94–109)
CO2 SERPL-SCNC: 19 MMOL/L (ref 20–32)
CREAT SERPL-MCNC: 0.56 MG/DL (ref 0.52–1.04)
CRP SERPL-MCNC: 98.6 MG/L (ref 0–8)
DIFFERENTIAL METHOD BLD: ABNORMAL
EOSINOPHIL # BLD AUTO: 0.1 10E9/L (ref 0–0.7)
EOSINOPHIL NFR BLD AUTO: 1 %
ERYTHROCYTE [DISTWIDTH] IN BLOOD BY AUTOMATED COUNT: 13.8 % (ref 10–15)
GFR SERPL CREATININE-BSD FRML MDRD: >90 ML/MIN/1.7M2
GLUCOSE SERPL-MCNC: 77 MG/DL (ref 70–99)
HCT VFR BLD AUTO: 35.9 % (ref 35–47)
HGB BLD-MCNC: 11.9 G/DL (ref 11.7–15.7)
IMM GRANULOCYTES # BLD: 0 10E9/L (ref 0–0.4)
IMM GRANULOCYTES NFR BLD: 0.2 %
LYMPHOCYTES # BLD AUTO: 1.2 10E9/L (ref 0.8–5.3)
LYMPHOCYTES NFR BLD AUTO: 14.8 %
MCH RBC QN AUTO: 30.4 PG (ref 26.5–33)
MCHC RBC AUTO-ENTMCNC: 33.1 G/DL (ref 31.5–36.5)
MCV RBC AUTO: 92 FL (ref 78–100)
MONOCYTES # BLD AUTO: 0.9 10E9/L (ref 0–1.3)
MONOCYTES NFR BLD AUTO: 10.7 %
NEUTROPHILS # BLD AUTO: 5.8 10E9/L (ref 1.6–8.3)
NEUTROPHILS NFR BLD AUTO: 72.8 %
NRBC # BLD AUTO: 0 10*3/UL
NRBC BLD AUTO-RTO: 0 /100
PLATELET # BLD AUTO: 114 10E9/L (ref 150–450)
POTASSIUM SERPL-SCNC: 3.3 MMOL/L (ref 3.4–5.3)
RBC # BLD AUTO: 3.91 10E12/L (ref 3.8–5.2)
SODIUM SERPL-SCNC: 143 MMOL/L (ref 133–144)
SPECIMEN SOURCE: NORMAL
WBC # BLD AUTO: 8 10E9/L (ref 4–11)

## 2017-10-09 PROCEDURE — 25000132 ZZH RX MED GY IP 250 OP 250 PS 637: Mod: GY | Performed by: INTERNAL MEDICINE

## 2017-10-09 PROCEDURE — A9270 NON-COVERED ITEM OR SERVICE: HCPCS | Mod: GY | Performed by: INTERNAL MEDICINE

## 2017-10-09 PROCEDURE — 12000000 ZZH R&B MED SURG/OB

## 2017-10-09 PROCEDURE — 74020 XR ABDOMEN 2 VW: CPT | Mod: TC

## 2017-10-09 PROCEDURE — 90662 IIV NO PRSV INCREASED AG IM: CPT | Performed by: HOSPITALIST

## 2017-10-09 PROCEDURE — 25000128 H RX IP 250 OP 636: Performed by: HOSPITALIST

## 2017-10-09 PROCEDURE — 80048 BASIC METABOLIC PNL TOTAL CA: CPT | Performed by: INTERNAL MEDICINE

## 2017-10-09 PROCEDURE — 99232 SBSQ HOSP IP/OBS MODERATE 35: CPT | Performed by: INTERNAL MEDICINE

## 2017-10-09 PROCEDURE — 25000132 ZZH RX MED GY IP 250 OP 250 PS 637: Mod: GY | Performed by: HOSPITALIST

## 2017-10-09 PROCEDURE — A9270 NON-COVERED ITEM OR SERVICE: HCPCS | Mod: GY | Performed by: HOSPITALIST

## 2017-10-09 PROCEDURE — 86140 C-REACTIVE PROTEIN: CPT | Performed by: INTERNAL MEDICINE

## 2017-10-09 PROCEDURE — 85025 COMPLETE CBC W/AUTO DIFF WBC: CPT | Performed by: INTERNAL MEDICINE

## 2017-10-09 PROCEDURE — 36415 COLL VENOUS BLD VENIPUNCTURE: CPT | Performed by: INTERNAL MEDICINE

## 2017-10-09 PROCEDURE — 25000125 ZZHC RX 250: Performed by: HOSPITALIST

## 2017-10-09 RX ADMIN — OLOPATADINE HYDROCHLORIDE 1 DROP: 1 SOLUTION/ DROPS OPHTHALMIC at 10:04

## 2017-10-09 RX ADMIN — METRONIDAZOLE 500 MG: 500 INJECTION, SOLUTION INTRAVENOUS at 12:30

## 2017-10-09 RX ADMIN — ONDANSETRON 4 MG: 2 INJECTION INTRAMUSCULAR; INTRAVENOUS at 14:38

## 2017-10-09 RX ADMIN — SODIUM CHLORIDE: 9 INJECTION, SOLUTION INTRAVENOUS at 18:04

## 2017-10-09 RX ADMIN — INFLUENZA A VIRUSA/MICHIGAN/45/2015 X-275 (H1N1) ANTIGEN (FORMALDEHYDE INACTIVATED), INFLUENZA A VIRUS A/HONG KONG/4801/2014 X-263B (H3N2) ANTIGEN (FORMALDEHYDE INACTIVATED), AND INFLUENZA B VIRUS B/BRISBANE/60/2008 ANTIGEN (FORMALDEHYDE INACTIVATED) 0.5 ML: 60; 60; 60 INJECTION, SUSPENSION INTRAMUSCULAR at 14:39

## 2017-10-09 RX ADMIN — GABAPENTIN 100 MG: 100 CAPSULE ORAL at 20:45

## 2017-10-09 RX ADMIN — CARBIDOPA AND LEVODOPA 1 TABLET: 25; 100 TABLET ORAL at 08:50

## 2017-10-09 RX ADMIN — CIPROFLOXACIN 400 MG: 2 INJECTION, SOLUTION INTRAVENOUS at 01:47

## 2017-10-09 RX ADMIN — CARBIDOPA AND LEVODOPA 1 TABLET: 25; 100 TABLET ORAL at 14:21

## 2017-10-09 RX ADMIN — GABAPENTIN 100 MG: 100 CAPSULE ORAL at 08:50

## 2017-10-09 RX ADMIN — CARBIDOPA AND LEVODOPA 1 TABLET: 25; 100 TABLET ORAL at 20:45

## 2017-10-09 RX ADMIN — HYDROMORPHONE HYDROCHLORIDE 0.5 MG: 1 INJECTION, SOLUTION INTRAMUSCULAR; INTRAVENOUS; SUBCUTANEOUS at 14:38

## 2017-10-09 RX ADMIN — CIPROFLOXACIN 400 MG: 2 INJECTION, SOLUTION INTRAVENOUS at 14:21

## 2017-10-09 RX ADMIN — ONDANSETRON 4 MG: 2 INJECTION INTRAMUSCULAR; INTRAVENOUS at 01:03

## 2017-10-09 RX ADMIN — SODIUM CHLORIDE: 9 INJECTION, SOLUTION INTRAVENOUS at 05:51

## 2017-10-09 RX ADMIN — METRONIDAZOLE 500 MG: 500 INJECTION, SOLUTION INTRAVENOUS at 06:08

## 2017-10-09 RX ADMIN — ACETAMINOPHEN 650 MG: 325 TABLET, FILM COATED ORAL at 01:03

## 2017-10-09 RX ADMIN — ACETAMINOPHEN 650 MG: 325 TABLET, FILM COATED ORAL at 20:45

## 2017-10-09 RX ADMIN — NYSTATIN: 100000 CREAM TOPICAL at 20:52

## 2017-10-09 RX ADMIN — HYDROMORPHONE HYDROCHLORIDE 0.5 MG: 1 INJECTION, SOLUTION INTRAMUSCULAR; INTRAVENOUS; SUBCUTANEOUS at 08:50

## 2017-10-09 RX ADMIN — PANTOPRAZOLE SODIUM 40 MG: 40 INJECTION, POWDER, FOR SOLUTION INTRAVENOUS at 06:40

## 2017-10-09 RX ADMIN — HYDROMORPHONE HYDROCHLORIDE 0.5 MG: 1 INJECTION, SOLUTION INTRAMUSCULAR; INTRAVENOUS; SUBCUTANEOUS at 06:08

## 2017-10-09 RX ADMIN — CARBIDOPA AND LEVODOPA 1 TABLET: 25; 100 TABLET ORAL at 18:04

## 2017-10-09 RX ADMIN — HYDROMORPHONE HYDROCHLORIDE 0.5 MG: 1 INJECTION, SOLUTION INTRAMUSCULAR; INTRAVENOUS; SUBCUTANEOUS at 21:19

## 2017-10-09 RX ADMIN — Medication 1 SPRAY: at 21:22

## 2017-10-09 RX ADMIN — METRONIDAZOLE 500 MG: 500 INJECTION, SOLUTION INTRAVENOUS at 00:39

## 2017-10-09 RX ADMIN — METOPROLOL TARTRATE 12.5 MG: 25 TABLET, FILM COATED ORAL at 08:50

## 2017-10-09 RX ADMIN — LIDOCAINE 1 PATCH: 50 PATCH TOPICAL at 08:49

## 2017-10-09 RX ADMIN — OLOPATADINE HYDROCHLORIDE 1 DROP: 1 SOLUTION/ DROPS OPHTHALMIC at 20:52

## 2017-10-09 RX ADMIN — NYSTATIN: 100000 CREAM TOPICAL at 10:04

## 2017-10-09 RX ADMIN — METOPROLOL TARTRATE 12.5 MG: 25 TABLET, FILM COATED ORAL at 20:45

## 2017-10-09 NOTE — PLAN OF CARE
Problem: Patient Care Overview  Goal: Plan of Care/Patient Progress Review  Outcome: No Change  Patient continues to c/o intermittent abdominal pain that she agrees when asked if it feels like cramping pain. She was given Tylenol and Dilaudid 0.3 mg IV for the pain, this did help bring her pain down to 2/10. Patient also c/o her buttocks being sore, likely from diarrhea. Barrier cream being applied for comfort and patient being TQ2H. VSS, low grade temp of 99.0 the 2nd half of the shift. Lung sounds clear. Encouraging patient to deep breathe and cough frequently. Oral intake is poor. She's really only taking in ice chips, she didn't care for chicken broth or jello. Patient denies nausea. She remains incontinent of urine and stool. Periarea area is reddened, Nystatin cream being applied. Buttocks and coccyx are also reddened and blanchable, barrier cream being applied. Stool is mostly watery, brown in color. 2 bed changes this evening due to fecal incontinence. Assist of 2 to the commode. Alarms in place. Using call light. IVF and IV antibiotics continue.      Face to face report given with opportunity to observe patient.     Report given to Demetrice Jiang   10/8/2017  11:34 PM

## 2017-10-09 NOTE — PROVIDER NOTIFICATION
"MD updated RE: patient's c/o intermittent abdominal cramping. Patient stated Tylenol \"would be wonderful.\" MD placing orders.  "

## 2017-10-09 NOTE — PLAN OF CARE
Problem: Patient Care Overview  Goal: Plan of Care/Patient Progress Review  Outcome: Improving  Pt slept most of the night without any problems.  Pleasant and cooperative.  Alert and oriented x 4 but does have some bout of confusion.  IV running at 100/hr. Pt pulled out IV during the night and a new IV was started.  IV anbx hung this shift. VSS and afebrile. Received tylenol and dilaudid for abdominal pain with and some zofran for some nausea. Both worked with adequate results.  Taking ice chips and small sips of water and tolerates well. Pt's bowel sounds were hyperactive.  Had 5 large loose incontinent stools during the night.  Buttocks and Coccyx is reddened but blanchable.  Aloe barrier cream applied.  Lili cares done every 2 hours.  Front lili area is less red and swollen then previous night.  Pt states lili area is very sore and sometimes feels like it's on fire.  Heels elevated due to soft heels.  Pt turned every 2 hours. Call light within reach and encouraged to use but yelled out when she needed help.  Pt's room is next to nurses station.  Frequent checks on pt and bed alarm on with no attempts to get out of bed.  Pneumoboots on pt most of shift.  Lungs have fine crackles in the right base and diminished in the left base.  Encouraged coughing and deep breathing.      Problem: Fluid Volume Deficit (Adult)  Goal: Identify Related Risk Factors and Signs and Symptoms  Related risk factors and signs and symptoms are identified upon initiation of Human Response Clinical Practice Guideline (CPG).   Outcome: Improving    10/09/17 0100   Fluid Volume Deficit   Related Risk Factors (Fluid Volume Deficit) advanced age;vomiting/diarrhea   Signs and Symptoms (Fluid Volume Deficit) mucous membranes dry       Goal: Optimal Fluid Balance  Patient will demonstrate the desired outcomes by discharge/transition of care.     10/09/17 0100   Fluid Volume Deficit (Adult)   Optimal Fluid Balance making progress toward outcome          Face to face report given with opportunity to observe patient.    Report given to Stacie Vega   10/9/2017  7:40 AM

## 2017-10-09 NOTE — PLAN OF CARE
Problem: Patient Care Overview  Goal: Individualization & Mutuality  Outcome: No Change  Medicated with Dilaudid 0.5mg IV c/o 7/10 abdomen pain with nausea- zofran 4mg IV also given with decreased discomfort. No emesis. Poor appetite- only will take sips ensure breeze, 2 coffee's and bites of jello and yogurt. Spoke with daughter on phone. Sat in chair after abdominal xray. Incontinent watery brown BM> no void this shift- straight cath'ed with 400cc tea colored urine return. Skin very red and swollen to periarea around to anal area. Prescribed creams applied. Pillow under heels at all times in bed. Flu injection given L deltoid. VSS IV NS at 100cc/hr.     Problem: Infection, Risk/Actual (Adult)  Goal: Identify Related Risk Factors and Signs and Symptoms  Related risk factors and signs and symptoms are identified upon initiation of Human Response Clinical Practice Guideline (CPG).   Outcome: No Change  Continues IV antibiotics

## 2017-10-09 NOTE — PLAN OF CARE
Face to face report given with opportunity to observe patient.  Report given to WENDI Kaplan.    Stacie Sprague  10/9/2017, 3:46 PM

## 2017-10-09 NOTE — PROGRESS NOTES
Met with Britney, her plan is to discharge back  to [facility]. Contacted switchboard to request communion at patients request. CM will remain available.                                    \[p0o\.

## 2017-10-09 NOTE — PHARMACY
[facility]    Pharmacy    Antimicrobial Stewardship Note     Current antimicrobial therapy:  Anti-infectives (Future)    Start     Dose/Rate Route Frequency Ordered Stop    10/07/17 2330  ciprofloxacin (CIPRO) infusion 400 mg      400 mg  over 1 Hours Intravenous EVERY 12 HOURS 10/07/17 2320      10/07/17 2330  metroNIDAZOLE (FLAGYL) infusion 500 mg      500 mg  100 mL/hr over 60 Minutes Intravenous EVERY 6 HOURS 10/07/17 2320          Indication: Colitis     Pertinent labs:  Creatinine   Creatinine   Date Value Ref Range Status   10/09/2017 0.56 0.52 - 1.04 mg/dL Final   10/08/2017 0.60 0.52 - 1.04 mg/dL Final   10/07/2017 0.68 0.52 - 1.04 mg/dL Final     WBC   WBC   Date Value Ref Range Status   10/09/2017 8.0 4.0 - 11.0 10e9/L Final   10/08/2017 15.4 (H) 4.0 - 11.0 10e9/L Final   10/07/2017 11.9 (H) 4.0 - 11.0 10e9/L Final     ANC No components found for: ANC     Culture Results:       Active MRSA Surveillance Culture [[id number]] Collected: 10/07/17 2349       Order Status: Completed Specimen: Swab from Nasopharyngeal Updated: 10/09/17 0637        Specimen Description Swab        Culture Micro No MRSA isolated       Stool culture SSCE [[id number]] Collected: 10/07/17 1535       Order Status: Completed Specimen: Feces Updated: 10/08/17 1329        Specimen Description Feces        Shiga-Toxins 1&2 Shiga toxin 1 NOT detected and Shiga toxin 2 NOT detected.                   Test results are to be used in conjunction with information available from the patient   clinical evaluation and other diagnostic procedures.           Culture Micro Culture in progress       Clostridium difficile toxin B PCR [[id number]] Collected: 10/07/17 1535       Order Status: Completed Specimen: Feces Updated: 10/07/17 1646        Specimen Description Feces        C Diff Toxin B PCR Negative         Negative: Clostridium difficile target DNA sequences NOT detected, presumed   negative for Clostridium difficile toxin B or the  number of bacteria present   may be below the limit of detection for the test.   FDA approved assay performed using [employer] GeneXpert real-time PCR.   A negative result does not exclude actual disease due to Clostridium difficile    and may be due to improper collection, handling and storage of the specimen   or the number of organisms in the specimen is below the detection limit of the    assay.             Recommendations/Interventions:  1. FYI: pharmacy is unable to obtain any more IV metronidazole due to a nationwide shortage. There are no more doses left in the pharmacy at this time.    Nallely Curiel RPH  October 9, 2017

## 2017-10-10 LAB
ALBUMIN SERPL-MCNC: 2.5 G/DL (ref 3.4–5)
ALP SERPL-CCNC: 41 U/L (ref 40–150)
ALT SERPL W P-5'-P-CCNC: <6 U/L (ref 0–50)
ANION GAP SERPL CALCULATED.3IONS-SCNC: 8 MMOL/L (ref 3–14)
AST SERPL W P-5'-P-CCNC: 10 U/L (ref 0–45)
BACTERIA SPEC CULT: NORMAL
BASOPHILS # BLD AUTO: 0 10E9/L (ref 0–0.2)
BASOPHILS NFR BLD AUTO: 0.5 %
BILIRUB SERPL-MCNC: 0.4 MG/DL (ref 0.2–1.3)
BUN SERPL-MCNC: 11 MG/DL (ref 7–30)
CALCIUM SERPL-MCNC: 7.5 MG/DL (ref 8.5–10.1)
CHLORIDE SERPL-SCNC: 113 MMOL/L (ref 94–109)
CO2 SERPL-SCNC: 20 MMOL/L (ref 20–32)
CREAT SERPL-MCNC: 0.55 MG/DL (ref 0.52–1.04)
CRP SERPL-MCNC: 77.9 MG/L (ref 0–8)
DIFFERENTIAL METHOD BLD: ABNORMAL
E COLI SXT1+2 STL IA: NORMAL
EOSINOPHIL # BLD AUTO: 0.3 10E9/L (ref 0–0.7)
EOSINOPHIL NFR BLD AUTO: 3.5 %
ERYTHROCYTE [DISTWIDTH] IN BLOOD BY AUTOMATED COUNT: 13.7 % (ref 10–15)
GFR SERPL CREATININE-BSD FRML MDRD: >90 ML/MIN/1.7M2
GLUCOSE SERPL-MCNC: 76 MG/DL (ref 70–99)
HCT VFR BLD AUTO: 34.8 % (ref 35–47)
HGB BLD-MCNC: 11.7 G/DL (ref 11.7–15.7)
IMM GRANULOCYTES # BLD: 0 10E9/L (ref 0–0.4)
IMM GRANULOCYTES NFR BLD: 0.5 %
LYMPHOCYTES # BLD AUTO: 1.3 10E9/L (ref 0.8–5.3)
LYMPHOCYTES NFR BLD AUTO: 15 %
MCH RBC QN AUTO: 30.5 PG (ref 26.5–33)
MCHC RBC AUTO-ENTMCNC: 33.6 G/DL (ref 31.5–36.5)
MCV RBC AUTO: 91 FL (ref 78–100)
MONOCYTES # BLD AUTO: 0.7 10E9/L (ref 0–1.3)
MONOCYTES NFR BLD AUTO: 7.9 %
NEUTROPHILS # BLD AUTO: 6.4 10E9/L (ref 1.6–8.3)
NEUTROPHILS NFR BLD AUTO: 72.6 %
NRBC # BLD AUTO: 0 10*3/UL
NRBC BLD AUTO-RTO: 0 /100
PLATELET # BLD AUTO: 119 10E9/L (ref 150–450)
POTASSIUM SERPL-SCNC: 3 MMOL/L (ref 3.4–5.3)
PROT SERPL-MCNC: 5.1 G/DL (ref 6.8–8.8)
RBC # BLD AUTO: 3.84 10E12/L (ref 3.8–5.2)
SODIUM SERPL-SCNC: 141 MMOL/L (ref 133–144)
SPECIMEN SOURCE: NORMAL
WBC # BLD AUTO: 8.8 10E9/L (ref 4–11)

## 2017-10-10 PROCEDURE — 12000000 ZZH R&B MED SURG/OB

## 2017-10-10 PROCEDURE — A9270 NON-COVERED ITEM OR SERVICE: HCPCS | Mod: GY | Performed by: HOSPITALIST

## 2017-10-10 PROCEDURE — 85025 COMPLETE CBC W/AUTO DIFF WBC: CPT | Performed by: INTERNAL MEDICINE

## 2017-10-10 PROCEDURE — 86140 C-REACTIVE PROTEIN: CPT | Performed by: INTERNAL MEDICINE

## 2017-10-10 PROCEDURE — 80053 COMPREHEN METABOLIC PANEL: CPT | Performed by: INTERNAL MEDICINE

## 2017-10-10 PROCEDURE — A9270 NON-COVERED ITEM OR SERVICE: HCPCS | Mod: GY | Performed by: INTERNAL MEDICINE

## 2017-10-10 PROCEDURE — 25000125 ZZHC RX 250: Performed by: HOSPITALIST

## 2017-10-10 PROCEDURE — 99232 SBSQ HOSP IP/OBS MODERATE 35: CPT | Performed by: INTERNAL MEDICINE

## 2017-10-10 PROCEDURE — 25000128 H RX IP 250 OP 636: Performed by: HOSPITALIST

## 2017-10-10 PROCEDURE — 25000132 ZZH RX MED GY IP 250 OP 250 PS 637: Mod: GY | Performed by: HOSPITALIST

## 2017-10-10 PROCEDURE — 36415 COLL VENOUS BLD VENIPUNCTURE: CPT | Performed by: INTERNAL MEDICINE

## 2017-10-10 PROCEDURE — 25000132 ZZH RX MED GY IP 250 OP 250 PS 637: Mod: GY | Performed by: INTERNAL MEDICINE

## 2017-10-10 RX ADMIN — OLOPATADINE HYDROCHLORIDE 1 DROP: 1 SOLUTION/ DROPS OPHTHALMIC at 09:20

## 2017-10-10 RX ADMIN — CIPROFLOXACIN 400 MG: 2 INJECTION, SOLUTION INTRAVENOUS at 00:53

## 2017-10-10 RX ADMIN — CARBIDOPA AND LEVODOPA 1 TABLET: 25; 100 TABLET ORAL at 09:15

## 2017-10-10 RX ADMIN — GABAPENTIN 100 MG: 100 CAPSULE ORAL at 09:14

## 2017-10-10 RX ADMIN — METOPROLOL TARTRATE 12.5 MG: 25 TABLET, FILM COATED ORAL at 20:56

## 2017-10-10 RX ADMIN — GABAPENTIN 100 MG: 100 CAPSULE ORAL at 20:57

## 2017-10-10 RX ADMIN — NYSTATIN: 100000 CREAM TOPICAL at 21:09

## 2017-10-10 RX ADMIN — LIDOCAINE 1 PATCH: 50 PATCH TOPICAL at 09:17

## 2017-10-10 RX ADMIN — METOPROLOL TARTRATE 12.5 MG: 25 TABLET, FILM COATED ORAL at 09:13

## 2017-10-10 RX ADMIN — ACETAMINOPHEN 650 MG: 325 TABLET, FILM COATED ORAL at 09:15

## 2017-10-10 RX ADMIN — ACETAMINOPHEN 650 MG: 325 TABLET, FILM COATED ORAL at 20:57

## 2017-10-10 RX ADMIN — OLOPATADINE HYDROCHLORIDE 1 DROP: 1 SOLUTION/ DROPS OPHTHALMIC at 21:09

## 2017-10-10 RX ADMIN — NYSTATIN: 100000 CREAM TOPICAL at 09:20

## 2017-10-10 RX ADMIN — Medication 1 SPRAY: at 21:09

## 2017-10-10 RX ADMIN — PANTOPRAZOLE SODIUM 40 MG: 40 INJECTION, POWDER, FOR SOLUTION INTRAVENOUS at 08:09

## 2017-10-10 RX ADMIN — HYDROMORPHONE HYDROCHLORIDE 0.5 MG: 1 INJECTION, SOLUTION INTRAMUSCULAR; INTRAVENOUS; SUBCUTANEOUS at 10:39

## 2017-10-10 RX ADMIN — HYDROMORPHONE HYDROCHLORIDE 0.5 MG: 1 INJECTION, SOLUTION INTRAMUSCULAR; INTRAVENOUS; SUBCUTANEOUS at 17:05

## 2017-10-10 RX ADMIN — CARBIDOPA AND LEVODOPA 1 TABLET: 25; 100 TABLET ORAL at 20:57

## 2017-10-10 RX ADMIN — CARBIDOPA AND LEVODOPA 1 TABLET: 25; 100 TABLET ORAL at 17:05

## 2017-10-10 RX ADMIN — CARBIDOPA AND LEVODOPA 1 TABLET: 25; 100 TABLET ORAL at 12:12

## 2017-10-10 RX ADMIN — CIPROFLOXACIN 400 MG: 2 INJECTION, SOLUTION INTRAVENOUS at 13:43

## 2017-10-10 ASSESSMENT — PAIN DESCRIPTION - DESCRIPTORS: DESCRIPTORS: CRAMPING

## 2017-10-10 NOTE — PROGRESS NOTES
Met with Britney. She is concerned about not being able to walk with a walker. She would like PT when she returns to [facility]. No other needs identified. CM will remain available.

## 2017-10-10 NOTE — PLAN OF CARE
Problem: Patient Care Overview  Goal: Individualization & Mutuality  Outcome: No Change  Pt continues to state pain to whole abdomen area- Tylenol given with fair results- Dilaudid given x1 with good results. Pt cheerful when awake but slleps often. Turned and repositioned q2hr with heels elevated off bed. Skin remains red and irritated to peroarea with prescribed creams applied per order. Eats none with drinks coffee and ginger ale occasionally . Denied nausea or need for antiemetic when questioned. Receiving IV antibiotics. Afebrile. Temp 98.9T. Dr Do aware of low potassium and high blood pressure of 170's systolic.

## 2017-10-10 NOTE — PHARMACY
[facility]    Pharmacy    Antimicrobial Stewardship Note     Current antimicrobial therapy:  Anti-infectives (Future)    Start     Dose/Rate Route Frequency Ordered Stop    10/07/17 2330  ciprofloxacin (CIPRO) infusion 400 mg      400 mg  over 1 Hours Intravenous EVERY 12 HOURS 10/07/17 2320          Indication: colitis     Pertinent labs:  Creatinine   Creatinine   Date Value Ref Range Status   10/10/2017 0.55 0.52 - 1.04 mg/dL Final   10/09/2017 0.56 0.52 - 1.04 mg/dL Final   10/08/2017 0.60 0.52 - 1.04 mg/dL Final     WBC   WBC   Date Value Ref Range Status   10/10/2017 8.8 4.0 - 11.0 10e9/L Final   10/09/2017 8.0 4.0 - 11.0 10e9/L Final   10/08/2017 15.4 (H) 4.0 - 11.0 10e9/L Final     ANC No components found for: ANC     Culture Results:       Stool culture SSCE [[id number]] Collected: 10/07/17 1535       Order Status: Completed Specimen: Feces Updated: 10/10/17 0827        Specimen Description Feces        Shiga-Toxins 1&2 Shiga toxin 1 NOT detected and Shiga toxin 2 NOT detected.                   Test results are to be used in conjunction with information available from the patient   clinical evaluation and other diagnostic procedures.           Culture Micro No Salmonella, Shigella, Campylobacter, E. coli O157, Aeromonas, or Plesiomonas isolated.   No Yersinia enterocolitica isolated          Active MRSA Surveillance Culture [[id number]] Collected: 10/07/17 2349       Order Status: Completed Specimen: Swab from Nasopharyngeal Updated: 10/09/17 0637        Specimen Description Swab        Culture Micro No MRSA isolated       Clostridium difficile toxin B PCR [[id number]] Collected: 10/07/17 1535       Order Status: Completed Specimen: Feces Updated: 10/07/17 1646        Specimen Description Feces        C Diff Toxin B PCR Negative         Negative: Clostridium difficile target DNA sequences NOT detected, presumed   negative for Clostridium difficile toxin B or the number of bacteria present   may be  below the limit of detection for the test.   FDA approved assay performed using [employer] GeneXpert real-time PCR.   A negative result does not exclude actual disease due to Clostridium difficile    and may be due to improper collection, handling and storage of the specimen   or the number of organisms in the specimen is below the detection limit of the    assay.             Recommendations/Interventions:  1. None at this time, will continue to monitor and make recommendations when appropriate    Nallely Curiel RPH  October 10, 2017

## 2017-10-10 NOTE — PLAN OF CARE
Problem: Patient Care Overview  Goal: Plan of Care/Patient Progress Review  Outcome: No Change  Patient continues to c/o intermittent abdominal cramping, she's denied pain at one point, otherwise she rates it 2-6/10. Tylenol PO and Dilaudid 0.5 mg IV were given towards the end of the shift, patient was found sleeping about a half hour after. VSS with the exception of low grade temp 2nd half of the shift, 99.3. Bowel sounds active. Tender with palpation in lower quadrants. Patient has denied nausea. Oral intake is poor. Patient reports not being hungry despite encouragement to try to increase oral intake. She prefers ice chips, she's had sips of Ensure juice and coffee. Patient did not void this shift, she was bladder scanned at 2100, which only showed 170 cc. Continent of 1 small loose BM mid shift. Buttocks continues to be reddened but blanchable, some improvement in color noted from yesterday afternoon. Barrier cream being applied. Periarea continues to be reddened as well, Nystatin cream continues to be applied BID. Heels are soft, elevating off bed. About quarter size healed scabbed area on left lateral foot. TQ2H. Remains assist of 2 for safety to commode. Alarms in place. Using call light appropriately. IVF and IV antibiotic continue.    Face to face report given with opportunity to observe patient.    Report given to Elbert ADAME.    Rosaura Jiang   10/9/2017  11:23 PM

## 2017-10-10 NOTE — PLAN OF CARE
(Face to face report given with opportunity to observe patient.    Report given to Trace WENDI SERVIN   10/10/2017  12:32 PM

## 2017-10-10 NOTE — PROGRESS NOTES
[facility]    Hospitalist Progress Note    Date of Service (when I saw the patient): 10/10/2017    Assessment & Plan   Britney Schaeffer is a 87 year old female who was admitted on 10/7/2017. Her history is signficant for hypertension, Parkinson's disease, TIA, and CAD  She was sent to the ED with a complaint of abdominal pain, nausea vomiting and diarrhea; she had noted constipation and abdominal pain for couple of days and received some extra stool softeners. She had a large bowel movement the morning of admission followed by nausea vomiting and diarrhea.  The patient had diffuse abdominal pain, crampy to sharp in nature.       1.  Colitis:     Not, certainly infectious, although appropriately she was treated preemptively for a possible infectious bacterial colitis with ciprofloxacin.  We will continue this.  If pro-calcitonin and another day is low  De-escalation or discontinuation may be reasonable.  Suspect noninfectious inflammatory colitis or possibly a viral process.  In any event, all her pain has been persistent although somewhat variable.  Overall clinically she appears to show some improvement.  Doubt ischemia or other surgical concern. Possible mid to high sigmoid mass.  Depending on her course.  Could consider further evaluation such as endoscopy, although it is unlikely that any finding result in a meaningful intervention even if malignancy is similar process were identified. She does have some persistent abdominal pain, although her report of this is somewhat variable.  Possible percussion tenderness but difficult to be certain her discomfort is not from abdominal wall rather than true viscous pain.    She initially presented to ER with N/V and some diarrhea along with abdominal pain.  She had been having some constipation issues which is not uncommon.  She had taken extra stool softener  and a big BM followed by the above symptoms.  No fevers.  WBC mildly elevated.  CT done which shows  suspicious colitis wit thickened walls of the sigmoid colon. As above, possible mass but a lot of artifact from the KE.  Also some small gut enteritis findings R lower quadrant.  Stool sent off and C diff negative. Others pending. Lives [place]  no outbreaks there.  Was put on cipro flagyl on admission.       2.  Parkinson's disease:   Continue Sinemet.  Has tremor but otherwise relatively stable.      3.  Hypertension:   Continuing metoprolol.  BP stable.  Volume appears controlled.     4.  Hyperlipidemia:   Continue statins but hold for now as the patient is nothing by mouth.      5.  Coronary artery disease:   Have held aspirin and Plavix due to increased risk of bleeding.  Continue on beta blockers .Her last stent was grater than 1 year ago in 2015.      6.  GERD:   Changed IV to oral Protonix during inpatient stay. Has a tremendous hiatal hernia with most of her stomach in  thoracic cavity.      7.  History of CVA: H  Holding aspirin and Plavix. No neurological issues. At this point, would be included to discontinue antilplatelet agents at least until abdominal issues under good control for several weeks. Best approach may be to discontinue permanantly       DVT Prophylaxis: Pneumatic Compression Devices  Code Status: DNR/DNI    Disposition: Expected discharge once abdominal pain resolved and eating anticipate at least.    Wu Bunch    Interval History   Awake and alert. Pleasant and interactive.  Mildly distractible. Moderate abdominal discomfort but tolerating clear liquids without difficulty.  No dyspnea.  -Data reviewed today: I reviewed all new labs over the last 24 hours.    Physical Exam   Temp: 99  F (37.2  C) Temp src: Tympanic BP: 116/52 Pulse: 53 Heart Rate: 69 Resp: 18 SpO2: 94 % (pt O2 sat range 92-94. pt did deep breathing and coughing) O2 Device: None (Room air)    Vitals:    10/08/17 0416 10/09/17 0614 10/10/17 0652   Weight: 57 kg (125 lb 10.6 oz) 59.2 kg (130 lb 8.2 oz) 60.5 kg (133 lb  6.1 oz)     Vital Signs with Ranges  Temp:  [96  F (35.6  C)-99.3  F (37.4  C)] 99  F (37.2  C)  Pulse:  [53-81] 53  Heart Rate:  [69] 69  Resp:  [16-20] 18  BP: (116-171)/(46-76) 116/52  SpO2:  [92 %-96 %] 94 %  I/O last 3 completed shifts:  In: 854 [P.O.:60; I.V.:794]  Out: -     Peripheral IV 10/09/17 Right Lower forearm (Active)   Site Assessment WDL 10/10/2017  8:00 AM   Line Status Saline locked 10/10/2017  8:00 AM   Phlebitis Scale 0-->no symptoms 10/10/2017  8:00 AM   Infiltration Scale 0 10/10/2017  8:00 AM   Number of days:1         Constitutional: Alert and oriented x3. No distress    HEENT: NC/AT, PERRL, EOMI, mouth moist , neck supple, no LN.     Cardiovascular: RRR. no Murmur, no  rubs, or gallops.  PMI not displaced. JVD flat.  Bruits no.  Pulses 2+    Respiratory: Aeration to bases. Clear to auscultation bilaterally    Abdomen: Soft, tender Right lower abdomen No masses  Minimal percussion tenderness with lower quadrant palpation tenderness, right greater than left. No organomegaly. Bowel sounds present    Extremities: Warm/dry. No edema    Neuro:   Non focal, cranial nerves intact, Moves all extremities.    Medications        sodium chloride  1 spray Both Nostrils At Bedtime     carbidopa-levodopa  1 tablet Oral 4x Daily     gabapentin  100 mg Oral BID     lidocaine  1 patch Transdermal Q24H     metoprolol  12.5 mg Oral BID     nystatin   Topical BID     olopatadine  1 drop Both Eyes BID     pantoprazole  40 mg Intravenous QAM AC     ciprofloxacin  400 mg Intravenous Q12H     lidocaine   Transdermal Q24h     lidocaine   Transdermal Q8H       Data     Recent Labs  Lab 10/10/17  0535 10/09/17  0555 10/08/17  0555 10/07/17  1516   WBC 8.8 8.0 15.4* 11.9*   HGB 11.7 11.9 13.1 13.5   MCV 91 92 90 93   * 114* 135* 165    143 144 141   POTASSIUM 3.0* 3.3* 3.4 3.7   CHLORIDE 113* 114* 109 108   CO2 20 19* 22 27   BUN 11 15 21 24   CR 0.55 0.56 0.60 0.68   ANIONGAP 8 10 13 6   BAKARI 7.5* 8.0* 8.6  9.2   GLC 76 77 120* 129*   ALBUMIN 2.5*  --   --  3.8   PROTTOTAL 5.1*  --   --  7.2   BILITOTAL 0.4  --   --  0.5   ALKPHOS 41  --   --  55   ALT <6  --   --  9   AST 10  --   --  17   LIPASE  --   --   --  199     Lactic Acid   Date Value Ref Range Status   12/14/2015 0.9 0.4 - 2.0 mmol/L Final   12/12/2015 1.6 0.4 - 2.0 mmol/L Final   12/12/2015 2.2 (H) 0.4 - 2.0 mmol/L Final       No results found for this or any previous visit (from the past 24 hour(s)).

## 2017-10-10 NOTE — PLAN OF CARE
Face to face report given with opportunity to observe patient.  Report given to WENDI Kaplan.    Stacie Sprague  10/10/2017, 3:58 PM

## 2017-10-10 NOTE — PLAN OF CARE
Problem: Patient Care Overview  Goal: Plan of Care/Patient Progress Review  Outcome: No Change  Pt alert and pleasantly confused. Forgetful to place at times. Appeared to be resting on and off throughout shift this shift. Denies pain this shift. No loose stool this shift. Denies abdominal pain. Bowel sounds active. VSS. Turned q2h this shift. Will continue to monitor.     Problem: Fluid Volume Deficit (Adult)  Goal: Optimal Fluid Balance  Patient will demonstrate the desired outcomes by discharge/transition of care.   Outcome: No Change    10/10/17 0529   Fluid Volume Deficit (Adult)   Optimal Fluid Balance making progress toward outcome         Problem: Infection, Risk/Actual (Adult)  Goal: Identify Related Risk Factors and Signs and Symptoms  Related risk factors and signs and symptoms are identified upon initiation of Human Response Clinical Practice Guideline (CPG).   Outcome: No Change    10/10/17 0529   Infection, Risk/Actual   Related Risk Factors (Infection, Risk/Actual) chronic illness/condition;sleep disturbance;skin integrity impairment         Face to face report given with opportunity to observe patient.    Report given to Stacie De Dios   10/10/2017  7:31 AM

## 2017-10-11 ENCOUNTER — APPOINTMENT (OUTPATIENT)
Dept: PHYSICAL THERAPY | Facility: HOSPITAL | Age: 82
DRG: 392 | End: 2017-10-11
Attending: INTERNAL MEDICINE
Payer: MEDICARE

## 2017-10-11 LAB
ANION GAP SERPL CALCULATED.3IONS-SCNC: 9 MMOL/L (ref 3–14)
BASOPHILS # BLD AUTO: 0.1 10E9/L (ref 0–0.2)
BASOPHILS NFR BLD AUTO: 0.6 %
BUN SERPL-MCNC: 8 MG/DL (ref 7–30)
CALCIUM SERPL-MCNC: 8 MG/DL (ref 8.5–10.1)
CHLORIDE SERPL-SCNC: 112 MMOL/L (ref 94–109)
CO2 SERPL-SCNC: 23 MMOL/L (ref 20–32)
CREAT SERPL-MCNC: 0.59 MG/DL (ref 0.52–1.04)
CRP SERPL-MCNC: 31.8 MG/L (ref 0–8)
DIFFERENTIAL METHOD BLD: ABNORMAL
EOSINOPHIL # BLD AUTO: 0.3 10E9/L (ref 0–0.7)
EOSINOPHIL NFR BLD AUTO: 3.2 %
ERYTHROCYTE [DISTWIDTH] IN BLOOD BY AUTOMATED COUNT: 13.6 % (ref 10–15)
GFR SERPL CREATININE-BSD FRML MDRD: >90 ML/MIN/1.7M2
GLUCOSE SERPL-MCNC: 78 MG/DL (ref 70–99)
HCT VFR BLD AUTO: 35.6 % (ref 35–47)
HGB BLD-MCNC: 12 G/DL (ref 11.7–15.7)
IMM GRANULOCYTES # BLD: 0.1 10E9/L (ref 0–0.4)
IMM GRANULOCYTES NFR BLD: 1.3 %
LYMPHOCYTES # BLD AUTO: 1.3 10E9/L (ref 0.8–5.3)
LYMPHOCYTES NFR BLD AUTO: 15.7 %
MAGNESIUM SERPL-MCNC: 1.6 MG/DL (ref 1.6–2.3)
MCH RBC QN AUTO: 30.6 PG (ref 26.5–33)
MCHC RBC AUTO-ENTMCNC: 33.7 G/DL (ref 31.5–36.5)
MCV RBC AUTO: 91 FL (ref 78–100)
MONOCYTES # BLD AUTO: 0.6 10E9/L (ref 0–1.3)
MONOCYTES NFR BLD AUTO: 7.1 %
NEUTROPHILS # BLD AUTO: 6.1 10E9/L (ref 1.6–8.3)
NEUTROPHILS NFR BLD AUTO: 72.1 %
NRBC # BLD AUTO: 0 10*3/UL
NRBC BLD AUTO-RTO: 0 /100
PLATELET # BLD AUTO: 119 10E9/L (ref 150–450)
POTASSIUM SERPL-SCNC: 3 MMOL/L (ref 3.4–5.3)
PROCALCITONIN SERPL-MCNC: <0.05 NG/ML
RBC # BLD AUTO: 3.92 10E12/L (ref 3.8–5.2)
SODIUM SERPL-SCNC: 144 MMOL/L (ref 133–144)
WBC # BLD AUTO: 8.5 10E9/L (ref 4–11)

## 2017-10-11 PROCEDURE — 25000128 H RX IP 250 OP 636: Performed by: HOSPITALIST

## 2017-10-11 PROCEDURE — 83735 ASSAY OF MAGNESIUM: CPT | Performed by: INTERNAL MEDICINE

## 2017-10-11 PROCEDURE — 85025 COMPLETE CBC W/AUTO DIFF WBC: CPT | Performed by: INTERNAL MEDICINE

## 2017-10-11 PROCEDURE — 12000000 ZZH R&B MED SURG/OB

## 2017-10-11 PROCEDURE — 84145 PROCALCITONIN (PCT): CPT | Performed by: INTERNAL MEDICINE

## 2017-10-11 PROCEDURE — 25000132 ZZH RX MED GY IP 250 OP 250 PS 637: Mod: GY | Performed by: INTERNAL MEDICINE

## 2017-10-11 PROCEDURE — 97530 THERAPEUTIC ACTIVITIES: CPT | Mod: GP

## 2017-10-11 PROCEDURE — A9270 NON-COVERED ITEM OR SERVICE: HCPCS | Mod: GY | Performed by: INTERNAL MEDICINE

## 2017-10-11 PROCEDURE — 25000125 ZZHC RX 250: Performed by: HOSPITALIST

## 2017-10-11 PROCEDURE — 97161 PT EVAL LOW COMPLEX 20 MIN: CPT | Mod: GP

## 2017-10-11 PROCEDURE — 80048 BASIC METABOLIC PNL TOTAL CA: CPT | Performed by: INTERNAL MEDICINE

## 2017-10-11 PROCEDURE — 36415 COLL VENOUS BLD VENIPUNCTURE: CPT | Performed by: INTERNAL MEDICINE

## 2017-10-11 PROCEDURE — 25000132 ZZH RX MED GY IP 250 OP 250 PS 637: Mod: GY | Performed by: HOSPITALIST

## 2017-10-11 PROCEDURE — 86140 C-REACTIVE PROTEIN: CPT | Performed by: INTERNAL MEDICINE

## 2017-10-11 PROCEDURE — 40000193 ZZH STATISTIC PT WARD VISIT

## 2017-10-11 PROCEDURE — 99232 SBSQ HOSP IP/OBS MODERATE 35: CPT | Performed by: INTERNAL MEDICINE

## 2017-10-11 PROCEDURE — A9270 NON-COVERED ITEM OR SERVICE: HCPCS | Mod: GY | Performed by: HOSPITALIST

## 2017-10-11 RX ORDER — PANTOPRAZOLE SODIUM 40 MG/1
40 TABLET, DELAYED RELEASE ORAL EVERY MORNING
Status: DISCONTINUED | OUTPATIENT
Start: 2017-10-12 | End: 2017-10-12 | Stop reason: HOSPADM

## 2017-10-11 RX ORDER — POTASSIUM CHLORIDE 1500 MG/1
40 TABLET, EXTENDED RELEASE ORAL 3 TIMES DAILY
Status: DISCONTINUED | OUTPATIENT
Start: 2017-10-11 | End: 2017-10-12 | Stop reason: HOSPADM

## 2017-10-11 RX ADMIN — POTASSIUM CHLORIDE 40 MEQ: 20 TABLET, EXTENDED RELEASE ORAL at 13:53

## 2017-10-11 RX ADMIN — POTASSIUM CHLORIDE 10 MEQ: 20 TABLET, EXTENDED RELEASE ORAL at 21:53

## 2017-10-11 RX ADMIN — CARBIDOPA AND LEVODOPA 1 TABLET: 25; 100 TABLET ORAL at 21:53

## 2017-10-11 RX ADMIN — CARBIDOPA AND LEVODOPA 1 TABLET: 25; 100 TABLET ORAL at 09:13

## 2017-10-11 RX ADMIN — OLOPATADINE HYDROCHLORIDE 1 DROP: 1 SOLUTION/ DROPS OPHTHALMIC at 09:18

## 2017-10-11 RX ADMIN — METOPROLOL TARTRATE 12.5 MG: 25 TABLET, FILM COATED ORAL at 21:53

## 2017-10-11 RX ADMIN — NYSTATIN: 100000 CREAM TOPICAL at 21:58

## 2017-10-11 RX ADMIN — HYDROMORPHONE HYDROCHLORIDE 0.5 MG: 1 INJECTION, SOLUTION INTRAMUSCULAR; INTRAVENOUS; SUBCUTANEOUS at 02:18

## 2017-10-11 RX ADMIN — NYSTATIN: 100000 CREAM TOPICAL at 09:19

## 2017-10-11 RX ADMIN — OLOPATADINE HYDROCHLORIDE 1 DROP: 1 SOLUTION/ DROPS OPHTHALMIC at 21:59

## 2017-10-11 RX ADMIN — CIPROFLOXACIN 400 MG: 2 INJECTION, SOLUTION INTRAVENOUS at 01:27

## 2017-10-11 RX ADMIN — CARBIDOPA AND LEVODOPA 1 TABLET: 25; 100 TABLET ORAL at 16:22

## 2017-10-11 RX ADMIN — GABAPENTIN 100 MG: 100 CAPSULE ORAL at 21:53

## 2017-10-11 RX ADMIN — ONDANSETRON 4 MG: 2 INJECTION INTRAMUSCULAR; INTRAVENOUS at 02:18

## 2017-10-11 RX ADMIN — PANTOPRAZOLE SODIUM 40 MG: 40 INJECTION, POWDER, FOR SOLUTION INTRAVENOUS at 06:39

## 2017-10-11 RX ADMIN — LIDOCAINE 1 PATCH: 50 PATCH TOPICAL at 09:13

## 2017-10-11 RX ADMIN — METOPROLOL TARTRATE 12.5 MG: 25 TABLET, FILM COATED ORAL at 09:13

## 2017-10-11 RX ADMIN — GABAPENTIN 100 MG: 100 CAPSULE ORAL at 09:13

## 2017-10-11 RX ADMIN — CARBIDOPA AND LEVODOPA 1 TABLET: 25; 100 TABLET ORAL at 12:12

## 2017-10-11 NOTE — PLAN OF CARE
Face to face report given with opportunity to observe patient.    Report given to Denise Lozoya   10/11/2017  6:55 PM

## 2017-10-11 NOTE — PROGRESS NOTES
[facility]    Hospitalist Progress Note    Date of Service (when I saw the patient): 10/11/2017    Assessment & Plan   Britney Schaeffer is a 87 year old female who was admitted on 10/7/2017. Her history is signficant for hypertension, Parkinson's disease, TIA, and CAD  She was sent to the ED with a complaint of abdominal pain, nausea vomiting and diarrhea; she had noted constipation and abdominal pain for couple of days and received some extra stool softeners. She had a large bowel movement the morning of admission followed by nausea vomiting and diarrhea.  The patient had diffuse abdominal pain, crampy to sharp in nature.       1.  Colitis:     Low pro-calcitonin.  We will discontinue ciprofloxacin today.  Still may be reasonable to consider a possible viral colitis or a noninfectious generally pancolitis, although more marked in colon.  In general, however, she shows slow although real improvement.  Some abdominal discomfort although it appears more  Isolated to abdominal wall and certainly does not appear to be peritoneal discomfort.  Trial of advancing oral intake cautiously.   As above.  Not certainly infectious, although appropriately she was treated preemptively for a possible infectious bacterial colitis with ciprofloxacin.  We will continue this. Doubt ischemia or other surgical concern. Possible mid to high sigmoid mass.  Depending on her course, could consider further evaluation such as endoscopy, although it is unlikely that any finding result in a meaningful intervention even if malignancy is similar process were identified. She does have some persistent abdominal pain, although her report of this is somewhat variable.  Possible percussion tenderness but difficult to be certain her discomfort is not from abdominal wall rather than true viscous pain.    She initially presented to ER with N/V and some diarrhea along with abdominal pain.  She had been having some constipation issues which is  not uncommon.  She had taken extra stool softener  and a big BM followed by the above symptoms.  No fevers.  WBC mildly elevated.  CT done which shows suspicious colitis wit thickened walls of the sigmoid colon. As above, possible mass but a lot of artifact from the KE.  Also some small gut enteritis findings R lower quadrant.  Stool sent off and C diff negative. Others pending. Lives [place]  no outbreaks there.  Was put on cipro flagyl on admission.       2.  Parkinson's disease:   Continue Sinemet.  Has tremor but otherwise relatively stable.      3.  Hypertension:   Continuing metoprolol.  BP stable.  Volume appears controlled.     4.  Hyperlipidemia:   Continue statins but hold for now as the patient is nothing by mouth.      5.  Coronary artery disease:   Have held aspirin and Plavix due to increased risk of bleeding.  Continue on beta blockers .Her last stent was grater than 1 year ago in 2015.      6.  GERD:   Changed IV to oral Protonix during inpatient stay. Has a tremendous hiatal hernia with most of her stomach in  thoracic cavity.      7.  History of CVA: H  Holding aspirin and Plavix. No neurological issues. At this point, would be included to discontinue antilplatelet agents at least until abdominal issues under good control for several weeks. Best approach may be to discontinue permanantly    8.  Generalized weakness.  Suspect a degree of this is been present for longer than this hospitalization.   She relates some concern with left hip pain especially in the region of her total hip replacement.She has been able to stand and walk several steps.  Have asked PT involvement for more precise evaluation of her functional status and requirements.  Would plan continued physical therapy at the time of her discharge.       DVT Prophylaxis: Pneumatic Compression Devices  Code Status: DNR/DNI    Disposition: Expected discharge once abdominal pain resolved and eating anticipate at least another 1-2 days  hospitalization.    Wu Bunch    Interval History   Awake and alert. Pleasant and interactive.  Mildly distractible. Moderate abdominal discomfort but tolerating clear liquids without difficulty.  No dyspnea.  -Data reviewed today: I reviewed all new labs over the last 24 hours.    Physical Exam   Temp: 97.7  F (36.5  C) Temp src: Tympanic BP: 153/56   Heart Rate: 63 Resp: 20 SpO2: 93 % O2 Device: None (Room air)    Vitals:    10/10/17 0652 10/11/17 0610 10/11/17 0806   Weight: 60.5 kg (133 lb 6.1 oz) 61.8 kg (136 lb 3.9 oz) 60.8 kg (134 lb 0.6 oz)     Vital Signs with Ranges  Temp:  [97.7  F (36.5  C)-98.8  F (37.1  C)] 97.7  F (36.5  C)  Heart Rate:  [55-63] 63  Resp:  [16-20] 20  BP: (115-153)/(47-56) 153/56  SpO2:  [93 %-95 %] 93 %  I/O last 3 completed shifts:  In: 647 [P.O.:240; I.V.:407]  Out: -     Peripheral IV 10/09/17 Right Lower forearm (Active)   Site Assessment WDL 10/11/2017  2:00 PM   Line Status Saline locked 10/11/2017  2:00 PM   Phlebitis Scale 0-->no symptoms 10/11/2017  2:00 PM   Infiltration Scale 0 10/11/2017  2:00 PM   Dressing Intervention New dressing  10/10/2017  4:45 PM   Number of days:2         Constitutional: Alert and oriented x3. No distress    HEENT: NC/AT, PERRL, EOMI, mouth moist , neck supple, no LN.     Cardiovascular: RRR. no Murmur, no  rubs, or gallops.  PMI not displaced. JVD flat.  Bruits no.  Pulses 2+    Respiratory: Aeration to bases. Clear to auscultation bilaterally    Abdomen: Soft, with bowel sounds in all quadrants.  No masses  No organomegaly. Abdominal tenderness appears isolated to abdominal wall.  Right greater than left.  No clear percussion tenderness.  No consistent percussion tenderness.    Extremities: Warm/dry. Brisk capillary refill distally.No edema    Neuro:   Non focal, cranial nerves intact, Moves all extremities.    Medications        potassium chloride  40 mEq Oral TID     [START ON 10/12/2017] pantoprazole  40 mg Oral QAM     sodium  chloride  1 spray Both Nostrils At Bedtime     carbidopa-levodopa  1 tablet Oral 4x Daily     gabapentin  100 mg Oral BID     lidocaine  1 patch Transdermal Q24H     metoprolol  12.5 mg Oral BID     nystatin   Topical BID     olopatadine  1 drop Both Eyes BID     lidocaine   Transdermal Q24h     lidocaine   Transdermal Q8H       Data     Recent Labs  Lab 10/11/17  0535 10/10/17  0535 10/09/17  0555  10/07/17  1516   WBC 8.5 8.8 8.0  < > 11.9*   HGB 12.0 11.7 11.9  < > 13.5   MCV 91 91 92  < > 93   * 119* 114*  < > 165    141 143  < > 141   POTASSIUM 3.0* 3.0* 3.3*  < > 3.7   CHLORIDE 112* 113* 114*  < > 108   CO2 23 20 19*  < > 27   BUN 8 11 15  < > 24   CR 0.59 0.55 0.56  < > 0.68   ANIONGAP 9 8 10  < > 6   BAKARI 8.0* 7.5* 8.0*  < > 9.2   GLC 78 76 77  < > 129*   ALBUMIN  --  2.5*  --   --  3.8   PROTTOTAL  --  5.1*  --   --  7.2   BILITOTAL  --  0.4  --   --  0.5   ALKPHOS  --  41  --   --  55   ALT  --  <6  --   --  9   AST  --  10  --   --  17   LIPASE  --   --   --   --  199   < > = values in this interval not displayed.  Lactic Acid   Date Value Ref Range Status   12/14/2015 0.9 0.4 - 2.0 mmol/L Final   12/12/2015 1.6 0.4 - 2.0 mmol/L Final   12/12/2015 2.2 (H) 0.4 - 2.0 mmol/L Final       No results found for this or any previous visit (from the past 24 hour(s)).

## 2017-10-11 NOTE — PLAN OF CARE
Problem: Patient Care Overview  Goal: Plan of Care/Patient Progress Review  Outcome: No Change  Patient continues to c/o intermittent abdominal cramping, she's rating pain 3-5/10. She was given Dilaudid IV at the beginning of the shift per her request and Tylenol at bedtime. Patient has shown signs of confusion as shift progressed, asking where we were and repeating the same questions to me a few times. VSS, afebrile. Bowel sounds active. Abdomen remains tender to palpation. Patient has denied nausea. Oral intake is poor despite encouragement. Patient has been continent of 1 smear and 1 small BM. Stool continues to be loose, watery and brown in color. She's voided twice this shift, she was incontinent once. Urine is arben. Periarea continues to be pinkish red, Nystatin being applied. Buttocks continues to be red but blanchable, barrier cream being applied. TQ2H. Quarter size healed area on left lateral foot. Patient was up in the chair for most of the shift. Pressure relieving chair cushion obtained for patient for when she's in the chair. Assist of 2 with walker to commode and to bed. Patient states she'd do better walking with her shoes on, which we don't have here. Alarm in place. Using call light appropriately but also calling out at times. IVF @ [facility]. IV antibiotic continues.    Face to face report given with opportunity to observe patient.    Report given to Yuriy Jiang   10/10/2017  11:21 PM

## 2017-10-11 NOTE — PROGRESS NOTES
10/11/17 0900   Quick Adds   Type of Visit Initial PT Evaluation   Living Environment   Lives With facility resident   Living Arrangements extended care facility   Home Accessibility no concerns   Number of Stairs to Enter Home 0   Number of Stairs Within Home 0   Transportation Available agency transportation   Living Environment Comment Lives at [facility]   Self-Care   Usual Activity Tolerance fair   Current Activity Tolerance fair   Equipment Currently Used at Home walker, rolling   Functional Level Prior   Ambulation 3-->assistive equipment and person   Transferring 3-->assistive equipment and person   Toileting 3-->assistive equipment and person   Bathing 3-->assistive equipment and person   Cognition 1 - attention or memory deficits   Fall history within last six months yes   Number of times patient has fallen within last six months 1   Which of the above functional risks had a recent onset or change? ambulation   Prior Functional Level Comment Reports she walks short distances at [facility] with assistance. She wants to do PT when she gets back   General Information   Onset of Illness/Injury or Date of Surgery - Date 10/07/17   Referring Physician Dr. Do   Pertinent History of Current Problem (include personal factors and/or comorbidities that impact the POC) Pt hosptitalized c Collitis. PMH includes Parkinsons, HTN, hyperlipidemia, CAD/GERD, CVA   Precautions/Limitations fall precautions   Weight-Bearing Status - LLE weight-bearing as tolerated   Weight-Bearing Status - RLE weight-bearing as tolerated   Cognitive Status Examination   Orientation orientation to person, place and time   Level of Consciousness alert   Follows Commands and Answers Questions 100% of the time   Personal Safety and Judgment at risk behaviors demonstrated   Memory intact   Pain Assessment   Patient Currently in Pain Yes, see Vital Sign flowsheet  (c/o abdominal pain)   Integumentary/Edema   Integumentary/Edema no  deficits were identifed   Posture    Posture Kyphosis;Protracted shoulders   Range of Motion (ROM)   ROM Quick Adds No deficits were identified   Strength   Strength Comments Gross BLE weakness 3+ to 4-/5   Transfer Skills   Transfer Transfer Skill: Sit to Stand   Transfer Skill:  Sit to Stand   Level of Emily: Sit/Stand minimum assist (75% patients effort)   Physical Assist/Nonphysical Assist: Sit/Stand 1 person assist   Weightbearing Restrictions: Sit/Stand full weight-bearing   Assistive Device for Transfer: Sit/Stand rolling walker   Gait   Gait Gait Skill   Gait Skills   Level of Emily: Gait minimum assist (75% patients effort)   Physical Assist/Nonphysical Assist: Gait 1 person + 1 person to manage equipment   Weight-Bearing Restrictions: Gait full weight-bearing   Assistive Device for Transfer: Gait rolling walker   Gait Distance 50 feet   Balance   Balance other (describe)   Sit-to-Stand Balance poor balance   Standing Balance: Static poor balance   Standing Balance: Dynamic poor balance   Balance Quick Add Sit to stand balance;Standing balance: static;Standing balance: dynamic   Sensory Examination   Sensory Perception no deficits were identified   Coordination   Coordination no deficits were identified   Muscle Tone   Muscle Tone no deficits were identified   General Therapy Interventions   Planned Therapy Interventions balance training;bed mobility training;gait training;neuromuscular re-education;strengthening;transfer training   Clinical Impression   Criteria for Skilled Therapeutic Intervention yes, treatment indicated   PT Diagnosis Weakness, impaired balance, impaired gait, decreased activity tolerance   Influenced by the following impairments Weakness, impaired balance, impaired gait, decreased activity tolerance   Functional limitations due to impairments Decreased safety and independence c functional mobility, fall risk   Clinical Presentation Stable/Uncomplicated   Clinical  "Presentation Rationale Currently stable   Clinical Decision Making (Complexity) Low complexity   Therapy Frequency` daily   Predicted Duration of Therapy Intervention (days/wks) 7 days   Anticipated Discharge Disposition Long Term Care Facility  (With PT)   Risk & Benefits of therapy have been explained Yes   Patient, Family & other staff in agreement with plan of care Yes   [facility] TM \"6 Clicks\"   2016, Trustees of [facility], under license to [employer].  All rights reserved.   6 Clicks Short Forms Basic Mobility Inpatient Short Form   [facility]  \"6 Clicks\" V.2 Basic Mobility Inpatient Short Form   1. Turning from your back to your side while in a flat bed without using bedrails? 3 - A Little   2. Moving from lying on your back to sitting on the side of a flat bed without using bedrails? 2 - A Lot   3. Moving to and from a bed to a chair (including a wheelchair)? 3 - A Little   4. Standing up from a chair using your arms (e.g., wheelchair, or bedside chair)? 3 - A Little   5. To walk in hospital room? 3 - A Little   6. Climbing 3-5 steps with a railing? 2 - A Lot   Basic Mobility Raw Score (Score out of 24.Lower scores equate to lower levels of function) 16   Total Evaluation Time   Total Evaluation Time (Minutes) 15     "

## 2017-10-11 NOTE — PLAN OF CARE
Problem: Patient Care Overview  Goal: Plan of Care/Patient Progress Review  Outcome: Improving  Patient A&O, forgetful at times. VSS. Remains Afebrile. HR 50s-60s. BP stable. RR 20, non labored, lungs clear bilaterally, diminished in bases. O2 sats >92% on RA. Pt continues to report abdominal pain/cramping and intermittent nausea, IV Zofran and 0.5mg IV Dilaudid given once this shift. Pt slept through rest of night with no further complaints. No loose stools this shift. Incontinent of urine. Turn/repo q. 2 hours. Nystatin cream to perineal area.

## 2017-10-11 NOTE — PLAN OF CARE
Problem: Patient Care Overview  Goal: Individualization & Mutuality  Outcome: Improving  Patient not having any more diarrhea today at all. Complains of some abdominal pain at times but lasts only a short amount of time. IV lock intact . Flushed easily. Ambulated in hallway a couple of times. Had to stop and take rest periods.  Remains alert and pleasantly forgetful at times. BBS clear and diminished in bases .

## 2017-10-11 NOTE — PROGRESS NOTES
Discharge Planner PT   Patient plan for discharge: Back to [facility]  Current status: Ambulated 50ft x4 c FWW and min A with verbal cues. Demonstrates kyphosis, flexed posture, needed cues for safety. Distance limited by weakness and fatigue.Demonstrates c poor balance  Barriers to return to prior living situation: None  Recommendations for discharge: Back to [facility] with PT  Rationale for recommendations: Would benefit from PT for weakness, balance, impaired gait        Entered by: Natividad Joshi 10/11/2017 11:30 AM

## 2017-10-11 NOTE — PLAN OF CARE
Face to face report given with opportunity to observe patient.    Report given to Peggy C., RN Annelyse J. Stromberg   10/11/2017  7:06 AM

## 2017-10-12 ENCOUNTER — APPOINTMENT (OUTPATIENT)
Dept: PHYSICAL THERAPY | Facility: HOSPITAL | Age: 82
DRG: 392 | End: 2017-10-12
Payer: MEDICARE

## 2017-10-12 ENCOUNTER — MEDICAL CORRESPONDENCE (OUTPATIENT)
Dept: HEALTH INFORMATION MANAGEMENT | Facility: HOSPITAL | Age: 82
End: 2017-10-12

## 2017-10-12 ENCOUNTER — APPOINTMENT (OUTPATIENT)
Dept: GENERAL RADIOLOGY | Facility: HOSPITAL | Age: 82
DRG: 392 | End: 2017-10-12
Attending: INTERNAL MEDICINE
Payer: MEDICARE

## 2017-10-12 VITALS
BODY MASS INDEX: 24.67 KG/M2 | DIASTOLIC BLOOD PRESSURE: 71 MMHG | TEMPERATURE: 98 F | HEART RATE: 53 BPM | OXYGEN SATURATION: 95 % | HEIGHT: 62 IN | SYSTOLIC BLOOD PRESSURE: 162 MMHG | WEIGHT: 134.04 LBS | RESPIRATION RATE: 16 BRPM

## 2017-10-12 LAB
ANION GAP SERPL CALCULATED.3IONS-SCNC: 8 MMOL/L (ref 3–14)
BASOPHILS # BLD AUTO: 0.1 10E9/L (ref 0–0.2)
BASOPHILS NFR BLD AUTO: 0.6 %
BUN SERPL-MCNC: 6 MG/DL (ref 7–30)
CALCIUM SERPL-MCNC: 8.2 MG/DL (ref 8.5–10.1)
CHLORIDE SERPL-SCNC: 113 MMOL/L (ref 94–109)
CO2 SERPL-SCNC: 24 MMOL/L (ref 20–32)
CREAT SERPL-MCNC: 0.58 MG/DL (ref 0.52–1.04)
DIFFERENTIAL METHOD BLD: ABNORMAL
EOSINOPHIL # BLD AUTO: 0.2 10E9/L (ref 0–0.7)
EOSINOPHIL NFR BLD AUTO: 2.4 %
ERYTHROCYTE [DISTWIDTH] IN BLOOD BY AUTOMATED COUNT: 13.6 % (ref 10–15)
GFR SERPL CREATININE-BSD FRML MDRD: >90 ML/MIN/1.7M2
GLUCOSE SERPL-MCNC: 81 MG/DL (ref 70–99)
HCT VFR BLD AUTO: 36 % (ref 35–47)
HGB BLD-MCNC: 12.1 G/DL (ref 11.7–15.7)
IMM GRANULOCYTES # BLD: 0.2 10E9/L (ref 0–0.4)
IMM GRANULOCYTES NFR BLD: 2.9 %
LYMPHOCYTES # BLD AUTO: 1.4 10E9/L (ref 0.8–5.3)
LYMPHOCYTES NFR BLD AUTO: 17 %
MAGNESIUM SERPL-MCNC: 1.7 MG/DL (ref 1.6–2.3)
MCH RBC QN AUTO: 30.3 PG (ref 26.5–33)
MCHC RBC AUTO-ENTMCNC: 33.6 G/DL (ref 31.5–36.5)
MCV RBC AUTO: 90 FL (ref 78–100)
MONOCYTES # BLD AUTO: 0.6 10E9/L (ref 0–1.3)
MONOCYTES NFR BLD AUTO: 6.8 %
NEUTROPHILS # BLD AUTO: 5.7 10E9/L (ref 1.6–8.3)
NEUTROPHILS NFR BLD AUTO: 70.3 %
NRBC # BLD AUTO: 0 10*3/UL
NRBC BLD AUTO-RTO: 0 /100
PLATELET # BLD AUTO: 144 10E9/L (ref 150–450)
POTASSIUM SERPL-SCNC: 3.5 MMOL/L (ref 3.4–5.3)
RBC # BLD AUTO: 3.99 10E12/L (ref 3.8–5.2)
SODIUM SERPL-SCNC: 145 MMOL/L (ref 133–144)
WBC # BLD AUTO: 8.1 10E9/L (ref 4–11)

## 2017-10-12 PROCEDURE — 80048 BASIC METABOLIC PNL TOTAL CA: CPT | Performed by: INTERNAL MEDICINE

## 2017-10-12 PROCEDURE — 97530 THERAPEUTIC ACTIVITIES: CPT | Mod: GP

## 2017-10-12 PROCEDURE — 99239 HOSP IP/OBS DSCHRG MGMT >30: CPT | Performed by: INTERNAL MEDICINE

## 2017-10-12 PROCEDURE — A9270 NON-COVERED ITEM OR SERVICE: HCPCS | Mod: GY | Performed by: HOSPITALIST

## 2017-10-12 PROCEDURE — 36415 COLL VENOUS BLD VENIPUNCTURE: CPT | Performed by: INTERNAL MEDICINE

## 2017-10-12 PROCEDURE — 25000132 ZZH RX MED GY IP 250 OP 250 PS 637: Mod: GY | Performed by: HOSPITALIST

## 2017-10-12 PROCEDURE — A9270 NON-COVERED ITEM OR SERVICE: HCPCS | Mod: GY | Performed by: INTERNAL MEDICINE

## 2017-10-12 PROCEDURE — 85025 COMPLETE CBC W/AUTO DIFF WBC: CPT | Performed by: INTERNAL MEDICINE

## 2017-10-12 PROCEDURE — 83735 ASSAY OF MAGNESIUM: CPT | Performed by: INTERNAL MEDICINE

## 2017-10-12 PROCEDURE — 40000718 ZZHC STATISTIC PT DEPARTMENT ORTHO VISIT

## 2017-10-12 PROCEDURE — 25000132 ZZH RX MED GY IP 250 OP 250 PS 637: Mod: GY | Performed by: INTERNAL MEDICINE

## 2017-10-12 RX ORDER — SPIRONOLACTONE 25 MG
12.5 TABLET ORAL DAILY
Status: DISCONTINUED | OUTPATIENT
Start: 2017-10-12 | End: 2017-10-12 | Stop reason: HOSPADM

## 2017-10-12 RX ORDER — ACETAMINOPHEN 500 MG
1000 TABLET ORAL 2 TIMES DAILY PRN
Qty: 360 TABLET | Refills: 1 | DISCHARGE
Start: 2017-10-12 | End: 2018-08-20

## 2017-10-12 RX ORDER — SODIUM CHLORIDE/ALOE VERA
GEL (GRAM) NASAL
Refills: 0 | DISCHARGE
Start: 2017-10-12 | End: 2018-04-02

## 2017-10-12 RX ORDER — CLOPIDOGREL BISULFATE 75 MG/1
75 TABLET ORAL DAILY
Qty: 90 TABLET | Refills: 2 | DISCHARGE
Start: 2017-10-23 | End: 2018-04-02

## 2017-10-12 RX ORDER — ASPIRIN 81 MG/1
81 TABLET, CHEWABLE ORAL DAILY
Qty: 90 TABLET | Refills: 3 | DISCHARGE
Start: 2017-10-23 | End: 2018-01-02

## 2017-10-12 RX ADMIN — METOPROLOL TARTRATE 12.5 MG: 25 TABLET, FILM COATED ORAL at 07:50

## 2017-10-12 RX ADMIN — GABAPENTIN 100 MG: 100 CAPSULE ORAL at 07:51

## 2017-10-12 RX ADMIN — PANTOPRAZOLE SODIUM 40 MG: 40 TABLET, DELAYED RELEASE ORAL at 07:51

## 2017-10-12 RX ADMIN — POTASSIUM CHLORIDE 40 MEQ: 20 TABLET, EXTENDED RELEASE ORAL at 07:55

## 2017-10-12 RX ADMIN — OLOPATADINE HYDROCHLORIDE 1 DROP: 1 SOLUTION/ DROPS OPHTHALMIC at 07:53

## 2017-10-12 RX ADMIN — Medication 12.5 MG: at 09:08

## 2017-10-12 RX ADMIN — NYSTATIN: 100000 CREAM TOPICAL at 09:08

## 2017-10-12 RX ADMIN — CARBIDOPA AND LEVODOPA 1 TABLET: 25; 100 TABLET ORAL at 12:20

## 2017-10-12 RX ADMIN — CARBIDOPA AND LEVODOPA 1 TABLET: 25; 100 TABLET ORAL at 07:50

## 2017-10-12 RX ADMIN — LIDOCAINE 1 PATCH: 50 PATCH TOPICAL at 07:51

## 2017-10-12 NOTE — PLAN OF CARE
Problem: Patient Care Overview  Goal: Plan of Care/Patient Progress Review  Outcome: Improving  Patient A&O. VSS. Denied abdominal pain this shift. No nausea or loose stools. Incontinent of urine. Perineal area pink, nystatin cream applied. Pt repositioned q. 2hrs in bed. Expresses wish for increased PT/OT at [facility], states she is supposed to wear a brace to lower extremity while ambulating but does not feel it is being utilized properly and this is why she has been more sore and left in bed more often lately. She would like to discuss this with our PT and MD today.

## 2017-10-12 NOTE — DISCHARGE SUMMARY
[facility]    Discharge Summary  Hospitalist    Date of Admission:  10/7/2017  Date of Discharge:  10/12/2017  Discharging Provider: Wu Bunch  Date of Service (when I saw the patient): 10/12/17    Discharge Diagnoses   Sigmoid colitis and small bowel enteritis, possibly infectious.  Generalized weakness and gait disturbance with limited balance.  Established post polio syndrome  Established large hiatal hernia.  Established Gastroesophageal reflux  Established dyslipidemia.  Established coronary disease and prior CVA.      History of Present Illness   Britney Schaeffer is a 87 year old woman who was admitted on 10/7/2017. Her history is signficant for hypertension, Parkinson's disease, TIA, and CAD  She was sent to the ED with a complaint of abdominal pain, nausea vomiting and diarrhea; she had noted constipation and abdominal pain for couple of days and received some extra stool softeners. She had a large bowel movement the morning of admission followed by nausea vomiting and diarrhea.  The patient had diffuse abdominal pain, crampy to sharp in nature.    Hospital Course   Britney Schaeffer was admitted on 10/7/2017.  The following problems were addressed during her hospitalization:     1.  Colitis:     Slow resolution of symptoms including nausea, vomiting, and abdominal pain.  She had persistent abdominal pain, although in the latter several days of hospitalization.  Impression was more of abdominal wall pain then of true intestinal viscus abnormality or any peritoneal irritation.  Preemptively treated with antibiotics with subsequent pro calcitonin being low, suggesting at least resolution of any bacterial process. Total of 3 days of antibiotic treatment. May be reasonable to consider a possible viral colitis or a noninfectious generally pancolitis, although more marked in colon.  in any event, she has shown slow although real improvement largely with supportive management. Doubt ischemia  or other surgical concern.     She initially presented to ER with N/V and some diarrhea along with abdominal pain.  She had been having some constipation issues which is not uncommon.  She had taken extra stool softener  and a big BM followed by the above symptoms.  No fevers.  WBC mildly elevated.  CT done which shows suspicious colitis wit thickened walls of the sigmoid colon. As above, possible mass but a lot of artifact from the KE.  Also some small gut enteritis findings R lower quadrant.  Stool sent off and C diff negative. Others pending. Lives in [place]  no outbreaks there.  Was put on cipro flagyl on admission.      2.  Possible proximal sigmoid mass  Possible mid to high sigmoid mass. Somewhat difficult to distinguish from signal artifact from her total hip prosthesis.  Depending on her course, could consider further evaluation such as endoscopy, although it is unlikely that any finding result in a meaningful intervention even if malignancy is similar process were identified.   2.  Parkinson's disease:   Continue Sinemet.  Has tremor but otherwise relatively stable.      3.  Primary Hypertension:   Continuing metoprolol.  BP stable.  Appears euvolemic at the time of discharge.      4.  Hyperlipidemia:   Has chronically been treated with Lipitor.  Most recent lipid panel showed  Quite low.  Lipid levels.  At her age its increasingly less likely that statins.  Provide  Benefit greater than risk and therefore Lipitor discontinued. If indicated later, a lower dose might be considered      5.  Coronary artery disease:   Have held aspirin and Plavix due to increased risk of bleeding through this hospitalization.  Likely any GI bleeding was from her enteritis or colitis.  Have requested resumption of both of these in approximately 10 days.  Continued beta blockers with spironolactone resumed at the day of discharge .Her last stent was greater than 1 year ago in 2015, and Plavix unlikely to be needed on this basis,  "although with a background history of stroke resumption not unreasonable.  If she does have further episodes of bleeding.  However, discontinuation of both her antiplatelet agents would be quite reasonable..      6.  GERD:   Changed IV to oral Protonix during inpatient stay. Has a tremendous hiatal hernia with most of her stomach in  thoracic cavity.      7.  History of CVA:   As above resumed aspirin and Plavix in approximately 10 days. No neurological issues.      8.  Generalized weakness.  Suspect a degree of this is been present for longer than this hospitalization. Likely combination of generalized deconditioning.  She also carries a history of post polio syndrome.  She alsorelates some concern with left hip pain especially in the region of her total hip replacement. Evaluated by PT who recommended continued physical therapy.  This arranged to be continued after discharge.    Wu Bunch    Significant Results and Procedures   CT of abdomen/pelvis showing both a localized area of enteritis as well as sigmoid colitis.  Possible proximal sigmoid mass.      Code Status   DNR / DNI       Primary Care Physician   Mario Fofana    Vital signs:  Temp: 98  F (36.7  C) Temp src: Tympanic BP: 162/71   Heart Rate: 64 Resp: 16 SpO2: 95 % O2 Device: None (Room air)   Height: 157.5 cm (5' 2\") Weight: 60.8 kg (134 lb 0.6 oz)  Estimated body mass index is 24.52 kg/(m^2) as calculated from the following:    Height as of this encounter: 1.575 m (5' 2\").    Weight as of this encounter: 60.8 kg (134 lb 0.6 oz).        Awake, alert, frail woman lying in bed, a medical wards.  Speech is clear.  Pleasant and interactive.HEENT: Pupils equal, conjugate. No icterus or nystagmus. Oral mucosa moist. No facial asymmetry.   Neck: Supple, jugular veins not elevated. Trachea midline   Chest: No chest wall movement asymmetry. Aeration preserved to bases. Accessory muscles not in use. Expiratory time not increased. No tidal wheezes. " Few scattered rhonchi. No discrete crackles.   Cardiac: PMI not displaced. S1, S2 unremarkable. No S3, S4. P2 not accentuated. No murmurs. Carotid upstroke preserved. Carotid amplitude preserved.   Abdomen: Soft. No deep palpation or percussion tenderness. Minimal palpation tenderness isolated to right mid abdominal wall. No distention. Normoactive bowel sounds. Liver and spleen not increased in size. No bruits, masses, or pulsations.   Extremities: No lower extremity edema. Extremities warm distally.No eccymoses, clubbing.   Neurologic: Mental state above. Motor 5/5 and bilaterally equal. DTR 2/4 and bilaterally equal.     Discharge Disposition   Discharged to nursing home  Condition at discharge: Stable    Consultations This Hospital Stay   PHYSICAL THERAPY ADULT IP CONSULT  PHYSICAL THERAPY ADULT IP CONSULT  OCCUPATIONAL THERAPY ADULT IP CONSULT    Time Spent on this Encounter   I, Wu Bunch, personally saw the patient today and spent greater than 30 minutes discharging this patient.    Discharge Orders     General info for SNF   Length of Stay Estimate: Long Term Care  Condition at Discharge: Improving  Level of care:skilled   Rehabilitation Potential: Fair  Admission H&P remains valid and up-to-date: Yes  Recent Chemotherapy: N/A  Use Nursing Home Standing Orders: Yes     Mantoux instructions   Give two-step Mantoux (PPD) Per Facility Policy Yes     Reason for your hospital stay   Britney Schaeffer is a 87 year old woman who was admitted on 10/7/2017. Her history is signficant for hypertension, Parkinson's disease, TIA, and CAD  She was sent to the ED with a complaint of abdominal pain, nausea vomiting and diarrhea after several days' constipation and abdominal pain; she received some extra stool softeners. She had a large bowel movement the morning of admission followed by nausea vomiting and diarrhea.  The patient had diffuse crampy abdominal pain. Abdominal CT showed colitis with regions of colon  and small bowel involved.  She was treated preemptively use for an infectious process, although no specific cause identified.  She slowly improved been able to tolerate a soft diet by the time of discharge.  In other respects, she showed diffuse weakness and gait disturbance.  Physical therapy will be continued.  Aspirin/Plavix to be resumed in approximately 10 days as she had some  Occult blood in her stool.  Imaging also showed the possibility of a sigmoid mass, although whether further investigation would be fruitful is uncertain and deferred to outpatient reevaluation.     Activity - Up with assistive device     Activity - Up with nursing assistance     DNR/DNI     Physical Therapy Adult Consult   Evaluate and treat as clinically indicated.    Reason:  generalized weakness, gait disturbance, balance limitation. Chronic weakness related to post-polio syndrome     Occupational Therapy Adult Consult   Evaluate and treat as clinically indicated.    Reason:  generalized weakness, gait disturbance, balance limitation. Chronic weakness related to post-polio syndrome     Fall precautions     Advance Diet as Tolerated   Follow this diet upon discharge: Orders Placed This Encounter     Begin with soft diet, advanding to Regular Diet as tolerated       Discharge Medications   Current Discharge Medication List      CONTINUE these medications which have CHANGED    Details   !! acetaminophen (TYLENOL) 500 MG tablet Take 2 tablets (1,000 mg) by mouth 2 times daily as needed for mild pain As needed  Qty: 360 tablet, Refills: 1    Associated Diagnoses: Health care maintenance      aspirin 81 MG chewable tablet Take 1 tablet (81 mg) by mouth daily  Qty: 90 tablet, Refills: 3    Associated Diagnoses: Health care maintenance      saline nasal (AYR SALINE) GEL topical gel Apply into each naris At Bedtime  Refills: 0    Associated Diagnoses: Chronic cough      clopidogrel (PLAVIX) 75 MG tablet Take 1 tablet (75 mg) by mouth  daily  Qty: 90 tablet, Refills: 2    Associated Diagnoses: Coronary artery disease involving native coronary artery of native heart without angina pectoris       !! - Potential duplicate medications found. Please discuss with provider.      CONTINUE these medications which have NOT CHANGED    Details   !! ACETAMINOPHEN PO Take 1,000 mg by mouth daily as needed for pain IN ADDITION TO BID DOSE      !! carbidopa-levodopa (SINEMET)  MG per tablet Take 2 tablets by mouth every morning      senna-docusate (SENOKOT-S;PERICOLACE) 8.6-50 MG per tablet Take 1 tablet by mouth 2 times daily      Multiple Vitamins-Minerals (PRESERVISION AREDS) TABS TAKE 1 TABLET BY MOUTH 2 TIMES DAILY  Qty: 120 tablet, Refills: 5    Associated Diagnoses: Health care maintenance      !! carbidopa-levodopa (SINEMET)  MG per tablet TAKE 1 TABLET THREE TIMES A DAY  Qty: 270 tablet, Refills: 1    Associated Diagnoses: Parkinsons (H)      chlorhexidine (PERIDEX) 0.12 % solution Swish and spit 15 mLs in mouth 2 times daily      pantoprazole (PROTONIX) 40 MG EC tablet TAKE 1 TABLET DAILY  Qty: 90 tablet, Refills: 1    Associated Diagnoses: Gastroesophageal reflux disease, esophagitis presence not specified      lidocaine (LIDODERM) 5 % Patch APPLY UP TO 3 PATCHES TO PAINFUL AREA AT ONCE FOR UP TO 12 HOURS WITHIN A 24 HOURS PERIOD. REMOVE AFTER 12 HOURS  Qty: 270 patch, Refills: 3    Associated Diagnoses: Polio      gabapentin (NEURONTIN) 100 MG capsule TAKE 1 CAPSULE TWICE A DAY  Qty: 180 capsule, Refills: 2    Associated Diagnoses: Neuralgia and neuritis      spironolactone (ALDACTONE) 25 MG tablet Take 0.5 tablets (12.5 mg) by mouth daily  Qty: 45 tablet, Refills: 3    Associated Diagnoses: Left heart failure (H)      MYRBETRIQ 50 MG 24 hr tablet Take one (1) tablet BY MOUTH daily  Qty: 31 tablet, Refills: 0    Associated Diagnoses: OAB (overactive bladder)      sodium fluoride dental gel (PREVIDENT) 1.1 % GEL topical gel Apply 1  applicator to affected area At Bedtime      metoprolol (LOPRESSOR) 25 MG tablet Take 0.5 tablets (12.5 mg) by mouth 2 times daily  Qty: 90 tablet, Refills: 3    Associated Diagnoses: NSTEMI (non-ST elevated myocardial infarction) (H)      calcium carbonate (TUMS) 500 MG chewable tablet Take 2 chew tab by mouth 2 times daily       olopatadine (PATANOL) 0.1 % ophthalmic solution INSTILL 1 DROP INTO BOTH EYES 2 TIMES DAILY  Qty: 3 Bottle, Refills: 3    Associated Diagnoses: Allergic conjunctivitis, unspecified laterality      Cholecalciferol (VITAMIN D3) 2000 UNITS CAPS Take 2,000 Units by mouth daily  Qty: 90 capsule, Refills: 3    Associated Diagnoses: Osteoporosis      loratadine (ALLERGY RELIEF) 10 MG tablet Take 1 tablet (10 mg) by mouth daily  Qty: 90 tablet, Refills: 3    Associated Diagnoses: Allergic rhinitis, unspecified allergic rhinitis type      polyethylene glycol (MIRALAX) powder Take 17 g by mouth daily  Qty: 500 g, Refills: 3    Associated Diagnoses: Constipation      artificial saliva, BIOTENE MT, (BIOTENE MT) SOLN Swish and spit 5 mLs in mouth as needed for dry mouth  Qty: 3 Bottle, Refills: 3    Associated Diagnoses: Dry mouth      furosemide (LASIX) 20 MG tablet Take 1 tablet (20 mg) by mouth as needed  Qty: 90 tablet, Refills: 1    Associated Diagnoses: Edema      polyethylene glycol 0.4%- propylene glycol 0.3% (SYSTANE) 0.4-0.3 % SOLN ophthalmic solution Place 2 drops into both eyes 4 times daily as needed for dry eyes  Qty: 3 Bottle, Refills: 1    Associated Diagnoses: Allergic conjunctivitis, unspecified laterality      Lanolin (CORONA MULTI-PURPOSE) 30 % OINT Externally apply 1 Application topically 2 times daily  Qty: 3 Tube, Refills: 3    Associated Diagnoses: Skin irritation      order for DME Equipment being ordered: neoprene knee sleeve  Qty: 1 Device, Refills: 0    Associated Diagnoses: Primary osteoarthritis involving multiple joints      AMOXICILLIN PO Take 500 mg by mouth as needed  (GIVE ONE HOUR PRIOIR TO DENTAL APTS FOR ISCHEMIC HEART DISEASE) Give 4 tablets as needed       ondansetron (ZOFRAN) 4 MG tablet TAKE 1 TABLET BY MOUTH EVERY 8 HOURS AS NEEDED FOR NAUSEA AND VOMITING  Qty: 30 tablet, Refills: 2    Associated Diagnoses: Nausea      ketoconazole (NIZORAL) 2 % shampoo Wash hair, let set for 5 minutes, rinse 1 time per day every Sunday  Qty: 120 mL, Refills: 2    Associated Diagnoses: Seborrheic dermatitis      nystatin (MYCOSTATIN) cream APPLY TO AFFECTED AREA 2 TIMES DAILY AS NEEDED  Qty: 30 g, Refills: 2    Associated Diagnoses: Candidiasis of skin and nails      guaiFENesin-codeine (GUAIFENESIN AC) 100-10 MG/5ML SOLN TAKE 2 TEASPOONFULS (10ML) BY MOUTH EVERY 4 HOURS AS NEEDED FOR COUGH  Qty: 473 mL, Refills: 0    Associated Diagnoses: Cough       !! - Potential duplicate medications found. Please discuss with provider.      STOP taking these medications       atorvastatin (LIPITOR) 40 MG tablet Comments:   Reason for Stopping:             Allergies   Allergies   Allergen Reactions     Ambien      Zolpidem Tartrate     Tramadol      Data   Most Recent 3 CBC's:  Recent Labs   Lab Test  10/12/17   0516  10/11/17   0535  10/10/17   0535   WBC  8.1  8.5  8.8   HGB  12.1  12.0  11.7   MCV  90  91  91   PLT  144*  119*  119*      Most Recent 3 BMP's:  Recent Labs   Lab Test  10/12/17   0516  10/11/17   0535  10/10/17   0535   NA  145*  144  141   POTASSIUM  3.5  3.0*  3.0*   CHLORIDE  113*  112*  113*   CO2  24  23  20   BUN  6*  8  11   CR  0.58  0.59  0.55   ANIONGAP  8  9  8   BAKARI  8.2*  8.0*  7.5*   GLC  81  78  76     Most Recent 2 LFT's:  Recent Labs   Lab Test  10/10/17   0535  10/07/17   1516   AST  10  17   ALT  <6  9   ALKPHOS  41  55   BILITOTAL  0.4  0.5     Most Recent INR's and Anticoagulation Dosing History:  Anticoagulation Dose History     Recent Dosing and Labs Latest Ref Rng & Units 3/25/2013 3/16/2015 5/6/2015 12/11/2015    INR 0.80 - 1.20 1.09 1.02 1.13 1.26(H)         Most Recent 3 Troponin's:  Recent Labs   Lab Test  12/13/15   0702  12/12/15   1252  12/12/15   1040   TROPI  0.259*  0.505*  0.515*     Most Recent Cholesterol Panel:  Recent Labs   Lab Test  12/12/15   0758   CHOL  83   LDL  17   HDL  53   TRIG  65     Most Recent 6 Bacteria Isolates From Any Culture (See EPIC Reports for Culture Details):  Recent Labs   Lab Test  10/07/17   2349  10/07/17   1535  06/08/17   1355  12/11/15   2021  12/11/15   1840  12/11/15   1836   CULT  No MRSA isolated  No Salmonella, Shigella, Campylobacter, E. coli O157, Aeromonas, or Plesiomonas isolated.  No Yersinia enterocolitica isolated    50,000 to 100,000 colonies/mL Escherichia coli ESBL  Critical Value called to and read back by Rhea Jacobson 0803 6/12/17 by Ivone Herman  *  No MRSA isolated  2 of 2 bottles Klebsiella pneumoniae  Critical Value: Gram stain bottle one called to and read back by Tierney Ramírez at 0744 on 12/12/15 by SMW  Critical Value: Gram stain bottle two called to and read back by Danny Manriquez at   0944 on 12/12/15 by SMW  *  Duplicate request  Canceled, Test credited       Most Recent TSH, T4 and A1c Labs:  Recent Labs   Lab Test  03/13/15   1547   TSH  2.44     Results for orders placed or performed during the hospital encounter of 10/07/17   XR Abdomen 2 Views    Narrative    EXAM:XR ABDOMEN 2 VW     CLINICAL HISTORY: Patient Age  87 years Additional clinical info:  n/v/d, r/o obstruction    COMPARISON: 7/2/2015      TECHNIQUE: 2 views      Impression    IMPRESSION: Gas in normal caliber loops of large and small bowel. No  free air. Very large esophageal hiatal hernia. Gas is seen in the  rectum. Elevated right hemidiaphragm. Right KE. Implanted electrode  with electrode pack projecting over the left lower quadrant. Vascular  calcifications.    LUCHO LUJAN MD   CT Abdomen Pelvis w Contrast    Narrative    EXAMINATION: CT ABDOMEN PELVIS W CONTRAST, 10/7/2017 7:35 PM    HISTORY: r/o obstruction  GI bleed.    COMPARISON: 8/9/2016    TECHNIQUE:  Helical CT images from the lung bases through the  symphysis pubis were obtained with IV contrast. Contrast dose: isovue  300, 100 ml    FINDINGS:    Pancreatico hepatobiliary: 1.3 cm peripherally enhancing area in the  inferior right hepatic lobe, adjacent to the gallbladder fossa. This  is best seen on axial series 2 images 41 through 5. There is mild  associated bile duct dilatation associated with this. This was present  on the ninth 2016 exam but is slightly more conspicuous today due to  differences in the phase of contrast. It likely represents transient  perfusion anomaly.    Gallbladder is contracted. Bile ducts are slightly prominent, likely  normal for age.. Spleen appears normal.    Genitourinary:  Areas of cortical scarring in both kidneys. 2 small,  approximately 4 mm nonobstructing stones in lower pole of the left  kidney. The adrenal glands, kidneys, ureters, and bladder otherwise  appear normal. Bladder is obscured by metallic artifact from a right  KE. Uterus and adnexa appear grossly normal.    Gastrointestinal:  Huge hiatal hernia. Most of the stomach is  herniated above the diaphragm on the left posteriorly. The upper  portion of the stomach is incompletely visualized.    Mild thickening and enhancement of the wall colon extending from the  descending colon down to the sigmoid colon. The wall of the sigmoid  colon appears more irregular but there is a more focal area of  thickening and irregularity of the upper to midportion of the sigmoid,  measuring approximately 3 cm in length, best seen on axial series 2  images 102 through 105. The findings of the colon are suspicious for  colitis but this focal area of thickening of the sigmoid could be a  mass. There is a small amount of high density material in the lower  sigmoid near the rectosigmoid junction best seen on axial series 2  image 112. This could represent acute blood.    There are several  loops of small bowel in the right lower quadrant  which demonstrates soft tissue stranding around the suspicious for  enteritis.    No evidence of obstruction. No fluid collection identified and no free  air identified    Lymph nodes:  No enlarged lymph nodes are identified but there is mild  mesenteric stranding in the mesentery in the right lower quadrant  surrounding several loops of small bowel.    Vasculature:  Very dense vascular calcifications including coronary  artery calcifications. No aneurysm identified. Very dense  calcifications at the origin of the renal arteries.    Lung bases: Atelectasis left lower lobe around the large esophageal  hiatal hernia. No infiltrate identified. Small left pleural effusion    Musculoskeletal: Advanced degenerative arthritis throughout the spine  and pelvis. The bones are demineralized. Right KE causes artifact  that obscures detail in the pelvis.. Electrode pack embedded in the  subcutaneous fat of the left buttock stimulator wires anterior to the  mid sacrum. Benign-appearing calcification in the right breast.      Impression    IMPRESSION:   1. There is bowel wall thickening and enhancement of the descending  colon, especially the sigmoid colon, suspicious for colitis with  possible mass in the upper to mid sigmoid colon. Portions of the  sigmoid colon and rectum are difficult to visualize due to metallic  artifact from right KE.  2. Small amount of high density material within the lumen of the lower  sigmoid and rectum, suspicious for acute hemorrhage  3. Probable focal enteritis several loops of small bowel in the right  lower quadrant  4. Other incidental findings as described.    Findings were discussed with Dr. Carlos Doll at 2015 hours on  10/7/2017    LUCHO LUJAN MD   XR Abdomen 2 Views    Narrative    PROCEDURE:  XR ABDOMEN 2 VW    HISTORY:  Colitis, diarrhea.    TECHNIQUE:  Upright and supine views of the abdomen were obtained.    COMPARISON:  CT abdomen  and pelvis 10/7/2017    FINDINGS:     There is a nonspecific, but nonobstructive bowel gas pattern. No free  air is seen. Stimulator device is present in the sacrum. Right hip  prosthesis is noted. There are degenerative changes in the spine.      Impression    IMPRESSION:    Nonspecific but nonobstructive bowel gas pattern.    CYNDI JEFFERSON MD   XR Abdomen Series Portable    Narrative    XR ABDOMEN SERIES PORTABLE   10/12/2017 9:03 AM    History:Female,age  87 years, colitis    Comparison: 10/9/2017    FINDINGS: Two views are submitted. Moderate volume of gas is seen  within the large and small bowel. Gaseous distention has decreased  when compared to the earlier study. There is no evidence of free air  or evidence of obstruction.       Impression    IMPRESSION:  1.   Moderate volume of gas and stool, no acute abnormality.    MARISA REYES MD

## 2017-10-12 NOTE — PLAN OF CARE
Patient discharged at 2:02 PM via wheel chair accompanied by [facility] . Prescriptions sent with patient to fill . All belongings sent with patient.     Discharge instructions reviewed with patient . Listed belongings gathered and returned to patient.     Patient discharged to [facility] .   Report called to Nursing Home:  Daysi SILVERIO RN     Core Measures and Vaccines  Core Measures applicable during stay: No. If yes, state diagnosis: NAPneumonia and Influenza given prior to discharge, if indicated: Yes Pneumonia was given but not flu shot .     Surgical Patient   Surgical Procedures during stay: NA  Did patient receive discharge instruction on wound care and recognition of infection symptoms? N/A    MISC  Follow up appointment made:  No, Nursing home will .   Home and hospital aquired medications returned to patient: Yes  Patient reports pain was well managed at discharge: Yes

## 2017-10-12 NOTE — PROGRESS NOTES
Spoke with daughter, Joslyn in regards to discharge.  She is agreeable.  Requested transportation be arranged.  She is aware of possible out of pocket fee for this service.  [facility] arranged for 2 pm.  Updated Britney and Cookie RN.  Left message for Evangelist at [facility] and faxed discharge paper work.      Name: Britney Schaeffer    MRN#: [id number]    Reason for Hospitalization: Pancolitis (H) [K51.00]  Vomiting and diarrhea [R11.10, R19.7]    MARILEE: average    Discharge Date: 10/12/2017    Patient / Family response to discharge plan: agreeable    Follow-Up Appt: No future appointments.    Other Providers (Care Coordinator, [facility], PCA services etc): Yes: see above    Discharge Disposition: nursing home- return to [facility]    Cynthia Squires

## 2017-10-12 NOTE — PLAN OF CARE
Face to face report given with opportunity to observe patient.    Report given to Peggy C., RN Annelyse J. Stromberg   10/12/2017  7:02 AM

## 2017-10-17 ENCOUNTER — MEDICAL CORRESPONDENCE (OUTPATIENT)
Dept: HEALTH INFORMATION MANAGEMENT | Facility: HOSPITAL | Age: 82
End: 2017-10-17

## 2017-10-22 ENCOUNTER — APPOINTMENT (OUTPATIENT)
Dept: CT IMAGING | Facility: HOSPITAL | Age: 82
End: 2017-10-22
Attending: EMERGENCY MEDICINE
Payer: MEDICARE

## 2017-10-22 ENCOUNTER — APPOINTMENT (OUTPATIENT)
Dept: GENERAL RADIOLOGY | Facility: HOSPITAL | Age: 82
End: 2017-10-22
Attending: EMERGENCY MEDICINE
Payer: MEDICARE

## 2017-10-22 ENCOUNTER — HOSPITAL ENCOUNTER (OUTPATIENT)
Facility: HOSPITAL | Age: 82
Setting detail: OBSERVATION
Discharge: NURSING, REHAB OR LONG-TERM CARE | End: 2017-10-24
Attending: EMERGENCY MEDICINE | Admitting: HOSPITALIST
Payer: MEDICARE

## 2017-10-22 ENCOUNTER — MEDICAL CORRESPONDENCE (OUTPATIENT)
Dept: HEALTH INFORMATION MANAGEMENT | Facility: HOSPITAL | Age: 82
End: 2017-10-22

## 2017-10-22 DIAGNOSIS — J18.9 PNEUMONIA OF LEFT LOWER LOBE DUE TO INFECTIOUS ORGANISM: Primary | ICD-10-CM

## 2017-10-22 PROBLEM — R10.9 ABDOMINAL PAIN: Status: ACTIVE | Noted: 2017-10-22

## 2017-10-22 LAB
ALBUMIN SERPL-MCNC: 3.4 G/DL (ref 3.4–5)
ALBUMIN UR-MCNC: 10 MG/DL
ALP SERPL-CCNC: 55 U/L (ref 40–150)
ALT SERPL W P-5'-P-CCNC: 12 U/L (ref 0–50)
AMYLASE SERPL-CCNC: 32 U/L (ref 30–110)
ANION GAP SERPL CALCULATED.3IONS-SCNC: 8 MMOL/L (ref 3–14)
APPEARANCE UR: CLEAR
AST SERPL W P-5'-P-CCNC: 13 U/L (ref 0–45)
BACTERIA #/AREA URNS HPF: ABNORMAL /HPF
BASOPHILS # BLD AUTO: 0.1 10E9/L (ref 0–0.2)
BASOPHILS NFR BLD AUTO: 0.4 %
BILIRUB SERPL-MCNC: 0.5 MG/DL (ref 0.2–1.3)
BILIRUB UR QL STRIP: NEGATIVE
BUN SERPL-MCNC: 14 MG/DL (ref 7–30)
CALCIUM SERPL-MCNC: 8.8 MG/DL (ref 8.5–10.1)
CAOX CRY #/AREA URNS HPF: ABNORMAL /HPF
CHLORIDE SERPL-SCNC: 108 MMOL/L (ref 94–109)
CO2 SERPL-SCNC: 25 MMOL/L (ref 20–32)
COLOR UR AUTO: YELLOW
CREAT SERPL-MCNC: 0.69 MG/DL (ref 0.52–1.04)
DIFFERENTIAL METHOD BLD: ABNORMAL
EOSINOPHIL # BLD AUTO: 0.1 10E9/L (ref 0–0.7)
EOSINOPHIL NFR BLD AUTO: 0.8 %
ERYTHROCYTE [DISTWIDTH] IN BLOOD BY AUTOMATED COUNT: 14.7 % (ref 10–15)
GFR SERPL CREATININE-BSD FRML MDRD: 80 ML/MIN/1.7M2
GLUCOSE SERPL-MCNC: 96 MG/DL (ref 70–99)
GLUCOSE UR STRIP-MCNC: NEGATIVE MG/DL
HCT VFR BLD AUTO: 42.4 % (ref 35–47)
HGB BLD-MCNC: 13.7 G/DL (ref 11.7–15.7)
HGB UR QL STRIP: NEGATIVE
IMM GRANULOCYTES # BLD: 0.1 10E9/L (ref 0–0.4)
IMM GRANULOCYTES NFR BLD: 0.5 %
KETONES UR STRIP-MCNC: 5 MG/DL
LACTATE SERPL-SCNC: 1.4 MMOL/L (ref 0.4–2)
LEUKOCYTE ESTERASE UR QL STRIP: NEGATIVE
LIPASE SERPL-CCNC: 109 U/L (ref 73–393)
LYMPHOCYTES # BLD AUTO: 1.1 10E9/L (ref 0.8–5.3)
LYMPHOCYTES NFR BLD AUTO: 7.5 %
MCH RBC QN AUTO: 30.4 PG (ref 26.5–33)
MCHC RBC AUTO-ENTMCNC: 32.3 G/DL (ref 31.5–36.5)
MCV RBC AUTO: 94 FL (ref 78–100)
MONOCYTES # BLD AUTO: 0.6 10E9/L (ref 0–1.3)
MONOCYTES NFR BLD AUTO: 4.1 %
MUCOUS THREADS #/AREA URNS LPF: PRESENT /LPF
NEUTROPHILS # BLD AUTO: 12.6 10E9/L (ref 1.6–8.3)
NEUTROPHILS NFR BLD AUTO: 86.7 %
NITRATE UR QL: NEGATIVE
NRBC # BLD AUTO: 0 10*3/UL
NRBC BLD AUTO-RTO: 0 /100
PH UR STRIP: 5.5 PH (ref 4.7–8)
PLATELET # BLD AUTO: 175 10E9/L (ref 150–450)
POTASSIUM SERPL-SCNC: 4.2 MMOL/L (ref 3.4–5.3)
PROCALCITONIN SERPL-MCNC: <0.05 NG/ML
PROT SERPL-MCNC: 7 G/DL (ref 6.8–8.8)
RBC # BLD AUTO: 4.51 10E12/L (ref 3.8–5.2)
RBC #/AREA URNS AUTO: 2 /HPF (ref 0–2)
SODIUM SERPL-SCNC: 141 MMOL/L (ref 133–144)
SOURCE: ABNORMAL
SP GR UR STRIP: 1.02 (ref 1–1.03)
TROPONIN I SERPL-MCNC: 0.03 UG/L (ref 0–0.04)
UROBILINOGEN UR STRIP-MCNC: NORMAL MG/DL (ref 0–2)
WBC # BLD AUTO: 14.5 10E9/L (ref 4–11)
WBC #/AREA URNS AUTO: 1 /HPF (ref 0–2)

## 2017-10-22 PROCEDURE — 96372 THER/PROPH/DIAG INJ SC/IM: CPT | Mod: XU | Performed by: INTERNAL MEDICINE

## 2017-10-22 PROCEDURE — 96365 THER/PROPH/DIAG IV INF INIT: CPT

## 2017-10-22 PROCEDURE — 96361 HYDRATE IV INFUSION ADD-ON: CPT

## 2017-10-22 PROCEDURE — 83605 ASSAY OF LACTIC ACID: CPT | Performed by: EMERGENCY MEDICINE

## 2017-10-22 PROCEDURE — A9270 NON-COVERED ITEM OR SERVICE: HCPCS | Mod: GY | Performed by: HOSPITALIST

## 2017-10-22 PROCEDURE — G0378 HOSPITAL OBSERVATION PER HR: HCPCS

## 2017-10-22 PROCEDURE — 96376 TX/PRO/DX INJ SAME DRUG ADON: CPT

## 2017-10-22 PROCEDURE — 36415 COLL VENOUS BLD VENIPUNCTURE: CPT | Performed by: HOSPITALIST

## 2017-10-22 PROCEDURE — 96375 TX/PRO/DX INJ NEW DRUG ADDON: CPT | Performed by: INTERNAL MEDICINE

## 2017-10-22 PROCEDURE — 80053 COMPREHEN METABOLIC PANEL: CPT | Performed by: EMERGENCY MEDICINE

## 2017-10-22 PROCEDURE — 99285 EMERGENCY DEPT VISIT HI MDM: CPT | Performed by: EMERGENCY MEDICINE

## 2017-10-22 PROCEDURE — 96375 TX/PRO/DX INJ NEW DRUG ADDON: CPT

## 2017-10-22 PROCEDURE — 81001 URINALYSIS AUTO W/SCOPE: CPT | Performed by: EMERGENCY MEDICINE

## 2017-10-22 PROCEDURE — 25000128 H RX IP 250 OP 636: Performed by: EMERGENCY MEDICINE

## 2017-10-22 PROCEDURE — 83690 ASSAY OF LIPASE: CPT | Performed by: EMERGENCY MEDICINE

## 2017-10-22 PROCEDURE — 99220 ZZC INITIAL OBSERVATION CARE,LEVL III: CPT | Performed by: HOSPITALIST

## 2017-10-22 PROCEDURE — 40000786 ZZHCL STATISTIC ACTIVE MRSA SURVEILLANCE CULTURE: Performed by: HOSPITALIST

## 2017-10-22 PROCEDURE — 87040 BLOOD CULTURE FOR BACTERIA: CPT | Performed by: EMERGENCY MEDICINE

## 2017-10-22 PROCEDURE — 36415 COLL VENOUS BLD VENIPUNCTURE: CPT | Performed by: EMERGENCY MEDICINE

## 2017-10-22 PROCEDURE — 84145 PROCALCITONIN (PCT): CPT | Performed by: HOSPITALIST

## 2017-10-22 PROCEDURE — 96376 TX/PRO/DX INJ SAME DRUG ADON: CPT | Performed by: INTERNAL MEDICINE

## 2017-10-22 PROCEDURE — 25000128 H RX IP 250 OP 636

## 2017-10-22 PROCEDURE — 25000128 H RX IP 250 OP 636: Performed by: RADIOLOGY

## 2017-10-22 PROCEDURE — 25000128 H RX IP 250 OP 636: Performed by: HOSPITALIST

## 2017-10-22 PROCEDURE — 99285 EMERGENCY DEPT VISIT HI MDM: CPT | Mod: 25

## 2017-10-22 PROCEDURE — 84484 ASSAY OF TROPONIN QUANT: CPT | Performed by: EMERGENCY MEDICINE

## 2017-10-22 PROCEDURE — 82150 ASSAY OF AMYLASE: CPT | Performed by: EMERGENCY MEDICINE

## 2017-10-22 PROCEDURE — 25000132 ZZH RX MED GY IP 250 OP 250 PS 637: Mod: GY | Performed by: HOSPITALIST

## 2017-10-22 PROCEDURE — 74177 CT ABD & PELVIS W/CONTRAST: CPT | Mod: TC

## 2017-10-22 PROCEDURE — 85025 COMPLETE CBC W/AUTO DIFF WBC: CPT | Performed by: EMERGENCY MEDICINE

## 2017-10-22 PROCEDURE — 71020 XR CHEST 2 VW: CPT | Mod: TC

## 2017-10-22 RX ORDER — OLOPATADINE HYDROCHLORIDE 1 MG/ML
1 SOLUTION/ DROPS OPHTHALMIC 2 TIMES DAILY
Status: DISCONTINUED | OUTPATIENT
Start: 2017-10-22 | End: 2017-10-24 | Stop reason: HOSPADM

## 2017-10-22 RX ORDER — BISACODYL 10 MG
10 SUPPOSITORY, RECTAL RECTAL ONCE
Status: DISCONTINUED | OUTPATIENT
Start: 2017-10-22 | End: 2017-10-24 | Stop reason: HOSPADM

## 2017-10-22 RX ORDER — HYDROMORPHONE HYDROCHLORIDE 1 MG/ML
0.5 INJECTION, SOLUTION INTRAMUSCULAR; INTRAVENOUS; SUBCUTANEOUS
Status: COMPLETED | OUTPATIENT
Start: 2017-10-22 | End: 2017-10-22

## 2017-10-22 RX ORDER — FLUORIDE TOOTHPASTE
5 TOOTHPASTE DENTAL 4 TIMES DAILY
Status: DISCONTINUED | OUTPATIENT
Start: 2017-10-22 | End: 2017-10-24 | Stop reason: HOSPADM

## 2017-10-22 RX ORDER — CLOPIDOGREL BISULFATE 75 MG/1
75 TABLET ORAL DAILY
Status: DISCONTINUED | OUTPATIENT
Start: 2017-10-23 | End: 2017-10-24 | Stop reason: HOSPADM

## 2017-10-22 RX ORDER — ONDANSETRON 2 MG/ML
4 INJECTION INTRAMUSCULAR; INTRAVENOUS EVERY 30 MIN PRN
Status: DISCONTINUED | OUTPATIENT
Start: 2017-10-22 | End: 2017-10-22

## 2017-10-22 RX ORDER — CALCIUM CARBONATE 500 MG/1
1000 TABLET, CHEWABLE ORAL 2 TIMES DAILY
Status: DISCONTINUED | OUTPATIENT
Start: 2017-10-22 | End: 2017-10-24 | Stop reason: HOSPADM

## 2017-10-22 RX ORDER — CODEINE PHOSPHATE AND GUAIFENESIN 10; 100 MG/5ML; MG/5ML
5 SOLUTION ORAL EVERY 4 HOURS PRN
Status: DISCONTINUED | OUTPATIENT
Start: 2017-10-22 | End: 2017-10-24 | Stop reason: HOSPADM

## 2017-10-22 RX ORDER — ASPIRIN 81 MG/1
81 TABLET, CHEWABLE ORAL DAILY
Status: DISCONTINUED | OUTPATIENT
Start: 2017-10-23 | End: 2017-10-24 | Stop reason: HOSPADM

## 2017-10-22 RX ORDER — CARBIDOPA AND LEVODOPA 25; 100 MG/1; MG/1
1 TABLET ORAL 3 TIMES DAILY
Status: DISCONTINUED | OUTPATIENT
Start: 2017-10-22 | End: 2017-10-24 | Stop reason: HOSPADM

## 2017-10-22 RX ORDER — AMOXICILLIN 250 MG
1 CAPSULE ORAL 2 TIMES DAILY
Status: DISCONTINUED | OUTPATIENT
Start: 2017-10-22 | End: 2017-10-24 | Stop reason: HOSPADM

## 2017-10-22 RX ORDER — PANTOPRAZOLE SODIUM 40 MG/1
40 TABLET, DELAYED RELEASE ORAL
Status: DISCONTINUED | OUTPATIENT
Start: 2017-10-22 | End: 2017-10-24 | Stop reason: HOSPADM

## 2017-10-22 RX ORDER — SODIUM CHLORIDE 9 MG/ML
INJECTION, SOLUTION INTRAVENOUS CONTINUOUS
Status: DISCONTINUED | OUTPATIENT
Start: 2017-10-22 | End: 2017-10-22

## 2017-10-22 RX ORDER — ONDANSETRON 2 MG/ML
4 INJECTION INTRAMUSCULAR; INTRAVENOUS EVERY 6 HOURS PRN
Status: DISCONTINUED | OUTPATIENT
Start: 2017-10-22 | End: 2017-10-24 | Stop reason: HOSPADM

## 2017-10-22 RX ORDER — MIRABEGRON 50 MG/1
50 TABLET, EXTENDED RELEASE ORAL DAILY
Status: DISCONTINUED | OUTPATIENT
Start: 2017-10-22 | End: 2017-10-24 | Stop reason: HOSPADM

## 2017-10-22 RX ORDER — LIDOCAINE 50 MG/G
1 PATCH TOPICAL DAILY
Status: DISCONTINUED | OUTPATIENT
Start: 2017-10-23 | End: 2017-10-24 | Stop reason: HOSPADM

## 2017-10-22 RX ORDER — IOPAMIDOL 612 MG/ML
100 INJECTION, SOLUTION INTRAVASCULAR ONCE
Status: COMPLETED | OUTPATIENT
Start: 2017-10-22 | End: 2017-10-22

## 2017-10-22 RX ORDER — PANTOPRAZOLE SODIUM 40 MG/1
40 TABLET, DELAYED RELEASE ORAL DAILY
Status: DISCONTINUED | OUTPATIENT
Start: 2017-10-22 | End: 2017-10-22

## 2017-10-22 RX ORDER — SPIRONOLACTONE 25 MG
12.5 TABLET ORAL DAILY
Status: DISCONTINUED | OUTPATIENT
Start: 2017-10-22 | End: 2017-10-24 | Stop reason: HOSPADM

## 2017-10-22 RX ORDER — POLYETHYLENE GLYCOL 3350 17 G/17G
17 POWDER, FOR SOLUTION ORAL ONCE
Status: DISCONTINUED | OUTPATIENT
Start: 2017-10-22 | End: 2017-10-24 | Stop reason: HOSPADM

## 2017-10-22 RX ORDER — HYDROMORPHONE HYDROCHLORIDE 1 MG/ML
0.5 SOLUTION ORAL
Status: DISCONTINUED | OUTPATIENT
Start: 2017-10-22 | End: 2017-10-22

## 2017-10-22 RX ORDER — POLYETHYLENE GLYCOL 3350 17 G/17G
17 POWDER, FOR SOLUTION ORAL DAILY
Status: DISCONTINUED | OUTPATIENT
Start: 2017-10-23 | End: 2017-10-24 | Stop reason: HOSPADM

## 2017-10-22 RX ORDER — LEVOFLOXACIN 5 MG/ML
500 INJECTION, SOLUTION INTRAVENOUS ONCE
Status: COMPLETED | OUTPATIENT
Start: 2017-10-22 | End: 2017-10-22

## 2017-10-22 RX ORDER — NALOXONE HYDROCHLORIDE 0.4 MG/ML
.1-.4 INJECTION, SOLUTION INTRAMUSCULAR; INTRAVENOUS; SUBCUTANEOUS
Status: DISCONTINUED | OUTPATIENT
Start: 2017-10-22 | End: 2017-10-24 | Stop reason: HOSPADM

## 2017-10-22 RX ORDER — LEVOFLOXACIN 5 MG/ML
INJECTION, SOLUTION INTRAVENOUS
Status: COMPLETED
Start: 2017-10-22 | End: 2017-10-22

## 2017-10-22 RX ORDER — CARBIDOPA AND LEVODOPA 25; 100 MG/1; MG/1
2 TABLET ORAL EVERY MORNING
Status: DISCONTINUED | OUTPATIENT
Start: 2017-10-23 | End: 2017-10-24 | Stop reason: HOSPADM

## 2017-10-22 RX ORDER — GABAPENTIN 100 MG/1
100 CAPSULE ORAL 2 TIMES DAILY
Status: DISCONTINUED | OUTPATIENT
Start: 2017-10-22 | End: 2017-10-24 | Stop reason: HOSPADM

## 2017-10-22 RX ORDER — METOCLOPRAMIDE HYDROCHLORIDE 5 MG/ML
5 INJECTION INTRAMUSCULAR; INTRAVENOUS EVERY 4 HOURS PRN
Status: DISCONTINUED | OUTPATIENT
Start: 2017-10-22 | End: 2017-10-24 | Stop reason: HOSPADM

## 2017-10-22 RX ORDER — LORATADINE 10 MG/1
10 TABLET ORAL DAILY
Status: DISCONTINUED | OUTPATIENT
Start: 2017-10-22 | End: 2017-10-24 | Stop reason: HOSPADM

## 2017-10-22 RX ORDER — HYDROMORPHONE HYDROCHLORIDE 1 MG/ML
0.5 INJECTION, SOLUTION INTRAMUSCULAR; INTRAVENOUS; SUBCUTANEOUS
Status: DISCONTINUED | OUTPATIENT
Start: 2017-10-22 | End: 2017-10-24 | Stop reason: HOSPADM

## 2017-10-22 RX ORDER — AZITHROMYCIN 250 MG/1
250 TABLET, FILM COATED ORAL EVERY 24 HOURS
Status: DISCONTINUED | OUTPATIENT
Start: 2017-10-24 | End: 2017-10-22

## 2017-10-22 RX ORDER — CEFTRIAXONE SODIUM 2 G/50ML
2 INJECTION, SOLUTION INTRAVENOUS EVERY 24 HOURS
Status: DISCONTINUED | OUTPATIENT
Start: 2017-10-23 | End: 2017-10-22

## 2017-10-22 RX ORDER — ACETAMINOPHEN 325 MG/1
1000 TABLET ORAL 2 TIMES DAILY PRN
Status: DISCONTINUED | OUTPATIENT
Start: 2017-10-22 | End: 2017-10-24 | Stop reason: HOSPADM

## 2017-10-22 RX ADMIN — IOPAMIDOL 100 ML: 612 INJECTION, SOLUTION INTRAVENOUS at 15:33

## 2017-10-22 RX ADMIN — HYDROMORPHONE HYDROCHLORIDE 0.5 MG: 1 INJECTION, SOLUTION INTRAMUSCULAR; INTRAVENOUS; SUBCUTANEOUS at 21:35

## 2017-10-22 RX ADMIN — LEVOFLOXACIN 500 MG: 5 INJECTION, SOLUTION INTRAVENOUS at 19:14

## 2017-10-22 RX ADMIN — METOCLOPRAMIDE 5 MG: 5 INJECTION, SOLUTION INTRAMUSCULAR; INTRAVENOUS at 23:13

## 2017-10-22 RX ADMIN — HYDROMORPHONE HYDROCHLORIDE 0.5 MG: 1 INJECTION, SOLUTION INTRAMUSCULAR; INTRAVENOUS; SUBCUTANEOUS at 18:00

## 2017-10-22 RX ADMIN — HYDROMORPHONE HYDROCHLORIDE 0.5 MG: 1 INJECTION, SOLUTION INTRAMUSCULAR; INTRAVENOUS; SUBCUTANEOUS at 14:32

## 2017-10-22 RX ADMIN — ENOXAPARIN SODIUM 40 MG: 40 INJECTION SUBCUTANEOUS at 21:35

## 2017-10-22 RX ADMIN — SODIUM CHLORIDE: 9 INJECTION, SOLUTION INTRAVENOUS at 14:32

## 2017-10-22 RX ADMIN — ONDANSETRON 4 MG: 2 INJECTION, SOLUTION INTRAMUSCULAR; INTRAVENOUS at 21:36

## 2017-10-22 RX ADMIN — ONDANSETRON 4 MG: 2 INJECTION INTRAMUSCULAR; INTRAVENOUS at 14:32

## 2017-10-22 RX ADMIN — Medication 5 ML: at 21:37

## 2017-10-22 RX ADMIN — HYDROMORPHONE HYDROCHLORIDE 0.5 MG: 1 INJECTION, SOLUTION INTRAMUSCULAR; INTRAVENOUS; SUBCUTANEOUS at 16:30

## 2017-10-22 RX ADMIN — LEVOFLOXACIN 500 MG: 5 INJECTION, SOLUTION INTRAVENOUS at 20:50

## 2017-10-22 RX ADMIN — OLOPATADINE HYDROCHLORIDE 1 DROP: 1 SOLUTION/ DROPS OPHTHALMIC at 21:37

## 2017-10-22 ASSESSMENT — ACTIVITIES OF DAILY LIVING (ADL)
RETIRED_EATING: 0-->INDEPENDENT
AMBULATION: 3-->ASSISTIVE EQUIPMENT AND PERSON
TRANSFERRING: 3-->ASSISTIVE EQUIPMENT AND PERSON
COGNITION: 1 - ATTENTION OR MEMORY DEFICITS
RETIRED_COMMUNICATION: 0-->UNDERSTANDS/COMMUNICATES WITHOUT DIFFICULTY
TOILETING: 3-->ASSISTIVE EQUIPMENT AND PERSON
FALL_HISTORY_WITHIN_LAST_SIX_MONTHS: YES
BATHING: 3-->ASSISTIVE EQUIPMENT AND PERSON
DRESS: 2-->ASSISTIVE PERSON
SWALLOWING: 0-->SWALLOWS FOODS/LIQUIDS WITHOUT DIFFICULTY

## 2017-10-22 NOTE — DISCHARGE INSTRUCTIONS
What to expect when you have contrast    During your exam, we will inject  contrast  into your vein or artery. (Contrast is a clear liquid with iodine in it. It shows up on X-rays.)    You may feel warm or hot. You may have a metal taste in your mouth and a slight upset stomach. You may also feel pressure near the kidneys and bladder. These effects will last about 1 to 3 minutes.    Please tell us if you have:    Sneezing     Itching    Hives     Swelling in the face    A hoarse voice    Breathing problems    Other new symptoms    Serious problems are rare.  They may include:    Irregular heartbeat     Seizures    Kidney failure              Tissue damage    Shock      Death    If you have any problems during the exam, we  will treat them right away.    When you get home    Call your hospital if you have any new symptoms in the next 2 days, like hives or swelling. (Phone numbers are at the bottom of this page.) Or call your family doctor.     If you have wheezing or trouble breathing, call 911.    Self-care  -Drink at least 4 extra glasses of water today.   This reduces the stress on your kidneys.  -Keep taking your regular medicines.    The contrast will pass out of your body in your  Urine(pee). This will happen in the next 24 hours. You  will not feel this. Your urine will not  change color.    If you have kidney problems or take metformin    Drink 4 to 8 large glasses of water for the next  2 days, if you are not on a fluid restriction.    ?If you take metformin (Glucophage or Glucovance) for diabetes, keep taking it.      ?Your kidney function tests are abnormal.  If you take Metformin, do not take it for 48 hours. Please go to your clinic for a blood test within 3 days after your exam before the restarting this medicine.     (Note to provider:please give patient prescription for lab tests.)    ?Special instructions: Drink at least 4 extra glasses of water today.   This reduces the stress on your kidneys.    I  have read and understand the above information.    Patient Sign Here:______________________________________Date:________Time:______    Staff Sign Here:________________________________________Date:_______Time:______      Radiology Departments:     ?[facility]: [phone] ?[place]: [phone]     ?[place]: [phone] ?[facility]:[phone]      ?[place]: [phone]  ?[place]: [phone]  ?[place]:[phone]    ?[facility]:[phone]  ?[facility] [place]:[phone]

## 2017-10-22 NOTE — ED PROVIDER NOTES
History     Chief Complaint   Patient presents with     Abdominal Pain     HPI  Britney Schaeffer is a 87 year old female who presents to the emergency department via EMS.  Patient is a resident of a nursing home.  Complaining of left lower quadrant abdominal pain rated as 8/10.  Does not radiate.  No no no aggravating or relieving factors.  Patient was discharged from the hospital approximately a week ago after being treated for acute pancreatitis.  She also had one episode of loose watery diarrhea yesterday morning.  Since then she had not she has not had any more episodes of diarrhea.  She feels nauseated but has not vomited.  Denies any fever, abdominal trauma, urinary symptoms, weight loss or night sweats.    Problem List:    Patient Active Problem List    Diagnosis Date Noted     Pneumonia 10/22/2017     Priority: Medium     Abdominal pain 10/22/2017     Priority: Medium     Colitis 10/07/2017     Priority: Medium     Glaucoma 12/27/2016     Priority: Medium     History of poliomyelitis 12/27/2016     Priority: Medium     Nursing Home Visit 01/19/2016     Priority: Medium     Osteoarthritis, multiple joints 12/13/2015     Priority: Medium     Fibrocystic breast disease, left 12/13/2015     Priority: Medium     Parkinson disease (H) 08/10/2015     Priority: Medium     CHD (coronary heart disease) 06/15/2015     Priority: Medium     03/2015  NSTEMI S/P angioplasty and stenting (ELIZABETH) LAD and D1       HF (heart failure) (H) 06/15/2015     Priority: Medium     Constipation, chronic 04/20/2015     Priority: Medium     Hiatal hernia 03/16/2015     Priority: Medium     TIA (transient ischemic attack) 03/16/2015     Priority: Medium     Chronic cough 07/15/2014     Priority: Medium     OAB (overactive bladder) 03/19/2014     Priority: Medium     S/P InterStim placement and multiple botox injections       Neuralgia, post-herpetic 03/19/2014     Priority: Medium     Dyslipidemia 04/10/2013     Priority: Medium      Cerebrovascular disease 04/10/2013     Priority: Medium     Hemorrhoids 04/10/2013     Priority: Medium     Perennial allergic rhinitis 04/10/2013     Priority: Medium     Asthma, mild persistent 04/10/2013     Priority: Medium              GERD (gastroesophageal reflux disease) 04/10/2013     Priority: Medium     Osteoporosis 04/10/2013     Priority: Medium     Kyphoscoliosis and scoliosis 04/10/2013     Priority: Medium     Insomnia, medical condition 04/10/2013     Priority: Medium     History of colonic polyps 04/10/2013     Priority: Medium     HTN (hypertension) 09/24/2006     Priority: Medium     Health Care Home 12/09/2015     Priority: Low     Class: Chronic        Past Medical History:    Past Medical History:   Diagnosis Date     Allergic rhinitis, Seasonal 06/19/2000     Colonic polyps 09/28/2000     Corns and callosities 01/20/2004     dyslipidemia 09/28/2000     Fibrocystic Breast Disease 03/01/2011     GERD 03/01/2011     hemorrhoids 03/01/2011     hypertension, benign 11/22/1999     Insomnia, unspecified 02/05/2004     Osteoarthritis, generalized 11/07/2008     Osteoporosis, unspecified 04/20/2009     Polio 1931     Scoliosis (and kyphoscoliosis), idiopathic 03/01/2011     TIA 03/09/2005     Unspecified asthma(493.90) 03/11/2003       Past Surgical History:    Past Surgical History:   Procedure Laterality Date     BREAST BIOPSY, RT/LT       BUNIONECTOMY      Bunion     COLONOSCOPY  2007     GENITOURINARY SURGERY      botox     JOINT REPLACEMENT      LT     JOINT REPLACEMENT, HIP RT/LT  2010     KERATOPLASTY PENETRATING, VITRECTOMY ANTERIOR, EXTRACAPSULAR CATARACT EXTRACTION, COMBINED  2010    LT, Cataracts       Family History:    Family History   Problem Relation Age of Onset     CEREBROVASCULAR DISEASE Mother      CVA (cause of death)     CEREBROVASCULAR DISEASE Father      CVA     Asthma No family hx of        Social History:  Marital Status:   [5]  Social History   Substance Use Topics      Smoking status: Passive Smoke Exposure - Never Smoker     Smokeless tobacco: Never Used      Comment: 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      Review of Systems   All other systems reviewed and are negative.      Physical Exam   BP: 146/50  Pulse: 55  Heart Rate: 58  Temp: 97.8  F (36.6  C)  Resp: 16  Weight: 56.4 kg (124 lb 5.4 oz)  SpO2: 96 %      Physical Exam   Constitutional: She is oriented to person, place, and time. She appears well-developed and well-nourished. No distress.   HENT:   Head: Normocephalic and atraumatic.   Mouth/Throat: Oropharynx is clear and moist. No oropharyngeal exudate.   Eyes: Pupils are equal, round, and reactive to light. No scleral icterus.   Cardiovascular: Normal rate, regular rhythm, normal heart sounds and intact distal pulses.    Pulmonary/Chest: Breath sounds normal. No respiratory distress. She has no wheezes. She has no rales.   Abdominal: Soft. Bowel sounds are normal. There is tenderness. There is no rebound and no guarding.       Musculoskeletal: She exhibits no edema or tenderness.   Neurological: She is alert and oriented to person, place, and time.   Skin: Skin is warm. No rash noted. She is not diaphoretic.   Nursing note and vitals reviewed.      ED Course   Patient evaluated and laboratory tests ordered.  CT scan of the abdomen and pelvis ordered.  Started on IV fluids.  IV Dilaudid given as needed for pain.  IV Zofran given for nausea and vomiting.    ED Course     Procedures       Labs Ordered and Resulted from Time of ED Arrival Up to the Time of Departure from the ED   UA MACROSCOPIC WITH REFLEX TO MICRO AND CULTURE - Abnormal; Notable for the following:        Result Value    Ketones Urine 5 (*)     Protein Albumin Urine 10 (*)     Bacteria Urine None (*)      Mucous Urine Present (*)     Calcium Oxalate Few (*)     All other components within normal limits   CBC WITH PLATELETS DIFFERENTIAL - Abnormal; Notable for the following:     WBC 14.5 (*)     Absolute Neutrophil 12.6 (*)     All other components within normal limits   AMYLASE   COMPREHENSIVE METABOLIC PANEL   LIPASE   TROPONIN I   LACTIC ACID   VITAL SIGNS   PERIPHERAL IV CATHETER   BLOOD CULTURE   BLOOD CULTURE       Assessments & Plan (with Medical Decision Making)   Left lower lobe pneumonia: Dr. Mabry hospitalist on-call consulted.  He will admit the patient.  Patient started on IV levofloxacin.    I have reviewed the nursing notes.    I have reviewed the findings, diagnosis, plan and need for follow up with the patient.    Current Discharge Medication List          Final diagnoses:   Pneumonia of left lower lobe due to infectious organism (H)       10/22/2017   [facility]     Chilo Castro MD  10/22/17 [id number]

## 2017-10-22 NOTE — IP AVS SNAPSHOT
MRN:[id number]                      After Visit Summary   10/22/2017    Britney Schaeffer    MRN: [id number]           Thank you!     Thank you for choosing [facility] for your care. Our goal is always to provide you with excellent care. Hearing back from our patients is one way we can continue to improve our services. Please take a few minutes to complete the written survey that you may receive in the mail after you visit with us. Thank you!        Patient Information     Date Of Birth          12/29/1929        Designated Caregiver       Most Recent Value    Caregiver    Will someone help with your care after discharge? yes    Name of designated caregiver [place]      About your hospital stay     You were admitted on:  October 22, 2017 You last received care in the:  [facility]    You were discharged on:  October 24, 2017       Who to Call     For medical emergencies, please call 911.  For non-urgent questions about your medical care, please call your primary care provider or clinic, [phone]          Attending Provider     Provider Specialty    Chilo Castro MD Emergency Medicine    [facility] --    Cresencio Sanchez MD Internal Medicine       Primary Care Provider Office Phone # Fax #    Mario Fofana [phone]      After Care Instructions     Activity - Up with assistive device           Activity - Up with nursing assistance           Advance Diet as Tolerated       Follow this diet upon discharge: Orders Placed This Encounter      Regular Diet Adult            Fall precautions           General info for SNF       Length of Stay Estimate: Long Term Care  Condition at Discharge: Stable  Level of care:skilled   Rehabilitation Potential: Good  Admission H&P remains valid and up-to-date: Yes  Recent Chemotherapy: N/A  Use Nursing Home Standing Orders: Yes            Mantoux instructions       Give two-step Mantoux (PPD) Per Facility Policy Yes                   Follow-up Appointments     Follow Up and recommended labs and tests       Follow up with primary care provider in 7-10 days.  No follow up labs or test are needed.                  Your next 10 appointments already scheduled     Nov 01, 2017  2:40 PM CDT   (Arrive by 2:25 PM)   SHORT with Mario Fofana MD   [facility] ([facility] )    [address] [place] [address]   [phone]              Further instructions from your care team       What to expect when you have contrast    During your exam, we will inject  contrast  into your vein or artery. (Contrast is a clear liquid with iodine in it. It shows up on X-rays.)    You may feel warm or hot. You may have a metal taste in your mouth and a slight upset stomach. You may also feel pressure near the kidneys and bladder. These effects will last about 1 to 3 minutes.    Please tell us if you have:    Sneezing     Itching    Hives     Swelling in the face    A hoarse voice    Breathing problems    Other new symptoms    Serious problems are rare.  They may include:    Irregular heartbeat     Seizures    Kidney failure              Tissue damage    Shock      Death    If you have any problems during the exam, we  will treat them right away.    When you get home    Call your hospital if you have any new symptoms in the next 2 days, like hives or swelling. (Phone numbers are at the bottom of this page.) Or call your family doctor.     If you have wheezing or trouble breathing, call 911.    Self-care  -Drink at least 4 extra glasses of water today.   This reduces the stress on your kidneys.  -Keep taking your regular medicines.    The contrast will pass out of your body in your  Urine(pee). This will happen in the next 24 hours. You  will not feel this. Your urine will not  change color.    If you have kidney problems or take metformin    Drink 4 to 8 large glasses of water for the next  2 days, if you are not on a  fluid restriction.    ?If you take metformin (Glucophage or Glucovance) for diabetes, keep taking it.      ?Your kidney function tests are abnormal.  If you take Metformin, do not take it for 48 hours. Please go to your clinic for a blood test within 3 days after your exam before the restarting this medicine.     (Note to provider:please give patient prescription for lab tests.)    ?Special instructions: Drink at least 4 extra glasses of water today.   This reduces the stress on your kidneys.    I have read and understand the above information.    Patient Sign Here:______________________________________Date:________Time:______    Staff Sign Here:________________________________________Date:_______Time:______      Radiology Departments:     ?[facility]: [phone] ?[facility]: [phone]     ?[facility]: [phone] ?[facility]:[phone]      ?[facility]: [phone]  ?[facility]: [phone]  ?[facility]:[phone]    ?[facility]:[phone]  ?[facility]:[phone]    Pending Results     Date and Time Order Name Status Description    10/22/2017 1846 Blood culture Preliminary     10/22/2017 1846 Blood culture Preliminary             Statement of Approval     Ordered          10/24/17 1357  I have reviewed and agree with all the recommendations and orders detailed in this document.  EFFECTIVE NOW     Approved and electronically signed by:  Cresencio Sanchez MD             Admission Information     Date & Time Provider Department Dept. Phone    10/22/2017 Cresencio Sanchez MD [facility] [phone]      Your Vitals Were     Blood Pressure Pulse Temperature Respirations Weight Pulse Oximetry    149/51 55 97.9  F (36.6  C) (Tympanic) 18 55.4 kg (122 lb 2.2 oz) 97%    BMI (Body Mass Index)                   22.34 kg/m2           MyChart Information     MyChart lets you send messages to your doctor, view your test results, renew your prescriptions, schedule appointments and more. To sign up, go to  [email or web address] . Click on \"Log in\" on the left side of the screen, which will take you to the Welcome page. Then click on \"Sign up Now\" on the right side of the page.     You will be asked to enter the access code listed below, as well as some personal information. Please follow the directions to create your username and password.     Your access code is: [email or web address]  Expires: 2018  2:11 PM     Your access code will  in 90 days. If you need help or a new code, please call your [facility] clinic or [phone].        Care EveryWhere ID     This is your Care EveryWhere ID. This could be used by other organizations to access your [facility] medical records  [id number]        Equal Access to Services     DES LEES : Amy Carrillo, clarissa metcalf, dominique fajardo, celena sandoval. So [facility] [phone].    ATENCIÓN: Si habla español, tiene a [place] disposición servicios gratuitos de asistencia lingüística. Lucia al [phone].    We comply with applicable federal civil rights laws and [place] laws. We do not discriminate on the basis of race, color, national origin, age, disability, sex, sexual orientation, or gender identity.               Review of your medicines      CONTINUE these medicines which may have CHANGED, or have new prescriptions. If we are uncertain of the size of tablets/capsules you have at home, strength may be listed as something that might have changed.        Dose / Directions    * carbidopa-levodopa  MG per tablet   Commonly known as:  SINEMET   Indication:  Parkinson's Disease   This may have changed:  Another medication with the same name was changed. Make sure you understand how and when to take each.        Dose:  2 tablet   Take 2 tablets by mouth every morning   Refills:  0       * carbidopa-levodopa  MG per tablet   Commonly known as:  SINEMET   This may have changed:  See the new instructions.   Used for: "  Parkinsons (H)        TAKE 1 TABLET THREE TIMES A DAY   Quantity:  270 tablet   Refills:  1       furosemide 20 MG tablet   Commonly known as:  LASIX   This may have changed:    - when to take this  - reasons to take this   Used for:  Edema        Dose:  20 mg   Take 1 tablet (20 mg) by mouth as needed   Quantity:  90 tablet   Refills:  1       lidocaine 5 % Patch   Commonly known as:  LIDODERM   This may have changed:    - how much to take  - how to take this  - when to take this  - additional instructions   Used for:  Polio        APPLY UP TO 3 PATCHES TO PAINFUL AREA AT ONCE FOR UP TO 12 HOURS WITHIN A 24 HOURS PERIOD. REMOVE AFTER 12 HOURS   Quantity:  270 patch   Refills:  3       polyethylene glycol 0.4%- propylene glycol 0.3% 0.4-0.3 % Soln ophthalmic solution   Commonly known as:  SYSTANE   This may have changed:  when to take this   Used for:  Allergic conjunctivitis, unspecified laterality        Dose:  2 drop   Place 2 drops into both eyes 4 times daily as needed for dry eyes   Quantity:  3 Bottle   Refills:  1       polyethylene glycol powder   Commonly known as:  MIRALAX   This may have changed:    - when to take this  - reasons to take this   Used for:  Constipation        Dose:  17 g   Take 17 g by mouth daily   Quantity:  500 g   Refills:  3       * Notice:  This list has 2 medication(s) that are the same as other medications prescribed for you. Read the directions carefully, and ask your doctor or other care provider to review them with you.      CONTINUE these medicines which have NOT CHANGED        Dose / Directions    * ACETAMINOPHEN PO        Dose:  1000 mg   Take 1,000 mg by mouth daily as needed for pain IN ADDITION TO BID DOSE   Refills:  0       * acetaminophen 500 MG tablet   Commonly known as:  TYLENOL   Used for:  Health care maintenance        Dose:  1000 mg   Take 2 tablets (1,000 mg) by mouth 2 times daily as needed for mild pain As needed   Quantity:  360 tablet   Refills:  1        AMOXICILLIN PO        Dose:  500 mg   Take 500 mg by mouth as needed (GIVE ONE HOUR PRIOIR TO DENTAL APTS FOR ISCHEMIC HEART DISEASE) Give 4 tablets as needed   Refills:  0       artificial saliva Soln solution   Used for:  Dry mouth        Dose:  5 mL   Swish and spit 5 mLs in mouth as needed for dry mouth   Quantity:  3 Bottle   Refills:  3       aspirin 81 MG chewable tablet   Used for:  Health care maintenance        Dose:  81 mg   Take 1 tablet (81 mg) by mouth daily   Quantity:  90 tablet   Refills:  3       calcium carbonate 500 MG chewable tablet   Commonly known as:  TUMS   Indication:  OSTEOPOROSIS        Dose:  2 chew tab   Take 2 chew tab by mouth 2 times daily   Refills:  0       chlorhexidine 0.12 % solution   Commonly known as:  PERIDEX        Dose:  15 mL   Swish and spit 15 mLs in mouth 2 times daily   Refills:  0       clopidogrel 75 MG tablet   Commonly known as:  PLAVIX   Used for:  Coronary artery disease involving native coronary artery of native heart without angina pectoris        Dose:  75 mg   Take 1 tablet (75 mg) by mouth daily   Quantity:  90 tablet   Refills:  2       gabapentin 100 MG capsule   Commonly known as:  NEURONTIN   Used for:  Neuralgia and neuritis        TAKE 1 CAPSULE TWICE A DAY   Quantity:  180 capsule   Refills:  2       guaiFENesin-codeine 100-10 MG/5ML Soln solution   Commonly known as:  guaiFENesin AC   Used for:  Cough        TAKE 2 TEASPOONFULS (10ML) BY MOUTH EVERY 4 HOURS AS NEEDED FOR COUGH   Quantity:  473 mL   Refills:  0       ketoconazole 2 % shampoo   Commonly known as:  NIZORAL   Used for:  Seborrheic dermatitis        Wash hair, let set for 5 minutes, rinse 1 time per day every Sunday   Quantity:  120 mL   Refills:  2       Lanolin 30 % Oint   Commonly known as:  CORONA MULTI-PURPOSE   Used for:  Skin irritation        Dose:  1 Application   Externally apply 1 Application topically 2 times daily   Quantity:  3 Tube   Refills:  3       loratadine 10 MG  tablet   Commonly known as:  ALLERGY RELIEF   Used for:  Allergic rhinitis, unspecified allergic rhinitis type        Dose:  1 tablet   Take 1 tablet (10 mg) by mouth daily   Quantity:  90 tablet   Refills:  3       metoprolol 25 MG tablet   Commonly known as:  LOPRESSOR   Used for:  NSTEMI (non-ST elevated myocardial infarction) (H)        Dose:  12.5 mg   Take 0.5 tablets (12.5 mg) by mouth 2 times daily   Quantity:  90 tablet   Refills:  3       MYRBETRIQ 50 MG 24 hr tablet   Used for:  OAB (overactive bladder)   Generic drug:  mirabegron        Take one (1) tablet BY MOUTH daily   Quantity:  31 tablet   Refills:  0       nystatin cream   Commonly known as:  MYCOSTATIN   Used for:  Candidiasis of skin and nails        APPLY TO AFFECTED AREA 2 TIMES DAILY AS NEEDED   Quantity:  30 g   Refills:  2       olopatadine 0.1 % ophthalmic solution   Commonly known as:  PATANOL   Used for:  Allergic conjunctivitis, unspecified laterality        INSTILL 1 DROP INTO BOTH EYES 2 TIMES DAILY   Quantity:  3 Bottle   Refills:  3       ondansetron 4 MG tablet   Commonly known as:  ZOFRAN   Used for:  Nausea        TAKE 1 TABLET BY MOUTH EVERY 8 HOURS AS NEEDED FOR NAUSEA AND VOMITING   Quantity:  30 tablet   Refills:  2       order for DME   Used for:  Primary osteoarthritis involving multiple joints        Equipment being ordered: neoprene knee sleeve   Quantity:  1 Device   Refills:  0       pantoprazole 40 MG EC tablet   Commonly known as:  PROTONIX   Used for:  Gastroesophageal reflux disease, esophagitis presence not specified        TAKE 1 TABLET DAILY   Quantity:  90 tablet   Refills:  1       PRESERVISION AREDS Tabs   Used for:  Health care maintenance        TAKE 1 TABLET BY MOUTH 2 TIMES DAILY   Quantity:  120 tablet   Refills:  5       saline nasal Gel topical gel   Used for:  Chronic cough        Apply into each naris At Bedtime   Refills:  0       senna-docusate 8.6-50 MG per tablet   Commonly known as:   SENOKOT-S;PERICOLACE        Dose:  1 tablet   Take 1 tablet by mouth 2 times daily   Refills:  0       sodium fluoride dental gel 1.1 % Gel topical gel   Commonly known as:  PREVIDENT        Dose:  1 applicator   Apply 1 applicator to affected area At Bedtime   Refills:  0       spironolactone 25 MG tablet   Commonly known as:  ALDACTONE   Used for:  Left heart failure (H)        Dose:  12.5 mg   Take 0.5 tablets (12.5 mg) by mouth daily   Quantity:  45 tablet   Refills:  3       vitamin D3 2000 UNITS Caps   Used for:  Osteoporosis        Dose:  2000 Units   Take 2,000 Units by mouth daily   Quantity:  90 capsule   Refills:  3       * Notice:  This list has 2 medication(s) that are the same as other medications prescribed for you. Read the directions carefully, and ask your doctor or other care provider to review them with you.             Protect others around you: Learn how to safely use, store and throw away your medicines at www.disposemymeds.org.             Medication List: This is a list of all your medications and when to take them. Check marks below indicate your daily home schedule. Keep this list as a reference.      Medications           Morning Afternoon Evening Bedtime As Needed    * ACETAMINOPHEN PO   Take 1,000 mg by mouth daily as needed for pain IN ADDITION TO BID DOSE   Last time this was given:  975 mg on 10/23/2017  5:10 PM                                * acetaminophen 500 MG tablet   Commonly known as:  TYLENOL   Take 2 tablets (1,000 mg) by mouth 2 times daily as needed for mild pain As needed   Last time this was given:  975 mg on 10/23/2017  5:10 PM                                AMOXICILLIN PO   Take 500 mg by mouth as needed (GIVE ONE HOUR PRIOIR TO DENTAL APTS FOR ISCHEMIC HEART DISEASE) Give 4 tablets as needed                                artificial saliva Soln solution   Swish and spit 5 mLs in mouth as needed for dry mouth                                aspirin 81 MG chewable  tablet   Take 1 tablet (81 mg) by mouth daily   Last time this was given:  81 mg on 10/24/2017 10:51 AM                                calcium carbonate 500 MG chewable tablet   Commonly known as:  TUMS   Take 2 chew tab by mouth 2 times daily   Last time this was given:  1,000 mg on 10/24/2017 10:56 AM                                * carbidopa-levodopa  MG per tablet   Commonly known as:  SINEMET   Take 2 tablets by mouth every morning   Last time this was given:  1 tablet on 10/24/2017  1:10 PM                                * carbidopa-levodopa  MG per tablet   Commonly known as:  SINEMET   TAKE 1 TABLET THREE TIMES A DAY   Last time this was given:  1 tablet on 10/24/2017  1:10 PM                                chlorhexidine 0.12 % solution   Commonly known as:  PERIDEX   Swish and spit 15 mLs in mouth 2 times daily                                clopidogrel 75 MG tablet   Commonly known as:  PLAVIX   Take 1 tablet (75 mg) by mouth daily   Last time this was given:  75 mg on 10/24/2017 10:52 AM                                furosemide 20 MG tablet   Commonly known as:  LASIX   Take 1 tablet (20 mg) by mouth as needed                                gabapentin 100 MG capsule   Commonly known as:  NEURONTIN   TAKE 1 CAPSULE TWICE A DAY   Last time this was given:  100 mg on 10/24/2017 10:50 AM                                guaiFENesin-codeine 100-10 MG/5ML Soln solution   Commonly known as:  guaiFENesin AC   TAKE 2 TEASPOONFULS (10ML) BY MOUTH EVERY 4 HOURS AS NEEDED FOR COUGH                                ketoconazole 2 % shampoo   Commonly known as:  NIZORAL   Wash hair, let set for 5 minutes, rinse 1 time per day every Sunday                                Lanolin 30 % Oint   Commonly known as:  CORONA MULTI-PURPOSE   Externally apply 1 Application topically 2 times daily                                lidocaine 5 % Patch   Commonly known as:  LIDODERM   APPLY UP TO 3 PATCHES TO PAINFUL AREA AT  ONCE FOR UP TO 12 HOURS WITHIN A 24 HOURS PERIOD. REMOVE AFTER 12 HOURS   Last time this was given:  1 patch on 10/24/2017 10:45 AM                                loratadine 10 MG tablet   Commonly known as:  ALLERGY RELIEF   Take 1 tablet (10 mg) by mouth daily   Last time this was given:  10 mg on 10/24/2017 10:53 AM                                metoprolol 25 MG tablet   Commonly known as:  LOPRESSOR   Take 0.5 tablets (12.5 mg) by mouth 2 times daily   Last time this was given:  12.5 mg on 10/24/2017 10:52 AM                                MYRBETRIQ 50 MG 24 hr tablet   Take one (1) tablet BY MOUTH daily   Generic drug:  mirabegron                                nystatin cream   Commonly known as:  MYCOSTATIN   APPLY TO AFFECTED AREA 2 TIMES DAILY AS NEEDED                                olopatadine 0.1 % ophthalmic solution   Commonly known as:  PATANOL   INSTILL 1 DROP INTO BOTH EYES 2 TIMES DAILY   Last time this was given:  1 drop on 10/24/2017 11:01 AM                                ondansetron 4 MG tablet   Commonly known as:  ZOFRAN   TAKE 1 TABLET BY MOUTH EVERY 8 HOURS AS NEEDED FOR NAUSEA AND VOMITING                                order for DME   Equipment being ordered: neoprene knee sleeve                                pantoprazole 40 MG EC tablet   Commonly known as:  PROTONIX   TAKE 1 TABLET DAILY   Last time this was given:  40 mg on 10/24/2017  7:30 AM                                polyethylene glycol 0.4%- propylene glycol 0.3% 0.4-0.3 % Soln ophthalmic solution   Commonly known as:  SYSTANE   Place 2 drops into both eyes 4 times daily as needed for dry eyes                                polyethylene glycol powder   Commonly known as:  MIRALAX   Take 17 g by mouth daily                                PRESERVISION AREDS Tabs   TAKE 1 TABLET BY MOUTH 2 TIMES DAILY                                saline nasal Gel topical gel   Apply into each naris At Bedtime                                 senna-docusate 8.6-50 MG per tablet   Commonly known as:  SENOKOT-S;PERICOLACE   Take 1 tablet by mouth 2 times daily   Last time this was given:  1 tablet on 10/24/2017 10:53 AM                                sodium fluoride dental gel 1.1 % Gel topical gel   Commonly known as:  PREVIDENT   Apply 1 applicator to affected area At Bedtime                                spironolactone 25 MG tablet   Commonly known as:  ALDACTONE   Take 0.5 tablets (12.5 mg) by mouth daily   Last time this was given:  12.5 mg on 10/24/2017 10:51 AM                                vitamin D3 2000 UNITS Caps   Take 2,000 Units by mouth daily                                * Notice:  This list has 4 medication(s) that are the same as other medications prescribed for you. Read the directions carefully, and ask your doctor or other care provider to review them with you.              More Information        Pneumonia (Adult)  Pneumonia is an infection deep within the lungs. It is in the small air sacs (alveoli). Pneumonia may be caused by a virus or bacteria. Pneumonia caused by bacteria is usually treated with an antibiotic. Severe cases may need to be treated in the hospital. Milder cases can be treated at home. Symptoms usually start to get better during the first 2 days of treatment.    Home care  Follow these guidelines when caring for yourself at home:    Rest at home for the first 2 to 3 days, or until you feel stronger. Don t let yourself get overly tired when you go back to your activities.    Stay away from cigarette smoke - yours or other people s.    You may use acetaminophen or ibuprofen to control fever or pain, unless another medicine was prescribed. If you have chronic liver or kidney disease, talk with your healthcare provider before using these medicines. Also talk with your provider if you ve had a stomach ulcer or gastrointestinal bleeding. Don t give aspirin to anyone younger than 18 years of age who is ill with a fever.  It may cause severe liver damage.    Your appetite may be poor, so a light diet is fine.    Drink 6 to 8 glasses of fluids every day to make sure you are getting enough fluids. Beverages can include water, sport drinks, sodas without caffeine, juices, tea, or soup. Fluids will help loosen secretions in the lung. This will make it easier for you to cough up the phlegm (sputum). If you also have heart or kidney disease, check with your healthcare provider before you drink extra fluids.    Take antibiotic medicine prescribed until it is all gone, even if you are feeling better after a few days.  Follow-up care  Follow up with your healthcare provider in the next 2 to 3 days, or as advised. This is to be sure the medicine is helping you get better.  If you are 65 or older, you should get a pneumococcal vaccine and a yearly flu (influenza) shot. You should also get these vaccines if you have chronic lung disease like asthma, emphysema, or COPD. Recently, a second type of pneumonia vaccine has become available for everyone over 65 years old. This is in addition to the previous vaccine. Ask your provider about this.  When to seek medical advice  Call your healthcare provider right away if any of these occur:    You don t get better within the first 48 hours of treatment    Shortness of breath gets worse    Rapid breathing (more than 25 breaths per minute)    Coughing up blood    Chest pain gets worse with breathing    Fever of 100.4 F (38 C) or higher that doesn t get better with fever medicine    Weakness, dizziness, or fainting that gets worse    Thirst or dry mouth that gets worse    Sinus pain, headache, or a stiff neck    Chest pain not caused by coughing  Date Last Reviewed: 1/1/2017 2000-2017 [employer]. [address] [place] [address]. All rights reserved. This information is not intended as a substitute for professional medical care. Always follow your healthcare professional's  instructions.                Preventing Pneumonia  Pneumonia is an infection in one or both of the lungs. Those most at risk include older adults, smokers, and people with chronic lung diseases. For example, those with conditions like chronic obstructive pulmonary disease (COPD), including emphysema or chronic bronchitis, asthma, and those with weak immune systems. There are some things you can do to lessen the chance that you will get pneumonia.    Avoid infection    Wash your hands often:    Use soap and warm water.    If soap and water aren't available, use a hand  with alcohol in it.    Avoid touching your face and mouth with your hands.    Use disposable tissues instead of a handkerchief. Throw used tissues away.    Avoid people who have a cold or the flu.    Try to avoid crowded places.  Get vaccinated  Talk with your healthcare provider about vaccinations and whether you should get a yearly flu shot. There are now 2 different pneumonia vaccines. Both of these are needed if you have a chronic disease or fit into the higher risk category.      Get a flu shot every year as soon as it's available in your area. The flu shot helps prevent you from getting the flu and complications of the flu, such as pneumonia.    Get pneumococcal pneumonia vaccines. Talk with your healthcare provider about which pneumococcal vaccines are right for you.  Do suggested breathing exercises  Deep breathing and coughing exercises can help clear your lungs. Your healthcare provider may suggest them. If so, you will be shown how to do them. Do them as often as your healthcare provider instructs.  Take care of your body    Drink at least 6 to 8 glasses of water a day.    Eat well-balanced, healthy meals.    Avoid drinking alcohol.    Don t smoke. Avoid places where people are smoking.    Moving around helps keep your lungs clear. Ask your healthcare provider what type of activity is best for you. Walking is often a good  choice.    Get enough rest. Sleep at least 8 hours each night. Rest or nap during the day as needed.    Ask your healthcare provider when you should schedule follow-up care to make sure the infection is gone.  Date Last Reviewed: 12/1/2016 2000-2017 [employer]. [address] [place] [address]. All rights reserved. This information is not intended as a substitute for professional medical care. Always follow your healthcare professional's instructions.                Treating Pneumonia  Pneumonia is an infection of one or both of the lungs. Pneumonia:    Is usually caused by either a virus or a bacteria    Can be very serious, especially in infants, young children, and older adults. It s also serious for those with other long-term health problems or weakened immune systems.    Is sometimes treated at home and sometimes in the hospital    Antibiotic medicines  Antibiotics may be prescribed for pneumonia caused by bacteria. They may be pills (oral medicines), or shots (injections). Or they may be given by IV (intravenously) into a vein. If you are taking oral medicines at home:    Fill your prescription and start taking your medicine as soon as you can.    You will likely start to feel better in a day or 2, but don t stop taking the antibiotic.    Use a pill organizer to help you remember to take your medicine.    Let your healthcare provider know if you have side effects.    Take your medicine exactly as directed on the label. Talk to your provider or pharmacist if you have any questions.  Antiviral medicines  Antiviral medicine may be prescribed for pneumonia caused by a virus. For example, antiviral medicine may be prescribed for pneumonia caused by the flu virus. Antibiotics do not work against viruses. If you are taking antiviral medicine at home:    Fill your prescription and start taking your medicine as soon as you can.    Talk with your provider or pharmacist about possible side  effects.    Take the medicine exactly as instructed.  To relieve symptoms  There are many medicines that can help relieve symptoms of pneumonia. Some are prescription and some are over-the-counter.  Your healthcare provider may recommend:    Acetaminophen or ibuprofen to lower your fever and to lessen headache or other pain    Cough medicine to loosen mucus or to reduce coughing  Make sure you check with your healthcare provider or pharmacist before taking any over-the-counter medicines.  Special treatments  If you are hospitalized for pneumonia, you may have other therapies, including:    Inhaled medicines to help with breathing or chest congestion    Supplemental oxygen to increase low oxygen levels  Drink fluids and eat healthy  You should eat healthy to help your body fight the infection. Drinking a lot of fluids helps to replace fluids lost from fever and to loosen mucus in your chest.    Diet. Make healthy food choices, including fruits and vegetables, lean meats and other proteins, 100% whole grain and low- or no-fat dairy products.    Fluids. Drink at least 6 to 8 tall glasses a day. Water and 100% fruit or vegetable juice are best.  Get plenty of rest and sleep  You may be more tired than usual for a while. It is important to get enough sleep at night. It s also important to rest during the day. Talk with your healthcare provider if coughing or other symptoms are interfering with your sleep.  Preventing the spread of germs  The best thing you can do to prevent spreading germs is to wash your hands often. You should:    Rub your hand with soap and water for 20 to 30 seconds.    Clean in between your fingers, the backs of your hands, and around your nails.    Dry your hands on a separate towel or use paper towels.  You should also:    Keep alcohol-based hand  nearby.    Make sure you also clean surfaces that you touch. Use a product that kills all types of germs.    Stay away from others until you are  feeling better.  When to call your healthcare provider  Call your healthcare provider if you have any of the following:    Symptoms get worse    Fever continues    Shortness of breath gets worse    Increased mucus or mucus that is darker in color    Coughing gets worse    Lips or fingers are bluish in color    Side effects from your medicine   Date Last Reviewed: 12/1/2016 2000-2017 [employer]. [address] [place], [place] [id number]. All rights reserved. This information is not intended as a substitute for professional medical care. Always follow your healthcare professional's instructions.                What is Pneumonia?    Pneumonia is a serious lung infection. Many cases of pneumonia are caused by bacteria or viruses. Fungi may also cause pneumonia, but this is less common.  You may also get pneumonia after another illness, such as a cold, flu, or bronchitis. Those most at risk include older adults, smokers, and people with long-term (chronic) health problems or weak immune systems.  Healthy lungs    Air travels in and out of the lungs through tubes called airways.    The tubes branch into smaller passages called bronchioles. These end in tiny sacs called alveoli.    Blood vessels surrounding the alveoli take oxygen into the bloodstream. At the same time, the alveoli remove carbon dioxide (a waste gas) from the blood. The carbon dioxide is then exhaled.  When you have pneumonia    Pneumonia causes the bronchioles and the alveoli to fill with excess mucus and become inflamed.    Your body s response may be to cough. This can help clear out the fluid.    The fluid (or mucus) you cough up may appear green or dark yellow.    The excess mucus may make you feel short of breath.    The inflammation and infection may give you a fever.  What are the symptoms?  Symptoms of pneumonia can come without warning. At first, you may think you have a cold or flu. But symptoms may get worse quickly, turning  into pneumonia. Symptoms can be different for bacterial and viral pneumonia. Common symptoms may include the following:    Severe cough with green or yellow mucus that doesn't improve or that gets worse    Fever and chills    Nausea, vomiting or diarrhea    Shortness of breath with normal daily activities    Increased heart rate    Chest pain or discomfort when breathing in or coughing    Headache    Excessive sweating and clammy skin  Date Last Reviewed: 12/1/2016 2000-2017 [employer]. [address] [place] [address], [place] [address]. All rights reserved. This information is not intended as a substitute for professional medical care. Always follow your healthcare professional's instructions.                When You Have Pneumonia  You have been diagnosed with pneumonia. This is a serious lung infection. Most cases of pneumonia are caused by bacteria. Pneumonia most often occurs in older adults, young children, and people with chronic health problems.  Home care    Take your medicine exactly as directed. Don t skip doses. Continue taking your antibiotics as until they are all gone, even if you start to feel better. This will prevent the pneumonia from coming back.    Drink at least 8 glasses of water daily, unless directed otherwise. This helps to loosen and thin secretions so that you can cough them up.    Use a cool-mist humidifier in your bedroom. Be sure to clean the humidifier daily.    Don t use medicines to suppress your cough unless your cough is dry, painful, or interferes with your sleep. Coughing up mucus is normal. You may use an expectorant if your healthcare provider says it s okay.    You can use warm compresses or a heating pad on the lowest setting to relieve chest discomfort. Use several times a day for 15-20 minutes at a time. To prevent injury to your skin, set the temperature to warm, not hot. Don t put the compress or pad directly on your skin. Make certain it has a cover or wrap it  in a towel. This is to prevent skin burns.    Get plenty of rest until your fever, shortness of breath, and chest pain go away.    Plan to get a flu shot every year. The flu is a common cause of pneumonia. Getting a flu shot every year can help prevent both the flu and pneumonia.  Getting the pneumococcal vaccine  Talk with your healthcare provider about getting the pneumococcalvaccine. Pneumococcal pneumonia is caused by bacteria that spread from person to person. It can cause minor problems, such as ear infections. But it can also turn into life-threatening illnesses of the lungs (pneumonia), the covering of the brain and spinal cord (meningitis), and the blood (bacteremia).  Children under 2 years of age, adults over age 65, people with certain health conditions, and smokers are at the highest risk of pneumococcal disease. This vaccine can help prevent pneumococcal disease in both adults and children. Some people should not have the vaccine. Make sure to ask your healthcare provider if you should have the vaccine.   Follow-up care  Make a follow-up appointment as directed by our staff.  When to call your healthcare provider  Call your healthcare provider if you have any of the following:    Fever above 100.4 F (38 C), or as directed by your healthcare provider    Mucus from the lungs (sputum) that s yellow, green, bloody, or smells bad    A large amount of sputum    Vomiting    Symptoms that get worse  When to call 911  Call 911 right away if you have any of the following:    Chest pain    Trouble breathing    Blue lips or fingernails   Date Last Reviewed: 11/1/2016 2000-2017 [employer]. [address] [place] [address], [place] [address]. All rights reserved. This information is not intended as a substitute for professional medical care. Always follow your healthcare professional's instructions.                Abdominal Pain    Abdominal pain is pain in the stomach or belly area. Everyone has this pain  from time to time. In many cases it goes away on its own. But abdominal pain can sometimes be due to a serious problem, such as appendicitis. So it s important to know when to seek help.  Causes of abdominal pain  There are many possible causes of abdominal pain. Common causes in adults include:    Constipation, diarrhea, or gas    Stomach acid flowing back up into the esophagus (acid reflux or heartburn)    Severe acid reflux, called GERD (gastroesophageal reflux disease)    A sore in the lining of the stomach or small intestine (peptic ulcer)    Inflammation of the gallbladder, liver, or pancreas    Gallstones or kidney stones    Appendicitis     Intestinal blockage     An internal organ pushing through a muscle or other tissue (hernia)    Urinary tract infections    In women, menstrual cramps, fibroids, or endometriosis    Inflammation or infection of the intestines  Diagnosing the cause of abdominal pain  Your healthcare provider will do a physical exam help find the cause of your pain. If needed, tests will be ordered. Belly pain has many possible causes. So it can be hard to find the reason for your pain. Giving details about your pain can help. Tell your provider where and when you feel the pain, and what makes it better or worse. Also let your provider know if you have other symptoms such as:    Fever    Tiredness    Upset stomach (nausea)    Vomiting    Changes in bathroom habits  Treating abdominal pain  Some causes of pain need emergency medical treatment right away. These include appendicitis or a bowel blockage. Other problems can be treated with rest, fluids, or medicines. Your healthcare provider can give you specific instructions for treatment or self-care based on what is causing your pain.  If you have vomiting or diarrhea, sip water or other clear fluids. When you are ready to eat solid foods again, start with small amounts of easy-to-digest, low-fat foods. These include apple sauce, toast, or  crackers.   When to seek medical care  Call 911 or go to the hospital right away if you:    Can t pass stool and are vomiting    Are vomiting blood or have bloody diarrhea or black, tarry diarrhea    Have chest, neck, or shoulder pain    Feel like you might pass out    Have pain in your shoulder blades with nausea    Have sudden, severe belly pain    Have new, severe pain unlike any you have felt before    Have a belly that is rigid, hard, and tender to touch  Call your healthcare provider if you have:    Pain for more than 5 days    Bloating for more than 2 days    Diarrhea for more than 5 days    A fever of 100.4 F (38.0 C) or higher, or as directed by your provider    Pain that gets worse    Weight loss for no reason    Continued lack of appetite    Blood in your stool  How to prevent abdominal pain  Here are some tips to help prevent abdominal pain:    Eat smaller amounts of food at one time.    Avoid greasy, fried, or other high-fat foods.    Avoid foods that give you gas.    Exercise regularly.    Drink plenty of fluids.  To help prevent GERD symptoms:    Quit smoking.    Reduce alcohol and certain foods that increase stomach acid.    Avoid aspirin and over-the-counter pain and fever medicines (NSAIDS or nonsteroidal anti-inflammatory drugs), if possible    Lose extra weight.    Finish eating at least 2 hours before you go to bed or lie down.    Raise the head of your bed.  Date Last Reviewed: 7/1/2016 2000-2017 The [employer]. [address] [place] [address], [place] [address]. All rights reserved. This information is not intended as a substitute for professional medical care. Always follow your healthcare professional's instructions.

## 2017-10-22 NOTE — ED NOTES
Pt comes in via [facility] from [facility] for report of abdominal pain, loose stools, and poor appetite. Nurse report from [facility] stated that pt was recently hospitalized for pancreatitis 10 days prior.  Pt reported severe pain that started this am.  Pt is alert, but confused upon arrival.  Pt rating pain 8/10 upon arriving, LLQ pain.  IV placed, unable to draw labs.  Pt given call light.

## 2017-10-22 NOTE — IP AVS SNAPSHOT
` ` Patient Information     Patient Name Sex     Britney Schaeffer ([id number]) Female 1929       Room Bed    3110 3110-2      Patient Demographics     Address Phone    [facility]  [address] [place] [id number] [phone] (Home) *Preferred*  [phone] (Mobile)      Patient Ethnicity & Race     Ethnic Group Patient Race    American White      Emergency Contact(s)     Name Relation Home Work Mobile    JACK SHETH Daughter [phone]      NO SECONDARY CONTACT Other NONE        Documents on File        Status Date Received Description       Documents for the Patient    Insurance Card Received 06 TRI/BXMN    Face Sheet  07     Privacy Notice - [place]  06     Consent for Services - Hospital/Clinic-ESign Received () 14     [place] Privacy Notice-ESign Received 13     Patient ID Received () 13 [place] DL    Consent for EHR Access-Received-ESign       Consent for EHR Access-Decline-ESign  13     Consent for EHR Access Received 13     Consent for Services - Hospital/Clinic Received () 13     HIM ELIZABETH Authorization  10/09/13     HIM ELIZABETH Authorization  14     Physical Therapy Certification Received 04/15/14 4-14-14    Physical Therapy Re-Certification Received () 14 ESIGN 14    Physical Therapy Certification Received 10/08/14 10-7-14    Consent to Communicate   AUTHORIZATION TO DISCUSS PROTECTED HEALTH INFORMATION    HIM ELIZABETH Authorization - File Only   AUTHORIZATION FOR RELEASE OF PROTECTED HEALTH INFORMATION    HIM ELIZABETH Authorization - File Only  () 14 AUTHORIZATION FOR RELEASE OF PHI    Physical Therapy Certification Received 01/29/15     HIM ELIZABETH Authorization  01/23/15 [facility]    HIM ELIZABETH Authorization  03/09/15 [facility]    Consent for Services - Hospital/Clinic Received () 03/27/15     HIM ELIZABETH Authorization  05/26/15 [facility]    Advance Directives and Living Will Received  07/21/15 POLST 2015    Consent for Services/Privacy Notice - Hospital/Clinic-Esign Received () 16     HIM ELIZABETH Authorization  16 [facility]    HIM ELIZABETH Authorization  16 [facility]    HIM ELIZABETH Authorization  10/28/16 [facility]    Consent for Services/Privacy Notice - Hospital/Clinic Received 17     HIM ELIZABETH Authorization  17     Advance Directives and Living Will Received 17 POLST 2016    HIM ELIZABETH Authorization  17     Care Everywhere Prospective Auth Received 10/22/17     Power of  Received (Deleted) 13     Advance Directives and Living Will Received (Deleted) 13        Documents for the Encounter    CMS GLASS for Patient Signature Received 10/23/17 Pt unable to sign for self. Letter completed per phone conversation with bambi Jorgensen,    Assessment/Questionnaire  10/24/17 CONTRAST QUESTIONNAIRE    Preliminary Result  10/24/17 CT ABDOMEN/PELVIS - PRELIMINARY - [facility]    Discharge Attachment   ADULT, PNEUMONIA (ENGLISH)    Discharge Attachment   PNEUMONIA, PREVENTING (ENGLISH)    Discharge Attachment   PNEUMONIA, TREATING (ENGLISH)    Discharge Attachment   PNEUMONIA, WHAT IS (ENGLISH)    Discharge Attachment   PNEUMONIA, WHEN YOU HAVE  (ENGLISH)    Discharge Attachment   ABDOMINAL PAIN, ADULT (ENGLISH)      Admission Information     Attending Provider Admitting Provider Admission Type Admission Date/Time    Cresencio Sacnhez MD Pitafi, Ali, MD Emergency 10/22/17  1357    Discharge Date Hospital Service Auth/Cert Status Service Area     General Medicine Incomplete [facility]    Unit Room/Bed Admission Status       [facility] 3110/3110-2 Admission (Confirmed)       Admission     Complaint    Abdominal pain      Hospital Account     Name Acct ID Class Status Primary Coverage    Britney Schaeffer [id number] Observation Open MEDICARE - MEDICARE            Guarantor Account (for Hospital Account #[id number])      Name Relation to Pt Service Area Active? Acct Type    Britney Schaeffer Self [facility] Yes Personal/Family    Address Phone          [facility]  [address] [place] [address] [phone](H)              Coverage Information (for Hospital Account #[id number])     1. MEDICARE/MEDICARE     F/O Payor/Plan Precert #    MEDICARE/MEDICARE     Subscriber Subscriber #    Britney Schaeffer [id number]    Address Phone    [employer]  [address]  [place] [address] [phone]          2. // FOR LIFE     F/O Payor/Plan Precert #    // FOR LIFE     Subscriber Subscriber #    Britney Schaeffer [id number]    Address Phone     [employer]  [address]  [place] [address]

## 2017-10-22 NOTE — IP AVS SNAPSHOT
Britney Schaeffer #[id number] (CSN: [id number])  (87 year old F)  (Adm: 10/22/17)     [id number]               [facility]: [phone]            Patient Demographics     Patient Name Sex          Age SSN Address Phone    LouannBritney jenkins Female 1929 (87 year old) [id number] [facility]  [address] [place] [id number] [phone] (Home) *Preferred*  [phone] (Mobile)      Emergency Contact(s)     Name Relation Home Work Mobile    JACK SHETH Daughter [phone]      NO SECONDARY CONTACT Other NONE        Admission Information     Attending Provider Admitting Provider Admission Type Admission Date/Time    Cresencio Sanchez MD Pitafi, Ali, MD Emergency 10/22/17  1357    Discharge Date Hospital Service Auth/Cert Status Service Area     General Medicine Incomplete [facility]    Unit Room/Bed Admission Status       [facility] 311/3110-2 Admission (Confirmed)       Admission     Complaint    Abdominal pain      Hospital Account     Name Acct ID Class Status Primary Coverage    Britney Schaeffer [id number] Observation Open MEDICARE - MEDICARE            Guarantor Account (for Hospital Account #[id number])     Name Relation to Pt Service Area Active? Acct Type    Britney Scheaffer Self [facility] Yes Personal/Family    Address Phone          [facility]  [address] [place] [id number] [phone](H)              Coverage Information (for Hospital Account #[id number])     1. MEDICARE/MEDICARE     F/O Payor/Plan Precert #    MEDICARE/MEDICARE     Subscriber Subscriber #    Britney Schaeffer [id number]    Address Phone    ATTN CLAIMS  [address]  [facility] [place] [address] [phone]          2. // FOR LIFE     F/O Payor/Plan Precert #    // FOR LIFE     Subscriber Subscriber #    Britney Schaeffer [id number]    Address Phone     FOR LIFE  [address]  [place] [address]                                                       INTERAGENCY TRANSFER FORM - PHYSICIAN ORDERS   10/22/2017                       [facility]: [phone]            Attending Provider: Cresencio Sanchez MD     Allergies:  Ambien, Tramadol    Infection:  ESBL   Service:  GENERAL MEDI    Ht:  1.549 m (5' 1\")   Wt:  55.4 kg (122 lb 2.2 oz)   Admission Wt:  56.4 kg (124 lb 5.4 oz)    BMI:  23.08 kg/m 2   BSA:  1.54 m 2            ED Clinical Impression     Diagnosis Description Comment Added By Time Added    Pneumonia of left lower lobe due to infectious organism (H) [J18.1] Pneumonia of left lower lobe due to infectious organism (H) [J18.1]  Chilo Castro MD 10/22/2017  6:06 PM      Hospital Problems as of 10/24/2017              Priority Class Noted POA    HTN (hypertension) Medium  9/24/2006 Yes    Dyslipidemia Medium  4/10/2013 Yes    Cerebrovascular disease Medium  4/10/2013 Yes    Asthma, mild persistent Medium  4/10/2013 Yes    GERD (gastroesophageal reflux disease) Medium  4/10/2013 Yes    OAB (overactive bladder) Medium  3/19/2014 Yes    Chronic cough Medium  7/15/2014 Yes    Parkinson disease (H) Medium  8/10/2015 Yes    * (Principal)Pneumonia Medium  10/22/2017 Unknown    Abdominal pain Medium  10/22/2017 Yes      Non-Hospital Problems as of 10/24/2017              Priority Class Noted    Hemorrhoids Medium  4/10/2013    Perennial allergic rhinitis Medium  4/10/2013    Osteoporosis Medium  4/10/2013    Kyphoscoliosis and scoliosis Medium  4/10/2013    Insomnia, medical condition Medium  4/10/2013    History of colonic polyps Medium  4/10/2013    Neuralgia, post-herpetic Medium  3/19/2014    Hiatal hernia Medium  3/16/2015    TIA (transient ischemic attack) Medium  3/16/2015    Constipation, chronic Medium  4/20/2015    CHD (coronary heart disease) Medium  6/15/2015    HF (heart failure) (H) Medium  6/15/2015    Health Care Home Low Chronic 12/9/2015    " Osteoarthritis, multiple joints Medium  12/13/2015    Fibrocystic breast disease, left Medium  12/13/2015    Nursing Home Visit Medium  1/19/2016    Glaucoma Medium  12/27/2016    History of poliomyelitis Medium  12/27/2016    Colitis Medium  10/7/2017      Code Status History     Date Active Date Inactive Code Status Order ID Comments User Context    10/12/2017 11:17 AM 10/22/2017  7:44 PM DNR/DNI [id number]  Wu Do MD Outpatient    10/7/2017 11:20 PM 10/12/2017 11:17 AM DNR/DNI [id number]  Sally Mabry MD Inpatient    12/16/2015 10:44 AM 10/7/2017 11:20 PM DNR [id number]  Cresencio Sanchez MD Outpatient    12/11/2015  8:33 PM 12/16/2015 10:44 AM DNR [id number]  Amberly Lopez MD Inpatient    3/16/2015  4:15 PM 3/16/2015  9:07 PM DNR/DNI [id number]  Cresencio Sanchez MD Inpatient      Current Code Status     Date Active Code Status Order ID Comments User Context       10/22/2017  7:44 PM DNR/DNI [id number]  Sally Mabry MD Inpatient          Medication Review      CONTINUE these medications which may have CHANGED, or have new prescriptions. If we are uncertain of the size of tablets/capsules you have at home, strength may be listed as something that might have changed.        Dose / Directions Comments    * carbidopa-levodopa  MG per tablet   Commonly known as:  SINEMET   Indication:  Parkinson's Disease   This may have changed:  Another medication with the same name was changed. Make sure you understand how and when to take each.        Dose:  2 tablet   Take 2 tablets by mouth every morning   Refills:  0        * carbidopa-levodopa  MG per tablet   Commonly known as:  SINEMET   This may have changed:  See the new instructions.   Used for:  Parkinsons (H)        TAKE 1 TABLET THREE TIMES A DAY   Quantity:  270 tablet   Refills:  1        furosemide 20 MG tablet   Commonly known as:  LASIX   This may have changed:    - when to take this  - reasons to take this   Used for:  Edema        Dose:  20  mg   Take 1 tablet (20 mg) by mouth as needed   Quantity:  90 tablet   Refills:  1        lidocaine 5 % Patch   Commonly known as:  LIDODERM   This may have changed:    - how much to take  - how to take this  - when to take this  - additional instructions   Used for:  Polio        APPLY UP TO 3 PATCHES TO PAINFUL AREA AT ONCE FOR UP TO 12 HOURS WITHIN A 24 HOURS PERIOD. REMOVE AFTER 12 HOURS   Quantity:  270 patch   Refills:  3        polyethylene glycol 0.4%- propylene glycol 0.3% 0.4-0.3 % Soln ophthalmic solution   Commonly known as:  SYSTANE   This may have changed:  when to take this   Used for:  Allergic conjunctivitis, unspecified laterality        Dose:  2 drop   Place 2 drops into both eyes 4 times daily as needed for dry eyes   Quantity:  3 Bottle   Refills:  1        polyethylene glycol powder   Commonly known as:  MIRALAX   This may have changed:    - when to take this  - reasons to take this   Used for:  Constipation        Dose:  17 g   Take 17 g by mouth daily   Quantity:  500 g   Refills:  3        * Notice:  This list has 2 medication(s) that are the same as other medications prescribed for you. Read the directions carefully, and ask your doctor or other care provider to review them with you.      CONTINUE these medications which have NOT CHANGED        Dose / Directions Comments    * ACETAMINOPHEN PO        Dose:  1000 mg   Take 1,000 mg by mouth daily as needed for pain IN ADDITION TO BID DOSE   Refills:  0        * acetaminophen 500 MG tablet   Commonly known as:  TYLENOL   Used for:  Health care maintenance        Dose:  1000 mg   Take 2 tablets (1,000 mg) by mouth 2 times daily as needed for mild pain As needed   Quantity:  360 tablet   Refills:  1        AMOXICILLIN PO        Dose:  500 mg   Take 500 mg by mouth as needed (GIVE ONE HOUR PRIOIR TO DENTAL APTS FOR ISCHEMIC HEART DISEASE) Give 4 tablets as needed   Refills:  0        artificial saliva Soln solution   Used for:  Dry mouth         Dose:  5 mL   Swish and spit 5 mLs in mouth as needed for dry mouth   Quantity:  3 Bottle   Refills:  3        aspirin 81 MG chewable tablet   Used for:  Health care maintenance        Dose:  81 mg   Take 1 tablet (81 mg) by mouth daily   Quantity:  90 tablet   Refills:  3        calcium carbonate 500 MG chewable tablet   Commonly known as:  TUMS   Indication:  OSTEOPOROSIS        Dose:  2 chew tab   Take 2 chew tab by mouth 2 times daily   Refills:  0        chlorhexidine 0.12 % solution   Commonly known as:  PERIDEX        Dose:  15 mL   Swish and spit 15 mLs in mouth 2 times daily   Refills:  0        clopidogrel 75 MG tablet   Commonly known as:  PLAVIX   Used for:  Coronary artery disease involving native coronary artery of native heart without angina pectoris        Dose:  75 mg   Take 1 tablet (75 mg) by mouth daily   Quantity:  90 tablet   Refills:  2        gabapentin 100 MG capsule   Commonly known as:  NEURONTIN   Used for:  Neuralgia and neuritis        TAKE 1 CAPSULE TWICE A DAY   Quantity:  180 capsule   Refills:  2        guaiFENesin-codeine 100-10 MG/5ML Soln solution   Commonly known as:  guaiFENesin AC   Used for:  Cough        TAKE 2 TEASPOONFULS (10ML) BY MOUTH EVERY 4 HOURS AS NEEDED FOR COUGH   Quantity:  473 mL   Refills:  0        ketoconazole 2 % shampoo   Commonly known as:  NIZORAL   Used for:  Seborrheic dermatitis        Wash hair, let set for 5 minutes, rinse 1 time per day every Sunday   Quantity:  120 mL   Refills:  2        Lanolin 30 % Oint   Commonly known as:  CORONA MULTI-PURPOSE   Used for:  Skin irritation        Dose:  1 Application   Externally apply 1 Application topically 2 times daily   Quantity:  3 Tube   Refills:  3        loratadine 10 MG tablet   Commonly known as:  ALLERGY RELIEF   Used for:  Allergic rhinitis, unspecified allergic rhinitis type        Dose:  1 tablet   Take 1 tablet (10 mg) by mouth daily   Quantity:  90 tablet   Refills:  3        metoprolol 25 MG  tablet   Commonly known as:  LOPRESSOR   Used for:  NSTEMI (non-ST elevated myocardial infarction) (H)        Dose:  12.5 mg   Take 0.5 tablets (12.5 mg) by mouth 2 times daily   Quantity:  90 tablet   Refills:  3        MYRBETRIQ 50 MG 24 hr tablet   Used for:  OAB (overactive bladder)   Generic drug:  mirabegron        Take one (1) tablet BY MOUTH daily   Quantity:  31 tablet   Refills:  0        nystatin cream   Commonly known as:  MYCOSTATIN   Used for:  Candidiasis of skin and nails        APPLY TO AFFECTED AREA 2 TIMES DAILY AS NEEDED   Quantity:  30 g   Refills:  2        olopatadine 0.1 % ophthalmic solution   Commonly known as:  PATANOL   Used for:  Allergic conjunctivitis, unspecified laterality        INSTILL 1 DROP INTO BOTH EYES 2 TIMES DAILY   Quantity:  3 Bottle   Refills:  3        ondansetron 4 MG tablet   Commonly known as:  ZOFRAN   Used for:  Nausea        TAKE 1 TABLET BY MOUTH EVERY 8 HOURS AS NEEDED FOR NAUSEA AND VOMITING   Quantity:  30 tablet   Refills:  2        order for DME   Used for:  Primary osteoarthritis involving multiple joints        Equipment being ordered: neoprene knee sleeve   Quantity:  1 Device   Refills:  0        pantoprazole 40 MG EC tablet   Commonly known as:  PROTONIX   Used for:  Gastroesophageal reflux disease, esophagitis presence not specified        TAKE 1 TABLET DAILY   Quantity:  90 tablet   Refills:  1        PRESERVISION AREDS Tabs   Used for:  Health care maintenance        TAKE 1 TABLET BY MOUTH 2 TIMES DAILY   Quantity:  120 tablet   Refills:  5        saline nasal Gel topical gel   Used for:  Chronic cough        Apply into each naris At Bedtime   Refills:  0        senna-docusate 8.6-50 MG per tablet   Commonly known as:  SENOKOT-S;PERICOLACE        Dose:  1 tablet   Take 1 tablet by mouth 2 times daily   Refills:  0        sodium fluoride dental gel 1.1 % Gel topical gel   Commonly known as:  PREVIDENT        Dose:  1 applicator   Apply 1 applicator to  affected area At Bedtime   Refills:  0        spironolactone 25 MG tablet   Commonly known as:  ALDACTONE   Used for:  Left heart failure (H)        Dose:  12.5 mg   Take 0.5 tablets (12.5 mg) by mouth daily   Quantity:  45 tablet   Refills:  3        vitamin D3 2000 UNITS Caps   Used for:  Osteoporosis        Dose:  2000 Units   Take 2,000 Units by mouth daily   Quantity:  90 capsule   Refills:  3        * Notice:  This list has 2 medication(s) that are the same as other medications prescribed for you. Read the directions carefully, and ask your doctor or other care provider to review them with you.            After Care     Activity - Up with assistive device           Activity - Up with nursing assistance           Advance Diet as Tolerated       Follow this diet upon discharge: Orders Placed This Encounter      Regular Diet Adult       Fall precautions           General info for SNF       Length of Stay Estimate: Long Term Care  Condition at Discharge: Stable  Level of care:skilled   Rehabilitation Potential: Good  Admission H&P remains valid and up-to-date: Yes  Recent Chemotherapy: N/A  Use Nursing Home Standing Orders: Yes       Mantoux instructions       Give two-step Mantoux (PPD) Per Facility Policy Yes               Further instructions from your care team       What to expect when you have contrast    During your exam, we will inject  contrast  into your vein or artery. (Contrast is a clear liquid with iodine in it. It shows up on X-rays.)    You may feel warm or hot. You may have a metal taste in your mouth and a slight upset stomach. You may also feel pressure near the kidneys and bladder. These effects will last about 1 to 3 minutes.    Please tell us if you have:    Sneezing     Itching    Hives     Swelling in the face    A hoarse voice    Breathing problems    Other new symptoms    Serious problems are rare.  They may include:    Irregular heartbeat     Seizures    Kidney failure               Tissue damage    Shock      Death    If you have any problems during the exam, we  will treat them right away.    When you get home    Call your hospital if you have any new symptoms in the next 2 days, like hives or swelling. (Phone numbers are at the bottom of this page.) Or call your family doctor.     If you have wheezing or trouble breathing, call 911.    Self-care  -Drink at least 4 extra glasses of water today.   This reduces the stress on your kidneys.  -Keep taking your regular medicines.    The contrast will pass out of your body in your  Urine(pee). This will happen in the next 24 hours. You  will not feel this. Your urine will not  change color.    If you have kidney problems or take metformin    Drink 4 to 8 large glasses of water for the next  2 days, if you are not on a fluid restriction.    ?If you take metformin (Glucophage or Glucovance) for diabetes, keep taking it.      ?Your kidney function tests are abnormal.  If you take Metformin, do not take it for 48 hours. Please go to your clinic for a blood test within 3 days after your exam before the restarting this medicine.     (Note to provider:please give patient prescription for lab tests.)    ?Special instructions: Drink at least 4 extra glasses of water today.   This reduces the stress on your kidneys.    I have read and understand the above information.    Patient Sign Here:______________________________________Date:________Time:______    Staff Sign Here:________________________________________Date:_______Time:______      Radiology Departments:     ?[facility] [phone] ?[facility]: [phone]     ?[facility]: [phone] ?[facility]:[phone]      ?[facility]: [phone]  ?[facility]: [phone]  ?[place]:[phone]    ?[facility]:[phone]  ?[facility]:[phone]    Follow-Up Appointment Instructions     Follow Up and recommended labs and tests       Follow up with primary care provider in 7-10 days.  No follow up labs or  "test are needed.             Your next 10 appointments already scheduled     Nov 01, 2017  2:40 PM CDT   (Arrive by 2:25 PM)   SHORT with Mario Fofana MD   [facility] [place] ([facility] )    [address]  [place] [address]   [phone]              Statement of Approval     Ordered          10/24/17 1357  I have reviewed and agree with all the recommendations and orders detailed in this document.  EFFECTIVE NOW     Approved and electronically signed by:  Cresencio Sanchez MD                                                 INTERAGENCY TRANSFER FORM - NURSING   10/22/2017                       [facility]: [phone]            Attending Provider: Cresencio Sanchez MD     Allergies:  Ambien, Tramadol    Infection:  ESBL   Service:  GENERAL MEDI    Ht:  1.549 m (5' 1\")   Wt:  55.4 kg (122 lb 2.2 oz)   Admission Wt:  56.4 kg (124 lb 5.4 oz)    BMI:  23.08 kg/m 2   BSA:  1.54 m 2            Advance Directives        Does patient have a scanned Advance Directive/ACP document in EPIC?           Yes        Immunizations     Name Date      Influenza (High Dose) 3 valent vaccine 10/09/17     Influenza (High Dose) 3 valent vaccine 10/21/15     Influenza (High Dose) 3 valent vaccine 10/29/14     Influenza (High Dose) 3 valent vaccine 10/16/13     Influenza (High Dose) 3 valent vaccine 10/16/13     Influenza (High Dose) 3 valent vaccine 09/28/11     Influenza (IIV3) 10/03/12     Influenza (IIV3) 09/23/09     Influenza (IIV3) 10/25/07     Influenza (IIV3) 10/19/06     Influenza (IIV3) 11/15/05     Influenza (IIV3) 10/21/04     Influenza (IIV3) 11/04/03     Influenza (IIV3) 11/20/01     Influenza (IIV3) 11/22/00     Pneumococcal 23 valent 12/02/02     Pneumococcal 23 valent 12/09/99     Poliovirus, inactivated (IPV) 04/13/61     Poliovirus, inactivated (IPV) 09/02/59     Poliovirus, inactivated (IPV) 07/28/57     Poliovirus, inactivated (IPV) 03/25/57     TD (ADULT, 7+) 06/12/07     " TD (ADULT, 7+) 08/05/96     TD (ADULT, 7+) 08/27/75     TD (ADULT, 7+) 12/08/58       ASSESSMENT     Discharge Profile Flowsheet     EXPECTED DISCHARGE     Last Bowel Movement  10/24/17 10/24/17 1237    Expected Discharge Date  10/25/17 10/23/17 1153   GI Signs/Symptoms  fecal incontinence 10/24/17 1237    DISCHARGE NEEDS ASSESSMENT     Passing flatus  yes 10/24/17 1237    Concerns Comments  -- (Admitted to hospital 12/11/2015) 12/14/15 1606   COMMUNICATION ASSESSMENT      Patient/family verbalizes understanding of discharge plan recommendations?  Yes 10/23/17 1153   Patient's communication style  spoken language (English or Bilingual) 10/22/17 1358    Medical Team notified of plan?  yes 10/23/17 1153   SKIN      Readmission Within The Last 30 Days  previous discharge plan unsuccessful 10/23/17 1153   Inspection of bony prominences  Full except (identify areas not inspected) 10/24/17 1237    Anticipated Changes Related to Illness  none 10/23/17 1153   Full except areas not inspected   Buttock, left;Buttock, right;Hip, left;Hip, right;Sacrum;Coccyx 10/24/17 1237    Equipment Currently Used at Home  walker, rolling;wheelchair, manual 10/23/17 1153   Inspection under devices  Full 10/22/17 2017    Transportation Available  agency transportation 10/23/17 1153   Skin WDL  ex 10/24/17 1237    Key Recommendations  F/U with PCP 10/23/17 1153   Skin Color/Characteristics  pale 10/24/17 1237    Equipment Used at Home  walker, rolling 12/12/15 1010   Skin Temperature  cool 10/24/17 1237    ASSESSMENT OF FUNCTIONAL STATUS     Skin Moisture  dry 10/24/17 1237    Prior to admission patient needed assistance with:  Home maintenance/Yard work;Handling finances;Transportation;Shopping;Laundry/Housekeeping;Meal preparation;Medications 10/23/17 1153   Skin Integrity  bruise(s);rash(es);scab(s) 10/24/17 1237    GASTROINTESTINAL (ADULT,PEDIATRIC,OB)     SAFETY      GI WDL  ex 10/24/17 1237   Safety WDL  WDL 10/24/17 1237    All  "Quadrants Bowel Sounds  audible and active in all quadrants 10/24/17 1237   All Alarms  alarm(s) activated and audible 10/24/17 1118                 Assessment WDL (Within Defined Limits) Definitions           Safety WDL     Effective: 09/28/15    Row Information: <b>WDL Definition:</b> Bed in low position, wheels locked; call light in reach; upper side rails up x 2; ID band on<br> <font color=\"gray\"><i>Item=AS safety wdl>>List=AS safety wdl>>Version=F14</i></font>      Skin WDL     Effective: 09/28/15    Row Information: <b>WDL Definition:</b> Warm; dry; intact; elastic; without discoloration; pressure points without redness<br> <font color=\"gray\"><i>Item=AS skin wdl>>List=AS skin wdl>>Version=F14</i></font>      Vitals     Vital Signs Flowsheet     VITAL SIGNS     Pain Descriptors  Sore 10/23/17 1709    Temp  97.9  F (36.6  C) 10/24/17 1045   Pain Intervention(s)  Medication (See eMAR);Repositioned (and barrier cream) 10/23/17 1709    Temp src  Tympanic 10/24/17 1045   Response to Interventions  Decrease in pain 10/22/17 2352    Resp  18 10/24/17 1043   HEIGHT AND WEIGHT      Pulse  55 10/22/17 1942   Weight  55.4 kg (122 lb 2.2 oz) 10/24/17 0633    Heart Rate  64 10/24/17 1043   Weight Method  Bed scale 10/23/17 0613    Pulse/Heart Rate Source  Monitor 10/24/17 1043   YENNIFER COMA SCALE      BP  149/51 10/24/17 1043   Best Eye Response  4-->(E4) spontaneous 10/24/17 0529    OXYGEN THERAPY     Best Motor Response  6-->(M6) obeys commands 10/24/17 0529    SpO2  97 % 10/24/17 1043   Best Verbal Response  5-->(V5) oriented (forgetful) 10/24/17 0529    O2 Device  None (Room air) 10/24/17 1223   Yennifer Coma Scale Score  15 10/24/17 0529    PAIN/COMFORT     POSITIONING      Patient Currently in Pain  denies 10/24/17 1043   Body Position  supine, head elevated 10/24/17 1223    Preferred Pain Scale  number (Numeric Rating Pain Scale) 10/23/17 1709   Head of Bed (HOB)  HOB at 20-30 degrees 10/24/17 1223    Patient's " Stated Pain Goal  No pain 10/23/17 0155   Positioning/Transfer Devices  pillows 10/24/17 1223    0-10 Pain Scale  8 10/23/17 1709   Chair  Upright in chair 10/24/17 1118    FACES Pain Rating  4-->hurts little more 10/22/17 1942   DAILY CARE      Pain Location  Rectum 10/23/17 1709   Activity Management  up to commode 10/24/17 1118    Pain Orientation  Mid;Lower 10/22/17 2352   Activity Assistance Provided  assistance, 2 people 10/24/17 1118            Patient Lines/Drains/Airways Status    Active LINES/DRAINS/AIRWAYS     Name: Placement date: Placement time: Site: Days: Last dressing change:    Peripheral IV 10/23/17 Left Hand 10/23/17   2125   Hand   less than 1             Patient Lines/Drains/Airways Status    Active PICC/CVC     None            Intake/Output Detail Report     Date Intake     Output    Shift P.O. I.V. IV Piggyback Total Total       Noc 10/22/17 2300 - 10/23/17 0659 -- -- -- -- -- 0    Day 10/23/17 0700 - 10/23/17 1459 660 -- -- 660 -- 660    Corrine 10/23/17 1500 - 10/23/17 2259 270 -- -- 270 -- 270    Noc 10/23/17 2300 - 10/24/17 0659 -- -- -- -- -- 0    Day 10/24/17 0700 - 10/24/17 1459 120 -- -- 120 -- 120      Last Void/BM       Most Recent Value    Urine Occurrence 1 at 10/24/2017 1116    Stool Occurrence 1 at 10/24/2017 0240      Case Management/Discharge Planning     Case Management/Discharge Planning Flowsheet     REFERRAL INFORMATION     COPING/STRESS      Did the Initial Social Work Assessment result in a Social Work Case?  No 10/23/17 1153   Major Change/Loss/Stressor  denies 10/23/17 1153    Admission Type  observation 10/23/17 1153   EXPECTED DISCHARGE      Arrived From  long[facility]term [facility] 10/23/17 1153   Expected Discharge Date  10/25/17 10/23/17 1153    Referral Source  admission list 10/23/17 1153   ASSESSMENT/CONCERNS TO BE ADDRESSED      New Steerage to  Clinics?  No 10/23/17 1153   Concerns Comments  -- (Admitted to hospital 12/11/2015) 12/14/15 6741    Reason For Consult  discharge  planning 10/23/17 1153   DISCHARGE PLANNING      Record Reviewed  history and physical;plan of care 10/23/17 1153   Patient/family verbalizes understanding of discharge plan recommendations?  Yes 10/23/17 1153    CTS Assigned to Case  Jeniffer ADAME 10/23/17 1153   Medical Team notified of plan?  yes 10/23/17 1153    [facility] 10/23/17 1153   Readmission Within The Last 30 Days  previous discharge plan unsuccessful 10/23/17 1153    Primary Care MD Name  Dr Fofana 10/23/17 1153   Anticipated Changes Related to Illness  none 10/23/17 1153    LIVING ENVIRONMENT     Transportation Available  agency transportation 10/23/17 1153    Lives With  facility resident 10/23/17 1153   [place] Recommendations  F/U with PCP 10/23/17 1153    Living Arrangements  -- (long term care) 10/23/17 1153   Equipment Used at Home  walker, rolling 12/12/15 1010    Primary Care Provided By  -- (staff) 10/23/17 1153   Discharge Plan Concerns  concerns to be addressed 10/22/17 2021    Quality Of Family Relationships  unable to assess 10/23/17 1153   FINAL NOTE      Able to Return to Prior Living Arrangements  yes 10/23/17 1153   Final Note  See Care Plan 10/23/17 1153    HOME SAFETY     FINAL RESOURCES      Patient Feels Safe Living in Home?  yes 10/23/17 1153   Equipment Currently Used at Home  walker, rolling;wheelchair, manual 10/23/17 1153    ASSESSMENT OF FAMILY/SOCIAL SUPPORT     ABUSE RISK SCREEN      Marital Status   10/23/17 1153   QUESTION TO PATIENT:  Has a member of your family or a partner(now or in the past) intimidated, hurt, manipulated, or controlled you in any way?  no 10/22/17 2020    Who is your support system?  Facility resident(s)/Staff (daughter, friends) 10/23/17 1153   QUESTION TO PATIENT: Do you feel safe going back to the place where you are living?  yes 10/22/17 2020    Description of Support System  Supportive;Involved 10/23/17 1153   OBSERVATION: Is there reason to believe there has  been maltreatment of a vulnerable adult (ie. Physical/Sexual/Emotional abuse, self neglect, lack of adequate food, shelter, medical care, or financial exploitation)?  no 10/22/17 2020    ASSESSMENT OF FUNCTIONAL STATUS     (R) MENTAL HEALTH SUICIDE RISK      Prior to admission patient needed assistance with:  Home maintenance/Yard work;Handling finances;Transportation;Shopping;Laundry/Housekeeping;Meal preparation;Medications 10/23/17 1153   Are you depressed or being treated for depression?  No 10/22/17 2020    EMPLOYMENT     HOMICIDE RISK      Do you work full or part-time?  no 10/23/17 1153   Feels Like Hurting Others  no 10/22/17 1359                  [facility]: [phone]            Medication Administration Report for Britney Schaeffer as of 10/24/17 1420   Legend:    Given Hold Not Given Due Canceled Entry Other Actions    Time Time (Time) Time  Time-Action       Inactive    Active    Linked        Medications 10/18/17 10/19/17 10/20/17 10/21/17 10/22/17 10/23/17 10/24/17    acetaminophen (TYLENOL) tablet 975 mg  Dose: 975 mg Freq: 2 TIMES DAILY PRN Route: PO  PRN Reason: mild pain  Start: 10/22/17 1944   Admin Instructions: Maximum acetaminophen dose from all sources = 75 mg/kg/day not to exceed 4 grams/day.          1710 (975 mg)-Given            artificial saliva (BIOTENE DRY MOUTHWASH) liquid 5 mL  Dose: 5 mL Freq: 4 TIMES DAILY Route: SWISH & SPIT  Start: 10/22/17 2100        2137 (5 mL)-Given        0958 (5 mL)-Given       1302 (5 mL)-Given       1716 (5 mL)-Given       2202 (5 mL)-Given        (0900)-Not Given       1310 (5 mL)-Given       [ ] 1700       [ ] 2100           aspirin chewable tablet 81 mg  Dose: 81 mg Freq: DAILY Route: PO  Start: 10/23/17 0900         0935 (81 mg)-Given        1051 (81 mg)-Given           bisacodyl (DULCOLAX) Suppository 10 mg  Dose: 10 mg Freq: ONCE Route: RE  Start: 10/22/17 2100         (0105)-Not Given [C]            calcium carbonate (TUMS) chewable  tablet 1,000 mg  Dose: 1,000 mg Freq: 2 TIMES DAILY Route: PO  Indications Comment: OSTEOPOROSIS  Start: 10/22/17 2100        (2136)-Not Given        0932 (1,000 mg)-Given       2201 (1,000 mg)-Given        1056 (1,000 mg)-Given       [ ] 2100           carbidopa-levodopa (SINEMET)  MG per tablet 1 tablet  Dose: 1 tablet Freq: 3 TIMES DAILY Route: PO  Start: 10/22/17 2000        (2136)-Not Given        1305 (1 tablet)-Given       1710 (1 tablet)-Given       2201 (1 tablet)-Given        1310 (1 tablet)-Given       [ ] 1600       [ ] 2000           carbidopa-levodopa (SINEMET)  MG per tablet 2 tablet  Dose: 2 tablet Freq: EVERY MORNING Route: PO  Indications of Use: PARKINSON'S DISEASE  Start: 10/23/17 0900         0932 (2 tablet)-Given        1058 (2 tablet)-Given           clopidogrel (PLAVIX) tablet 75 mg  Dose: 75 mg Freq: DAILY Route: PO  Start: 10/23/17 0900         0934 (75 mg)-Given        1052 (75 mg)-Given           enoxaparin (LOVENOX) injection 40 mg  Dose: 40 mg Freq: EVERY 24 HOURS Route: SC  Start: 10/22/17 2030        2135 (40 mg)-Given        2203 (40 mg)-Given        [ ] 2030           gabapentin (NEURONTIN) capsule 100 mg  Dose: 100 mg Freq: 2 TIMES DAILY Route: PO  Start: 10/22/17 2100        (2136)-Not Given        0934 (100 mg)-Given       2201 (100 mg)-Given        1050 (100 mg)-Given       [ ] 2100           guaiFENesin-codeine (ROBITUSSIN AC) 100-10 MG/5ML solution 5 mL  Dose: 5 mL Freq: EVERY 4 HOURS PRN Route: PO  PRN Reason: cough  Start: 10/22/17 1944              HYDROmorphone (PF) (DILAUDID) injection 0.5 mg  Dose: 0.5 mg Freq: EVERY 2 HOURS PRN Route: IV  PRN Reason: moderate to severe pain  Start: 10/22/17 2000        2135 (0.5 mg)-Given             hypromellose-dextran (ARTIFICAL TEARS) ophthalmic solution 2 drop  Dose: 2 drop Freq: 4 TIMES DAILY PRN Route: Both Eyes  PRN Reason: dry eyes  Start: 10/22/17 1944   Admin Instructions: Formulary alternate for Systane Ultra           1021 (2 drop)-Given            lidocaine (LIDODERM) 5 % Patch 1 patch  Dose: 1 patch Freq: DAILY Route: TD  Start: 10/23/17 0900   Admin Instructions: Apply patch(s) to the affected area. To prevent lidocaine toxicity, patient should be patch free for 12 hrs daily. Patches may be cut to smaller size prior to removing release liner.  NEVER APPLY HEAT OVER PATCH which will increase absorption and may lead to risk of local anesthetic toxicity.  Do not apply over area where liposomal bupivacaine was injected for 96 hours post injection.          0935 (1 patch)-Given        1045 (1 patch)-Given           lidocaine (LIDODERM) patch in PLACE  Freq: EVERY 8 HOURS Route: TD  Start: 10/22/17 2000   Admin Instructions: Chart every shift, confirming that patch is still in place on patient (no barcode scan needed). See patch order for dose information.  NEVER APPLY HEAT OVER PATCH which will increase absorption and may lead to risk of local anesthetic toxicity. Do not apply over area where liposomal bupivacaine injected for 96 hours.         2000 ( )-Patch Removed               0958 ( )-Patch in Place       1712 ( )-Patch in Place                      [ ] 1600           lidocaine (LIDODERM) patch REMOVAL  Freq: EVERY 24 HOURS 2000 Route: TD  Start: 10/22/17 2000   Admin Instructions: Remove lidocaine Patch.         2000 ( )-Patch Removed        1956 ( )-Patch Removed        [ ] 2000           loratadine (CLARITIN) tablet 10 mg  Dose: 10 mg Freq: DAILY Route: PO  Start: 10/22/17 1945        (2123)-Not Given        0935 (10 mg)-Given        1053 (10 mg)-Given           metoclopramide (REGLAN) injection 5 mg  Dose: 5 mg Freq: EVERY 4 HOURS PRN Route: IV  PRN Reasons: nausea,vomiting  Start: 10/22/17 1219   Admin Instructions: Avoid use if patient has full bowel obstruction or perforation. Irritant. Up to 10mg may be given IV Push undiluted over 2 minutes.         2313 (5 mg)-Given             metoprolol (LOPRESSOR) half-tab  12.5 mg  Dose: 12.5 mg Freq: 2 TIMES DAILY Route: PO  Indications of Use: HYPERTENSION  Start: 10/22/17 2100        2127-Hold        0935 (12.5 mg)-Given       2246-Hold        1052 (12.5 mg)-Given       [ ] 2100           miconazole (MICATIN; MICRO GUARD) 2 % powder  Freq: 2 TIMES DAILY Route: Top  Start: 10/22/17 2100   Admin Instructions: Apply to perivaginal area         (2100)-Not Given        1018 ( )-Given       2218 ( )-Given        1113 ( )-Given       [ ] 2100           mirabegron (MYRBETRIQ) 24 hr tablet 50 mg  Dose: 50 mg Freq: DAILY Route: PO  Start: 10/22/17 1945   Admin Instructions: Do not chew, crush or split tablets.         (2100)-Not Given        (0900)-Not Given        (0900)-Not Given           naloxone (NARCAN) injection 0.1-0.4 mg  Dose: 0.1-0.4 mg Freq: EVERY 2 MIN PRN Route: IV  PRN Reason: opioid reversal  Start: 10/22/17 2003   Admin Instructions: For respiratory rate LESS than or EQUAL to 8.  Partial reversal dose:  0.1 mg titrated q 2 minutes for Analgesia Side Effects Monitoring Sedation Level of 3 (frequently drowsy, arousable, drifts to sleep during conversation).Full reversal dose:  0.4 mg bolus for Analgesia Side Effects Monitoring Sedation Level of 4 (somnolent, minimal or no response to stimulation).  Up to 2mg may be given IV Push undiluted each 0.4mg over 15 seconds in emergency situations. For non-emergent situations further dilute in 9mL of NS to facilitate titration of response.               olopatadine (PATANOL) 0.1 % ophthalmic solution 1 drop  Dose: 1 drop Freq: 2 TIMES DAILY Route: Both Eyes  Start: 10/22/17 2100        2137 (1 drop)-Given        1016 (1 drop)-Given       2202 (1 drop)-Given        1101 (1 drop)-Given       [ ] 2100           ondansetron (ZOFRAN) injection 4 mg  Dose: 4 mg Freq: EVERY 6 HOURS PRN Route: IV  PRN Reasons: nausea,vomiting  Start: 10/22/17 1944   Admin Instructions: Irritant. Up to 4 mg may be given IV Push undiluted over 2-5 minutes.          2136 (4 mg)-Given             pantoprazole (PROTONIX) EC tablet 40 mg  Dose: 40 mg Freq: 2 TIMES DAILY BEFORE MEALS Route: PO  Start: 10/22/17 2045   Admin Instructions: DO NOT CRUSH.         (2136)-Not Given        0933 (40 mg)-Given       1710 (40 mg)-Given        0730 (40 mg)-Given       [ ] 1630           polyethylene glycol (MIRALAX/GLYCOLAX) Packet 17 g  Dose: 17 g Freq: ONCE Route: PO  Start: 10/22/17 2115   Admin Instructions: 1 Packet = 17 grams. Mixed prescribed dose in 8 ounces of water. Follow with 8 oz. of water.          (0105)-Not Given [C]            polyethylene glycol (MIRALAX/GLYCOLAX) Packet 17 g  Dose: 17 g Freq: DAILY Route: PO  Start: 10/23/17 0900   Admin Instructions: 1 Packet = 17 grams. Mixed prescribed dose in 8 ounces of water.  1 Packet = 17 grams. Mixed prescribed dose in 8 ounces of water. Follow with 8 oz. of water.          0937 (17 g)-Given        1049 (17 g)-Given           senna-docusate (SENOKOT-S;PERICOLACE) 8.6-50 MG per tablet 1 tablet  Dose: 1 tablet Freq: 2 TIMES DAILY Route: PO  Start: 10/22/17 2100        (2136)-Not Given        0933 (1 tablet)-Given       2201 (1 tablet)-Given        1053 (1 tablet)-Given       [ ] 2100           sodium chloride (OCEAN) 0.65 % nasal spray 1 spray  Dose: 1 spray Freq: EVERY 6 HOURS PRN Route: NA  PRN Reason: congestion  Start: 10/22/17 1944   Admin Instructions: Formulary alternate for AYR saline gel          2212 (1 spray)-Given            spironolactone (ALDACTONE) half-tab 12.5 mg  Dose: 12.5 mg Freq: DAILY Route: PO  Start: 10/22/17 1945        (2123)-Not Given        0934 (12.5 mg)-Given        1051 (12.5 mg)-Given          Completed Medications  Medications 10/18/17 10/19/17 10/20/17 10/21/17 10/22/17 10/23/17 10/24/17         Dose: 0.5 mg Freq: EVERY 15 MIN PRN Route: IV  PRN Reason: moderate to severe pain  Start: 10/22/17 1418   End: 10/22/17 1800        1432 (0.5 mg)-Given       1630 (0.5 mg)-Given       1800 (0.5  mg)-Given               Dose: 100 mL Freq: ONCE Route: IV  Start: 10/22/17 1427   End: 10/22/17 1533        1533 (100 mL)-Given               Start: 10/22/17 2035   End: 10/22/17 2050   Admin Instructions: Candace Mendes: cabinet override  Irritant. Administer at a rate of no greater than 100 mL/hr         2050 (500 mg)-New Bag               Dose: 500 mg Freq: ONCE Route: IV  Indications of Use: COMMUNITY ACQUIRED PNEUMONIA  Last Dose: Stopped (10/22/17 1915)  Start: 10/22/17 1835   End: 10/22/17 2000   Admin Instructions: Irritant. Administer at a rate of no greater than 100 mL/hr         1914 (500 mg)-New Bag       1915-ED Infusing on Admission/transfer       2000-Stopped [C]               Dose: 296 mL Freq: ONCE Route: PO  Start: 10/23/17 1030   End: 10/23/17 1301         1301 (296 mL)-Given              Dose: 60 mL Freq: ONCE Route: IV  Start: 10/22/17 1427   End: 10/22/17 1533        1533 (60 mL)-Given            Discontinued Medications  Medications 10/18/17 10/19/17 10/20/17 10/21/17 10/22/17 10/23/17 10/24/17         Rate: 75 mL/hr Freq: CONTINUOUS Route: IV  Last Dose: Stopped (10/22/17 1842)  Start: 10/22/17 1420   End: 10/22/17 1944        1432 ( )-New Bag       1842-ED Infusing on Admission/transfer       1944-Med Discontinued           Dose: 500 mg Freq: EVERY 24 HOURS Route: IV  Indications of Use: COMMUNITY ACQUIRED PNEUMONIA  Start: 10/23/17 1900   End: 10/22/17 2034   Admin Instructions: FIRST DOSE STAT.  Start within 4 hours of patient's arrival to hospital.         2034-Med Discontinued           Dose: 250 mg Freq: EVERY 24 HOURS Route: PO  Indications of Use: COMMUNITY ACQUIRED PNEUMONIA  Start: 10/24/17 1900   End: 10/22/17 2034   Admin Instructions: Schedule subsequent doses 24 hours from first dose.         2034-Med Discontinued           Dose: 2 g Freq: EVERY 24 HOURS Route: IV  Indications of Use: COMMUNITY ACQUIRED PNEUMONIA  Start: 10/23/17 1900   End: 10/22/17 2034   Admin Instructions:  FIRST DOSE STAT, start within 4 hours of patient's arrival to hospital.         2034-Med Discontinued           Dose: 0.5 mg Freq: EVERY 2 HOURS PRN Route: PO  PRN Reason: moderate to severe pain  Start: 10/22/17 1944   End: 10/22/17 2000        2000-Med Discontinued           Dose: 4 mg Freq: EVERY 30 MIN PRN Route: IV  PRN Reasons: nausea,vomiting  Start: 10/22/17 1418   End: 10/22/17 1944   Admin Instructions: May repeat in 30 minutes as needed, up to 3 doses.  Irritant. Up to 4 mg may be given IV Push undiluted over 2-5 minutes.         1432 (4 mg)-Given       1944-Med Discontinued           Dose: 40 mg Freq: DAILY Route: PO  Start: 10/22/17 2015   End: 10/22/17 2033   Admin Instructions: DO NOT CRUSH.         2033-Med Discontinued            Medications 10/18/17 10/19/17 10/20/17 10/21/17 10/22/17 10/23/17 10/24/17               INTERAGENCY TRANSFER FORM - NOTES (H&P, Discharge Summary, Consults, Procedures, Therapies)   10/22/2017                       [facility]: [phone]               History & Physicals      H&P by Sally Mabry MD at 10/22/2017  7:23 PM     Author:  Sally Mabry MD Service:  Hospitalist Author Type:  Physician    Filed:  10/22/2017  9:28 PM Date of Service:  10/22/2017  7:23 PM Creation Time:  10/22/2017  7:22 PM    Status:  Signed :  Sally Mabry MD (Physician)         [facility]    History and Physical  Hospitalist       Date of Admission:  10/22/2017[AP1.1]    Assessment & Plan[AP1.2]   Britney Schaeffer is a 87 year old female History of hypertension, Parkinson's disease, TIA, CAD  Was sent to the ED with a complaint of abdominal pain[AP1.3].  Her issues are the following:    Abdominal pain: The etiology of the abdominal pain is not clear.  Last time she presented with  Colitis found on the CAT scan.  There was also possibility of a proximal sigmoid colon mass but that is not visible on the CT scan done today according to the [facility] report.  I suspect that  she has gastritis and also she is constipated and has been having bouts of[AP1.4]  [AP1.3]spurious overflow diarrhea reflected by small amounts of loose stools multiple times.  I will give her a Dulcolax suppository and will continue on MiraLAX.  I expect her abdominal pain did get better once her constipation relieves..  On the CT scan she still has a lot of hard stools in the colon.  Increase the dose of Protonix to 40 mg by mouth twice a day. She might need bidirectional endoscopies if we cannot find the cause for her abdominal pain.    Questionable pneumonia: I don't suspect pneumonia as the patient does not have symptoms or signs of pneumonia.  Pro-calcitonin is normal.  I would not give her antibiotics.[AP1.4]    Leukocytosis: I think this is reactive  Due to ongoing abdominal pain.  Less likely bacterial  Infection.blood cultures have already been sent.  UA is unremarkable.  Pro-calcitonin is normal.[AP1.5]    Parkinson's disease: Continue Sinemet.     Hypertension: continue on Lopressor.  Monitor blood pressure closely.       Hyperlipidemia: Continue statins      Coronary artery disease:[AP1.3] Continue[AP1.4] aspirin and Plavix due to increased risk of bleeding.  Continue on beta blockers.     GERD:[AP1.3] Increase the dose of protonix to 40mg BID[AP1.4]   [AP1.3]  Renal Cyst: Will need f/u imaging like MRI once acute issues resolve.[AP1.5]     History of CVA: Continue on  aspirin and Plavix[AP1.3]    DVT Prophylaxis:[AP1.1] Enoxaparin (Lovenox) SQ[AP1.3]  Code Status:[AP1.1] DNR / DNI[AP1.3]    Disposition: Expected discharge in[AP1.1] 1-2[AP1.3] days once[AP1.1] symptoms resolve.[AP1.3]     Sally Mabry    Primary Care Physician   M[facility] Fofana    Chief Complaint[AP1.2]   Abdominal pain    History is obtained from the patient[AP1.3]    History of Present Illness[AP1.2]   Britney Schaeffer is a 87 year old female[AP1.1] with history of hypertension, Parkinson's disease, TIA, CAD who was recently  discharged from this facility on 12 October back to nursing home after management of her colitis.  Her colitis was probably noninfectious but there was also possibility of proximal sigmoid colon mass.   The patient presented back to the ED today with a complaint of ongoing issue of abdominal pain.  The pain is more localized in the left lower quadrant.  Last time she had almost same kind of pain.  She was also feeling nauseous.  She also reports having small amounts of loose stools  At the nursing home.  She denied any fevers or chills.  She does have chronic cough that is not worse at the moment.  She denied any chest pain.  Denies hematochezia or hematemesis.  Denies melena.  She denied any dysuria urgency or frequency.  She says that she was feeling better at the time of discharge from  This facility but for the last couple of days  Has been having more nausea and has not been eating well.[AP1.4]    Past M[AP1.2]edical History    I have reviewed this patient's medical history and updated it with pertinent information if needed.[AP1.1]   Past Medical History:   Diagnosis Date     Allergic rhinitis, Seasonal 06/19/2000     Colonic polyps 09/28/2000     Corns and callosities 01/20/2004     dyslipidemia 09/28/2000     Fibrocystic Breast Disease 03/01/2011     GERD 03/01/2011     hemorrhoids 03/01/2011     hypertension, benign 11/22/1999     Insomnia, unspecified 02/05/2004     Osteoarthritis, generalized 11/07/2008     Osteoporosis, unspecified 04/20/2009     Polio 1931     Scoliosis (and kyphoscoliosis), idiopathic 03/01/2011     TIA 03/09/2005     Unspecified asthma(493.90) 03/11/2003       Past Surgical History[AP1.2]   I have reviewed this patient's surgical history and updated it with pertinent information if needed.[AP1.1]  Past Surgical History:   Procedure Laterality Date     BREAST BIOPSY, RT/LT       BUNIONECTOMY      Bunion     COLONOSCOPY  2007     GENITOURINARY SURGERY      botox     JOINT REPLACEMENT       LT     JOINT REPLACEMENT, HIP RT/LT  2010     KERATOPLASTY PENETRATING, VITRECTOMY ANTERIOR, EXTRACAPSULAR CATARACT EXTRACTION, COMBINED  2010    LT, Cataracts       Prior to Admission Medications   Prior to Admission Medications   Prescriptions Last Dose Informant Patient Reported? Taking?   ACETAMINOPHEN PO   Yes Yes   Sig: Take 1,000 mg by mouth daily as needed for pain IN ADDITION TO BID DOSE   AMOXICILLIN PO   Yes Yes   Sig: Take 500 mg by mouth as needed (GIVE ONE HOUR PRIOIR TO DENTAL APTS FOR ISCHEMIC HEART DISEASE) Give 4 tablets as needed    Cholecalciferol (VITAMIN D3) 2000 UNITS CAPS   No Yes   Sig: Take 2,000 Units by mouth daily   Lanolin (CORONA MULTI-PURPOSE) 30 % OINT   No Yes   Sig: Externally apply 1 Application topically 2 times daily   MYRBETRIQ 50 MG 24 hr tablet   No Yes   Sig: Take one (1) tablet BY MOUTH daily   Multiple Vitamins-Minerals (PRESERVISION AREDS) TABS Unknown at Unknown time  No No   Sig: TAKE 1 TABLET BY MOUTH 2 TIMES DAILY   acetaminophen (TYLENOL) 500 MG tablet   No Yes   Sig: Take 2 tablets (1,000 mg) by mouth 2 times daily as needed for mild pain As needed   artificial saliva, BIOTENE MT, (BIOTENE MT) SOLN   No Yes   Sig: Swish and spit 5 mLs in mouth as needed for dry mouth   aspirin 81 MG chewable tablet   No Yes   Sig: Take 1 tablet (81 mg) by mouth daily   calcium carbonate (TUMS) 500 MG chewable tablet   Yes Yes   Sig: Take 2 chew tab by mouth 2 times daily    carbidopa-levodopa (SINEMET)  MG per tablet   No Yes   Sig: TAKE 1 TABLET THREE TIMES A DAY   Patient taking differently: TAKE 1 TABLET THREE TIMES A DAY at 1200,1600 and 2000   carbidopa-levodopa (SINEMET)  MG per tablet   Yes Yes   Sig: Take 2 tablets by mouth every morning   chlorhexidine (PERIDEX) 0.12 % solution   Yes Yes   Sig: Swish and spit 15 mLs in mouth 2 times daily   clopidogrel (PLAVIX) 75 MG tablet   No Yes   Sig: Take 1 tablet (75 mg) by mouth daily   furosemide (LASIX) 20 MG tablet    No Yes   Sig: Take 1 tablet (20 mg) by mouth as needed   Patient taking differently: Take 20 mg by mouth daily as needed (FOR EDEMA GOAL < +3)    gabapentin (NEURONTIN) 100 MG capsule   No Yes   Sig: TAKE 1 CAPSULE TWICE A DAY   guaiFENesin-codeine (GUAIFENESIN AC) 100-10 MG/5ML SOLN   No Yes   Sig: TAKE 2 TEASPOONFULS (10ML) BY MOUTH EVERY 4 HOURS AS NEEDED FOR COUGH   ketoconazole (NIZORAL) 2 % shampoo   No Yes   Sig: Wash hair, let set for 5 minutes, rinse 1 time per day every Sunday   lidocaine (LIDODERM) 5 % Patch   No Yes   Sig: APPLY UP TO 3 PATCHES TO PAINFUL AREA AT ONCE FOR UP TO 12 HOURS WITHIN A 24 HOURS PERIOD. REMOVE AFTER 12 HOURS   Patient taking differently: Place 1 patch onto the skin daily APPLY TO LEFT SIDE/BREAST EVERY AM, REMOVE PATCH EVERY HS   loratadine (ALLERGY RELIEF) 10 MG tablet   No Yes   Sig: Take 1 tablet (10 mg) by mouth daily   metoprolol (LOPRESSOR) 25 MG tablet   No Yes   Sig: Take 0.5 tablets (12.5 mg) by mouth 2 times daily   Patient taking differently: Take 12.5 mg by mouth 2 times daily    nystatin (MYCOSTATIN) cream   No Yes   Sig: APPLY TO AFFECTED AREA 2 TIMES DAILY AS NEEDED   olopatadine (PATANOL) 0.1 % ophthalmic solution   No Yes   Sig: INSTILL 1 DROP INTO BOTH EYES 2 TIMES DAILY   ondansetron (ZOFRAN) 4 MG tablet   No Yes   Sig: TAKE 1 TABLET BY MOUTH EVERY 8 HOURS AS NEEDED FOR NAUSEA AND VOMITING   order for DME Unknown at Unknown time  No No   Sig: Equipment being ordered: neoprene knee sleeve   pantoprazole (PROTONIX) 40 MG EC tablet   No Yes   Sig: TAKE 1 TABLET DAILY   polyethylene glycol (MIRALAX) powder   No Yes   Sig: Take 17 g by mouth daily   Patient taking differently: Take 17 g by mouth daily as needed for constipation (IF NO BM IN 2 DAYS)    polyethylene glycol 0.4%- propylene glycol 0.3% (SYSTANE) 0.4-0.3 % SOLN ophthalmic solution   No Yes   Sig: Place 2 drops into both eyes 4 times daily as needed for dry eyes   Patient taking differently: Place 2  drops into both eyes daily    saline nasal (AYR SALINE) GEL topical gel   No Yes   Sig: Apply into each naris At Bedtime   senna-docusate (SENOKOT-S;PERICOLACE) 8.6-50 MG per tablet   Yes Yes   Sig: Take 1 tablet by mouth 2 times daily   sodium fluoride dental gel (PREVIDENT) 1.1 % GEL topical gel   Yes Yes   Sig: Apply 1 applicator to affected area At Bedtime   spironolactone (ALDACTONE) 25 MG tablet   No Yes   Sig: Take 0.5 tablets (12.5 mg) by mouth daily      Facility-Administered Medications: None     Allergies   Allergies   Allergen Reactions     Ambien      Zolpidem Tartrate     Tramadol        Social History[AP1.2]   I have reviewed this patient's social history and updated it with pertinent information if needed. Britney Schaeffer[AP1.1]  reports that she is a non-smoker but has been exposed to tobacco smoke. She has never used smokeless tobacco. She reports that she drinks alcohol. She reports that she does not use illicit drugs.    Family History[AP1.2]   I have reviewed this patient's family history and updated it with pertinent information if needed.[AP1.1]   Family History   Problem Relation Age of Onset     CEREBROVASCULAR DISEASE Mother      CVA (cause of death)     CEREBROVASCULAR DISEASE Father      CVA     Asthma No family hx of        Review of Systems[AP1.2]   GENERAL/CONSTITUTIONAL: The patient denies fever,weight gain or weight loss.  HEAD, EYES, EARS, NOSE AND THROAT: Eyes - The patient denies pain, redness, loss of vision,,Ears, nose, mouth and throat. The patient denies ringing in the ears, loss of hearing,   CARDIOVASCULAR: The patient denies chest pain, irregular heartbeats, palpitation, shortness of breath, orthopnea or PND  RESPIRATORY: The patient[AP1.3] does have[AP1.4] chronic dry cough,[AP1.3]denies[AP1.4] Hemoptysis,  repeated pneumonias, wheezing or night sweats.  GASTROINTESTINAL: as per HPI otherwise negative  GENITOURINARY: The patient denies difficult urination, pain or  burning with urination, blood in the urine,  MUSCULOSKELETAL: The patient denies muscle cramps.   SKIN : The patient denies easy bruising, skin redness, skin rash,   NEUROLOGIC: the patient has Parkinson's disease due to which she has rigidity, tremors and bradykinesia.  PSYCHIATRIC: The patient denies depression with thoughts of suicide,  ENDOCRINE: The patient denies intolerance to hot or cold temperature,  HEMATOLOGIC/LYMPHATIC: reported blood in stool  ALLERGIC/IMMUNOLOGIC: The patient denies rhinitis, skin sensitivity,[AP1.3]    Physical Exam   Temp: 97.8  F (36.6  C) Temp src: Tympanic BP: 146/50   Heart Rate: 58 Resp: 16 SpO2: 97 % O2 Device: None (Room air)[AP1.2]    Vital Signs with Ranges[AP1.1]  Temp:  [97.8  F (36.6  C)] 97.8  F (36.6  C)  Heart Rate:  [58] 58  Resp:  [16] 16  BP: (146)/(50) 146/50  SpO2:  [94 %-97 %] 97 %  0 lbs 0 oz[AP1.2]  GENERAL APPEARANCE: Pt  is an elderly lean built white female who has resting tremors otherwise in no acute distress  HEENT: Normocephalic and atraumatic. No scleral icterus.PERRLA. No conjunctival injection is noted. No oropharyngeal lesions.  NECK: Supple. Trachea is midline. No evidence of thyroid enlargement. No lymphadenopathy or tenderness.  CHEST: Symmetric. Nontender to palpation.  LUNGS: Breath sounds are equal and clear bilaterally. No wheezes, rhonchi, or rales.  HEART: Regular rate and rhythm with normal S1 and S2. No murmurs, gallops, or rubs.  ABDOMEN: Soft, flat,diffusely tender[AP1.3] more in the left lower quadrant, no guarding or rebound[AP1.4], bowel sounds are normal  RECTAL: Deferred  EXTREMITIES: No cyanosis, clubbing, or edema.  NEUROLOGIC: resting tremors, rigidity and bradykinesia  PSYCHIATRIC: flat affect due to Parkinson's  SKIN: Warm, dry, and well perfused. Good turgor. No lesions, nodules or rashes are noted.[AP1.3]   Data[AP1.2]   Data reviewed today:[AP1.1]     Recent Labs  Lab 10/22/17  1448   WBC 14.5*   HGB 13.7   MCV 94          POTASSIUM 4.2   CHLORIDE 108   CO2 25   BUN 14   CR 0.69   ANIONGAP 8   BAKARI 8.8   GLC 96   ALBUMIN 3.4   PROTTOTAL 7.0   BILITOTAL 0.5   ALKPHOS 55   ALT 12   AST 13   LIPASE 109   TROPI 0.025[AP1.2]     CT scan of the abdomen and pelvis with contrast was performed that showed 7 mm kidney cyst, left lower lobe opacity possibly atelectasis.[AP1.4]    Total time spent in patient care was[AP1.1] 55[AP1.3]minutes. >  50% of time was spent in coordination of care and patient counseling.[AP1.1]     Revision History        User Key Date/Time User Provider Type Action    > AP1.5 10/22/2017  9:28 PM Sally Mabry MD Physician Sign     AP1.4 10/22/2017  9:14 PM Sally Mabry MD Physician      AP1.3 10/22/2017  9:03 PM Sally Mabry MD Physician      AP1.2 10/22/2017  7:23 PM Sally Mabry MD Physician      AP1.1 10/22/2017  7:22 PM Sally Mabry MD Physician                   Discharge Summaries     No notes of this type exist for this encounter.      Consult Notes     No notes of this type exist for this encounter.         Progress Notes - Physician (Notes for yesterday and today)      Progress Notes by Cresencio Sanchez MD at 10/23/2017  1:38 PM     Author:  Cresencio Sanchez MD Service:  Hospitalist Author Type:  Physician    Filed:  10/24/2017 10:20 AM Date of Service:  10/23/2017  1:38 PM Creation Time:  10/23/2017  1:38 PM    Status:  Addendum :  Cresencio Sanchez MD (Physician)         [facility]    Hospitalist Progress Note    Date of Service (when I saw the patient): 10/23/2017    Assessment & Plan[SS1.1]   Britney Schaeffer is a 87 year old female with history of hypertension, Parkinson's disease, TIA, CAD  Was sent to the ED with a complaint of abdominal pain.  Her issues are the following:     Abdominal pain:   The etiology of the abdominal pain is not clear.  Last time she presented with colitis and focal small bowel bowel enteritis in the RLQ found on the CAT scan.  There was also  possibility of a proximal sigmoid colon mass but that is not visible on the CT scan done this admission, after reviewing with radiology.[SS1.2]  S[SS1.3]uspect that she has gastritis and also she is constipated and has been having bouts of spurious overflow diarrhea reflected by small amounts of loose stools multiple times.  She did have moderate amount of stool in the colon and so will continue on MiraLAX.  She refused a dulcolax suppository[SS1.2], so will try some magnesium citrate[SS1.3].[SS1.2] Expec[SS1.3]t[SS1.2] that[SS1.3] her abdominal pain[SS1.2] will[SS1.3] get better once her constipation relieves[SS1.2], she is already reporting it is significantly better today than upon admission, really with no interval interventions[SS1.3].  On the CT scan she still has a lot of hard stools in the colon.  Increase[SS1.2]d[SS1.3] the dose of Protonix to 40 mg by mouth twice a day.[SS1.2] Will get abdominal ultrasound given some question of enlarged pancreatic duct on discussion of abd/pelvic CT results with radiology.[SS1.3] She might need bidirectional endoscopies if we cannot find the cause for her abdominal pain.     Questionable pneumonia:[SS1.2]  Do not[SS1.3] suspect pneumonia as the patient does not have symptoms or signs of pneumonia.  Pro-calcitonin is normal.[SS1.2]  W[SS1.3]ould not give her antibiotics.     Leukocytosis:[SS1.2]   Felt to be[SS1.3] reactive[SS1.2]  WBC now normalized.[SS1.3] Due to ongoing abdominal pain.  Less likely bacterial[SS1.2] i[SS1.3]nfection.[SS1.2] B[SS1.3]lood cultures have already been sent.  UA is unremarkable. Pro-calcitonin is normal.     Parkinson's disease:   Continue Sinemet.      Hypertension:[SS1.2]   C[SS1.3]ontinue on Lopressor.  Monitor blood pressure closely.        Hyperlipidemia:   Continue statins       Coronary artery disease:   Continue aspirin and Plavix due to increased risk of bleeding.  Continue on beta blockers.      GERD:  Increase the dose of protonix  "to 40mg BID     Renal Cyst:[SS1.2]   Incidental finding on CT. Discussed with radiology and no further imaging or evaluation recommended.[SS1.3]     History of CVA:  Continue on  aspirin and Plavix   [SS1.2]  DVT Prophylaxis:[SS1.1] Enoxaparin (Lovenox) SQ[SS1.3]  Code Status: DNR/DNI    Disposition: Expected discharge in[SS1.1] 1-2[SS1.3] days once[SS1.1] abdominal pain resolving and able to pass BM[SS1.3]  Cresencio BELGICA Sanchez    Interval History[SS1.1]   Feels that abdominal pain is largely resolved, but still slightly noticeable. \"Nothing like it was.\" Had no other interval complaints[SS1.3]    -Data reviewed today: I reviewed all new labs and imaging results over the last 24 hours. I personally reviewed[SS1.1] no images or EKG's today[SS1.3].    Physical Exam   Temp: 97.9  F (36.6  C) Temp src: Tympanic BP: (!) 115/43 Pulse: 55 Heart Rate: 61 Resp: 16 SpO2: 93 % O2 Device: None (Room air)    Vitals:    10/22/17 2002 10/23/17 0605   Weight: 56.4 kg (124 lb 5.4 oz) 56.6 kg (124 lb 12.5 oz)     Vital Signs with Ranges  Temp:  [97.6  F (36.4  C)-98.4  F (36.9  C)] 97.9  F (36.6  C)  Pulse:  [55] 55  Heart Rate:  [51-63] 61  Resp:  [16-18] 16  BP: (115-152)/(43-82) 115/43  SpO2:  [92 %-97 %] 93 %       Peripheral IV 10/22/17 Right Upper arm (Active)   Site Assessment WDL 10/23/2017  9:40 AM   Line Status Saline locked 10/23/2017  9:40 AM   Phlebitis Scale 0-->no symptoms 10/23/2017  9:40 AM   Infiltration Scale 0 10/23/2017  9:40 AM   Number of days:1[SS1.1]     No line/device[SS1.3]    Constitutional: Alert and oriented x3. No distress    HEENT: NC/AT, PERRL, EOMI, mouth[SS1.1] clear[SS1.3], neck supple, no LN.     Cardiovascular: RRR.[SS1.1] No m[SS1.3]urmur, no  rubs, or gallops.      Respiratory: Clear to auscultation bilaterally    Abdomen: Soft,[SS1.1] non-distended, mildly tender in the right and left mid-abdomen, no peritoneal signs, no guarding or rebound tenderness[SS1.4], no organomegaly. Bowel sounds " present    Extremities: Warm/dry.[SS1.1] No[SS1.3] edema    Neuro:[SS1.1]  Resting tremor.[SS1.3] Non focal, cranial nerves intact, Moves all extremities.    Medications        artificial saliva  5 mL Swish & Spit 4x Daily     aspirin  81 mg Oral Daily     calcium carbonate  1,000 mg Oral BID     carbidopa-levodopa  1 tablet Oral TID     carbidopa-levodopa  2 tablet Oral QAM     clopidogrel  75 mg Oral Daily     gabapentin  100 mg Oral BID     lidocaine  1 patch Transdermal Daily     loratadine  10 mg Oral Daily     metoprolol  12.5 mg Oral BID     mirabegron  50 mg Oral Daily     olopatadine  1 drop Both Eyes BID     polyethylene glycol  17 g Oral Daily     senna-docusate  1 tablet Oral BID     spironolactone  12.5 mg Oral Daily     enoxaparin  40 mg Subcutaneous Q24H     lidocaine   Transdermal Q24h     lidocaine   Transdermal Q8H     pantoprazole  40 mg Oral BID AC     miconazole   Topical BID     bisacodyl  10 mg Rectal Once     polyethylene glycol  17 g Oral Once       Data     Recent Labs  Lab 10/23/17  0516 10/22/17  1448   WBC 8.6 14.5*   HGB 12.0 13.7   MCV 92 94    175   NA  --  141   POTASSIUM  --  4.2   CHLORIDE  --  108   CO2  --  25   BUN  --  14   CR  --  0.69   ANIONGAP  --  8   BAKARI  --  8.8   GLC  --  96   ALBUMIN  --  3.4   PROTTOTAL  --  7.0   BILITOTAL  --  0.5   ALKPHOS  --  55   ALT  --  12   AST  --  13   LIPASE  --  109   TROPI  --  0.025     Lactic Acid   Date Value Ref Range Status   10/22/2017 1.4 0.4 - 2.0 mmol/L Final   12/14/2015 0.9 0.4 - 2.0 mmol/L Final   12/12/2015 1.6 0.4 - 2.0 mmol/L Final       Recent Results (from the past 24 hour(s))   CT Abdomen Pelvis w Contrast    Narrative    PROCEDURE:  CT ABDOMEN PELVIS W CONTRAST    HISTORY:  LLq abdominal pain, recent hospitalization for acute  pancreatitis     TECHNIQUE:  Helical CT of the abdomen and pelvis was performed  following injection of intravenous contrast.     Sagittal and coronal reformatted images were  reviewed.    COMPARISON:  CT abdomen pelvis 10/7/2017    FINDINGS:      There is a large hiatal hernia. There is a small left pleural effusion  with associated atelectasis.    The liver, spleen, pancreas and adrenal glands are unremarkable. The  gallbladder is present.    Multiple areas of cortical scarring in the left kidney are unchanged.  Small right renal cyst is noted.    The bowel is normal in caliber. The appendix is not seen. There are  multiple diverticula in the colon without evidence of diverticulitis.  Moderate stool is present in the colon.     The aorta is normal in size with scattered atherosclerotic  calcifications.    No free fluid, free air or adenopathy is present.      No suspicious osseous lesions are identified. There are degenerative  changes in the spine. Right hip prosthesis is present. Spinal  stimulator device is noted in the left buttock.      Impression    IMPRESSION:    1. No acute findings in the abdomen or pelvis.  2. Large hiatal hernia.  3. Small left pleural effusion with associated atelectasis.    CYNDI JEFFERSON MD   Chest XR,  PA & LAT    Narrative    PROCEDURE:  XR CHEST 2 VW    HISTORY:  left lower lobe consolidation on ct scan.     COMPARISON:  12/11/2015    FINDINGS:   The cardiac silhouette is normal in size. The pulmonary vasculature is  normal.  There is a large hiatal hernia. There is a small left pleural  effusion with associated atelectasis. No pneumothorax.      Impression    IMPRESSION:  Small left pleural effusion with associated atelectasis  or infiltrate. Large hiatal hernia.      CYNDI JEFFERSON MD[SS1.1]                Revision History        User Key Date/Time User Provider Type Action    > [N/A] 10/24/2017 10:20 AM Cresencio Sanchez MD Physician Addend     SS1.4 10/24/2017 10:19 AM Cresencio Sanchez MD Physician Sign     SS1.3 10/24/2017  9:59 AM Cresencio Sanchez MD Physician      SS1.2 10/24/2017  7:43 AM Cresencio Sanchez MD Physician      SS1.1 10/23/2017  1:38  PM Cresencio Sanchez MD Physician                   Procedure Notes     No notes of this type exist for this encounter.      Progress Notes - Therapies (Notes from 10/21/17 through 10/24/17)     No notes of this type exist for this encounter.                                          INTERAGENCY TRANSFER FORM - LAB / IMAGING / EKG / EMG RESULTS   10/22/2017                       [facility]: [phone]            Unresulted Labs (24h ago through future)    Start       Ordered    10/24/17 0500  Basic metabolic panel  AM DRAW,   Routine      10/24/17 0452    10/24/17 0500  CBC with platelets  AM DRAW,   Routine     Comments:  Last Lab Result: Hemoglobin (g/dL)       Date                     Value                 10/23/2017               12.0             ----------    10/24/17 0452         Lab Results - 3 Days      Active MRSA Surveillance Culture [[id number]]  Resulted: 10/24/17 0734, Result status: Final result    Ordering provider: Sally Mabry MD  10/22/17 2054 Resulting lab: [facility]    Specimen Information    Type Source Collected On   Nares Nose 10/22/17 2100          Components       Value Reference Range Flag Lab   Specimen Description Nares      Culture Micro No MRSA isolated   HI            Blood culture [[id number]]  Resulted: 10/24/17 0613, Result status: Preliminary result    Ordering provider: Chilo Castro MD  10/22/17 1846 Resulting lab: [facility]    Specimen Information    Type Source Collected On   Blood  10/22/17 1905   Comment:  Right Arm          Components       Value Reference Range Flag Lab   Specimen Description Blood Right Arm      Special Requests 1ML   HI   Culture Micro No growth after 2 days   HI            Blood culture [[id number]]  Resulted: 10/24/17 0613, Result status: Preliminary result    Ordering provider: Chilo Castro MD  10/22/17 1846 Resulting lab: [facility]    Specimen Information    Type Source  Collected On   Blood  10/22/17 1901   Comment:  Left Arm          Components       Value Reference Range Flag Lab   Specimen Description Blood Left Arm      Special Requests 10ML   HI   Culture Micro No growth after 2 days   HI            Basic metabolic panel [[id number]]  Resulted: 10/24/17 0559, Result status: Final result    Ordering provider: Cresencio Sanchez MD  10/24/17 0452 Resulting lab: [facility]    Specimen Information    Type Source Collected On   Blood  10/24/17 0528          Components       Value Reference Range Flag Lab   Sodium 142 133 - 144 mmol/L  [facility]   Potassium 3.7 3.4 - 5.3 mmol/L  HI   Chloride 108 94 - 109 mmol/L  HI   Carbon Dioxide 27 20 - 32 mmol/L  HI   Anion Gap 7 3 - 14 mmol/L  HI   Glucose 74 70 - 99 mg/dL  HI   Urea Nitrogen 13 7 - 30 mg/dL  HI   Creatinine 0.74 0.52 - 1.04 mg/dL  HI   GFR Estimate 74 >60 mL/min/1.7m2  HI   Comment:  Non  GFR Calc   GFR Estimate If Black 90 >60 mL/min/1.7m2  HI   Comment:  African American GFR Calc   Calcium 8.9 8.5 - 10.1 mg/dL  HI            CBC with platelets [[id number]]  Resulted: 10/24/17 0545, Result status: Final result    Ordering provider: Cresencio Sanchez MD  10/24/17 0452 Resulting lab: [facility]    Specimen Information    Type Source Collected On   Blood  10/24/17 0528          Components       Value Reference Range Flag Lab   WBC 5.6 4.0 - 11.0 10e9/L  HI   RBC Count 3.96 3.8 - 5.2 10e12/L  HI   Hemoglobin 12.0 11.7 - 15.7 g/dL  HI   Hematocrit 36.7 35.0 - 47.0 %  HI   MCV 93 78 - 100 fl  HI   MCH 30.3 26.5 - 33.0 pg  HI   MCHC 32.7 31.5 - 36.5 g/dL  HI   RDW 14.7 10.0 - 15.0 %  HI   Platelet Count 198 150 - 450 10e9/L  HI            CBC with platelets differential [[id number]]  Resulted: 10/23/17 0543, Result status: Final result    Ordering provider: Sally Mabry MD  10/23/17 0000 Resulting lab: [facility]    Specimen Information    Type Source Collected On   Blood   10/23/17 0516          Components       Value Reference Range Flag Lab   WBC 8.6 4.0 - 11.0 10e9/L  HI   RBC Count 3.97 3.8 - 5.2 10e12/L  HI   Hemoglobin 12.0 11.7 - 15.7 g/dL  HI   Hematocrit 36.4 35.0 - 47.0 %  HI   MCV 92 78 - 100 fl  HI   MCH 30.2 26.5 - 33.0 pg  HI   MCHC 33.0 31.5 - 36.5 g/dL  HI   RDW 14.6 10.0 - 15.0 %  HI   Platelet Count 181 150 - 450 10e9/L  HI   Diff Method Automated Method   HI   % Neutrophils 76.1 %  HI   % Lymphocytes 15.5 %  HI   % Monocytes 5.4 %  HI   % Eosinophils 1.9 %  HI   % Basophils 0.6 %  HI   % Immature Granulocytes 0.5 %  HI   Nucleated RBCs 0 0 /100  HI   Absolute Neutrophil 6.6 1.6 - 8.3 10e9/L  HI   Absolute Lymphocytes 1.3 0.8 - 5.3 10e9/L  HI   Absolute Monocytes 0.5 0.0 - 1.3 10e9/L  HI   Absolute Eosinophils 0.2 0.0 - 0.7 10e9/L  HI   Absolute Basophils 0.1 0.0 - 0.2 10e9/L  HI   Abs Immature Granulocytes 0.0 0 - 0.4 10e9/L  HI   Absolute Nucleated RBC 0.0   HI            Procalcitonin [[id number]]  Resulted: 10/22/17 2013, Result status: Final result    Ordering provider: Sally Mabry MD  10/22/17 1907 Resulting lab: [facility]    Specimen Information    Type Source Collected On   Blood  10/22/17 1937          Components       Value Reference Range Flag Lab   Procalcitonin <0.05 ng/ml  HI   Comment:         <0.05 ng/ml  Normal  Recommendation: Very low risk of bacterial infection.   Discourage antibiotics unless strong clinical suspicion for serious infection.              Lactic acid [[id number]]  Resulted: 10/22/17 1638, Result status: Final result    Ordering provider: Chilo Castro MD  10/22/17 1602 Resulting lab: [facility]    Specimen Information    Type Source Collected On   Blood  10/22/17 1617          Components       Value Reference Range Flag Lab   Lactic Acid 1.4 0.4 - 2.0 mmol/L  HI            UA reflex to Microscopic and Culture [[id number]] (Abnormal)  Resulted: 10/22/17 1608, Result status: Final result     Ordering provider: Chilo Castro MD  10/22/17 1419 Resulting lab: [facility]    Specimen Information    Type Source Collected On   Catheterized Urine Urine 10/22/17 1550          Components       Value Reference Range Flag Lab   Color Urine Yellow   HI   Appearance Urine Clear   HI   Glucose Urine Negative NEG^Negative mg/dL  HI   Bilirubin Urine Negative NEG^Negative  HI   Ketones Urine 5 NEG^Negative mg/dL A HI   Specific Gravity Urine 1.022 1.003 - 1.035  HI   Blood Urine Negative NEG^Negative  HI   pH Urine 5.5 4.7 - 8.0 pH  HI   Protein Albumin Urine 10 NEG^Negative mg/dL A HI   Urobilinogen mg/dL Normal 0.0 - 2.0 mg/dL  HI   Nitrite Urine Negative NEG^Negative  HI   Leukocyte Esterase Urine Negative NEG^Negative  HI   Source Catheterized Urine   HI   RBC Urine 2 0 - 2 /HPF  HI   WBC Urine 1 0 - 2 /HPF  HI   Bacteria Urine None NEG^Negative /HPF A HI   Mucous Urine Present NEG^Negative /LPF A HI   Calcium Oxalate Few NEG^Negative /HPF A HI            Comprehensive metabolic panel [[id number]]  Resulted: 10/22/17 1514, Result status: Final result    Ordering provider: Chilo Castro MD  10/22/17 1423 Resulting lab: [facility]    Specimen Information    Type Source Collected On   Blood  10/22/17 1448          Components       Value Reference Range Flag Lab   Sodium 141 133 - 144 mmol/L  HI   Potassium 4.2 3.4 - 5.3 mmol/L  HI   Comment:  Specimen slightly hemolyzed   Chloride 108 94 - 109 mmol/L  HI   Carbon Dioxide 25 20 - 32 mmol/L  HI   Anion Gap 8 3 - 14 mmol/L  HI   Glucose 96 70 - 99 mg/dL  HI   Urea Nitrogen 14 7 - 30 mg/dL  HI   Creatinine 0.69 0.52 - 1.04 mg/dL  HI   GFR Estimate 80 >60 mL/min/1.7m2  HI   Comment:  Non  GFR Calc   GFR Estimate If Black >90 >60 mL/min/1.7m2  HI   Comment:  African American GFR Calc   Calcium 8.8 8.5 - 10.1 mg/dL  HI   Bilirubin Total 0.5 0.2 - 1.3 mg/dL  HI   Albumin 3.4 3.4 - 5.0 g/dL  HI   Protein Total 7.0 6.8  - 8.8 g/dL  HI   Alkaline Phosphatase 55 40 - 150 U/L  HI   ALT 12 0 - 50 U/L  HI   AST 13 0 - 45 U/L  HI            Lipase [[id number]]  Resulted: 10/22/17 1514, Result status: Final result    Ordering provider: Chilo Castro MD  10/22/17 1429 Resulting lab: [facility]    Specimen Information    Type Source Collected On   Blood  10/22/17 1448          Components       Value Reference Range Flag Lab   Lipase 109 73 - 393 U/L  HI            Troponin I [[id number]]  Resulted: 10/22/17 1514, Result status: Final result    Ordering provider: Chilo Castro MD  10/22/17 142 Resulting lab: [facility]    Specimen Information    Type Source Collected On   Blood  10/22/17 1448          Components       Value Reference Range Flag Lab   Troponin I ES 0.025 0.000 - 0.045 ug/L  HI   Comment:         The 99th percentile for upper reference range is 0.045 ug/L.  Troponin values   in the range of 0.045 - 0.120 ug/L may be associated with risks of adverse   clinical events.              Amylase [[id number]]  Resulted: 10/22/17 1514, Result status: Final result    Ordering provider: Chilo Castro MD  10/22/17 1425 Resulting lab: [facility]    Specimen Information    Type Source Collected On   Blood  10/22/17 1448          Components       Value Reference Range Flag Lab   Amylase 32 30 - 110 U/L  HI            CBC with platelets differential [[id number]] (Abnormal)  Resulted: 10/22/17 1454, Result status: Final result    Ordering provider: Chilo Castro MD  10/22/17 6956 Resulting lab: [facility]    Specimen Information    Type Source Collected On   Blood  10/22/17 1448          Components       Value Reference Range Flag Lab   WBC 14.5 4.0 - 11.0 10e9/L H HI   RBC Count 4.51 3.8 - 5.2 10e12/L  HI   Hemoglobin 13.7 11.7 - 15.7 g/dL  HI   Hematocrit 42.4 35.0 - 47.0 %  HI   MCV 94 78 - 100 fl  HI   MCH 30.4 26.5 - 33.0 pg  HI   MCHC 32.3 31.5 - 36.5  g/dL  HI   RDW 14.7 10.0 - 15.0 %  HI   Platelet Count 175 150 - 450 10e9/L  HI   Diff Method Automated Method   HI   % Neutrophils 86.7 %  HI   % Lymphocytes 7.5 %  HI   % Monocytes 4.1 %  HI   % Eosinophils 0.8 %  HI   % Basophils 0.4 %  HI   % Immature Granulocytes 0.5 %  HI   Nucleated RBCs 0 0 /100  HI   Absolute Neutrophil 12.6 1.6 - 8.3 10e9/L H HI   Absolute Lymphocytes 1.1 0.8 - 5.3 10e9/L  HI   Absolute Monocytes 0.6 0.0 - 1.3 10e9/L  HI   Absolute Eosinophils 0.1 0.0 - 0.7 10e9/L  HI   Absolute Basophils 0.1 0.0 - 0.2 10e9/L  HI   Abs Immature Granulocytes 0.1 0 - 0.4 10e9/L  HI   Absolute Nucleated RBC 0.0   HI            Testing Performed By     Lab - Abbreviation Name Director Address Valid Date Range    210 - HI [facility] Unknown [address] [address] 05/08/15 1057 - Present               Imaging Results - 3 Days      US Abdomen Complete [[id number]]  Resulted: 10/24/17 1347, Result status: Final result    Ordering provider: Cresencio Sanchez MD  10/23/17 1019 Resulted by: Ernst Romero MD    Performed: 10/24/17 0933 - 10/24/17 1034 Resulting lab: RADIOLOGY RESULTS    Narrative:       PROCEDURE: US ABDOMEN COMPLETE 10/24/2017 10:34 AM    HISTORY: abdominal pain    COMPARISONS: None.    TECHNIQUE: Some of the abdomen    FINDINGS: No gallstones were seen. The common hepatic duct measures 5  mm the common bile ducts measures 7 mm no gallstones are seen within  the biliary tree the liver is free of masses. Pancreas appears normal  with spleen is normal. Questionable small stone seen in the left  kidney no longer hydronephrosis is seen. The abdominal aorta is  normally tapering         Impression:       IMPRESSION: No stones seen in either in the gallbladder or the biliary  tree    ERNST ROMERO MD      Chest XR,  PA & LAT [[id number]]  Resulted: 10/23/17 0826, Result status: Final result    Ordering provider: Chilo Castro MD  10/22/17 5039 Resulted by: Wallace  Cyndi JEFFERSON MD    Performed: 10/22/17 1716 - 10/22/17 1731 Resulting lab: RADIOLOGY RESULTS    Narrative:       PROCEDURE:  XR CHEST 2 VW    HISTORY:  left lower lobe consolidation on ct scan.     COMPARISON:  12/11/2015    FINDINGS:   The cardiac silhouette is normal in size. The pulmonary vasculature is  normal.  There is a large hiatal hernia. There is a small left pleural  effusion with associated atelectasis. No pneumothorax.      Impression:       IMPRESSION:  Small left pleural effusion with associated atelectasis  or infiltrate. Large hiatal hernia.      CYNDI JEFFERSON MD      CT Abdomen Pelvis w Contrast [[id number]]  Resulted: 10/23/17 0818, Result status: Final result    Ordering provider: Chilo Castro MD  10/22/17 1419 Resulted by: Cyndi Jefferson MD    Performed: 10/22/17 1517 - 10/22/17 1540 Resulting lab: RADIOLOGY RESULTS    Narrative:       PROCEDURE:  CT ABDOMEN PELVIS W CONTRAST    HISTORY:  LLq abdominal pain, recent hospitalization for acute  pancreatitis     TECHNIQUE:  Helical CT of the abdomen and pelvis was performed  following injection of intravenous contrast.     Sagittal and coronal reformatted images were reviewed.    COMPARISON:  CT abdomen pelvis 10/7/2017    FINDINGS:      There is a large hiatal hernia. There is a small left pleural effusion  with associated atelectasis.    The liver, spleen, pancreas and adrenal glands are unremarkable. The  gallbladder is present.    Multiple areas of cortical scarring in the left kidney are unchanged.  Small right renal cyst is noted.    The bowel is normal in caliber. The appendix is not seen. There are  multiple diverticula in the colon without evidence of diverticulitis.  Moderate stool is present in the colon.     The aorta is normal in size with scattered atherosclerotic  calcifications.    No free fluid, free air or adenopathy is present.      No suspicious osseous lesions are identified. There are degenerative  changes in the spine.  Right hip prosthesis is present. Spinal  stimulator device is noted in the left buttock.      Impression:       IMPRESSION:    1. No acute findings in the abdomen or pelvis.  2. Large hiatal hernia.  3. Small left pleural effusion with associated atelectasis.    CYNDI JEFFERSON MD      Testing Performed By     Lab - Abbreviation Name Director Address Valid Date Range    104 - Rad Rslts RADIOLOGY RESULTS Unknown Unknown 02/16/05 1553 - Present            Encounter-Level Documents:     There are no encounter-level documents.      Order-Level Documents:     There are no order-level documents.

## 2017-10-22 NOTE — ED NOTES
Bed: ED04  Expected date: 10/22/17  Expected time:   Means of arrival:   Comments:  [facility]

## 2017-10-22 NOTE — IP AVS SNAPSHOT
[facility]    [address] [address]    Phone:  [phone]    Fax:  [phone]                                       After Visit Summary   10/22/2017    Britney Schaeffer    MRN: [id number]           After Visit Summary Signature Page     I have received my discharge instructions, and my questions have been answered. I have discussed any challenges I see with this plan with the nurse or doctor.    ..........................................................................................................................................  Patient/Patient Representative Signature      ..........................................................................................................................................  Patient Representative Print Name and Relationship to Patient    ..................................................               ................................................  Date                                            Time    ..........................................................................................................................................  Reviewed by Signature/Title    ...................................................              ..............................................  Date                                                            Time

## 2017-10-23 LAB
BASOPHILS # BLD AUTO: 0.1 10E9/L (ref 0–0.2)
BASOPHILS NFR BLD AUTO: 0.6 %
DIFFERENTIAL METHOD BLD: NORMAL
EOSINOPHIL # BLD AUTO: 0.2 10E9/L (ref 0–0.7)
EOSINOPHIL NFR BLD AUTO: 1.9 %
ERYTHROCYTE [DISTWIDTH] IN BLOOD BY AUTOMATED COUNT: 14.6 % (ref 10–15)
HCT VFR BLD AUTO: 36.4 % (ref 35–47)
HGB BLD-MCNC: 12 G/DL (ref 11.7–15.7)
IMM GRANULOCYTES # BLD: 0 10E9/L (ref 0–0.4)
IMM GRANULOCYTES NFR BLD: 0.5 %
LYMPHOCYTES # BLD AUTO: 1.3 10E9/L (ref 0.8–5.3)
LYMPHOCYTES NFR BLD AUTO: 15.5 %
MCH RBC QN AUTO: 30.2 PG (ref 26.5–33)
MCHC RBC AUTO-ENTMCNC: 33 G/DL (ref 31.5–36.5)
MCV RBC AUTO: 92 FL (ref 78–100)
MONOCYTES # BLD AUTO: 0.5 10E9/L (ref 0–1.3)
MONOCYTES NFR BLD AUTO: 5.4 %
NEUTROPHILS # BLD AUTO: 6.6 10E9/L (ref 1.6–8.3)
NEUTROPHILS NFR BLD AUTO: 76.1 %
NRBC # BLD AUTO: 0 10*3/UL
NRBC BLD AUTO-RTO: 0 /100
PLATELET # BLD AUTO: 181 10E9/L (ref 150–450)
RBC # BLD AUTO: 3.97 10E12/L (ref 3.8–5.2)
WBC # BLD AUTO: 8.6 10E9/L (ref 4–11)

## 2017-10-23 PROCEDURE — 25000128 H RX IP 250 OP 636: Performed by: HOSPITALIST

## 2017-10-23 PROCEDURE — 25000132 ZZH RX MED GY IP 250 OP 250 PS 637: Mod: GY | Performed by: HOSPITALIST

## 2017-10-23 PROCEDURE — 36415 COLL VENOUS BLD VENIPUNCTURE: CPT | Performed by: HOSPITALIST

## 2017-10-23 PROCEDURE — G0378 HOSPITAL OBSERVATION PER HR: HCPCS

## 2017-10-23 PROCEDURE — 25000132 ZZH RX MED GY IP 250 OP 250 PS 637: Mod: GY | Performed by: INTERNAL MEDICINE

## 2017-10-23 PROCEDURE — 99225 ZZC SUBSEQUENT OBSERVATION CARE,LEVEL II: CPT | Performed by: INTERNAL MEDICINE

## 2017-10-23 PROCEDURE — 85025 COMPLETE CBC W/AUTO DIFF WBC: CPT | Performed by: HOSPITALIST

## 2017-10-23 PROCEDURE — A9270 NON-COVERED ITEM OR SERVICE: HCPCS | Mod: GY | Performed by: HOSPITALIST

## 2017-10-23 RX ORDER — MAGNESIUM CARB/ALUMINUM HYDROX 105-160MG
296 TABLET,CHEWABLE ORAL ONCE
Status: COMPLETED | OUTPATIENT
Start: 2017-10-23 | End: 2017-10-23

## 2017-10-23 RX ADMIN — Medication 5 ML: at 13:02

## 2017-10-23 RX ADMIN — SENNOSIDES AND DOCUSATE SODIUM 1 TABLET: 8.6; 5 TABLET ORAL at 22:01

## 2017-10-23 RX ADMIN — ENOXAPARIN SODIUM 40 MG: 40 INJECTION SUBCUTANEOUS at 22:03

## 2017-10-23 RX ADMIN — CARBIDOPA AND LEVODOPA 1 TABLET: 25; 100 TABLET ORAL at 13:05

## 2017-10-23 RX ADMIN — METOPROLOL TARTRATE 12.5 MG: 25 TABLET ORAL at 09:35

## 2017-10-23 RX ADMIN — Medication 5 ML: at 09:58

## 2017-10-23 RX ADMIN — OLOPATADINE HYDROCHLORIDE 1 DROP: 1 SOLUTION/ DROPS OPHTHALMIC at 10:16

## 2017-10-23 RX ADMIN — GABAPENTIN 100 MG: 100 CAPSULE ORAL at 09:34

## 2017-10-23 RX ADMIN — DEXTRAN 70, AND HYPROMELLOSE 2910 2 DROP: 1; 3 SOLUTION/ DROPS OPHTHALMIC at 10:21

## 2017-10-23 RX ADMIN — CARBIDOPA AND LEVODOPA 2 TABLET: 25; 100 TABLET ORAL at 09:32

## 2017-10-23 RX ADMIN — PANTOPRAZOLE SODIUM 40 MG: 40 TABLET, DELAYED RELEASE ORAL at 17:10

## 2017-10-23 RX ADMIN — Medication 5 ML: at 17:16

## 2017-10-23 RX ADMIN — MICONAZOLE NITRATE: 20 POWDER TOPICAL at 10:18

## 2017-10-23 RX ADMIN — LORATADINE 10 MG: 10 TABLET ORAL at 09:35

## 2017-10-23 RX ADMIN — PANTOPRAZOLE SODIUM 40 MG: 40 TABLET, DELAYED RELEASE ORAL at 09:33

## 2017-10-23 RX ADMIN — MAGNESIUM CITRATE 296 ML: 1.75 LIQUID ORAL at 13:01

## 2017-10-23 RX ADMIN — ASPIRIN 81 MG 81 MG: 81 TABLET ORAL at 09:35

## 2017-10-23 RX ADMIN — MICONAZOLE NITRATE: 20 POWDER TOPICAL at 22:18

## 2017-10-23 RX ADMIN — SALINE NASAL SPRAY 1 SPRAY: 1.5 SOLUTION NASAL at 22:12

## 2017-10-23 RX ADMIN — SPIRONOLACTONE 12.5 MG: 25 TABLET ORAL at 09:34

## 2017-10-23 RX ADMIN — CALCIUM CARBONATE (ANTACID) CHEW TAB 500 MG 1000 MG: 500 CHEW TAB at 22:01

## 2017-10-23 RX ADMIN — OLOPATADINE HYDROCHLORIDE 1 DROP: 1 SOLUTION/ DROPS OPHTHALMIC at 22:02

## 2017-10-23 RX ADMIN — CARBIDOPA AND LEVODOPA 1 TABLET: 25; 100 TABLET ORAL at 22:01

## 2017-10-23 RX ADMIN — CLOPIDOGREL 75 MG: 75 TABLET, FILM COATED ORAL at 09:34

## 2017-10-23 RX ADMIN — LIDOCAINE 1 PATCH: 50 PATCH TOPICAL at 09:35

## 2017-10-23 RX ADMIN — SENNOSIDES AND DOCUSATE SODIUM 1 TABLET: 8.6; 5 TABLET ORAL at 09:33

## 2017-10-23 RX ADMIN — ACETAMINOPHEN 975 MG: 325 TABLET, FILM COATED ORAL at 17:10

## 2017-10-23 RX ADMIN — CALCIUM CARBONATE (ANTACID) CHEW TAB 500 MG 1000 MG: 500 CHEW TAB at 09:32

## 2017-10-23 RX ADMIN — POLYETHYLENE GLYCOL 3350 17 G: 17 POWDER, FOR SOLUTION ORAL at 09:37

## 2017-10-23 RX ADMIN — Medication 5 ML: at 22:02

## 2017-10-23 RX ADMIN — GABAPENTIN 100 MG: 100 CAPSULE ORAL at 22:01

## 2017-10-23 RX ADMIN — CARBIDOPA AND LEVODOPA 1 TABLET: 25; 100 TABLET ORAL at 17:10

## 2017-10-23 NOTE — H&P
[facility]    History and Physical  Hospitalist       Date of Admission:  10/22/2017    Assessment & Plan   Britney Schaeffer is a 87 year old female History of hypertension, Parkinson's disease, TIA, CAD  Was sent to the ED with a complaint of abdominal pain.  Her issues are the following:    Abdominal pain: The etiology of the abdominal pain is not clear.  Last time she presented with  Colitis found on the CAT scan.  There was also possibility of a proximal sigmoid colon mass but that is not visible on the CT scan done today according to the [facility] report.  I suspect that she has gastritis and also she is constipated and has been having bouts of  spurious overflow diarrhea reflected by small amounts of loose stools multiple times.  I will give her a Dulcolax suppository and will continue on MiraLAX.  I expect her abdominal pain did get better once her constipation relieves..  On the CT scan she still has a lot of hard stools in the colon.  Increase the dose of Protonix to 40 mg by mouth twice a day. She might need bidirectional endoscopies if we cannot find the cause for her abdominal pain.    Questionable pneumonia: I don't suspect pneumonia as the patient does not have symptoms or signs of pneumonia.  Pro-calcitonin is normal.  I would not give her antibiotics.    Leukocytosis: I think this is reactive  Due to ongoing abdominal pain.  Less likely bacterial  Infection.blood cultures have already been sent.  UA is unremarkable.  Pro-calcitonin is normal.    Parkinson's disease: Continue Sinemet.     Hypertension: continue on Lopressor.  Monitor blood pressure closely.       Hyperlipidemia: Continue statins      Coronary artery disease: Continue aspirin and Plavix due to increased risk of bleeding.  Continue on beta blockers.     GERD: Increase the dose of protonix to 40mg BID     Renal Cyst: Will need f/u imaging like MRI once acute issues resolve.     History of CVA: Continue on  aspirin and  Plavix    DVT Prophylaxis: Enoxaparin (Lovenox) SQ  Code Status: DNR / DNI    Disposition: Expected discharge in 1-2 days once symptoms resolve.     Sally Mabry    Primary Care Physician   Mario Fofana    Chief Complaint   Abdominal pain    History is obtained from the patient    History of Present Illness   Britney Schaeffer is a 87 year old female with history of hypertension, Parkinson's disease, TIA, CAD who was recently discharged from this facility on 12 October back to nursing home after management of her colitis.  Her colitis was probably noninfectious but there was also possibility of proximal sigmoid colon mass.   The patient presented back to the ED today with a complaint of ongoing issue of abdominal pain.  The pain is more localized in the left lower quadrant.  Last time she had almost same kind of pain.  She was also feeling nauseous.  She also reports having small amounts of loose stools  At the nursing home.  She denied any fevers or chills.  She does have chronic cough that is not worse at the moment.  She denied any chest pain.  Denies hematochezia or hematemesis.  Denies melena.  She denied any dysuria urgency or frequency.  She says that she was feeling better at the time of discharge from  This facility but for the last couple of days  Has been having more nausea and has not been eating well.    Past Medical History    I have reviewed this patient's medical history and updated it with pertinent information if needed.   Past Medical History:   Diagnosis Date     Allergic rhinitis, Seasonal 06/19/2000     Colonic polyps 09/28/2000     Corns and callosities 01/20/2004     dyslipidemia 09/28/2000     Fibrocystic Breast Disease 03/01/2011     GERD 03/01/2011     hemorrhoids 03/01/2011     hypertension, benign 11/22/1999     Insomnia, unspecified 02/05/2004     Osteoarthritis, generalized 11/07/2008     Osteoporosis, unspecified 04/20/2009     Polio 1931     Scoliosis (and kyphoscoliosis),  idiopathic 03/01/2011     TIA 03/09/2005     Unspecified asthma(493.90) 03/11/2003       Past Surgical History   I have reviewed this patient's surgical history and updated it with pertinent information if needed.  Past Surgical History:   Procedure Laterality Date     BREAST BIOPSY, RT/LT       BUNIONECTOMY      Bunion     COLONOSCOPY  2007     GENITOURINARY SURGERY      botox     JOINT REPLACEMENT      LT     JOINT REPLACEMENT, HIP RT/LT  2010     KERATOPLASTY PENETRATING, VITRECTOMY ANTERIOR, EXTRACAPSULAR CATARACT EXTRACTION, COMBINED  2010    LT, Cataracts       Prior to Admission Medications   Prior to Admission Medications   Prescriptions Last Dose Informant Patient Reported? Taking?   ACETAMINOPHEN PO   Yes Yes   Sig: Take 1,000 mg by mouth daily as needed for pain IN ADDITION TO BID DOSE   AMOXICILLIN PO   Yes Yes   Sig: Take 500 mg by mouth as needed (GIVE ONE HOUR PRIOIR TO DENTAL APTS FOR ISCHEMIC HEART DISEASE) Give 4 tablets as needed    Cholecalciferol (VITAMIN D3) 2000 UNITS CAPS   No Yes   Sig: Take 2,000 Units by mouth daily   Lanolin (CORONA MULTI-PURPOSE) 30 % OINT   No Yes   Sig: Externally apply 1 Application topically 2 times daily   MYRBETRIQ 50 MG 24 hr tablet   No Yes   Sig: Take one (1) tablet BY MOUTH daily   Multiple Vitamins-Minerals (PRESERVISION AREDS) TABS Unknown at Unknown time  No No   Sig: TAKE 1 TABLET BY MOUTH 2 TIMES DAILY   acetaminophen (TYLENOL) 500 MG tablet   No Yes   Sig: Take 2 tablets (1,000 mg) by mouth 2 times daily as needed for mild pain As needed   artificial saliva, BIOTENE MT, (BIOTENE MT) SOLN   No Yes   Sig: Swish and spit 5 mLs in mouth as needed for dry mouth   aspirin 81 MG chewable tablet   No Yes   Sig: Take 1 tablet (81 mg) by mouth daily   calcium carbonate (TUMS) 500 MG chewable tablet   Yes Yes   Sig: Take 2 chew tab by mouth 2 times daily    carbidopa-levodopa (SINEMET)  MG per tablet   No Yes   Sig: TAKE 1 TABLET THREE TIMES A DAY   Patient  taking differently: TAKE 1 TABLET THREE TIMES A DAY at 1200,1600 and 2000   carbidopa-levodopa (SINEMET)  MG per tablet   Yes Yes   Sig: Take 2 tablets by mouth every morning   chlorhexidine (PERIDEX) 0.12 % solution   Yes Yes   Sig: Swish and spit 15 mLs in mouth 2 times daily   clopidogrel (PLAVIX) 75 MG tablet   No Yes   Sig: Take 1 tablet (75 mg) by mouth daily   furosemide (LASIX) 20 MG tablet   No Yes   Sig: Take 1 tablet (20 mg) by mouth as needed   Patient taking differently: Take 20 mg by mouth daily as needed (FOR EDEMA GOAL < +3)    gabapentin (NEURONTIN) 100 MG capsule   No Yes   Sig: TAKE 1 CAPSULE TWICE A DAY   guaiFENesin-codeine (GUAIFENESIN AC) 100-10 MG/5ML SOLN   No Yes   Sig: TAKE 2 TEASPOONFULS (10ML) BY MOUTH EVERY 4 HOURS AS NEEDED FOR COUGH   ketoconazole (NIZORAL) 2 % shampoo   No Yes   Sig: Wash hair, let set for 5 minutes, rinse 1 time per day every Sunday   lidocaine (LIDODERM) 5 % Patch   No Yes   Sig: APPLY UP TO 3 PATCHES TO PAINFUL AREA AT ONCE FOR UP TO 12 HOURS WITHIN A 24 HOURS PERIOD. REMOVE AFTER 12 HOURS   Patient taking differently: Place 1 patch onto the skin daily APPLY TO LEFT SIDE/BREAST EVERY AM, REMOVE PATCH EVERY HS   loratadine (ALLERGY RELIEF) 10 MG tablet   No Yes   Sig: Take 1 tablet (10 mg) by mouth daily   metoprolol (LOPRESSOR) 25 MG tablet   No Yes   Sig: Take 0.5 tablets (12.5 mg) by mouth 2 times daily   Patient taking differently: Take 12.5 mg by mouth 2 times daily    nystatin (MYCOSTATIN) cream   No Yes   Sig: APPLY TO AFFECTED AREA 2 TIMES DAILY AS NEEDED   olopatadine (PATANOL) 0.1 % ophthalmic solution   No Yes   Sig: INSTILL 1 DROP INTO BOTH EYES 2 TIMES DAILY   ondansetron (ZOFRAN) 4 MG tablet   No Yes   Sig: TAKE 1 TABLET BY MOUTH EVERY 8 HOURS AS NEEDED FOR NAUSEA AND VOMITING   order for DME Unknown at Unknown time  No No   Sig: Equipment being ordered: neoprene knee sleeve   pantoprazole (PROTONIX) 40 MG EC tablet   No Yes   Sig: TAKE 1 TABLET  DAILY   polyethylene glycol (MIRALAX) powder   No Yes   Sig: Take 17 g by mouth daily   Patient taking differently: Take 17 g by mouth daily as needed for constipation (IF NO BM IN 2 DAYS)    polyethylene glycol 0.4%- propylene glycol 0.3% (SYSTANE) 0.4-0.3 % SOLN ophthalmic solution   No Yes   Sig: Place 2 drops into both eyes 4 times daily as needed for dry eyes   Patient taking differently: Place 2 drops into both eyes daily    saline nasal (AYR SALINE) GEL topical gel   No Yes   Sig: Apply into each naris At Bedtime   senna-docusate (SENOKOT-S;PERICOLACE) 8.6-50 MG per tablet   Yes Yes   Sig: Take 1 tablet by mouth 2 times daily   sodium fluoride dental gel (PREVIDENT) 1.1 % GEL topical gel   Yes Yes   Sig: Apply 1 applicator to affected area At Bedtime   spironolactone (ALDACTONE) 25 MG tablet   No Yes   Sig: Take 0.5 tablets (12.5 mg) by mouth daily      Facility-Administered Medications: None     Allergies   Allergies   Allergen Reactions     Ambien      Zolpidem Tartrate     Tramadol        Social History   I have reviewed this patient's social history and updated it with pertinent information if needed. Britney PATEL Maksim  reports that she is a non-smoker but has been exposed to tobacco smoke. She has never used smokeless tobacco. She reports that she drinks alcohol. She reports that she does not use illicit drugs.    Family History   I have reviewed this patient's family history and updated it with pertinent information if needed.   Family History   Problem Relation Age of Onset     CEREBROVASCULAR DISEASE Mother      CVA (cause of death)     CEREBROVASCULAR DISEASE Father      CVA     Asthma No family hx of        Review of Systems   GENERAL/CONSTITUTIONAL: The patient denies fever,weight gain or weight loss.  HEAD, EYES, EARS, NOSE AND THROAT: Eyes - The patient denies pain, redness, loss of vision,,Ears, nose, mouth and throat. The patient denies ringing in the ears, loss of hearing,   CARDIOVASCULAR:  The patient denies chest pain, irregular heartbeats, palpitation, shortness of breath, orthopnea or PND  RESPIRATORY: The patient does have chronic dry cough,denies Hemoptysis,  repeated pneumonias, wheezing or night sweats.  GASTROINTESTINAL: as per HPI otherwise negative  GENITOURINARY: The patient denies difficult urination, pain or burning with urination, blood in the urine,  MUSCULOSKELETAL: The patient denies muscle cramps.   SKIN : The patient denies easy bruising, skin redness, skin rash,   NEUROLOGIC: the patient has Parkinson's disease due to which she has rigidity, tremors and bradykinesia.  PSYCHIATRIC: The patient denies depression with thoughts of suicide,  ENDOCRINE: The patient denies intolerance to hot or cold temperature,  HEMATOLOGIC/LYMPHATIC: reported blood in stool  ALLERGIC/IMMUNOLOGIC: The patient denies rhinitis, skin sensitivity,    Physical Exam   Temp: 97.8  F (36.6  C) Temp src: Tympanic BP: 146/50   Heart Rate: 58 Resp: 16 SpO2: 97 % O2 Device: None (Room air)    Vital Signs with Ranges  Temp:  [97.8  F (36.6  C)] 97.8  F (36.6  C)  Heart Rate:  [58] 58  Resp:  [16] 16  BP: (146)/(50) 146/50  SpO2:  [94 %-97 %] 97 %  0 lbs 0 oz  GENERAL APPEARANCE: Pt  is an elderly lean built white female who has resting tremors otherwise in no acute distress  HEENT: Normocephalic and atraumatic. No scleral icterus.PERRLA. No conjunctival injection is noted. No oropharyngeal lesions.  NECK: Supple. Trachea is midline. No evidence of thyroid enlargement. No lymphadenopathy or tenderness.  CHEST: Symmetric. Nontender to palpation.  LUNGS: Breath sounds are equal and clear bilaterally. No wheezes, rhonchi, or rales.  HEART: Regular rate and rhythm with normal S1 and S2. No murmurs, gallops, or rubs.  ABDOMEN: Soft, flat,diffusely tender more in the left lower quadrant, no guarding or rebound, bowel sounds are normal  RECTAL: Deferred  EXTREMITIES: No cyanosis, clubbing, or edema.  NEUROLOGIC: resting  tremors, rigidity and bradykinesia  PSYCHIATRIC: flat affect due to Parkinson's  SKIN: Warm, dry, and well perfused. Good turgor. No lesions, nodules or rashes are noted.   Data   Data reviewed today:     Recent Labs  Lab 10/22/17  1448   WBC 14.5*   HGB 13.7   MCV 94         POTASSIUM 4.2   CHLORIDE 108   CO2 25   BUN 14   CR 0.69   ANIONGAP 8   BAKARI 8.8   GLC 96   ALBUMIN 3.4   PROTTOTAL 7.0   BILITOTAL 0.5   ALKPHOS 55   ALT 12   AST 13   LIPASE 109   TROPI 0.025     CT scan of the abdomen and pelvis with contrast was performed that showed 7 mm kidney cyst, left lower lobe opacity possibly atelectasis.    Total time spent in patient care was 55minutes. >  50% of time was spent in coordination of care and patient counseling.

## 2017-10-23 NOTE — PROGRESS NOTES
Assessment completed see flow sheet.    Britney is sitting in a chair. Her PCP is Dr Fofana, she goes to [facility] and she goes to the [facility]. Britney does not have a healthcare directive, she has a Polst on file from 2015. She uses [facility]. She is not a .     Britney lives at [facility], she states she feels safe. She uses a walker for ambulation. She fell about 7-8 weeks ago, she cannot remember how this happened, there was no injury. She uses [facility] or [facility].     Britney sleeps well at night with the help of Tylenol. She has no mood concerns. She has never smoked but was exposed to 2nd hand smoke for 30+ years, she quit drinking 5 years ago. Her ashleigh is important to her. Her support system is her daughter and her friend Maddie Sanders. Stressors/worries include money issues. She enjoys reading about [employer], reading, praying the rosary and going to [email or web address]. Contacted Maddie Sanders on request by Britney.    Britney plans on discharging back to [facility]. CM will remain available.    LOC: alert    Others present: Patient    Dx: Abd pain    Lives with: Lives With: facility resident    Living at: Living Arrangements:  (long term care)    Equipment used:  Walker, wheelchair    Support System: Description of Support System: Supportive, Involved    Primary PCP: Mario Fofana    POA/Guardian: no    Health Care Directive: NO, old Polst on file    Pharmacy: [facility]    :  no    Homecare/County Services:   no    Adequate Resources for needs (housing, utilities, food/med): YES    Meds and appointments management: YES    Work: NO    Transportation: [facility] is first choice, [facility] is 2nd choice.     ADLs: needs assistance on all ADL's    Falls: Yes  Reason for falls risk: Other- does not remember, no injury    Able to Return to Prior Living Arrangements: YES    : NO    Goals: to find out what's  wrong with her    Barriers: none    Needs: Needs Instruction/Review    MARILEE: Average    ED Visits: 1

## 2017-10-23 NOTE — PROVIDER NOTIFICATION
MD updated RE: HR 52 apical, /56. Metoprolol 12.5 mg PO due. MD orders to hold this dose due to HR 52.

## 2017-10-23 NOTE — PLAN OF CARE
Problem: Patient Care Overview  Goal: Plan of Care/Patient Progress Review  Outcome: No Change  Reason for hospital stay:  PNA     Pain Management:  Denies any pain this shift     Orientation:  A+O x4     Respiratory:  Lungs clear - sats 93% on RA     GI:  BS present x4 - constipated - gave mag citrate and Miralax per orders. Poor appetite for breakfast - ate muffin and coffee and declined lunch     :  Incontinent of urine     Skin Issues: Antifungal powder applied to lili area.     IVF:  Saline Lock     Mobility:  Up with assist 2 and walker with gait belt     Safety:  Alarms on     Face to face report given with opportunity to observe patient.     Report given to Rosaura Manriquez   10/23/2017  3:15PM           Problem: Hospitalized Acutely Ill Older (Adult)  Goal: Signs and Symptoms of Listed Potential Problems Will be Absent, Minimized or Managed (Hospitalized Acutely Ill Older)  Signs and symptoms of listed potential problems will be absent, minimized or managed by discharge/transition of care (reference Hospitalized Acutely Ill Older (Adult) CPG).   Outcome: No Change    10/23/17 1628   Hospitalized Acutely Ill Older Adult   Problems Assessed (Acutely Ill Older Adult) all   Problems Present (Acute Older Adult) constipation;voiding dysfunction

## 2017-10-23 NOTE — PLAN OF CARE
Problem: Patient Care Overview  Goal: Plan of Care/Patient Progress Review  Outcome: Improving        10/23/17 0823   OTHER   Plan Of Care Reviewed With patient   Plan of Care Review   Progress improving   Outcome Summary Pt will have pain well controled this shift.      VSS, afebrile, lungs clear, bowels active, pt has red yeasty areas in perineal folds. Pt was turned and repositioned q 2 hrs this shift, and incontinent of urine 2x. Pt declined Miralax and suppository last night. Declined pain, and nausea. Pt slept well. Pt is very fearful of falling, and does best when allowed to hold side rails for repositioning/changing.      Face to face report given with opportunity to observe patient.     Report given to WENDI Delgado and WENDI Milner RN  10/23/2017  8:34 AM       Problem: Hospitalized Acutely Ill Older (Adult)  Goal: Signs and Symptoms of Listed Potential Problems Will be Absent, Minimized or Managed (Hospitalized Acutely Ill Older)  Signs and symptoms of listed potential problems will be absent, minimized or managed by discharge/transition of care (reference Hospitalized Acutely Ill Older (Adult) CPG).   Outcome: No Change    10/23/17 0823   Hospitalized Acutely Ill Older Adult   Problems Assessed (Acutely Ill Older Adult) all   Problems Present (Acute Older Adult) voiding dysfunction

## 2017-10-23 NOTE — PROGRESS NOTES
[facility]    Hospitalist Progress Note    Date of Service (when I saw the patient): 10/23/2017    Assessment & Plan   Britney Schaeffer is a 87 year old female with history of hypertension, Parkinson's disease, TIA, CAD  Was sent to the ED with a complaint of abdominal pain.  Her issues are the following:     Abdominal pain:   The etiology of the abdominal pain is not clear.  Last time she presented with colitis and focal small bowel bowel enteritis in the RLQ found on the CAT scan.  There was also possibility of a proximal sigmoid colon mass but that is not visible on the CT scan done this admission, after reviewing with radiology.  Suspect that she has gastritis and also she is constipated and has been having bouts of spurious overflow diarrhea reflected by small amounts of loose stools multiple times.  She did have moderate amount of stool in the colon and so will continue on MiraLAX.  She refused a dulcolax suppository, so will try some magnesium citrate. Expect that her abdominal pain will get better once her constipation relieves, she is already reporting it is significantly better today than upon admission, really with no interval interventions.  On the CT scan she still has a lot of hard stools in the colon.  Increased the dose of Protonix to 40 mg by mouth twice a day. Will get abdominal ultrasound given some question of enlarged pancreatic duct on discussion of abd/pelvic CT results with radiology. She might need bidirectional endoscopies if we cannot find the cause for her abdominal pain.     Questionable pneumonia:  Do not suspect pneumonia as the patient does not have symptoms or signs of pneumonia.  Pro-calcitonin is normal.  Would not give her antibiotics.     Leukocytosis:   Felt to be reactive  WBC now normalized. Due to ongoing abdominal pain.  Less likely bacterial infection. Blood cultures have already been sent.  UA is unremarkable. Pro-calcitonin is normal.     Parkinson's  "disease:   Continue Sinemet.      Hypertension:   Continue on Lopressor.  Monitor blood pressure closely.        Hyperlipidemia:   Continue statins       Coronary artery disease:   Continue aspirin and Plavix due to increased risk of bleeding.  Continue on beta blockers.      GERD:  Increase the dose of protonix to 40mg BID     Renal Cyst:   Incidental finding on CT. Discussed with radiology and no further imaging or evaluation recommended.     History of CVA:  Continue on  aspirin and Plavix     DVT Prophylaxis: Enoxaparin (Lovenox) SQ  Code Status: DNR/DNI    Disposition: Expected discharge in 1-2 days once abdominal pain resolving and able to pass BM  Sereen DFabio Sharp    Interval History   Feels that abdominal pain is largely resolved, but still slightly noticeable. \"Nothing like it was.\" Had no other interval complaints    -Data reviewed today: I reviewed all new labs and imaging results over the last 24 hours. I personally reviewed no images or EKG's today.    Physical Exam   Temp: 97.9  F (36.6  C) Temp src: Tympanic BP: (!) 115/43 Pulse: 55 Heart Rate: 61 Resp: 16 SpO2: 93 % O2 Device: None (Room air)    Vitals:    10/22/17 2002 10/23/17 0605   Weight: 56.4 kg (124 lb 5.4 oz) 56.6 kg (124 lb 12.5 oz)     Vital Signs with Ranges  Temp:  [97.6  F (36.4  C)-98.4  F (36.9  C)] 97.9  F (36.6  C)  Pulse:  [55] 55  Heart Rate:  [51-63] 61  Resp:  [16-18] 16  BP: (115-152)/(43-82) 115/43  SpO2:  [92 %-97 %] 93 %       Peripheral IV 10/22/17 Right Upper arm (Active)   Site Assessment WDL 10/23/2017  9:40 AM   Line Status Saline locked 10/23/2017  9:40 AM   Phlebitis Scale 0-->no symptoms 10/23/2017  9:40 AM   Infiltration Scale 0 10/23/2017  9:40 AM   Number of days:1     No line/device    Constitutional: Alert and oriented x3. No distress    HEENT: NC/AT, PERRL, EOMI, mouth clear, neck supple, no LN.     Cardiovascular: RRR. No murmur, no  rubs, or gallops.      Respiratory: Clear to auscultation " bilaterally    Abdomen: Soft, non-distended, mildly tender in the right and left mid-abdomen, no peritoneal signs, no guarding or rebound tenderness, no organomegaly. Bowel sounds present    Extremities: Warm/dry. No edema    Neuro:  Resting tremor. Non focal, cranial nerves intact, Moves all extremities.    Medications        artificial saliva  5 mL Swish & Spit 4x Daily     aspirin  81 mg Oral Daily     calcium carbonate  1,000 mg Oral BID     carbidopa-levodopa  1 tablet Oral TID     carbidopa-levodopa  2 tablet Oral QAM     clopidogrel  75 mg Oral Daily     gabapentin  100 mg Oral BID     lidocaine  1 patch Transdermal Daily     loratadine  10 mg Oral Daily     metoprolol  12.5 mg Oral BID     mirabegron  50 mg Oral Daily     olopatadine  1 drop Both Eyes BID     polyethylene glycol  17 g Oral Daily     senna-docusate  1 tablet Oral BID     spironolactone  12.5 mg Oral Daily     enoxaparin  40 mg Subcutaneous Q24H     lidocaine   Transdermal Q24h     lidocaine   Transdermal Q8H     pantoprazole  40 mg Oral BID AC     miconazole   Topical BID     bisacodyl  10 mg Rectal Once     polyethylene glycol  17 g Oral Once       Data     Recent Labs  Lab 10/23/17  0516 10/22/17  1448   WBC 8.6 14.5*   HGB 12.0 13.7   MCV 92 94    175   NA  --  141   POTASSIUM  --  4.2   CHLORIDE  --  108   CO2  --  25   BUN  --  14   CR  --  0.69   ANIONGAP  --  8   BAKARI  --  8.8   GLC  --  96   ALBUMIN  --  3.4   PROTTOTAL  --  7.0   BILITOTAL  --  0.5   ALKPHOS  --  55   ALT  --  12   AST  --  13   LIPASE  --  109   TROPI  --  0.025     Lactic Acid   Date Value Ref Range Status   10/22/2017 1.4 0.4 - 2.0 mmol/L Final   12/14/2015 0.9 0.4 - 2.0 mmol/L Final   12/12/2015 1.6 0.4 - 2.0 mmol/L Final       Recent Results (from the past 24 hour(s))   CT Abdomen Pelvis w Contrast    Narrative    PROCEDURE:  CT ABDOMEN PELVIS W CONTRAST    HISTORY:  LLq abdominal pain, recent hospitalization for acute  pancreatitis     TECHNIQUE:  Helical  CT of the abdomen and pelvis was performed  following injection of intravenous contrast.     Sagittal and coronal reformatted images were reviewed.    COMPARISON:  CT abdomen pelvis 10/7/2017    FINDINGS:      There is a large hiatal hernia. There is a small left pleural effusion  with associated atelectasis.    The liver, spleen, pancreas and adrenal glands are unremarkable. The  gallbladder is present.    Multiple areas of cortical scarring in the left kidney are unchanged.  Small right renal cyst is noted.    The bowel is normal in caliber. The appendix is not seen. There are  multiple diverticula in the colon without evidence of diverticulitis.  Moderate stool is present in the colon.     The aorta is normal in size with scattered atherosclerotic  calcifications.    No free fluid, free air or adenopathy is present.      No suspicious osseous lesions are identified. There are degenerative  changes in the spine. Right hip prosthesis is present. Spinal  stimulator device is noted in the left buttock.      Impression    IMPRESSION:    1. No acute findings in the abdomen or pelvis.  2. Large hiatal hernia.  3. Small left pleural effusion with associated atelectasis.    CYNDI JEFFERSON MD   Chest XR,  PA & LAT    Narrative    PROCEDURE:  XR CHEST 2 VW    HISTORY:  left lower lobe consolidation on ct scan.     COMPARISON:  12/11/2015    FINDINGS:   The cardiac silhouette is normal in size. The pulmonary vasculature is  normal.  There is a large hiatal hernia. There is a small left pleural  effusion with associated atelectasis. No pneumothorax.      Impression    IMPRESSION:  Small left pleural effusion with associated atelectasis  or infiltrate. Large hiatal hernia.      CYNDI JEFFERSON MD

## 2017-10-23 NOTE — PROVIDER NOTIFICATION
MD updated RE: patient's c/o nausea, no emesis. Zofran IV given at 2136. Dulcolax not given due to patient being nauseated but will give soon.

## 2017-10-23 NOTE — PLAN OF CARE
"Problem: Patient Care Overview  Goal: Plan of Care/Patient Progress Review  Outcome: No Change  Care plans chosen after speaking with MD, he states he does not think patient has pneumonia. Patient forgetful at times and requesting staff to stay with her. She continues to c/o mid to low abdominal pain, she's rated pain 6-7/10. Patient was given Dilaudid 0.5 mg IV. VSS, afebrile. HR bradycardic 52 apical. Metoprolol held. Patient has c/o nausea, no emesis. Zofran and Reglan IV given. Bowel sounds active. Abdomen tender with palpation. Patient is drinking thickened water per order. She states she last ate this morning and declined offer for supper. Patient c/o \"vaginia\" being sore. Labia is pink in color, slightly swollen. Patient did have Nystatin cream ordered during last stay for this area, MD was updated. Periarea is reddened along with buttocks and coccyx. Barrier cream being applied. TQ2H. Float heels. History of Polio, placing feet against pillow due to foot drop. Patient was incontinent of a small amount of urine upon arrival to floor and was incontinent of a small amount of urine again towards the end of the shift. No BM. Dulcolax suppository and Miralax ordered, was deferred to night shift due to patient feeling nauseated for most of my time with her. Night time medications not given due to c/o nausea. Bed alarms in place. Using call light appropriately. IVF continues.     Face to face report given with opportunity to observe patient.     Report given to Joshua Jiang   10/23/2017  12:53 AM             "

## 2017-10-23 NOTE — PROGRESS NOTES
Assessment completed see flowsheet.      LOC: alert    Others present: Patient    Dx: abdominal pain      Lives with: Lives With: facility resident    Living at: Living Arrangements:  (long term care)    Equipment used:  Walker, wheelchair    Support System: Description of Support System: Supportive, Involved        Primary PCP: Mario Fofana    POA/Guardian: no    Health Care Directive: NO    Pharmacy: [facility]    :  no    Homecare/County Services:   no      Adequate Resources for needs (housing, utilities, food/med): YES    Meds and appointments management: YES    Work: [employer]    Transportation: [employer]       ADLs: is a facility resident needs assistance with all ADL's except feeding    Falls:     Able to Return to Prior Living Arrangements: YES

## 2017-10-24 ENCOUNTER — APPOINTMENT (OUTPATIENT)
Dept: ULTRASOUND IMAGING | Facility: HOSPITAL | Age: 82
End: 2017-10-24
Attending: INTERNAL MEDICINE
Payer: MEDICARE

## 2017-10-24 VITALS
TEMPERATURE: 97.9 F | BODY MASS INDEX: 22.34 KG/M2 | SYSTOLIC BLOOD PRESSURE: 149 MMHG | DIASTOLIC BLOOD PRESSURE: 51 MMHG | OXYGEN SATURATION: 97 % | WEIGHT: 122.14 LBS | RESPIRATION RATE: 18 BRPM | HEART RATE: 55 BPM

## 2017-10-24 LAB
ANION GAP SERPL CALCULATED.3IONS-SCNC: 7 MMOL/L (ref 3–14)
BACTERIA SPEC CULT: NORMAL
BUN SERPL-MCNC: 13 MG/DL (ref 7–30)
CALCIUM SERPL-MCNC: 8.9 MG/DL (ref 8.5–10.1)
CHLORIDE SERPL-SCNC: 108 MMOL/L (ref 94–109)
CO2 SERPL-SCNC: 27 MMOL/L (ref 20–32)
CREAT SERPL-MCNC: 0.74 MG/DL (ref 0.52–1.04)
ERYTHROCYTE [DISTWIDTH] IN BLOOD BY AUTOMATED COUNT: 14.7 % (ref 10–15)
GFR SERPL CREATININE-BSD FRML MDRD: 74 ML/MIN/1.7M2
GLUCOSE SERPL-MCNC: 74 MG/DL (ref 70–99)
HCT VFR BLD AUTO: 36.7 % (ref 35–47)
HGB BLD-MCNC: 12 G/DL (ref 11.7–15.7)
MCH RBC QN AUTO: 30.3 PG (ref 26.5–33)
MCHC RBC AUTO-ENTMCNC: 32.7 G/DL (ref 31.5–36.5)
MCV RBC AUTO: 93 FL (ref 78–100)
PLATELET # BLD AUTO: 198 10E9/L (ref 150–450)
POTASSIUM SERPL-SCNC: 3.7 MMOL/L (ref 3.4–5.3)
RBC # BLD AUTO: 3.96 10E12/L (ref 3.8–5.2)
SODIUM SERPL-SCNC: 142 MMOL/L (ref 133–144)
SPECIMEN SOURCE: NORMAL
WBC # BLD AUTO: 5.6 10E9/L (ref 4–11)

## 2017-10-24 PROCEDURE — 85027 COMPLETE CBC AUTOMATED: CPT | Performed by: INTERNAL MEDICINE

## 2017-10-24 PROCEDURE — 80048 BASIC METABOLIC PNL TOTAL CA: CPT | Performed by: INTERNAL MEDICINE

## 2017-10-24 PROCEDURE — G0378 HOSPITAL OBSERVATION PER HR: HCPCS

## 2017-10-24 PROCEDURE — 25000132 ZZH RX MED GY IP 250 OP 250 PS 637: Mod: GY | Performed by: HOSPITALIST

## 2017-10-24 PROCEDURE — 99217 ZZC OBSERVATION CARE DISCHARGE: CPT | Performed by: INTERNAL MEDICINE

## 2017-10-24 PROCEDURE — A9270 NON-COVERED ITEM OR SERVICE: HCPCS | Mod: GY | Performed by: HOSPITALIST

## 2017-10-24 PROCEDURE — 36415 COLL VENOUS BLD VENIPUNCTURE: CPT | Performed by: INTERNAL MEDICINE

## 2017-10-24 PROCEDURE — 76700 US EXAM ABDOM COMPLETE: CPT | Mod: TC

## 2017-10-24 RX ADMIN — GABAPENTIN 100 MG: 100 CAPSULE ORAL at 10:50

## 2017-10-24 RX ADMIN — LORATADINE 10 MG: 10 TABLET ORAL at 10:53

## 2017-10-24 RX ADMIN — CLOPIDOGREL 75 MG: 75 TABLET, FILM COATED ORAL at 10:52

## 2017-10-24 RX ADMIN — POLYETHYLENE GLYCOL 3350 17 G: 17 POWDER, FOR SOLUTION ORAL at 10:49

## 2017-10-24 RX ADMIN — CARBIDOPA AND LEVODOPA 2 TABLET: 25; 100 TABLET ORAL at 10:58

## 2017-10-24 RX ADMIN — SPIRONOLACTONE 12.5 MG: 25 TABLET ORAL at 10:51

## 2017-10-24 RX ADMIN — CALCIUM CARBONATE (ANTACID) CHEW TAB 500 MG 1000 MG: 500 CHEW TAB at 10:56

## 2017-10-24 RX ADMIN — PANTOPRAZOLE SODIUM 40 MG: 40 TABLET, DELAYED RELEASE ORAL at 07:30

## 2017-10-24 RX ADMIN — LIDOCAINE 1 PATCH: 50 PATCH TOPICAL at 10:45

## 2017-10-24 RX ADMIN — CARBIDOPA AND LEVODOPA 1 TABLET: 25; 100 TABLET ORAL at 13:10

## 2017-10-24 RX ADMIN — MICONAZOLE NITRATE: 20 POWDER TOPICAL at 11:13

## 2017-10-24 RX ADMIN — SENNOSIDES AND DOCUSATE SODIUM 1 TABLET: 8.6; 5 TABLET ORAL at 10:53

## 2017-10-24 RX ADMIN — OLOPATADINE HYDROCHLORIDE 1 DROP: 1 SOLUTION/ DROPS OPHTHALMIC at 11:01

## 2017-10-24 RX ADMIN — METOPROLOL TARTRATE 12.5 MG: 25 TABLET ORAL at 10:52

## 2017-10-24 RX ADMIN — ASPIRIN 81 MG 81 MG: 81 TABLET ORAL at 10:51

## 2017-10-24 RX ADMIN — Medication 5 ML: at 13:10

## 2017-10-24 NOTE — PLAN OF CARE
"Problem: Patient Care Overview  Goal: Plan of Care/Patient Progress Review  Outcome: No Change  Patient is forgetful. She c/o pain in her \"rectum\" at the beginning of the shift from going to the bathroom. Barrier cream being applied, Tylenol PO given as well. Patient was found sleeping then later denied pain. /46 and 118/44. HR has been 51 and 52 apical. MD updated, Metoprolol PO held this evening. Otherwise VSS. Patient has been incontinent of stool multiple times this shift, stool has mostly been watery with small formed BM as well. She has stated that she's been incontinent of urine as well, it has been difficult to discern due to watery BM's. Buttocks and coccyx are red but blanchable, periarea is also reddened. Barrier cream being applied. Labia pink as well as in folds, Miconazole powder being applied to outer labia obviously. Miconazole powder also applied to umbilicus fold and under right breast for area being pink and patient's c/o itching. Heels pink but blanchable, floating. Placing pillow against bottom of feet to help with foot drop. TQ2H. Patient was up in the chair for half the shift. Assist of 2 for transfers with walker. Oral intake is fair, she's had c/o thickened liquids. No coughing or swallowing difficulty noted. Patient has had no c/o nausea. Bowel sounds active. Alarms in place. Using call light appropriately. IV SL.    Face to face report given with opportunity to observe patient.    Report given to Joshua Jiang   10/23/2017  11:20 PM      "

## 2017-10-24 NOTE — PLAN OF CARE
Name: Britney Schaeffer    MRN#: [id number]    Reason for Hospitalization: Pneumonia of left lower lobe due to infectious organism (H) [J18.1]    Discharge Date: 10/24/2017    Patient / Family response to discharge plan: in agreement    Follow-Up Appt: Future Appointments  Date Time Provider Department Center   11/1/2017 2:40 PM Mario Fofana MD [facility]       Other Providers (Care Coordinator, County Services, PCA services etc): Yes: Aisha Segovai contacted and discharge faxed to Aisha Segovia.    Discharge Disposition: long term care facility    [facility]

## 2017-10-24 NOTE — PLAN OF CARE
Problem: Patient Care Overview  Goal: Plan of Care/Patient Progress Review  Patient discharged at 2:55 PM via wheel chair accompanied by other:transportation staff from [place]. Prescriptions - no new meds ordered for discharge, return to [facility]. All belongings sent with patient.      Discharge instructions reviewed with Neelam ADAME at [facility]. All belongings gathered and returned to patient.      Patient discharged to [facility].   Report called to Nursing Home:  Neelam ADAME at [facility] at 1445. Notified Neelam ADAME that patient's W/C did not have foot pedals when it arrived at hospital.      Core Measures and Vaccines  Core Measures applicable during stay: No. If yes, state diagnosis: N/A  Pneumonia and Influenza given prior to discharge, if indicated: N/A     Surgical Patient   Surgical Procedures during stay: N/A  Did patient receive discharge instruction on wound care and recognition of infection symptoms? N/A     MISC  Follow up appointment made:  Yes  Home and hospital aquired medications returned to patient: N/A  Patient reports pain was well managed at discharge: Yes

## 2017-10-24 NOTE — PLAN OF CARE
Problem: Patient Care Overview  Goal: Plan of Care/Patient Progress Review  Outcome: Improving    10/23/17 0823 10/24/17 0813   OTHER   Plan Of Care Reviewed With --  patient   Plan of Care Review   Progress --  improving   Outcome Summary Pt will have pain well controled this shift. --          VSS, lung sounds clear, bowels are active. Pt refuses thickened liquids, was allowed a few sips of regular water, pt did fine with no coughing. Pt is forgetful, did not remember me from previous shift, or from previous visits in the room during the night, pt is very pleasant and happy. Pt does have some complaints of slight abdominal cramping, but declines any pain, pt did also state this morning that her rectum was sore. Pt has had 1 incontinent bowel and bladder this shift.      Face to face report given with opportunity to observe patient.     Report given to WENDI Cox.        Joshua Hernandez RN  10/24/2017  8:17 AM

## 2017-10-24 NOTE — PLAN OF CARE
Ultrasound being done at bedside at this time. Had sips of water with meds this morning. Has not eaten breakfast yet.

## 2017-10-24 NOTE — DISCHARGE SUMMARY
[facility]    Discharge Summary  Hospitalist    Date of Admission:  10/22/2017  Date of Discharge:  10/24/2017  2:55 PM  Discharging Provider: Cresencio Sanchez  Date of Service (when I saw the patient): 10/24/17    Discharge Diagnoses   1. Abdominal pain  2. Constipation      History of Present Illness   Britney Schaeffer is a 87 year old female with history of hypertension, Parkinson's disease, TIA, CAD who was recently discharged from this facility on 12 October back to nursing home after management of her colitis.  Her colitis was probably noninfectious but there was also possibility of proximal sigmoid colon mass.   The patient presented back to the ED today with a complaint of ongoing issue of abdominal pain.  The pain is more localized in the left lower quadrant.  Last time she had almost same kind of pain.  She was also feeling nauseous.  She also reports having small amounts of loose stools  At the nursing home.  She denied any fevers or chills.  She does have chronic cough that is not worse at the moment.  She denied any chest pain.  Denies hematochezia or hematemesis.  Denies melena.  She denied any dysuria urgency or frequency.  She says that she was feeling better at the time of discharge from  This facility but for the last couple of days  Has been having more nausea and has not been eating well.    Hospital Course   Britney Schaeffer is a 87 year old female with history of hypertension, Parkinson's disease, TIA, and CAD was sent to the ED with a complaint of abdominal pain.   She was admitted on 10/22/2017.  The following problems were addressed during her hospitalization:      Abdominal pain:   The etiology of the abdominal pain is not clear.  Last time she presented here, from 10/7 to 10/12, with colitis and focal small bowel bowel enteritis in the RLQ found on the CAT scan.  There was also possibility of a proximal sigmoid colon mass but that was not visible on the CT scan done this  admission, after reviewing with radiology. Initial suspicion was that she has gastritis and also she was likely constipated and was having bouts of spurious overflow diarrhea reflected by small amounts of loose stools multiple times. On the CT scan she still has a lot of hard stools in the colon.  She did have moderate amount of stool in the colon was continued on MiraLAX.  She refused a dulcolax suppository, so we did try some magnesium citrate with good results.  Increased the dose of Protonix to 40 mg by mouth twice a day during her hospital stay.  We did get an abdominal ultrasound given some question of enlarged pancreatic duct on discussion of abd/pelvic CT results with radiology. The common hepatic duct measured 5 mm and the common bile duct measured 7 mm  With no gallstones seen within the biliary tree. The liver was free of masses.  Discussed with patient that if her pain intensifies again or worsens, then she might need referral for bidirectional endoscopies for further evaluation since the source of her abdominal pain remains unclear. She stated the pain was markedly improved and she would like to simply discharge and see how things go over the next week and follow up with her PCP to consider endoscopies if pain worsens or persists.       Leukocytosis:   She had an initial elevation in WBC felt to be reactive  WBC now normalized. Due to ongoing abdominal pain.  Less likely bacterial infection. Blood cultures were sent with no growth.  UA was unremarkable. Pro-calcitonin was normal.      Parkinson's disease:   Continued Sinemet.      Hypertension:   Continued on Lopressor.     Hyperlipidemia: Continued statin      Coronary artery disease:  Continued aspirin and Plavix as well as her beta blocker.      GERD:  Increased the dose of protonix to 40mg BID during her hospitalization, but have resumed her usual once daily dosing upon discharge      Renal Cyst:   Incidental finding on CT. Discussed with radiology  and no further imaging or evaluation was recommended.      History of CVA:  Continued on aspirin and Plavix    Pending Results   Unresulted Labs Ordered in the Past 30 Days of this Admission     Date and Time Order Name Status Description    10/22/2017 1846 Blood culture Preliminary     10/22/2017 1846 Blood culture Preliminary           Code Status   DNR / DNI       Primary Care Physician   Mario Fofana    Physical Exam   Temp: 97.9  F (36.6  C) Temp src: Tympanic BP: 149/51   Heart Rate: 64 Resp: 18 SpO2: 97 % O2 Device: None (Room air)    Vitals:    10/22/17 2002 10/23/17 0605 10/24/17 0500   Weight: 56.4 kg (124 lb 5.4 oz) 56.6 kg (124 lb 12.5 oz) 55.4 kg (122 lb 2.2 oz)     Vital Signs with Ranges  Temp:  [97.3  F (36.3  C)-97.9  F (36.6  C)] 97.9  F (36.6  C)  Heart Rate:  [52-64] 64  Resp:  [18] 18  BP: (118-149)/(44-51) 149/51  SpO2:  [95 %-98 %] 97 %  I/O last 3 completed shifts:  In: 930 [P.O.:930]  Out: -     Constitutional: Alert and oriented x3. No distress     HEENT: NC/AT, PERRL, EOMI, mouth clear, neck supple, no LN.                   Cardiovascular: RRR. No murmur, no  rubs, or gallops.       Respiratory: Clear to auscultation bilaterally     Abdomen: Soft, non-distended, mildly tender in the right and left mid-abdomen, no peritoneal signs, no guarding or rebound tenderness, no organomegaly. Bowel sounds present     Extremities: Warm/dry. No edema     Neuro:  Resting tremor. Non focal, cranial nerves intact, Moves all extremities.    Discharge Disposition   Discharged to home  Condition at discharge: Stable    Consultations This Hospital Stay   SWALLOW EVAL SPEECH PATH AT BEDSIDE IP CONSULT    Time Spent on this Encounter   ICresencio, personally saw the patient today and spent greater than 30 minutes discharging this patient.    Discharge Orders     General info for SNF   Length of Stay Estimate: Long Term Care  Condition at Discharge: Stable  Level of care:skilled   Rehabilitation  Potential: Good  Admission H&P remains valid and up-to-date: Yes  Recent Chemotherapy: N/A  Use Nursing Home Standing Orders: Yes     Mantoux instructions   Give two-step Mantoux (PPD) Per Facility Policy Yes     Follow Up and recommended labs and tests   Follow up with primary care provider in 7-10 days.  No follow up labs or test are needed.     Activity - Up with nursing assistance     Activity - Up with assistive device     Fall precautions     Advance Diet as Tolerated   Follow this diet upon discharge: Orders Placed This Encounter     Regular Diet Adult       Discharge Medications   Current Discharge Medication List      CONTINUE these medications which have NOT CHANGED    Details   !! acetaminophen (TYLENOL) 500 MG tablet Take 2 tablets (1,000 mg) by mouth 2 times daily as needed for mild pain As needed  Qty: 360 tablet, Refills: 1    Associated Diagnoses: Health care maintenance      aspirin 81 MG chewable tablet Take 1 tablet (81 mg) by mouth daily  Qty: 90 tablet, Refills: 3    Associated Diagnoses: Health care maintenance      clopidogrel (PLAVIX) 75 MG tablet Take 1 tablet (75 mg) by mouth daily  Qty: 90 tablet, Refills: 2    Associated Diagnoses: Coronary artery disease involving native coronary artery of native heart without angina pectoris      !! carbidopa-levodopa (SINEMET)  MG per tablet Take 2 tablets by mouth every morning      senna-docusate (SENOKOT-S;PERICOLACE) 8.6-50 MG per tablet Take 1 tablet by mouth 2 times daily      Multiple Vitamins-Minerals (PRESERVISION AREDS) TABS TAKE 1 TABLET BY MOUTH 2 TIMES DAILY  Qty: 120 tablet, Refills: 5    Associated Diagnoses: Health care maintenance      !! carbidopa-levodopa (SINEMET)  MG per tablet TAKE 1 TABLET THREE TIMES A DAY  Qty: 270 tablet, Refills: 1    Associated Diagnoses: Parkinsons (H)      chlorhexidine (PERIDEX) 0.12 % solution Swish and spit 15 mLs in mouth 2 times daily      pantoprazole (PROTONIX) 40 MG EC tablet TAKE 1  TABLET DAILY  Qty: 90 tablet, Refills: 1    Associated Diagnoses: Gastroesophageal reflux disease, esophagitis presence not specified      lidocaine (LIDODERM) 5 % Patch APPLY UP TO 3 PATCHES TO PAINFUL AREA AT ONCE FOR UP TO 12 HOURS WITHIN A 24 HOURS PERIOD. REMOVE AFTER 12 HOURS  Qty: 270 patch, Refills: 3    Associated Diagnoses: Polio      gabapentin (NEURONTIN) 100 MG capsule TAKE 1 CAPSULE TWICE A DAY  Qty: 180 capsule, Refills: 2    Associated Diagnoses: Neuralgia and neuritis      spironolactone (ALDACTONE) 25 MG tablet Take 0.5 tablets (12.5 mg) by mouth daily  Qty: 45 tablet, Refills: 3    Associated Diagnoses: Left heart failure (H)      MYRBETRIQ 50 MG 24 hr tablet Take one (1) tablet BY MOUTH daily  Qty: 31 tablet, Refills: 0    Associated Diagnoses: OAB (overactive bladder)      metoprolol (LOPRESSOR) 25 MG tablet Take 0.5 tablets (12.5 mg) by mouth 2 times daily  Qty: 90 tablet, Refills: 3    Associated Diagnoses: NSTEMI (non-ST elevated myocardial infarction) (H)      calcium carbonate (TUMS) 500 MG chewable tablet Take 2 chew tab by mouth 2 times daily       olopatadine (PATANOL) 0.1 % ophthalmic solution INSTILL 1 DROP INTO BOTH EYES 2 TIMES DAILY  Qty: 3 Bottle, Refills: 3    Associated Diagnoses: Allergic conjunctivitis, unspecified laterality      Cholecalciferol (VITAMIN D3) 2000 UNITS CAPS Take 2,000 Units by mouth daily  Qty: 90 capsule, Refills: 3    Associated Diagnoses: Osteoporosis      loratadine (ALLERGY RELIEF) 10 MG tablet Take 1 tablet (10 mg) by mouth daily  Qty: 90 tablet, Refills: 3    Associated Diagnoses: Allergic rhinitis, unspecified allergic rhinitis type      polyethylene glycol 0.4%- propylene glycol 0.3% (SYSTANE) 0.4-0.3 % SOLN ophthalmic solution Place 2 drops into both eyes 4 times daily as needed for dry eyes  Qty: 3 Bottle, Refills: 1    Associated Diagnoses: Allergic conjunctivitis, unspecified laterality      nystatin (MYCOSTATIN) cream APPLY TO AFFECTED AREA 2  TIMES DAILY AS NEEDED  Qty: 30 g, Refills: 2    Associated Diagnoses: Candidiasis of skin and nails      saline nasal (AYR SALINE) GEL topical gel Apply into each naris At Bedtime  Refills: 0    Associated Diagnoses: Chronic cough      !! ACETAMINOPHEN PO Take 1,000 mg by mouth daily as needed for pain IN ADDITION TO BID DOSE      AMOXICILLIN PO Take 500 mg by mouth as needed (GIVE ONE HOUR PRIOIR TO DENTAL APTS FOR ISCHEMIC HEART DISEASE) Give 4 tablets as needed       sodium fluoride dental gel (PREVIDENT) 1.1 % GEL topical gel Apply 1 applicator to affected area At Bedtime      polyethylene glycol (MIRALAX) powder Take 17 g by mouth daily  Qty: 500 g, Refills: 3    Associated Diagnoses: Constipation      artificial saliva, BIOTENE MT, (BIOTENE MT) SOLN Swish and spit 5 mLs in mouth as needed for dry mouth  Qty: 3 Bottle, Refills: 3    Associated Diagnoses: Dry mouth      furosemide (LASIX) 20 MG tablet Take 1 tablet (20 mg) by mouth as needed  Qty: 90 tablet, Refills: 1    Associated Diagnoses: Edema      ondansetron (ZOFRAN) 4 MG tablet TAKE 1 TABLET BY MOUTH EVERY 8 HOURS AS NEEDED FOR NAUSEA AND VOMITING  Qty: 30 tablet, Refills: 2    Associated Diagnoses: Nausea      ketoconazole (NIZORAL) 2 % shampoo Wash hair, let set for 5 minutes, rinse 1 time per day every Sunday  Qty: 120 mL, Refills: 2    Associated Diagnoses: Seborrheic dermatitis      Lanolin (CORONA MULTI-PURPOSE) 30 % OINT Externally apply 1 Application topically 2 times daily  Qty: 3 Tube, Refills: 3    Associated Diagnoses: Skin irritation      guaiFENesin-codeine (GUAIFENESIN AC) 100-10 MG/5ML SOLN TAKE 2 TEASPOONFULS (10ML) BY MOUTH EVERY 4 HOURS AS NEEDED FOR COUGH  Qty: 473 mL, Refills: 0    Associated Diagnoses: Cough      order for DME Equipment being ordered: neoprene knee sleeve  Qty: 1 Device, Refills: 0    Associated Diagnoses: Primary osteoarthritis involving multiple joints       !! - Potential duplicate medications found. Please  discuss with provider.        Allergies   Allergies   Allergen Reactions     Ambien      Zolpidem Tartrate     Tramadol      Data   Most Recent 3 CBC's:  Recent Labs   Lab Test  10/24/17   0528  10/23/17   0516  10/22/17   1448   WBC  5.6  8.6  14.5*   HGB  12.0  12.0  13.7   MCV  93  92  94   PLT  198  181  175      Most Recent 3 BMP's:  Recent Labs   Lab Test  10/24/17   0528  10/22/17   1448  10/12/17   0516   NA  142  141  145*   POTASSIUM  3.7  4.2  3.5   CHLORIDE  108  108  113*   CO2  27  25  24   BUN  13  14  6*   CR  0.74  0.69  0.58   ANIONGAP  7  8  8   BAKARI  8.9  8.8  8.2*   GLC  74  96  81     Most Recent 2 LFT's:  Recent Labs   Lab Test  10/22/17   1448  10/10/17   0535   AST  13  10   ALT  12  <6   ALKPHOS  55  41   BILITOTAL  0.5  0.4     Most Recent INR's and Anticoagulation Dosing History:  Anticoagulation Dose History     Recent Dosing and Labs Latest Ref Rng & Units 3/25/2013 3/16/2015 5/6/2015 12/11/2015    INR 0.80 - 1.20 1.09 1.02 1.13 1.26(H)        Most Recent 3 Troponin's:  Recent Labs   Lab Test  10/22/17   1448  12/13/15   0702  12/12/15   1252   TROPI  0.025  0.259*  0.505*     Most Recent Cholesterol Panel:  Recent Labs   Lab Test  12/12/15   0758   CHOL  83   LDL  17   HDL  53   TRIG  65     Most Recent 6 Bacteria Isolates From Any Culture (See EPIC Reports for Culture Details):  Recent Labs   Lab Test  10/22/17   2100  10/22/17   1905  10/22/17   1901  10/07/17   2349  10/07/17   1535  06/08/17   1355   CULT  No MRSA isolated  No growth after 6 days  No growth after 6 days  No MRSA isolated  No Salmonella, Shigella, Campylobacter, E. coli O157, Aeromonas, or Plesiomonas isolated.  No Yersinia enterocolitica isolated    50,000 to 100,000 colonies/mL Escherichia coli ESBL  Critical Value called to and read back by Rhea Jacobson 0803 6/12/17 by Ivone Herman  *     Most Recent TSH, T4 and A1c Labs:  Recent Labs   Lab Test  03/13/15   1547   TSH  2.44     Results for orders placed or  performed during the hospital encounter of 10/22/17   CT Abdomen Pelvis w Contrast    Narrative    PROCEDURE:  CT ABDOMEN PELVIS W CONTRAST    HISTORY:  LLq abdominal pain, recent hospitalization for acute  pancreatitis     TECHNIQUE:  Helical CT of the abdomen and pelvis was performed  following injection of intravenous contrast.     Sagittal and coronal reformatted images were reviewed.    COMPARISON:  CT abdomen pelvis 10/7/2017    FINDINGS:      There is a large hiatal hernia. There is a small left pleural effusion  with associated atelectasis.    The liver, spleen, pancreas and adrenal glands are unremarkable. The  gallbladder is present.    Multiple areas of cortical scarring in the left kidney are unchanged.  Small right renal cyst is noted.    The bowel is normal in caliber. The appendix is not seen. There are  multiple diverticula in the colon without evidence of diverticulitis.  Moderate stool is present in the colon.     The aorta is normal in size with scattered atherosclerotic  calcifications.    No free fluid, free air or adenopathy is present.      No suspicious osseous lesions are identified. There are degenerative  changes in the spine. Right hip prosthesis is present. Spinal  stimulator device is noted in the left buttock.      Impression    IMPRESSION:    1. No acute findings in the abdomen or pelvis.  2. Large hiatal hernia.  3. Small left pleural effusion with associated atelectasis.    CYNDI JEFFERSON MD   Chest XR,  PA & LAT    Narrative    PROCEDURE:  XR CHEST 2 VW    HISTORY:  left lower lobe consolidation on ct scan.     COMPARISON:  12/11/2015    FINDINGS:   The cardiac silhouette is normal in size. The pulmonary vasculature is  normal.  There is a large hiatal hernia. There is a small left pleural  effusion with associated atelectasis. No pneumothorax.      Impression    IMPRESSION:  Small left pleural effusion with associated atelectasis  or infiltrate. Large hiatal hernia.      CYNDI  MD RONNY   US Abdomen Complete    Narrative    PROCEDURE: US ABDOMEN COMPLETE 10/24/2017 10:34 AM    HISTORY: abdominal pain    COMPARISONS: None.    TECHNIQUE: Some of the abdomen    FINDINGS: No gallstones were seen. The common hepatic duct measures 5  mm the common bile ducts measures 7 mm no gallstones are seen within  the biliary tree the liver is free of masses. Pancreas appears normal  with spleen is normal. Questionable small stone seen in the left  kidney no longer hydronephrosis is seen. The abdominal aorta is  normally tapering         Impression    IMPRESSION: No stones seen in either in the gallbladder or the biliary  tree    DYLAN ROMERO MD

## 2017-10-24 NOTE — PROVIDER NOTIFICATION
MD updated RE: /44, HR 52 apical. Scheduled Metoprolol 12.5 mg PO due, order received to hold this dose.

## 2017-10-25 ENCOUNTER — MEDICAL CORRESPONDENCE (OUTPATIENT)
Dept: HEALTH INFORMATION MANAGEMENT | Facility: HOSPITAL | Age: 82
End: 2017-10-25

## 2017-10-26 ENCOUNTER — MEDICAL CORRESPONDENCE (OUTPATIENT)
Dept: HEALTH INFORMATION MANAGEMENT | Facility: HOSPITAL | Age: 82
End: 2017-10-26

## 2017-10-27 ENCOUNTER — MEDICAL CORRESPONDENCE (OUTPATIENT)
Dept: HEALTH INFORMATION MANAGEMENT | Facility: HOSPITAL | Age: 82
End: 2017-10-27

## 2017-10-28 LAB
BACTERIA SPEC CULT: NORMAL
BACTERIA SPEC CULT: NORMAL
Lab: NORMAL
Lab: NORMAL
SPECIMEN SOURCE: NORMAL
SPECIMEN SOURCE: NORMAL

## 2017-10-31 DIAGNOSIS — R69 DIAGNOSIS UNKNOWN: Primary | ICD-10-CM

## 2017-11-01 ENCOUNTER — OFFICE VISIT (OUTPATIENT)
Dept: FAMILY MEDICINE | Facility: OTHER | Age: 82
End: 2017-11-01
Attending: FAMILY MEDICINE
Payer: MEDICARE

## 2017-11-01 VITALS
SYSTOLIC BLOOD PRESSURE: 100 MMHG | HEART RATE: 70 BPM | OXYGEN SATURATION: 95 % | WEIGHT: 122.5 LBS | HEIGHT: 62 IN | DIASTOLIC BLOOD PRESSURE: 52 MMHG | BODY MASS INDEX: 22.54 KG/M2

## 2017-11-01 DIAGNOSIS — R10.84 ABDOMINAL PAIN, GENERALIZED: Primary | ICD-10-CM

## 2017-11-01 DIAGNOSIS — K59.09 CONSTIPATION, CHRONIC: ICD-10-CM

## 2017-11-01 DIAGNOSIS — G89.29 CHRONIC PAIN OF LEFT KNEE: ICD-10-CM

## 2017-11-01 DIAGNOSIS — M25.562 CHRONIC PAIN OF LEFT KNEE: ICD-10-CM

## 2017-11-01 PROCEDURE — 99214 OFFICE O/P EST MOD 30 MIN: CPT | Performed by: FAMILY MEDICINE

## 2017-11-01 PROCEDURE — 99212 OFFICE O/P EST SF 10 MIN: CPT

## 2017-11-01 ASSESSMENT — ANXIETY QUESTIONNAIRES
GAD7 TOTAL SCORE: 4
2. NOT BEING ABLE TO STOP OR CONTROL WORRYING: NOT AT ALL
4. TROUBLE RELAXING: SEVERAL DAYS
IF YOU CHECKED OFF ANY PROBLEMS ON THIS QUESTIONNAIRE, HOW DIFFICULT HAVE THESE PROBLEMS MADE IT FOR YOU TO DO YOUR WORK, TAKE CARE OF THINGS AT HOME, OR GET ALONG WITH OTHER PEOPLE: NOT DIFFICULT AT ALL
6. BECOMING EASILY ANNOYED OR IRRITABLE: SEVERAL DAYS
5. BEING SO RESTLESS THAT IT IS HARD TO SIT STILL: NOT AT ALL
7. FEELING AFRAID AS IF SOMETHING AWFUL MIGHT HAPPEN: NOT AT ALL
1. FEELING NERVOUS, ANXIOUS, OR ON EDGE: SEVERAL DAYS
3. WORRYING TOO MUCH ABOUT DIFFERENT THINGS: SEVERAL DAYS

## 2017-11-01 ASSESSMENT — PATIENT HEALTH QUESTIONNAIRE - PHQ9: SUM OF ALL RESPONSES TO PHQ QUESTIONS 1-9: 3

## 2017-11-01 ASSESSMENT — PAIN SCALES - GENERAL: PAINLEVEL: NO PAIN (0)

## 2017-11-01 NOTE — MR AVS SNAPSHOT
After Visit Summary   11/1/2017    Britney Schaeffer    MRN: [id number]           Patient Information     Date Of Birth          12/29/1929        Visit Information        Provider Department      11/1/2017 2:40 PM Mario Fofana MD [facility]        Today's Diagnoses     Abdominal pain, generalized    -  1    Constipation, chronic        Chronic pain of left knee          Care Instructions    Continue same medications.            Follow-ups after your visit        Additional Services     ORTHOPEDICS ADULT REFERRAL       Your provider has referred you to: [facility]    Please be aware that coverage of these services is subject to the terms and limitations of your health insurance plan.  Call member services at your health plan with any benefit or coverage questions.      Please bring the following to your appointment:    >>   Any x-rays, CTs or MRIs which have been performed.  Contact the facility where they were done to arrange for  prior to your scheduled appointment.    >>   List of current medications   >>   This referral request   >>   Any documents/labs given to you for this referral                  Follow-up notes from your care team     Return in about 1 month (around 12/1/2017) for Follow Up Visit.      Who to contact     If you have questions or need follow up information about today's clinic visit or your schedule please contact [facility] directly at [phone].  Normal or non-critical lab and imaging results will be communicated to you by MyChart, letter or phone within 4 business days after the clinic has received the results. If you do not hear from us within 7 days, please contact the clinic through MyChart or phone. If you have a critical or abnormal lab result, we will notify you by phone as soon as possible.  Submit refill requests through [employer] or call your pharmacy and they will forward the refill request to us.  "Please allow 3 business days for your refill to be completed.          Additional Information About Your Visit        MyChart Information     [employer] lets you send messages to your doctor, view your test results, renew your prescriptions, schedule appointments and more. To sign up, go to [email or web address] . Click on \"Log in\" on the left side of the screen, which will take you to the Welcome page. Then click on \"Sign up Now\" on the right side of the page.     You will be asked to enter the access code listed below, as well as some personal information. Please follow the directions to create your username and password.     Your access code is: [email or web address]  Expires: 2018  1:11 PM     Your access code will  in 90 days. If you need help or a new code, please call your [facility] clinic or [phone].        Care EveryWhere ID     This is your [facility] EveryWhere ID. This could be used by other organizations to access your [facility] medical records  [id number]        Your Vitals Were     Pulse Height Pulse Oximetry Breastfeeding? BMI (Body Mass Index)       70 5' 2\" (1.575 m) 95% No 22.41 kg/m2        Blood Pressure from Last 3 Encounters:   17 100/52   10/24/17 149/51   10/12/17 162/71    Weight from Last 3 Encounters:   17 122 lb 8 oz (55.6 kg)   10/24/17 122 lb 2.2 oz (55.4 kg)   10/12/17 134 lb 0.6 oz (60.8 kg)              We Performed the Following     ORTHOPEDICS ADULT REFERRAL          Today's Medication Changes          These changes are accurate as of: 17 11:59 PM.  If you have any questions, ask your nurse or doctor.               These medicines have changed or have updated prescriptions.        Dose/Directions    * carbidopa-levodopa  MG per tablet   Commonly known as:  SINEMET   Indication:  Parkinson's Disease   This may have changed:  Another medication with the same name was changed. Make sure you understand how and when to take each.        Dose:  2 tablet   Take 2 " tablets by mouth every morning   Refills:  0       * carbidopa-levodopa  MG per tablet   Commonly known as:  SINEMET   This may have changed:  See the new instructions.   Used for:  Parkinsons (H)   Changed by:  Mario Fofana MD        TAKE 1 TABLET THREE TIMES A DAY   Quantity:  270 tablet   Refills:  1       furosemide 20 MG tablet   Commonly known as:  LASIX   This may have changed:    - when to take this  - reasons to take this   Used for:  Edema        Dose:  20 mg   Take 1 tablet (20 mg) by mouth as needed   Quantity:  90 tablet   Refills:  1       lidocaine 5 % Patch   Commonly known as:  LIDODERM   This may have changed:    - how much to take  - how to take this  - when to take this  - additional instructions   Used for:  Polio        APPLY UP TO 3 PATCHES TO PAINFUL AREA AT ONCE FOR UP TO 12 HOURS WITHIN A 24 HOURS PERIOD. REMOVE AFTER 12 HOURS   Quantity:  270 patch   Refills:  3       polyethylene glycol 0.4%- propylene glycol 0.3% 0.4-0.3 % Soln ophthalmic solution   Commonly known as:  SYSTANE   This may have changed:  when to take this   Used for:  Allergic conjunctivitis, unspecified laterality        Dose:  2 drop   Place 2 drops into both eyes 4 times daily as needed for dry eyes   Quantity:  3 Bottle   Refills:  1       polyethylene glycol powder   Commonly known as:  MIRALAX   This may have changed:    - when to take this  - reasons to take this   Used for:  Constipation        Dose:  17 g   Take 17 g by mouth daily   Quantity:  500 g   Refills:  3       * Notice:  This list has 2 medication(s) that are the same as other medications prescribed for you. Read the directions carefully, and ask your doctor or other care provider to review them with you.             Primary Care Provider Office Phone # Fax #    Mario Fofana [phone]       [facility] [address]  [place] [address]        Equal Access to Services     DES LEES AH: Amy Carrillo  wapaulda etheladaha, qaybta kaganesh fajardo, celena palmaaalexia ah. So [facility] [phone].    ATENCIÓN: Si fran wade, tiene a holbrook disposición servicios gratuitos de asistencia lingüística. Lucia al [phone].    We comply with applicable federal civil rights laws and [place] laws. We do not discriminate on the basis of race, color, national origin, age, disability, sex, sexual orientation, or gender identity.            Thank you!     Thank you for choosing [facility]  for your [facility]. Our goal is always to provide you with excellent care. Hearing back from our patients is one way we can continue to improve our services. Please take a few minutes to complete the written survey that you may receive in the mail after your visit with us. Thank you!             Your Updated Medication List - Protect others around you: Learn how to safely use, store and throw away your medicines at www.disposemymeds.org.          This list is accurate as of: 11/1/17 11:59 PM.  Always use your most recent med list.                   Brand Name Dispense Instructions for use Diagnosis    * ACETAMINOPHEN PO      Take 1,000 mg by mouth daily as needed for pain IN ADDITION TO BID DOSE        * acetaminophen 500 MG tablet    TYLENOL    360 tablet    Take 2 tablets (1,000 mg) by mouth 2 times daily as needed for mild pain As needed    Health care maintenance       AMOXICILLIN PO      Take 500 mg by mouth as needed (GIVE ONE HOUR PRIOIR TO DENTAL APTS FOR ISCHEMIC HEART DISEASE) Give 4 tablets as needed        artificial saliva Soln solution     3 Bottle    Swish and spit 5 mLs in mouth as needed for dry mouth    Dry mouth       aspirin 81 MG chewable tablet     90 tablet    Take 1 tablet (81 mg) by mouth daily    Health care maintenance       calcium carbonate 500 MG chewable tablet    TUMS     Take 2 chew tab by mouth 2 times daily        * carbidopa-levodopa  MG per tablet    SINEMET     Take 2  tablets by mouth every morning        * carbidopa-levodopa  MG per tablet    SINEMET    270 tablet    TAKE 1 TABLET THREE TIMES A DAY    Parkinsons (H)       chlorhexidine 0.12 % solution    PERIDEX    473 mL    Give 15 ml by mouth twice a day at 8 am and 8pm. Swish 2 min and spitfor Peridex    Diagnosis unknown       clopidogrel 75 MG tablet    PLAVIX    90 tablet    Take 1 tablet (75 mg) by mouth daily    Coronary artery disease involving native coronary artery of native heart without angina pectoris       furosemide 20 MG tablet    LASIX    90 tablet    Take 1 tablet (20 mg) by mouth as needed    Edema       gabapentin 100 MG capsule    NEURONTIN    180 capsule    TAKE 1 CAPSULE TWICE A DAY    Neuralgia and neuritis       guaiFENesin-codeine 100-10 MG/5ML Soln solution    guaiFENesin AC    473 mL    TAKE 2 TEASPOONFULS (10ML) BY MOUTH EVERY 4 HOURS AS NEEDED FOR COUGH    Cough       ketoconazole 2 % shampoo    NIZORAL    120 mL    Wash hair, let set for 5 minutes, rinse 1 time per day every Sunday    Seborrheic dermatitis       Lanolin 30 % Oint    CORONA MULTI-PURPOSE    3 Tube    Externally apply 1 Application topically 2 times daily    Skin irritation       lidocaine 5 % Patch    LIDODERM    270 patch    APPLY UP TO 3 PATCHES TO PAINFUL AREA AT ONCE FOR UP TO 12 HOURS WITHIN A 24 HOURS PERIOD. REMOVE AFTER 12 HOURS    Polio       loratadine 10 MG tablet    ALLERGY RELIEF    90 tablet    Take 1 tablet (10 mg) by mouth daily    Allergic rhinitis, unspecified allergic rhinitis type       metoprolol 25 MG tablet    LOPRESSOR    90 tablet    Take 0.5 tablets (12.5 mg) by mouth 2 times daily    NSTEMI (non-ST elevated myocardial infarction) (H)       MYRBETRIQ 50 MG 24 hr tablet   Generic drug:  mirabegron     31 tablet    Take one (1) tablet BY MOUTH daily    OAB (overactive bladder)       nystatin cream    MYCOSTATIN    30 g    APPLY TO AFFECTED AREA 2 TIMES DAILY AS NEEDED    Candidiasis of skin and nails        olopatadine 0.1 % ophthalmic solution    PATANOL    3 Bottle    INSTILL 1 DROP INTO BOTH EYES 2 TIMES DAILY    Allergic conjunctivitis, unspecified laterality       ondansetron 4 MG tablet    ZOFRAN    30 tablet    TAKE 1 TABLET BY MOUTH EVERY 8 HOURS AS NEEDED FOR NAUSEA AND VOMITING    Nausea       order for DME     1 Device    Equipment being ordered: neoprene knee sleeve    Primary osteoarthritis involving multiple joints       pantoprazole 40 MG EC tablet    PROTONIX    90 tablet    TAKE 1 TABLET DAILY    Gastroesophageal reflux disease, esophagitis presence not specified       polyethylene glycol 0.4%- propylene glycol 0.3% 0.4-0.3 % Soln ophthalmic solution    SYSTANE    3 Bottle    Place 2 drops into both eyes 4 times daily as needed for dry eyes    Allergic conjunctivitis, unspecified laterality       polyethylene glycol powder    MIRALAX    500 g    Take 17 g by mouth daily    Constipation       PRESERVISION AREDS Tabs     120 tablet    TAKE 1 TABLET BY MOUTH 2 TIMES DAILY    Health care maintenance       saline nasal Gel topical gel      Apply into each naris At Bedtime    Chronic cough       senna-docusate 8.6-50 MG per tablet    SENOKOT-S;PERICOLACE     Take 1 tablet by mouth 2 times daily        sodium fluoride dental gel 1.1 % Gel topical gel    PREVIDENT     Apply 1 applicator to affected area At Bedtime        spironolactone 25 MG tablet    ALDACTONE    45 tablet    Take 0.5 tablets (12.5 mg) by mouth daily    Left heart failure (H)       vitamin D3 2000 UNITS Caps     90 capsule    Take 2,000 Units by mouth daily    Osteoporosis       * Notice:  This list has 4 medication(s) that are the same as other medications prescribed for you. Read the directions carefully, and ask your doctor or other care provider to review them with you.

## 2017-11-01 NOTE — TELEPHONE ENCOUNTER
Peridex      Last Written Prescription Date: reported by patient on med list  Last Fill Quantity: uknown,  # refills: unknown   Last Office Visit with [facility], [facility] or [facility] prescribing provider: 10/02/2017 nursing home visit                                          Next 5 appointments (look out 90 days)     Nov 01, 2017  2:40 PM CDT   (Arrive by 2:25 PM)   SHORT with Mario Fofana MD   [facility] [place] ([facility] [place] )    [address] [place] [address]   [phone]

## 2017-11-01 NOTE — PROGRESS NOTES
SUBJECTIVE:  Britney Schaeffer, 87 year old, female is seen with the following medical problems.    Abdominal pain. She was recently hospitalized with recurrent abdominal pain.  Her first hospitalization she was noted to have colitis with possible local area of small bowel enteritis.   This was also felt to be secondary to gastritis.  She was noted to have a large amount of stool on CT and was given Miralax with some improvement.  Her pain improved with the above changes and she was [facility]. She continues with no appetite.     Constipation.This continues.  Britney relates she has not had a bowel movement for 5 days.  Reviewed bowel regimen.    Left knee pain. She continues with left knee pain.  Previous x rays showed a lateral fixation screw on the tibial component of the knee prosthesis was outside the cortex.  This is reviewed.    Denies fever, shaking, chills, dysphagia, change in appetite, weight loss, nausea, vomiting, diarrhea, melena, or hematochezia.  No change in bowel habits.   Current Outpatient Prescriptions   Medication Sig Dispense Refill     acetaminophen (TYLENOL) 500 MG tablet Take 2 tablets (1,000 mg) by mouth 2 times daily as needed for mild pain As needed 360 tablet 1     aspirin 81 MG chewable tablet Take 1 tablet (81 mg) by mouth daily 90 tablet 3     saline nasal (AYR SALINE) GEL topical gel Apply into each naris At Bedtime  0     clopidogrel (PLAVIX) 75 MG tablet Take 1 tablet (75 mg) by mouth daily 90 tablet 2     ACETAMINOPHEN PO Take 1,000 mg by mouth daily as needed for pain IN ADDITION TO BID DOSE       carbidopa-levodopa (SINEMET)  MG per tablet Take 2 tablets by mouth every morning       senna-docusate (SENOKOT-S;PERICOLACE) 8.6-50 MG per tablet Take 1 tablet by mouth 2 times daily       AMOXICILLIN PO Take 500 mg by mouth as needed (GIVE ONE HOUR PRIOIR TO DENTAL APTS FOR ISCHEMIC HEART DISEASE) Give 4 tablets as needed        Multiple  Vitamins-Minerals (PRESERVISION AREDS) TABS TAKE 1 TABLET BY MOUTH 2 TIMES DAILY 120 tablet 5     carbidopa-levodopa (SINEMET)  MG per tablet TAKE 1 TABLET THREE TIMES A DAY (Patient taking differently: TAKE 1 TABLET THREE TIMES A DAY at 1200,1600 and 2000) 270 tablet 1     chlorhexidine (PERIDEX) 0.12 % solution Swish and spit 15 mLs in mouth 2 times daily       pantoprazole (PROTONIX) 40 MG EC tablet TAKE 1 TABLET DAILY 90 tablet 1     lidocaine (LIDODERM) 5 % Patch APPLY UP TO 3 PATCHES TO PAINFUL AREA AT ONCE FOR UP TO 12 HOURS WITHIN A 24 HOURS PERIOD. REMOVE AFTER 12 HOURS (Patient taking differently: Place 1 patch onto the skin daily APPLY TO LEFT SIDE/BREAST EVERY AM, REMOVE PATCH EVERY HS) 270 patch 3     gabapentin (NEURONTIN) 100 MG capsule TAKE 1 CAPSULE TWICE A  capsule 2     spironolactone (ALDACTONE) 25 MG tablet Take 0.5 tablets (12.5 mg) by mouth daily 45 tablet 3     MYRBETRIQ 50 MG 24 hr tablet Take one (1) tablet BY MOUTH daily 31 tablet 0     sodium fluoride dental gel (PREVIDENT) 1.1 % GEL topical gel Apply 1 applicator to affected area At Bedtime       metoprolol (LOPRESSOR) 25 MG tablet Take 0.5 tablets (12.5 mg) by mouth 2 times daily (Patient taking differently: Take 12.5 mg by mouth 2 times daily ) 90 tablet 3     calcium carbonate (TUMS) 500 MG chewable tablet Take 2 chew tab by mouth 2 times daily        olopatadine (PATANOL) 0.1 % ophthalmic solution INSTILL 1 DROP INTO BOTH EYES 2 TIMES DAILY 3 Bottle 3     Cholecalciferol (VITAMIN D3) 2000 UNITS CAPS Take 2,000 Units by mouth daily 90 capsule 3     loratadine (ALLERGY RELIEF) 10 MG tablet Take 1 tablet (10 mg) by mouth daily 90 tablet 3     polyethylene glycol (MIRALAX) powder Take 17 g by mouth daily (Patient taking differently: Take 17 g by mouth daily as needed for constipation (IF NO BM IN 2 DAYS) ) 500 g 3     artificial saliva, BIOTENE MT, (BIOTENE MT) SOLN Swish and spit 5 mLs in mouth as needed for dry mouth 3  Bottle 3     furosemide (LASIX) 20 MG tablet Take 1 tablet (20 mg) by mouth as needed (Patient taking differently: Take 20 mg by mouth daily as needed (FOR EDEMA GOAL < +3) ) 90 tablet 1     ondansetron (ZOFRAN) 4 MG tablet TAKE 1 TABLET BY MOUTH EVERY 8 HOURS AS NEEDED FOR NAUSEA AND VOMITING 30 tablet 2     polyethylene glycol 0.4%- propylene glycol 0.3% (SYSTANE) 0.4-0.3 % SOLN ophthalmic solution Place 2 drops into both eyes 4 times daily as needed for dry eyes (Patient taking differently: Place 2 drops into both eyes daily ) 3 Bottle 1     ketoconazole (NIZORAL) 2 % shampoo Wash hair, let set for 5 minutes, rinse 1 time per day every Sunday 120 mL 2     nystatin (MYCOSTATIN) cream APPLY TO AFFECTED AREA 2 TIMES DAILY AS NEEDED 30 g 2     Lanolin (CORONA MULTI-PURPOSE) 30 % OINT Externally apply 1 Application topically 2 times daily 3 Tube 3     guaiFENesin-codeine (GUAIFENESIN AC) 100-10 MG/5ML SOLN TAKE 2 TEASPOONFULS (10ML) BY MOUTH EVERY 4 HOURS AS NEEDED FOR COUGH 473 mL 0     order for DME Equipment being ordered: neoprene knee sleeve 1 Device 0        Allergies   Allergen Reactions     Ambien      Zolpidem Tartrate     Tramadol        Past Medical History:   Diagnosis Date     Allergic rhinitis, Seasonal 06/19/2000     Colonic polyps 09/28/2000     Corns and callosities 01/20/2004     dyslipidemia 09/28/2000     Fibrocystic Breast Disease 03/01/2011     GERD 03/01/2011     hemorrhoids 03/01/2011     hypertension, benign 11/22/1999     Insomnia, unspecified 02/05/2004     Osteoarthritis, generalized 11/07/2008     Osteoporosis, unspecified 04/20/2009     Polio 1931     Scoliosis (and kyphoscoliosis), idiopathic 03/01/2011     TIA 03/09/2005     Unspecified asthma(493.90) 03/11/2003     Past Surgical History:   Procedure Laterality Date     BREAST BIOPSY, RT/LT       BUNIONECTOMY      Bunion     COLONOSCOPY  2007     GENITOURINARY SURGERY      botox     JOINT REPLACEMENT      LT     JOINT REPLACEMENT, HIP  "RT/LT  2010     KERATOPLASTY PENETRATING, VITRECTOMY ANTERIOR, EXTRACAPSULAR CATARACT EXTRACTION, COMBINED  2010    LT, Cataracts     Family History   Problem Relation Age of Onset     CEREBROVASCULAR DISEASE Mother      CVA (cause of death)     CEREBROVASCULAR DISEASE Father      CVA     Asthma No family hx of      Social History     Social History     Marital status:      Spouse name: N/A     Number of children: N/A     Years of education: N/A     Occupational History     Retired [employer]     Social History Main Topics     Smoking status: Passive Smoke Exposure - Never Smoker     Smokeless tobacco: Never Used      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 No     Social History Narrative         Review Of Systems  Constitutional, HEENT, cardiovascular, pulmonary, gi and gu systems are negative, except as otherwise noted.      OBJECTIVE:/52 (BP Location: Right arm)  Pulse 70  Ht 5' 2\" (1.575 m)  Wt 122 lb 8 oz (55.6 kg)  SpO2 95%  Breastfeeding? No  BMI 22.41 kg/m2      Exam:  Physical Exam   Constitutional: She is oriented to person, place, and time. She appears well-developed and well-nourished. No distress.   Neurological: She is alert and oriented to person, place, and time.   Psychiatric: She has a normal mood and affect.     Other exam not repeated    Labs:  Admission on " 10/22/2017, Discharged on 10/24/2017   Component Date Value Ref Range Status     Color Urine 10/22/2017 Yellow   Final     Appearance Urine 10/22/2017 Clear   Final     Glucose Urine 10/22/2017 Negative  NEG^Negative mg/dL Final     Bilirubin Urine 10/22/2017 Negative  NEG^Negative Final     Ketones Urine 10/22/2017 5* NEG^Negative mg/dL Final     Specific Gravity Urine 10/22/2017 1.022  1.003 - 1.035 Final     Blood Urine 10/22/2017 Negative  NEG^Negative Final     pH Urine 10/22/2017 5.5  4.7 - 8.0 pH Final     Protein Albumin Urine 10/22/2017 10* NEG^Negative mg/dL Final     Urobilinogen mg/dL 10/22/2017 Normal  0.0 - 2.0 mg/dL Final     Nitrite Urine 10/22/2017 Negative  NEG^Negative Final     Leukocyte Esterase Urine 10/22/2017 Negative  NEG^Negative Final     Source 10/22/2017 Catheterized Urine   Final     RBC Urine 10/22/2017 2  0 - 2 /HPF Final     WBC Urine 10/22/2017 1  0 - 2 /HPF Final     Bacteria Urine 10/22/2017 None* NEG^Negative /HPF Final     Mucous Urine 10/22/2017 Present* NEG^Negative /LPF Final     Calcium Oxalate 10/22/2017 Few* NEG^Negative /HPF Final     Amylase 10/22/2017 32  30 - 110 U/L Final     WBC 10/22/2017 14.5* 4.0 - 11.0 10e9/L Final     RBC Count 10/22/2017 4.51  3.8 - 5.2 10e12/L Final     Hemoglobin 10/22/2017 13.7  11.7 - 15.7 g/dL Final     Hematocrit 10/22/2017 42.4  35.0 - 47.0 % Final     MCV 10/22/2017 94  78 - 100 fl Final     MCH 10/22/2017 30.4  26.5 - 33.0 pg Final     MCHC 10/22/2017 32.3  31.5 - 36.5 g/dL Final     RDW 10/22/2017 14.7  10.0 - 15.0 % Final     Platelet Count 10/22/2017 175  150 - 450 10e9/L Final     Diff Method 10/22/2017 Automated Method   Final     % Neutrophils 10/22/2017 86.7  % Final     % Lymphocytes 10/22/2017 7.5  % Final     % Monocytes 10/22/2017 4.1  % Final     % Eosinophils 10/22/2017 0.8  % Final     % Basophils 10/22/2017 0.4  % Final     % Immature Granulocytes 10/22/2017 0.5  % Final     Nucleated RBCs 10/22/2017 0  0 /100 Final      Absolute Neutrophil 10/22/2017 12.6* 1.6 - 8.3 10e9/L Final     Absolute Lymphocytes 10/22/2017 1.1  0.8 - 5.3 10e9/L Final     Absolute Monocytes 10/22/2017 0.6  0.0 - 1.3 10e9/L Final     Absolute Eosinophils 10/22/2017 0.1  0.0 - 0.7 10e9/L Final     Absolute Basophils 10/22/2017 0.1  0.0 - 0.2 10e9/L Final     Abs Immature Granulocytes 10/22/2017 0.1  0 - 0.4 10e9/L Final     Absolute Nucleated RBC 10/22/2017 0.0   Final     Sodium 10/22/2017 141  133 - 144 mmol/L Final     Potassium 10/22/2017 4.2  3.4 - 5.3 mmol/L Final    Specimen slightly hemolyzed     Chloride 10/22/2017 108  94 - 109 mmol/L Final     Carbon Dioxide 10/22/2017 25  20 - 32 mmol/L Final     Anion Gap 10/22/2017 8  3 - 14 mmol/L Final     Glucose 10/22/2017 96  70 - 99 mg/dL Final     Urea Nitrogen 10/22/2017 14  7 - 30 mg/dL Final     Creatinine 10/22/2017 0.69  0.52 - 1.04 mg/dL Final     GFR Estimate 10/22/2017 80  >60 mL/min/1.7m2 Final    Non  GFR Calc     GFR Estimate If Black 10/22/2017 >90  >60 mL/min/1.7m2 Final    African American GFR Calc     Calcium 10/22/2017 8.8  8.5 - 10.1 mg/dL Final     Bilirubin Total 10/22/2017 0.5  0.2 - 1.3 mg/dL Final     Albumin 10/22/2017 3.4  3.4 - 5.0 g/dL Final     Protein Total 10/22/2017 7.0  6.8 - 8.8 g/dL Final     Alkaline Phosphatase 10/22/2017 55  40 - 150 U/L Final     ALT 10/22/2017 12  0 - 50 U/L Final     AST 10/22/2017 13  0 - 45 U/L Final     Lipase 10/22/2017 109  73 - 393 U/L Final     Troponin I ES 10/22/2017 0.025  0.000 - 0.045 ug/L Final    Comment: The 99th percentile for upper reference range is 0.045 ug/L.  Troponin values   in the range of 0.045 - 0.120 ug/L may be associated with risks of adverse   clinical events.       Lactic Acid 10/22/2017 1.4  0.4 - 2.0 mmol/L Final     Specimen Description 10/22/2017 Blood Left Arm   Final     Special Requests 10/22/2017 10ML   Final     Culture Micro 10/22/2017 No growth after 6 days   Final     Specimen Description  10/22/2017 Blood Right Arm   Final     Special Requests 10/22/2017 1ML   Final     Culture Micro 10/22/2017 No growth after 6 days   Final     Procalcitonin 10/22/2017 <0.05  ng/ml Final    Comment: <0.05 ng/ml  Normal  Recommendation: Very low risk of bacterial infection.   Discourage antibiotics unless strong clinical suspicion for serious infection.       Specimen Description 10/22/2017 Nares   Final     Culture Micro 10/22/2017 No MRSA isolated   Final     WBC 10/23/2017 8.6  4.0 - 11.0 10e9/L Final     RBC Count 10/23/2017 3.97  3.8 - 5.2 10e12/L Final     Hemoglobin 10/23/2017 12.0  11.7 - 15.7 g/dL Final     Hematocrit 10/23/2017 36.4  35.0 - 47.0 % Final     MCV 10/23/2017 92  78 - 100 fl Final     MCH 10/23/2017 30.2  26.5 - 33.0 pg Final     MCHC 10/23/2017 33.0  31.5 - 36.5 g/dL Final     RDW 10/23/2017 14.6  10.0 - 15.0 % Final     Platelet Count 10/23/2017 181  150 - 450 10e9/L Final     Diff Method 10/23/2017 Automated Method   Final     % Neutrophils 10/23/2017 76.1  % Final     % Lymphocytes 10/23/2017 15.5  % Final     % Monocytes 10/23/2017 5.4  % Final     % Eosinophils 10/23/2017 1.9  % Final     % Basophils 10/23/2017 0.6  % Final     % Immature Granulocytes 10/23/2017 0.5  % Final     Nucleated RBCs 10/23/2017 0  0 /100 Final     Absolute Neutrophil 10/23/2017 6.6  1.6 - 8.3 10e9/L Final     Absolute Lymphocytes 10/23/2017 1.3  0.8 - 5.3 10e9/L Final     Absolute Monocytes 10/23/2017 0.5  0.0 - 1.3 10e9/L Final     Absolute Eosinophils 10/23/2017 0.2  0.0 - 0.7 10e9/L Final     Absolute Basophils 10/23/2017 0.1  0.0 - 0.2 10e9/L Final     Abs Immature Granulocytes 10/23/2017 0.0  0 - 0.4 10e9/L Final     Absolute Nucleated RBC 10/23/2017 0.0   Final     Sodium 10/24/2017 142  133 - 144 mmol/L Final     Potassium 10/24/2017 3.7  3.4 - 5.3 mmol/L Final     Chloride 10/24/2017 108  94 - 109 mmol/L Final     Carbon Dioxide 10/24/2017 27  20 - 32 mmol/L Final     Anion Gap 10/24/2017 7  3 - 14  mmol/L Final     Glucose 10/24/2017 74  70 - 99 mg/dL Final     Urea Nitrogen 10/24/2017 13  7 - 30 mg/dL Final     Creatinine 10/24/2017 0.74  0.52 - 1.04 mg/dL Final     GFR Estimate 10/24/2017 74  >60 mL/min/1.7m2 Final    Non  GFR Calc     GFR Estimate If Black 10/24/2017 90  >60 mL/min/1.7m2 Final    African American GFR Calc     Calcium 10/24/2017 8.9  8.5 - 10.1 mg/dL Final     WBC 10/24/2017 5.6  4.0 - 11.0 10e9/L Final     RBC Count 10/24/2017 3.96  3.8 - 5.2 10e12/L Final     Hemoglobin 10/24/2017 12.0  11.7 - 15.7 g/dL Final     Hematocrit 10/24/2017 36.7  35.0 - 47.0 % Final     MCV 10/24/2017 93  78 - 100 fl Final     MCH 10/24/2017 30.3  26.5 - 33.0 pg Final     MCHC 10/24/2017 32.7  31.5 - 36.5 g/dL Final     RDW 10/24/2017 14.7  10.0 - 15.0 % Final     Platelet Count 10/24/2017 198  150 - 450 10e9/L Final       ASSESSMENT/PLAN:  Abdominal pain, generalized  Records reviewed in detail.  Suspect GERD with constipation.  This is discussed.  Will continue protonix twice daily.  Add magnesium citrate as needed to current bowel regimen.  Continue fluids.  Follow up in Skilled Nursing Facility     Constipation, chronic  As above.    Chronic pain of left knee  Discussed x ray results.  Recommend Orthopedic consultation.  Follow up as arranged.            Mario Fofana MD

## 2017-11-01 NOTE — NURSING NOTE
"Chief Complaint   Patient presents with     Hospital F/U     initially hospitalized on 10/7/17 with bowel obstruction and then was readmitted on 10/22/17 with constipation and pneumonia      Constipation     hasn't had a BM in 6 days       Initial /52 (BP Location: Right arm)  Pulse 70  Ht 5' 2\" (1.575 m)  Wt 122 lb 8 oz (55.6 kg)  SpO2 95%  Breastfeeding? No  BMI 22.41 kg/m2 Estimated body mass index is 22.41 kg/(m^2) as calculated from the following:    Height as of this encounter: 5' 2\" (1.575 m).    Weight as of this encounter: 122 lb 8 oz (55.6 kg).  Medication Reconciliation: complete   Shamika Stoner      "

## 2017-11-02 ASSESSMENT — ANXIETY QUESTIONNAIRES: GAD7 TOTAL SCORE: 4

## 2017-11-03 RX ORDER — CHLORHEXIDINE GLUCONATE ORAL RINSE 1.2 MG/ML
SOLUTION DENTAL
Qty: 473 ML | Refills: 0 | Status: SHIPPED | OUTPATIENT
Start: 2017-11-03 | End: 2017-12-26

## 2017-11-06 ENCOUNTER — MEDICAL CORRESPONDENCE (OUTPATIENT)
Dept: HEALTH INFORMATION MANAGEMENT | Facility: HOSPITAL | Age: 82
End: 2017-11-06

## 2017-11-13 ENCOUNTER — NURSING HOME VISIT (OUTPATIENT)
Dept: FAMILY MEDICINE | Facility: OTHER | Age: 82
End: 2017-11-13
Payer: MEDICARE

## 2017-11-13 VITALS
SYSTOLIC BLOOD PRESSURE: 142 MMHG | HEART RATE: 78 BPM | WEIGHT: 121.44 LBS | BODY MASS INDEX: 22.21 KG/M2 | OXYGEN SATURATION: 97 % | RESPIRATION RATE: 16 BRPM | DIASTOLIC BLOOD PRESSURE: 70 MMHG | TEMPERATURE: 98.6 F

## 2017-11-13 DIAGNOSIS — Z78.9 NURSING HOME RESIDENT: Primary | ICD-10-CM

## 2017-11-13 PROCEDURE — 99308 SBSQ NF CARE LOW MDM 20: CPT | Performed by: FAMILY MEDICINE

## 2017-11-13 RX ORDER — CARBIDOPA AND LEVODOPA 25; 100 MG/1; MG/1
1 TABLET ORAL DAILY
COMMUNITY
End: 2018-01-29

## 2017-11-13 NOTE — MR AVS SNAPSHOT
"              After Visit Summary   11/13/2017    Britney Schaeffer    MRN: [id number]           Patient Information     Date Of Birth          12/29/1929        Visit Information        Provider Department      11/13/2017 4:06 PM Mario Fofana MD [facility]        Today's Diagnoses     Nursing Home Visit    -  1       Follow-ups after your visit        Your next 10 appointments already scheduled     Nov 29, 2017  1:20 PM CST   (Arrive by 1:00 PM)   New Visit with Meng Pop MD    ORTHOPEDICS ([facility] )    [address] [facility] [id number] [phone]              Who to contact     If you have questions or need follow up information about today's clinic visit or your schedule please contact [facility] directly at [phone].  Normal or non-critical lab and imaging results will be communicated to you by MyChart, letter or phone within 4 business days after the clinic has received the results. If you do not hear from us within 7 days, please contact the clinic through MyChart or phone. If you have a critical or abnormal lab result, we will notify you by phone as soon as possible.  Submit refill requests through [email or web address] or call your pharmacy and they will forward the refill request to us. Please allow 3 business days for your refill to be completed.          Additional Information About Your Visit        MyChart Information     [email or web address] lets you send messages to your doctor, view your test results, renew your prescriptions, schedule appointments and more. To sign up, go to [email or web address] . Click on \"Log in\" on the left side of the screen, which will take you to the Welcome page. Then click on \"Sign up Now\" on the right side of the page.     You will be asked to enter the access code listed below, as well as some personal information. Please follow the directions to create your username and password.     Your access code is: " [email or web address]  Expires: 2018  1:11 PM     Your access code will  in 90 days. If you need help or a new code, please call your [place] clinic or [phone].        Care EveryWhere ID     This is your Care EveryWhere ID. This could be used by other organizations to access your [place] medical records  [id number]        Your Vitals Were     Pulse Temperature Respirations Pulse Oximetry BMI (Body Mass Index)       78 98.6  F (37  C) 16 97% 22.21 kg/m2        Blood Pressure from Last 3 Encounters:   17 142/70   17 100/52   10/24/17 149/51    Weight from Last 3 Encounters:   17 121 lb 7 oz (55.1 kg)   17 122 lb 8 oz (55.6 kg)   10/24/17 122 lb 2.2 oz (55.4 kg)              Today, you had the following     No orders found for display         Today's Medication Changes          These changes are accurate as of: 17 11:59 PM.  If you have any questions, ask your nurse or doctor.               These medicines have changed or have updated prescriptions.        Dose/Directions    * SINEMET  MG per tablet   This may have changed:  Another medication with the same name was changed. Make sure you understand how and when to take each.   Generic drug:  carbidopa-levodopa        Dose:  2 tablet   Take 2 tablets by mouth daily   Refills:  0       * carbidopa-levodopa  MG per tablet   Commonly known as:  SINEMET   This may have changed:  See the new instructions.   Used for:  Parkinsons (H)        TAKE 1 TABLET THREE TIMES A DAY   Quantity:  270 tablet   Refills:  1       furosemide 20 MG tablet   Commonly known as:  LASIX   This may have changed:    - when to take this  - reasons to take this   Used for:  Edema        Dose:  20 mg   Take 1 tablet (20 mg) by mouth as needed   Quantity:  90 tablet   Refills:  1       lidocaine 5 % Patch   Commonly known as:  LIDODERM   This may have changed:    - how much to take  - how to take this  - when to take this  - additional instructions    Used for:  Polio        APPLY UP TO 3 PATCHES TO PAINFUL AREA AT ONCE FOR UP TO 12 HOURS WITHIN A 24 HOURS PERIOD. REMOVE AFTER 12 HOURS   Quantity:  270 patch   Refills:  3       polyethylene glycol 0.4%- propylene glycol 0.3% 0.4-0.3 % Soln ophthalmic solution   Commonly known as:  SYSTANE   This may have changed:  when to take this   Used for:  Allergic conjunctivitis, unspecified laterality        Dose:  2 drop   Place 2 drops into both eyes 4 times daily as needed for dry eyes   Quantity:  3 Bottle   Refills:  1       polyethylene glycol powder   Commonly known as:  MIRALAX   This may have changed:    - when to take this  - reasons to take this   Used for:  Constipation        Dose:  17 g   Take 17 g by mouth daily   Quantity:  500 g   Refills:  3       * Notice:  This list has 2 medication(s) that are the same as other medications prescribed for you. Read the directions carefully, and ask your doctor or other care provider to review them with you.             Primary Care Provider Office Phone # Fax #    Mario Fofana [phone]       [facility] [address]  [place] [address]        Equal Access to Services     [facility]: Hadii josé farfan hadasho Soad, waaxda luqadaha, qaybta kaalmada adeambrose, celena antony . So [facility] [phone].    ATENCIÓN: Si habla español, tiene a holbrook disposición servicios gratuitos de asistencia lingüística. [facility] [phone].    We comply with applicable federal civil rights laws and [place] laws. We do not discriminate on the basis of race, color, national origin, age, disability, sex, sexual orientation, or gender identity.            Thank you!     Thank you for choosing [facility]  for your care. Our goal is always to provide you with excellent care. Hearing back from our patients is one way we can continue to improve our services. Please take a few minutes to complete the written survey that you may  receive in the mail after your visit with us. Thank you!             Your Updated Medication List - Protect others around you: Learn how to safely use, store and throw away your medicines at www.disposemymeds.org.          This list is accurate as of: 11/13/17 11:59 PM.  Always use your most recent med list.                   Brand Name Dispense Instructions for use Diagnosis    * ACETAMINOPHEN PO      Take 1,000 mg by mouth daily as needed for pain IN ADDITION TO BID DOSE        * acetaminophen 500 MG tablet    TYLENOL    360 tablet    Take 2 tablets (1,000 mg) by mouth 2 times daily as needed for mild pain As needed    Health care maintenance       AMOXICILLIN PO      Take 500 mg by mouth as needed (GIVE ONE HOUR PRIOIR TO DENTAL APTS FOR ISCHEMIC HEART DISEASE) Give 4 tablets as needed        artificial saliva Soln solution     3 Bottle    Swish and spit 5 mLs in mouth as needed for dry mouth    Dry mouth       aspirin 81 MG chewable tablet     90 tablet    Take 1 tablet (81 mg) by mouth daily    Health care maintenance       calcium carbonate 500 MG chewable tablet    TUMS     Take 2 chew tab by mouth 2 times daily        * SINEMET  MG per tablet   Generic drug:  carbidopa-levodopa      Take 2 tablets by mouth daily        * carbidopa-levodopa  MG per tablet    SINEMET    270 tablet    TAKE 1 TABLET THREE TIMES A DAY    Parkinsons (H)       chlorhexidine 0.12 % solution    PERIDEX    473 mL    Give 15 ml by mouth twice a day at 8 am and 8pm. Swish 2 min and spitfor Peridex    Diagnosis unknown       clopidogrel 75 MG tablet    PLAVIX    90 tablet    Take 1 tablet (75 mg) by mouth daily    Coronary artery disease involving native coronary artery of native heart without angina pectoris       furosemide 20 MG tablet    LASIX    90 tablet    Take 1 tablet (20 mg) by mouth as needed    Edema       gabapentin 100 MG capsule    NEURONTIN    180 capsule    TAKE 1 CAPSULE TWICE A DAY    Neuralgia and neuritis        guaiFENesin-codeine 100-10 MG/5ML Soln solution    guaiFENesin AC    473 mL    TAKE 2 TEASPOONFULS (10ML) BY MOUTH EVERY 4 HOURS AS NEEDED FOR COUGH    Cough       ketoconazole 2 % shampoo    NIZORAL    120 mL    Wash hair, let set for 5 minutes, rinse 1 time per day every Sunday    Seborrheic dermatitis       Lanolin 30 % Oint    CORONA MULTI-PURPOSE    3 Tube    Externally apply 1 Application topically 2 times daily    Skin irritation       lidocaine 5 % Patch    LIDODERM    270 patch    APPLY UP TO 3 PATCHES TO PAINFUL AREA AT ONCE FOR UP TO 12 HOURS WITHIN A 24 HOURS PERIOD. REMOVE AFTER 12 HOURS    Polio       loratadine 10 MG tablet    ALLERGY RELIEF    90 tablet    Take 1 tablet (10 mg) by mouth daily    Allergic rhinitis, unspecified allergic rhinitis type       metoprolol 25 MG tablet    LOPRESSOR    90 tablet    Take 0.5 tablets (12.5 mg) by mouth 2 times daily    NSTEMI (non-ST elevated myocardial infarction) (H)       MYRBETRIQ 50 MG 24 hr tablet   Generic drug:  mirabegron     31 tablet    Take one (1) tablet BY MOUTH daily    OAB (overactive bladder)       nystatin cream    MYCOSTATIN    30 g    APPLY TO AFFECTED AREA 2 TIMES DAILY AS NEEDED    Candidiasis of skin and nails       olopatadine 0.1 % ophthalmic solution    PATANOL    3 Bottle    INSTILL 1 DROP INTO BOTH EYES 2 TIMES DAILY    Allergic conjunctivitis, unspecified laterality       ondansetron 4 MG tablet    ZOFRAN    30 tablet    TAKE 1 TABLET BY MOUTH EVERY 8 HOURS AS NEEDED FOR NAUSEA AND VOMITING    Nausea       order for DME     1 Device    Equipment being ordered: neoprene knee sleeve    Primary osteoarthritis involving multiple joints       pantoprazole 40 MG EC tablet    PROTONIX    90 tablet    TAKE 1 TABLET DAILY    Gastroesophageal reflux disease, esophagitis presence not specified       polyethylene glycol 0.4%- propylene glycol 0.3% 0.4-0.3 % Soln ophthalmic solution    SYSTANE    3 Bottle    Place 2 drops into both eyes 4  times daily as needed for dry eyes    Allergic conjunctivitis, unspecified laterality       polyethylene glycol powder    MIRALAX    500 g    Take 17 g by mouth daily    Constipation       PRESERVISION AREDS Tabs     120 tablet    TAKE 1 TABLET BY MOUTH 2 TIMES DAILY    Health care maintenance       saline nasal Gel topical gel      Apply into each naris At Bedtime    Chronic cough       senna-docusate 8.6-50 MG per tablet    SENOKOT-S;PERICOLACE     Take 1 tablet by mouth 2 times daily        sodium fluoride dental gel 1.1 % Gel topical gel    PREVIDENT     Apply 1 applicator to affected area At Bedtime        spironolactone 25 MG tablet    ALDACTONE    45 tablet    Take 0.5 tablets (12.5 mg) by mouth daily    Left heart failure (H)       vitamin D3 2000 UNITS Caps     90 capsule    Take 2,000 Units by mouth daily    Osteoporosis       * Notice:  This list has 4 medication(s) that are the same as other medications prescribed for you. Read the directions carefully, and ask your doctor or other care provider to review them with you.

## 2017-11-14 NOTE — PROGRESS NOTES
HISTORY OF PRESENT ILLNESS:  Britney is a 87 year old female (12/29/1929)  resident of [facility]  who is being seen today for a routine 30 day follow up. Previously her Sinemet was reduced  and her nausea is improved. She has questions about her tremor. This has increased to the point where it is difficult to feed herself. In addition, she notes left knee pain.  She has a history of left total knee arthroplasty.  X rays were performed which showed probable loosening of rhe lateral fixation screw of the tibial component. Patient offers no other complaint.  Staff notes no other issues.    Current medications, allergies, and interdisciplinary care plan are reviewed.      Patient Active Problem List    Diagnosis Date Noted     Parkinson disease (H) 08/10/2015     Priority: High     CHD (coronary heart disease) 06/15/2015     Priority: High     03/2015  NSTEMI S/P angioplasty and stenting (ELIZABETH) LAD and D1       HF (heart failure) (H) 06/15/2015     Priority: High     TIA (transient ischemic attack) 03/16/2015     Priority: High     Neuralgia, post-herpetic 03/19/2014     Priority: High     Cerebrovascular disease 04/10/2013     Priority: High     HTN (hypertension) 09/24/2006     Priority: High     Pneumonia 10/22/2017     Priority: Medium     Abdominal pain 10/22/2017     Priority: Medium     Colitis 10/07/2017     Priority: Medium     Glaucoma 12/27/2016     Priority: Medium     History of poliomyelitis 12/27/2016     Priority: Medium     Osteoarthritis, multiple joints 12/13/2015     Priority: Medium     Fibrocystic breast disease, left 12/13/2015     Priority: Medium     Constipation, chronic 04/20/2015     Priority: Medium     Hiatal hernia 03/16/2015     Priority: Medium     Chronic cough 07/15/2014     Priority: Medium     OAB (overactive bladder) 03/19/2014     Priority: Medium     S/P InterStim placement and multiple botox injections       Dyslipidemia 04/10/2013     Priority: Medium      Hemorrhoids 04/10/2013     Priority: Medium     Perennial allergic rhinitis 04/10/2013     Priority: Medium     Asthma, mild persistent 04/10/2013     Priority: Medium              GERD (gastroesophageal reflux disease) 04/10/2013     Priority: Medium     Osteoporosis 04/10/2013     Priority: Medium     Kyphoscoliosis and scoliosis 04/10/2013     Priority: Medium     Insomnia, medical condition 04/10/2013     Priority: Medium     History of colonic polyps 04/10/2013     Priority: Medium     Nursing Home Visit 01/19/2016     Priority: Low     Health Care Home 12/09/2015     Priority: Low     Class: Chronic          Social History     Social History     Marital status:      Spouse name: N/A     Number of children: N/A     Years of education: N/A     Occupational History     Retired [employer]     Social History Main Topics     Smoking status: Passive Smoke Exposure - Never Smoker     Smokeless tobacco: Never Used      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 No     Social History Narrative        Current Outpatient Prescriptions   Medication Sig     carbidopa-levodopa (SINEMET)  MG per tablet Take 2 tablets by mouth daily     chlorhexidine (PERIDEX) 0.12 % solution Give 15 ml by mouth twice a day at 8 am and 8pm. Swish 2 min and spitfor Peridex     acetaminophen (TYLENOL) 500 MG tablet Take 2  tablets (1,000 mg) by mouth 2 times daily as needed for mild pain As needed     aspirin 81 MG chewable tablet Take 1 tablet (81 mg) by mouth daily     saline nasal (AYR SALINE) GEL topical gel Apply into each naris At Bedtime     clopidogrel (PLAVIX) 75 MG tablet Take 1 tablet (75 mg) by mouth daily     ACETAMINOPHEN PO Take 1,000 mg by mouth daily as needed for pain IN ADDITION TO BID DOSE     senna-docusate (SENOKOT-S;PERICOLACE) 8.6-50 MG per tablet Take 1 tablet by mouth 2 times daily     AMOXICILLIN PO Take 500 mg by mouth as needed (GIVE ONE HOUR PRIOIR TO DENTAL APTS FOR ISCHEMIC HEART DISEASE) Give 4 tablets as needed      Multiple Vitamins-Minerals (PRESERVISION AREDS) TABS TAKE 1 TABLET BY MOUTH 2 TIMES DAILY     carbidopa-levodopa (SINEMET)  MG per tablet TAKE 1 TABLET THREE TIMES A DAY (Patient taking differently: TAKE 1 TABLET THREE TIMES A DAY at 1200,1600 and 2000)     pantoprazole (PROTONIX) 40 MG EC tablet TAKE 1 TABLET DAILY     lidocaine (LIDODERM) 5 % Patch APPLY UP TO 3 PATCHES TO PAINFUL AREA AT ONCE FOR UP TO 12 HOURS WITHIN A 24 HOURS PERIOD. REMOVE AFTER 12 HOURS (Patient taking differently: Place 1 patch onto the skin daily APPLY TO LEFT SIDE/BREAST EVERY AM, REMOVE PATCH EVERY HS)     gabapentin (NEURONTIN) 100 MG capsule TAKE 1 CAPSULE TWICE A DAY     spironolactone (ALDACTONE) 25 MG tablet Take 0.5 tablets (12.5 mg) by mouth daily     MYRBETRIQ 50 MG 24 hr tablet Take one (1) tablet BY MOUTH daily     sodium fluoride dental gel (PREVIDENT) 1.1 % GEL topical gel Apply 1 applicator to affected area At Bedtime     metoprolol (LOPRESSOR) 25 MG tablet Take 0.5 tablets (12.5 mg) by mouth 2 times daily (Patient taking differently: Take 12.5 mg by mouth 2 times daily )     calcium carbonate (TUMS) 500 MG chewable tablet Take 2 chew tab by mouth 2 times daily      olopatadine (PATANOL) 0.1 % ophthalmic solution INSTILL 1 DROP INTO BOTH EYES 2 TIMES DAILY     Cholecalciferol (VITAMIN D3)  2000 UNITS CAPS Take 2,000 Units by mouth daily     loratadine (ALLERGY RELIEF) 10 MG tablet Take 1 tablet (10 mg) by mouth daily     polyethylene glycol (MIRALAX) powder Take 17 g by mouth daily (Patient taking differently: Take 17 g by mouth daily as needed for constipation (IF NO BM IN 2 DAYS) )     artificial saliva, BIOTENE MT, (BIOTENE MT) SOLN Swish and spit 5 mLs in mouth as needed for dry mouth     furosemide (LASIX) 20 MG tablet Take 1 tablet (20 mg) by mouth as needed (Patient taking differently: Take 20 mg by mouth daily as needed (FOR EDEMA GOAL < +3) )     ondansetron (ZOFRAN) 4 MG tablet TAKE 1 TABLET BY MOUTH EVERY 8 HOURS AS NEEDED FOR NAUSEA AND VOMITING     polyethylene glycol 0.4%- propylene glycol 0.3% (SYSTANE) 0.4-0.3 % SOLN ophthalmic solution Place 2 drops into both eyes 4 times daily as needed for dry eyes (Patient taking differently: Place 2 drops into both eyes daily )     ketoconazole (NIZORAL) 2 % shampoo Wash hair, let set for 5 minutes, rinse 1 time per day every Sunday     nystatin (MYCOSTATIN) cream APPLY TO AFFECTED AREA 2 TIMES DAILY AS NEEDED     Lanolin (CORONA MULTI-PURPOSE) 30 % OINT Externally apply 1 Application topically 2 times daily     guaiFENesin-codeine (GUAIFENESIN AC) 100-10 MG/5ML SOLN TAKE 2 TEASPOONFULS (10ML) BY MOUTH EVERY 4 HOURS AS NEEDED FOR COUGH     order for DME Equipment being ordered: neoprene knee sleeve     No current facility-administered medications for this visit.        Allergies   Allergen Reactions     Ambien      Zolpidem Tartrate     Tramadol        I have reviewed the care plan and do agree with the plan.    ROS:  No chest pain, shortness of breath, fever, chills, headache, nausea, vomiting, dysuria, or changes in bowel habits.  Appetite is normal.  Left shoulder/back  pain noted.          OBJECTIVE:  /70  Pulse 78  Temp 98.6  F (37  C)  Resp 16  Wt 121 lb 7 oz (55.1 kg)  SpO2 97%  BMI 22.21 kg/m2    GENERAL:  Chronically ill  appearing, alert, and in no acute distress  RESP:  Lungs clear.  No rales, rhonchi, or wheezing  CV:  RRR.  S1 S2 without murmur. No clicks or rubs.  SKIN:  Age-related changes.  No suspicious lesions or rashes.  PSYCH:  Mentation intact, affect bright, and orientation intact.  EXTREM:  Trace edema.  Pulses palpable. There is as small joint effusion noted left knee.  Some tenderness noted diffusely.            Lab/Diagnostic data:    none    ASSESSMENT/ORDERS:  Nursing Home Visit  Will attempt again to increase Sinemet.  She will be seeing Orthopedics.  Other issues stable.  No changes in current medications or care plan.      Total time spent with patient visit was 25 min including patient visit, review of pertinent clinical information, and treatment plan.      Mario Fofana MD

## 2017-11-17 ENCOUNTER — MEDICAL CORRESPONDENCE (OUTPATIENT)
Dept: HEALTH INFORMATION MANAGEMENT | Facility: HOSPITAL | Age: 82
End: 2017-11-17

## 2017-11-18 ENCOUNTER — MEDICAL CORRESPONDENCE (OUTPATIENT)
Dept: HEALTH INFORMATION MANAGEMENT | Facility: HOSPITAL | Age: 82
End: 2017-11-18

## 2017-11-20 ENCOUNTER — MEDICAL CORRESPONDENCE (OUTPATIENT)
Dept: HEALTH INFORMATION MANAGEMENT | Facility: HOSPITAL | Age: 82
End: 2017-11-20

## 2017-11-21 ENCOUNTER — MEDICAL CORRESPONDENCE (OUTPATIENT)
Dept: HEALTH INFORMATION MANAGEMENT | Facility: HOSPITAL | Age: 82
End: 2017-11-21

## 2017-11-29 ENCOUNTER — OFFICE VISIT (OUTPATIENT)
Dept: ORTHOPEDICS | Facility: OTHER | Age: 82
End: 2017-11-29
Attending: FAMILY MEDICINE
Payer: MEDICARE

## 2017-11-29 ENCOUNTER — RADIANT APPOINTMENT (OUTPATIENT)
Dept: GENERAL RADIOLOGY | Facility: OTHER | Age: 82
End: 2017-11-29
Attending: ORTHOPAEDIC SURGERY
Payer: MEDICARE

## 2017-11-29 VITALS
OXYGEN SATURATION: 95 % | TEMPERATURE: 96.5 F | HEART RATE: 70 BPM | SYSTOLIC BLOOD PRESSURE: 100 MMHG | DIASTOLIC BLOOD PRESSURE: 60 MMHG

## 2017-11-29 DIAGNOSIS — M25.562 LEFT KNEE PAIN: Primary | ICD-10-CM

## 2017-11-29 DIAGNOSIS — M25.562 CHRONIC PAIN OF LEFT KNEE: ICD-10-CM

## 2017-11-29 DIAGNOSIS — T84.093A FAILED TOTAL KNEE, LEFT, INITIAL ENCOUNTER (H): Primary | ICD-10-CM

## 2017-11-29 DIAGNOSIS — G89.29 CHRONIC PAIN OF LEFT KNEE: ICD-10-CM

## 2017-11-29 PROCEDURE — 99203 OFFICE O/P NEW LOW 30 MIN: CPT | Performed by: ORTHOPAEDIC SURGERY

## 2017-11-29 PROCEDURE — 73562 X-RAY EXAM OF KNEE 3: CPT | Mod: TC,LT

## 2017-11-29 PROCEDURE — 99213 OFFICE O/P EST LOW 20 MIN: CPT

## 2017-11-29 ASSESSMENT — PAIN SCALES - GENERAL: PAINLEVEL: NO PAIN (0)

## 2017-11-29 NOTE — NURSING NOTE
"Chief Complaint   Patient presents with     Musculoskeletal Problem     Left Knee Pain       Initial /60 (BP Location: Left arm, Patient Position: Sitting, Cuff Size: Adult Regular)  Pulse 70  Temp 96.5  F (35.8  C) (Tympanic)  SpO2 95% Estimated body mass index is 22.21 kg/(m^2) as calculated from the following:    Height as of 11/1/17: 5' 2\" (1.575 m).    Weight as of 11/13/17: 121 lb 7 oz (55.1 kg).  Medication Reconciliation: complete   Eliza Espinoza      "

## 2017-11-29 NOTE — MR AVS SNAPSHOT
"              After Visit Summary   11/29/2017    Britney Schaeffer    MRN: [id number]           Patient Information     Date Of Birth          12/29/1929        Visit Information        Provider Department      11/29/2017 1:20 PM Meng Pop MD  [facility]        Today's Diagnoses     Failed total knee, left, initial encounter (H)    -  1    Chronic pain of left knee           Follow-ups after your visit        Follow-up notes from your care team     Return in about 6 weeks (around 1/10/2018).      Your next 10 appointments already scheduled     Jan 11, 2018  2:00 PM CST   (Arrive by 1:45 PM)   New Visit with Meng Pop MD    [facility] ([facility] )    [address] [facility] [address] [phone]              Who to contact     If you have questions or need follow up information about today's clinic visit or your schedule please contact  ORTH[facility]EDICS directly at [phone].  Normal or non-critical lab and imaging results will be communicated to you by k[employer]ierohart, letter or phone within 4 business days after the clinic has received the results. If you do not hear from us within 7 days, please contact the clinic through k[employer]ierohart or phone. If you have a critical or abnormal lab result, we will notify you by phone as soon as possible.  Submit refill requests through [employer] or call your pharmacy and they will forward the refill request to us. Please allow 3 business days for your refill to be completed.          Additional Information About Your Visit        MyChart Information     [employer] lets you send messages to your doctor, view your test results, renew your prescriptions, schedule appointments and more. To sign up, go to [email or web address] . Click on \"Log in\" on the left side of the screen, which will take you to the Welcome page. Then click on \"Sign up Now\" on the right side of the page.     You will be asked to enter the access code listed below, as well " as some personal information. Please follow the directions to create your username and password.     Your access code is: [email or web address]  Expires: 2018  1:11 PM     Your access code will  in 90 days. If you need help or a new code, please call your [facility] clinic or [phone].        Care EveryWhere ID     This is your Care EveryWhere ID. This could be used by other organizations to access your [facility] medical records  [id number]        Your Vitals Were     Pulse Temperature Pulse Oximetry             70 96.5  F (35.8  C) (Tympanic) 95%          Blood Pressure from Last 3 Encounters:   17 100/60   17 142/70   17 100/52    Weight from Last 3 Encounters:   17 121 lb 7 oz (55.1 kg)   17 122 lb 8 oz (55.6 kg)   10/24/17 122 lb 2.2 oz (55.4 kg)              Today, you had the following     No orders found for display         Today's Medication Changes          These changes are accurate as of: 17  2:35 PM.  If you have any questions, ask your nurse or doctor.               These medicines have changed or have updated prescriptions.        Dose/Directions    * SINEMET  MG per tablet   This may have changed:  Another medication with the same name was changed. Make sure you understand how and when to take each.   Generic drug:  carbidopa-levodopa   Changed by:  Mario Fofana MD        Dose:  2 tablet   Take 2 tablets by mouth daily   Refills:  0       * carbidopa-levodopa  MG per tablet   Commonly known as:  SINEMET   This may have changed:  See the new instructions.   Used for:  Parkinsons (H)   Changed by:  Mario Fofana MD        TAKE 1 TABLET THREE TIMES A DAY   Quantity:  270 tablet   Refills:  1       furosemide 20 MG tablet   Commonly known as:  LASIX   This may have changed:    - when to take this  - reasons to take this   Used for:  Edema        Dose:  20 mg   Take 1 tablet (20 mg) by mouth as needed   Quantity:  90 tablet   Refills:  1        lidocaine 5 % Patch   Commonly known as:  LIDODERM   This may have changed:    - how much to take  - how to take this  - when to take this  - additional instructions   Used for:  Polio        APPLY UP TO 3 PATCHES TO PAINFUL AREA AT ONCE FOR UP TO 12 HOURS WITHIN A 24 HOURS PERIOD. REMOVE AFTER 12 HOURS   Quantity:  270 patch   Refills:  3       polyethylene glycol 0.4%- propylene glycol 0.3% 0.4-0.3 % Soln ophthalmic solution   Commonly known as:  SYSTANE   This may have changed:  when to take this   Used for:  Allergic conjunctivitis, unspecified laterality        Dose:  2 drop   Place 2 drops into both eyes 4 times daily as needed for dry eyes   Quantity:  3 Bottle   Refills:  1       polyethylene glycol powder   Commonly known as:  MIRALAX   This may have changed:    - when to take this  - reasons to take this   Used for:  Constipation        Dose:  17 g   Take 17 g by mouth daily   Quantity:  500 g   Refills:  3       * Notice:  This list has 2 medication(s) that are the same as other medications prescribed for you. Read the directions carefully, and ask your doctor or other care provider to review them with you.             Primary Care Provider Office Phone # Fax #    Mario Fofana [phone]       [facility] [address] [place] [id number]        Equal Access to Services     DES LEES AH: Amy Carrillo, wahi metcalf, qaybta kaalmada tana, celena sandoval. So [facility] [phone].    ATENCIÓN: Si habla español, tiene a [place] disposición servicios gratuitos de asistencia lingüística. Llame al [phone].    We comply with applicable federal civil rights laws and [place] laws. We do not discriminate on the basis of race, color, national origin, age, disability, sex, sexual orientation, or gender identity.            Thank you!     Thank you for choosing  [facility]TH[facility]ED[facility]  for your care. Our goal is always to provide you with excellent  care. Hearing back from our patients is one way we can continue to improve our services. Please take a few minutes to complete the written survey that you may receive in the mail after your visit with us. Thank you!             Your Updated Medication List - Protect others around you: Learn how to safely use, store and throw away your medicines at www.disposemymeds.org.          This list is accurate as of: 11/29/17  2:35 PM.  Always use your most recent med list.                   Brand Name Dispense Instructions for use Diagnosis    * ACETAMINOPHEN PO      Take 1,000 mg by mouth daily as needed for pain IN ADDITION TO BID DOSE        * acetaminophen 500 MG tablet    TYLENOL    360 tablet    Take 2 tablets (1,000 mg) by mouth 2 times daily as needed for mild pain As needed    Health care maintenance       AMOXICILLIN PO      Take 500 mg by mouth as needed (GIVE ONE HOUR PRIOIR TO DENTAL APTS FOR ISCHEMIC HEART DISEASE) Give 4 tablets as needed        artificial saliva Soln solution     3 Bottle    Swish and spit 5 mLs in mouth as needed for dry mouth    Dry mouth       aspirin 81 MG chewable tablet     90 tablet    Take 1 tablet (81 mg) by mouth daily    Health care maintenance       calcium carbonate 500 MG chewable tablet    TUMS     Take 2 chew tab by mouth 2 times daily        * SINEMET  MG per tablet   Generic drug:  carbidopa-levodopa      Take 2 tablets by mouth daily        * carbidopa-levodopa  MG per tablet    SINEMET    270 tablet    TAKE 1 TABLET THREE TIMES A DAY    Parkinsons (H)       chlorhexidine 0.12 % solution    PERIDEX    473 mL    Give 15 ml by mouth twice a day at 8 am and 8pm. Swish 2 min and spitfor Peridex    Diagnosis unknown       clopidogrel 75 MG tablet    PLAVIX    90 tablet    Take 1 tablet (75 mg) by mouth daily    Coronary artery disease involving native coronary artery of native heart without angina pectoris       furosemide 20 MG tablet    LASIX    90 tablet    Take 1  tablet (20 mg) by mouth as needed    Edema       gabapentin 100 MG capsule    NEURONTIN    180 capsule    TAKE 1 CAPSULE TWICE A DAY    Neuralgia and neuritis       guaiFENesin-codeine 100-10 MG/5ML Soln solution    guaiFENesin AC    473 mL    TAKE 2 TEASPOONFULS (10ML) BY MOUTH EVERY 4 HOURS AS NEEDED FOR COUGH    Cough       ketoconazole 2 % shampoo    NIZORAL    120 mL    Wash hair, let set for 5 minutes, rinse 1 time per day every Sunday    Seborrheic dermatitis       Lanolin 30 % Oint    CORONA MULTI-PURPOSE    3 Tube    Externally apply 1 Application topically 2 times daily    Skin irritation       lidocaine 5 % Patch    LIDODERM    270 patch    APPLY UP TO 3 PATCHES TO PAINFUL AREA AT ONCE FOR UP TO 12 HOURS WITHIN A 24 HOURS PERIOD. REMOVE AFTER 12 HOURS    Polio       loratadine 10 MG tablet    ALLERGY RELIEF    90 tablet    Take 1 tablet (10 mg) by mouth daily    Allergic rhinitis, unspecified allergic rhinitis type       metoprolol 25 MG tablet    LOPRESSOR    90 tablet    Take 0.5 tablets (12.5 mg) by mouth 2 times daily    NSTEMI (non-ST elevated myocardial infarction) (H)       MYRBETRIQ 50 MG 24 hr tablet   Generic drug:  mirabegron     31 tablet    Take one (1) tablet BY MOUTH daily    OAB (overactive bladder)       nystatin cream    MYCOSTATIN    30 g    APPLY TO AFFECTED AREA 2 TIMES DAILY AS NEEDED    Candidiasis of skin and nails       olopatadine 0.1 % ophthalmic solution    PATANOL    3 Bottle    INSTILL 1 DROP INTO BOTH EYES 2 TIMES DAILY    Allergic conjunctivitis, unspecified laterality       ondansetron 4 MG tablet    ZOFRAN    30 tablet    TAKE 1 TABLET BY MOUTH EVERY 8 HOURS AS NEEDED FOR NAUSEA AND VOMITING    Nausea       order for DME     1 Device    Equipment being ordered: neoprene knee sleeve    Primary osteoarthritis involving multiple joints       pantoprazole 40 MG EC tablet    PROTONIX    90 tablet    TAKE 1 TABLET DAILY    Gastroesophageal reflux disease, esophagitis presence  not specified       polyethylene glycol 0.4%- propylene glycol 0.3% 0.4-0.3 % Soln ophthalmic solution    SYSTANE    3 Bottle    Place 2 drops into both eyes 4 times daily as needed for dry eyes    Allergic conjunctivitis, unspecified laterality       polyethylene glycol powder    MIRALAX    500 g    Take 17 g by mouth daily    Constipation       PRESERVISION AREDS Tabs     120 tablet    TAKE 1 TABLET BY MOUTH 2 TIMES DAILY    Health care maintenance       saline nasal Gel topical gel      Apply into each naris At Bedtime    Chronic cough       senna-docusate 8.6-50 MG per tablet    SENOKOT-S;PERICOLACE     Take 1 tablet by mouth 2 times daily        sodium fluoride dental gel 1.1 % Gel topical gel    PREVIDENT     Apply 1 applicator to affected area At Bedtime        spironolactone 25 MG tablet    ALDACTONE    45 tablet    Take 0.5 tablets (12.5 mg) by mouth daily    Left heart failure (H)       vitamin D3 2000 UNITS Caps     90 capsule    Take 2,000 Units by mouth daily    Osteoporosis       * Notice:  This list has 4 medication(s) that are the same as other medications prescribed for you. Read the directions carefully, and ask your doctor or other care provider to review them with you.

## 2017-11-29 NOTE — PROGRESS NOTES
New Patient Visit    Chief Complaint: Painful left TKA    Patient Profile: 87-year-old right-handed nonsmoking walker dependent resident of [facility], patient of Dr. Fofana.  She is status post left TKA in 1996, right TKA sometime thereafter, and right KE sometime after that.    History of Present Illness/Injury: Britney is status post left TKA at [facility] in [place] in 1996.  She does not recall the surgeon's name.  The knee did well until several years ago when she began having recurrent effusions.  It has been aspirated multiple times by other physicians.  Over the last year she has had increasing intermittent left knee pain always associated with walking.  The pain is a throbbing nature.  It is absent when she is nonweightbearing.    A review of the patient's complete medical/family/social  history, medications, allergies and a two (neurological and musculoskeletal) system review of systems reveal that she has postpolio syndrome, cardiac and cerebrovascular disease, history of heart failure, osteoporosis, and GERD.  She sometimes wears an AFO on the left foot, inside a customized shoe.    Objective: Elderly female in a wheelchair in no obvious distress.  Vital signs stable and afebrile.  The left knee has a well-healed anterior surgical scar.  The knee has a mild effusion.  It is diffusely tender to palpation.  It is lax to varus valgus and to anterior posterior stresses.  Extension strength is grade 4.  Light touch sensation is intact around the ankle.  Capillary refill is intact distal to the knee.    X-ray; plain films of the right knee taken today show a press-fit TKA without patellar resurfacing.  The femoral component is in significant valgus and may be loose.  The tibial component has subsided posteriorly and medially (and thus is in varus) and is definitely loose.  It is fixed with 2 long fixation screws, the lateral one of which penetrates beyond the lateral cortex of the tibial flare  significantly.  It is clear that it has been that way since the knee was put in over 20 years ago.    Impression: Failed left TKA    Plan: I discussed with the patient and her daughter, Joslyn De León tel: [phone], the situation and the treatment options of bracing versus revision TKA, the latter being extremely risky.  We agreed to try bracing.  She is placed in an OTC hinged brace and willreturn in 6 weeks for recheck. If this does not work, she may need a longer customized knee brace.    She also has pain in her right TKA but no one told us about this when she was scheduled, and time did not allow her right knee to evaluated or x-rayed today.  We will set up a double appointment at her next appointment with right knee films on arrival.    ,

## 2017-12-04 ENCOUNTER — MEDICAL CORRESPONDENCE (OUTPATIENT)
Dept: HEALTH INFORMATION MANAGEMENT | Facility: HOSPITAL | Age: 82
End: 2017-12-04

## 2017-12-08 ENCOUNTER — MEDICAL CORRESPONDENCE (OUTPATIENT)
Dept: HEALTH INFORMATION MANAGEMENT | Facility: HOSPITAL | Age: 82
End: 2017-12-08

## 2017-12-18 ENCOUNTER — NURSING HOME VISIT (OUTPATIENT)
Dept: FAMILY MEDICINE | Facility: OTHER | Age: 82
End: 2017-12-18
Payer: MEDICARE

## 2017-12-18 VITALS
HEART RATE: 68 BPM | SYSTOLIC BLOOD PRESSURE: 120 MMHG | WEIGHT: 125.25 LBS | TEMPERATURE: 98.1 F | OXYGEN SATURATION: 98 % | RESPIRATION RATE: 20 BRPM | DIASTOLIC BLOOD PRESSURE: 62 MMHG | BODY MASS INDEX: 22.91 KG/M2

## 2017-12-18 DIAGNOSIS — Z78.9 NURSING HOME RESIDENT: Primary | ICD-10-CM

## 2017-12-18 PROCEDURE — 99308 SBSQ NF CARE LOW MDM 20: CPT | Performed by: NURSE PRACTITIONER

## 2017-12-18 RX ORDER — L. ACIDOPHILUS/PECTIN, CITRUS 25MM-100MG
1 TABLET ORAL DAILY
COMMUNITY
End: 2018-04-02

## 2017-12-18 NOTE — MR AVS SNAPSHOT
"              After Visit Summary   12/18/2017    Britney Schaeffer    MRN: [id number]           Patient Information     Date Of Birth          12/29/1929        Visit Information        Provider Department      12/18/2017 4:00 PM Minerva Bhandari APRN CNP [facility]        Today's Diagnoses     Nursing Home Visit    -  1       Follow-ups after your visit        Your next 10 appointments already scheduled     Jan 11, 2018  2:00 PM CST   (Arrive by 1:45 PM)   New Visit with Meng Pop MD    ORTHOPEDICS ([facility] )    [address] [facility] [id number] [phone]              Who to contact     If you have questions or need follow up information about today's clinic visit or your schedule please contact [facility] directly at [phone].  Normal or non-critical lab and imaging results will be communicated to you by MyChart, letter or phone within 4 business days after the clinic has received the results. If you do not hear from us within 7 days, please contact the clinic through MyChart or phone. If you have a critical or abnormal lab result, we will notify you by phone as soon as possible.  Submit refill requests through [employer] or call your pharmacy and they will forward the refill request to us. Please allow 3 business days for your refill to be completed.          Additional Information About Your Visit        MyChart Information     [employer] lets you send messages to your doctor, view your test results, renew your prescriptions, schedule appointments and more. To sign up, go to [email or web address] . Click on \"Log in\" on the left side of the screen, which will take you to the Welcome page. Then click on \"Sign up Now\" on the right side of the page.     You will be asked to enter the access code listed below, as well as some personal information. Please follow the directions to create your username and password.     Your access code is: " [email or web address]  Expires: 2018  1:11 PM     Your access code will  in 90 days. If you need help or a new code, please call your [facility] clinic or [phone].        Care EveryWhere ID     This is your Care EveryWhere ID. This could be used by other organizations to access your [facility] medical records  [id number]        Your Vitals Were     Pulse Temperature Respirations Pulse Oximetry BMI (Body Mass Index)       68 98.1  F (36.7  C) 20 98% 22.91 kg/m2        Blood Pressure from Last 3 Encounters:   17 120/62   17 100/60   17 142/70    Weight from Last 3 Encounters:   17 125 lb 4 oz (56.8 kg)   17 121 lb 7 oz (55.1 kg)   17 122 lb 8 oz (55.6 kg)              Today, you had the following     No orders found for display         Today's Medication Changes          These changes are accurate as of: 17 11:59 PM.  If you have any questions, ask your nurse or doctor.               These medicines have changed or have updated prescriptions.        Dose/Directions    * SINEMET  MG per tablet   This may have changed:  Another medication with the same name was changed. Make sure you understand how and when to take each.   Generic drug:  carbidopa-levodopa        Dose:  1 tablet   Take 1 tablet by mouth daily   Refills:  0       * carbidopa-levodopa  MG per tablet   Commonly known as:  SINEMET   This may have changed:  See the new instructions.   Used for:  Parkinsons (H)        TAKE 1 TABLET THREE TIMES A DAY   Quantity:  270 tablet   Refills:  1       furosemide 20 MG tablet   Commonly known as:  LASIX   This may have changed:    - when to take this  - reasons to take this   Used for:  Edema        Dose:  20 mg   Take 1 tablet (20 mg) by mouth as needed   Quantity:  90 tablet   Refills:  1       lidocaine 5 % Patch   Commonly known as:  LIDODERM   This may have changed:    - how much to take  - how to take this  - when to take this  - additional instructions    Used for:  Polio        APPLY UP TO 3 PATCHES TO PAINFUL AREA AT ONCE FOR UP TO 12 HOURS WITHIN A 24 HOURS PERIOD. REMOVE AFTER 12 HOURS   Quantity:  270 patch   Refills:  3       polyethylene glycol 0.4%- propylene glycol 0.3% 0.4-0.3 % Soln ophthalmic solution   Commonly known as:  SYSTANE   This may have changed:  how much to take   Used for:  Allergic conjunctivitis, unspecified laterality        Dose:  2 drop   Place 2 drops into both eyes 4 times daily as needed for dry eyes   Quantity:  3 Bottle   Refills:  1       polyethylene glycol powder   Commonly known as:  MIRALAX   This may have changed:    - when to take this  - reasons to take this   Used for:  Constipation        Dose:  17 g   Take 17 g by mouth daily   Quantity:  500 g   Refills:  3       * Notice:  This list has 2 medication(s) that are the same as other medications prescribed for you. Read the directions carefully, and ask your doctor or other care provider to review them with you.             Primary Care Provider Office Phone # Fax #    Mario Fofana [phone]       [facility] [address]  [place] [address]        Equal Access to Services     [facility]: Hadii josé farfan hadasho Soad, waaxda luqadaha, qaybta kaalmada adeambrose, celena antony . [facility] [phone].    ATENCIÓN: Si habla español, tiene a holbrook disposición servicios gratuitos de asistencia lingüística. [facility] [phone].    We comply with applicable federal civil rights laws and [place] laws. We do not discriminate on the basis of race, color, national origin, age, disability, sex, sexual orientation, or gender identity.            Thank you!     Thank you for choosing [facility]  for your care. Our goal is always to provide you with excellent care. Hearing back from our patients is one way we can continue to improve our services. Please take a few minutes to complete the written survey that you may  receive in the mail after your visit with us. Thank you!             Your Updated Medication List - Protect others around you: Learn how to safely use, store and throw away your medicines at www.disposemymeds.org.          This list is accurate as of: 12/18/17 11:59 PM.  Always use your most recent med list.                   Brand Name Dispense Instructions for use Diagnosis    * ACETAMINOPHEN PO      Take 1,000 mg by mouth daily as needed for pain IN ADDITION TO BID DOSE        * acetaminophen 500 MG tablet    TYLENOL    360 tablet    Take 2 tablets (1,000 mg) by mouth 2 times daily as needed for mild pain As needed    Health care maintenance       AMOXICILLIN PO      Take 500 mg by mouth as needed (GIVE ONE HOUR PRIOIR TO DENTAL APTS FOR ISCHEMIC HEART DISEASE) Give 4 tablets as needed        artificial saliva Soln solution     3 Bottle    Swish and spit 5 mLs in mouth as needed for dry mouth    Dry mouth       aspirin 81 MG chewable tablet     90 tablet    Take 1 tablet (81 mg) by mouth daily    Health care maintenance       calcium carbonate 500 MG chewable tablet    TUMS     Take 2 chew tab by mouth 2 times daily        * SINEMET  MG per tablet   Generic drug:  carbidopa-levodopa      Take 1 tablet by mouth daily        * carbidopa-levodopa  MG per tablet    SINEMET    270 tablet    TAKE 1 TABLET THREE TIMES A DAY    Parkinsons (H)       chlorhexidine 0.12 % solution    PERIDEX    473 mL    Give 15 ml by mouth twice a day at 8 am and 8pm. Swish 2 min and spitfor Peridex    Diagnosis unknown       clopidogrel 75 MG tablet    PLAVIX    90 tablet    Take 1 tablet (75 mg) by mouth daily    Coronary artery disease involving native coronary artery of native heart without angina pectoris       furosemide 20 MG tablet    LASIX    90 tablet    Take 1 tablet (20 mg) by mouth as needed    Edema       gabapentin 100 MG capsule    NEURONTIN    180 capsule    TAKE 1 CAPSULE TWICE A DAY    Neuralgia and neuritis        ketoconazole 2 % shampoo    NIZORAL    120 mL    Wash hair, let set for 5 minutes, rinse 1 time per day every Sunday    Seborrheic dermatitis       lactobacillus acidophilus Tabs      Take 1 tablet by mouth daily        Lanolin 30 % Oint    CORONA MULTI-PURPOSE    3 Tube    Externally apply 1 Application topically 2 times daily    Skin irritation       lidocaine 5 % Patch    LIDODERM    270 patch    APPLY UP TO 3 PATCHES TO PAINFUL AREA AT ONCE FOR UP TO 12 HOURS WITHIN A 24 HOURS PERIOD. REMOVE AFTER 12 HOURS    Polio       loratadine 10 MG tablet    ALLERGY RELIEF    90 tablet    Take 1 tablet (10 mg) by mouth daily    Allergic rhinitis, unspecified allergic rhinitis type       metoprolol 25 MG tablet    LOPRESSOR    90 tablet    Take 0.5 tablets (12.5 mg) by mouth 2 times daily    NSTEMI (non-ST elevated myocardial infarction) (H)       MYRBETRIQ 50 MG 24 hr tablet   Generic drug:  mirabegron     31 tablet    Take one (1) tablet BY MOUTH daily    OAB (overactive bladder)       nystatin cream    MYCOSTATIN    30 g    APPLY TO AFFECTED AREA 2 TIMES DAILY AS NEEDED    Candidiasis of skin and nails       ondansetron 4 MG tablet    ZOFRAN    30 tablet    TAKE 1 TABLET BY MOUTH EVERY 8 HOURS AS NEEDED FOR NAUSEA AND VOMITING    Nausea       order for DME     1 Device    Equipment being ordered: neoprene knee sleeve    Primary osteoarthritis involving multiple joints       pantoprazole 40 MG EC tablet    PROTONIX    90 tablet    TAKE 1 TABLET DAILY    Gastroesophageal reflux disease, esophagitis presence not specified       polyethylene glycol 0.4%- propylene glycol 0.3% 0.4-0.3 % Soln ophthalmic solution    SYSTANE    3 Bottle    Place 2 drops into both eyes 4 times daily as needed for dry eyes    Allergic conjunctivitis, unspecified laterality       polyethylene glycol powder    MIRALAX    500 g    Take 17 g by mouth daily    Constipation       PRESERVISION AREDS Tabs     120 tablet    TAKE 1 TABLET BY MOUTH 2  TIMES DAILY    Health care maintenance       saline nasal Gel topical gel      Apply into each naris At Bedtime    Chronic cough       senna-docusate 8.6-50 MG per tablet    SENOKOT-S;PERICOLACE     Take 1 tablet by mouth 2 times daily        sodium fluoride dental gel 1.1 % Gel topical gel    PREVIDENT     Apply 1 applicator to affected area At Bedtime        spironolactone 25 MG tablet    ALDACTONE    45 tablet    Take 0.5 tablets (12.5 mg) by mouth daily    Left heart failure (H)       vitamin D3 2000 UNITS Caps     90 capsule    Take 2,000 Units by mouth daily    Osteoporosis       * Notice:  This list has 4 medication(s) that are the same as other medications prescribed for you. Read the directions carefully, and ask your doctor or other care provider to review them with you.

## 2017-12-21 ENCOUNTER — MEDICAL CORRESPONDENCE (OUTPATIENT)
Dept: HEALTH INFORMATION MANAGEMENT | Facility: HOSPITAL | Age: 82
End: 2017-12-21

## 2017-12-22 NOTE — PROGRESS NOTES
HISTORY OF PRESENT ILLNESS:  Britney is a 87 year old female (12/29/1929)  resident of [facility]  who is being seen today for a routine 30 day follow up. Patient states she recently saw orthopedics for left knee pain. She was given a brace and has been working with physical therapy 4 times a week. She states she will be following up again with ortho in January. She states she also has some right knee pain.  She states her Sinemet was recently increased and she feels this has helped with her tremors. Patient offers no other complaint.  Staff notes no other issues.    Current medications, allergies, and interdisciplinary care plan are reviewed.      Patient Active Problem List    Diagnosis Date Noted     Pneumonia 10/22/2017     Priority: Medium     Abdominal pain 10/22/2017     Priority: Medium     Colitis 10/07/2017     Priority: Medium     Glaucoma 12/27/2016     Priority: Medium     History of poliomyelitis 12/27/2016     Priority: Medium     Nursing Home Visit 01/19/2016     Priority: Medium     Osteoarthritis, multiple joints 12/13/2015     Priority: Medium     Fibrocystic breast disease, left 12/13/2015     Priority: Medium     Parkinson disease (H) 08/10/2015     Priority: Medium     CHD (coronary heart disease) 06/15/2015     Priority: Medium     03/2015  NSTEMI S/P angioplasty and stenting (ELIZABETH) LAD and D1       HF (heart failure) (H) 06/15/2015     Priority: Medium     Constipation, chronic 04/20/2015     Priority: Medium     Hiatal hernia 03/16/2015     Priority: Medium     TIA (transient ischemic attack) 03/16/2015     Priority: Medium     Chronic cough 07/15/2014     Priority: Medium     OAB (overactive bladder) 03/19/2014     Priority: Medium     S/P InterStim placement and multiple botox injections       Neuralgia, post-herpetic 03/19/2014     Priority: Medium     Dyslipidemia 04/10/2013     Priority: Medium     Cerebrovascular disease 04/10/2013     Priority: Medium      Hemorrhoids 04/10/2013     Priority: Medium     Perennial allergic rhinitis 04/10/2013     Priority: Medium     Asthma, mild persistent 04/10/2013     Priority: Medium              GERD (gastroesophageal reflux disease) 04/10/2013     Priority: Medium     Osteoporosis 04/10/2013     Priority: Medium     Kyphoscoliosis and scoliosis 04/10/2013     Priority: Medium     Insomnia, medical condition 04/10/2013     Priority: Medium     History of colonic polyps 04/10/2013     Priority: Medium     HTN (hypertension) 09/24/2006     Priority: Medium     Health Care Home 12/09/2015     Priority: Low     Class: Chronic          Social History     Social History     Marital status:      Spouse name: N/A     Number of children: N/A     Years of education: N/A     Occupational History     Retired [employer]     Social History Main Topics     Smoking status: Never Smoker     Smokeless tobacco: Never Used      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 No     Social History Narrative        Current Outpatient Prescriptions   Medication Sig     Lactobacillus Acid-Pectin (LACTOBACILLUS ACIDOPHILUS) TABS Take 1 tablet by mouth daily     carbidopa-levodopa (SINEMET)  MG per tablet Take 1 tablet by mouth daily      chlorhexidine (PERIDEX) 0.12 % solution Give 15 ml by mouth twice a day at 8 am and 8pm. Swish 2 min  and spitfor Peridex     acetaminophen (TYLENOL) 500 MG tablet Take 2 tablets (1,000 mg) by mouth 2 times daily as needed for mild pain As needed     aspirin 81 MG chewable tablet Take 1 tablet (81 mg) by mouth daily     saline nasal (AYR SALINE) GEL topical gel Apply into each naris At Bedtime     clopidogrel (PLAVIX) 75 MG tablet Take 1 tablet (75 mg) by mouth daily     ACETAMINOPHEN PO Take 1,000 mg by mouth daily as needed for pain IN ADDITION TO BID DOSE     senna-docusate (SENOKOT-S;PERICOLACE) 8.6-50 MG per tablet Take 1 tablet by mouth 2 times daily     AMOXICILLIN PO Take 500 mg by mouth as needed (GIVE ONE HOUR PRIOIR TO DENTAL APTS FOR ISCHEMIC HEART DISEASE) Give 4 tablets as needed      Multiple Vitamins-Minerals (PRESERVISION AREDS) TABS TAKE 1 TABLET BY MOUTH 2 TIMES DAILY     carbidopa-levodopa (SINEMET)  MG per tablet TAKE 1 TABLET THREE TIMES A DAY (Patient taking differently: TAKE 2 TABLETS  2 TIMES A DAY)     pantoprazole (PROTONIX) 40 MG EC tablet TAKE 1 TABLET DAILY     lidocaine (LIDODERM) 5 % Patch APPLY UP TO 3 PATCHES TO PAINFUL AREA AT ONCE FOR UP TO 12 HOURS WITHIN A 24 HOURS PERIOD. REMOVE AFTER 12 HOURS (Patient taking differently: Place 1 patch onto the skin 2 times daily )     gabapentin (NEURONTIN) 100 MG capsule TAKE 1 CAPSULE TWICE A DAY     spironolactone (ALDACTONE) 25 MG tablet Take 0.5 tablets (12.5 mg) by mouth daily     MYRBETRIQ 50 MG 24 hr tablet Take one (1) tablet BY MOUTH daily     sodium fluoride dental gel (PREVIDENT) 1.1 % GEL topical gel Apply 1 applicator to affected area At Bedtime     metoprolol (LOPRESSOR) 25 MG tablet Take 0.5 tablets (12.5 mg) by mouth 2 times daily (Patient taking differently: Take 12.5 mg by mouth 2 times daily )     calcium carbonate (TUMS) 500 MG chewable tablet Take 2 chew tab by mouth 2 times daily      Cholecalciferol (VITAMIN D3) 2000 UNITS CAPS Take 2,000 Units by mouth daily     loratadine (ALLERGY RELIEF) 10 MG tablet Take 1  tablet (10 mg) by mouth daily     polyethylene glycol (MIRALAX) powder Take 17 g by mouth daily (Patient taking differently: Take 17 g by mouth daily as needed for constipation (IF NO BM IN 2 DAYS) )     artificial saliva, BIOTENE MT, (BIOTENE MT) SOLN Swish and spit 5 mLs in mouth as needed for dry mouth     furosemide (LASIX) 20 MG tablet Take 1 tablet (20 mg) by mouth as needed (Patient taking differently: Take 20 mg by mouth daily as needed (FOR EDEMA GOAL < +3) )     ondansetron (ZOFRAN) 4 MG tablet TAKE 1 TABLET BY MOUTH EVERY 8 HOURS AS NEEDED FOR NAUSEA AND VOMITING     polyethylene glycol 0.4%- propylene glycol 0.3% (SYSTANE) 0.4-0.3 % SOLN ophthalmic solution Place 2 drops into both eyes 4 times daily as needed for dry eyes (Patient taking differently: Place 1 drop into both eyes 4 times daily as needed for dry eyes )     ketoconazole (NIZORAL) 2 % shampoo Wash hair, let set for 5 minutes, rinse 1 time per day every Sunday     nystatin (MYCOSTATIN) cream APPLY TO AFFECTED AREA 2 TIMES DAILY AS NEEDED     Lanolin (CORONA MULTI-PURPOSE) 30 % OINT Externally apply 1 Application topically 2 times daily     order for DME Equipment being ordered: neoprene knee sleeve     No current facility-administered medications for this visit.        Allergies   Allergen Reactions     Ambien      Zolpidem Tartrate     Tramadol        I have reviewed the care plan and do agree with the plan.      ROS:  No chest pain, shortness of breath, fever, chills, headache, nausea, vomiting, dysuria, or changes in bowel habits.  Appetite has been decreased per patient report, but states she has had a better appetite this week.  Right knee pain noted.          OBJECTIVE:  /62  Pulse 68  Temp 98.1  F (36.7  C)  Resp 20  Wt 125 lb 4 oz (56.8 kg)  SpO2 98%  BMI 22.91 kg/m2    GENERAL:  Chronically ill appearing, alert, and in no acute distress  RESP:  Lungs clear.  No rales, rhonchi, or wheezing  CV:  RRR.  S1 S2 without murmur.  No clicks or rubs.  SKIN:  Age-related changes.  No suspicious lesions or rashes.  PSYCH:  Mentation intact, affect bright, and orientation intact.  EXTREM:  Traced bilateral pedal/ankle edema.  Knee brace on left knee. Pulses palpable.          Lab/Diagnostic data:    none    ASSESSMENT/ORDERS:  Nursing Home Visit    Above issues stable.  No changes in current medications or care plan.      Total time spent with patient visit was 25 min including patient visit, review of pertinent clinical information, and treatment plan.      Minerav BLAIR, CNP

## 2017-12-26 DIAGNOSIS — R69 DIAGNOSIS UNKNOWN: ICD-10-CM

## 2017-12-26 NOTE — TELEPHONE ENCOUNTER
chlorhexidine      Last Written Prescription Date: 11/3/17  Last Fill Quantity: 473 ml,  # refills: 0   Last Office Visit with [facility], [facility] or [facility] prescribing provider: 11/1/17

## 2017-12-27 RX ORDER — CHLORHEXIDINE GLUCONATE ORAL RINSE 1.2 MG/ML
SOLUTION DENTAL
Qty: 473 ML | Refills: 0 | Status: SHIPPED | OUTPATIENT
Start: 2017-12-27 | End: 2018-01-20

## 2018-01-01 ENCOUNTER — NURSING HOME VISIT (OUTPATIENT)
Dept: FAMILY MEDICINE | Facility: OTHER | Age: 83
End: 2018-01-01
Payer: MEDICARE

## 2018-01-01 ENCOUNTER — NURSING HOME VISIT (OUTPATIENT)
Dept: FAMILY MEDICINE | Facility: OTHER | Age: 83
End: 2018-01-01

## 2018-01-01 VITALS
RESPIRATION RATE: 18 BRPM | HEART RATE: 60 BPM | BODY MASS INDEX: 21.58 KG/M2 | DIASTOLIC BLOOD PRESSURE: 64 MMHG | SYSTOLIC BLOOD PRESSURE: 83 MMHG | TEMPERATURE: 97.8 F | WEIGHT: 118 LBS

## 2018-01-01 VITALS
WEIGHT: 112 LBS | TEMPERATURE: 98.2 F | OXYGEN SATURATION: 96 % | HEART RATE: 68 BPM | DIASTOLIC BLOOD PRESSURE: 68 MMHG | RESPIRATION RATE: 12 BRPM | SYSTOLIC BLOOD PRESSURE: 110 MMHG | BODY MASS INDEX: 20.49 KG/M2

## 2018-01-01 DIAGNOSIS — Z78.9 NURSING HOME RESIDENT: Primary | ICD-10-CM

## 2018-01-01 DIAGNOSIS — Z53.9 ERRONEOUS ENCOUNTER--DISREGARD: Primary | ICD-10-CM

## 2018-01-01 PROCEDURE — 99308 SBSQ NF CARE LOW MDM 20: CPT | Performed by: FAMILY MEDICINE

## 2018-01-01 RX ORDER — GUAIFENESIN AND DEXTROMETHORPHAN HYDROBROMIDE 100; 10 MG/5ML; MG/5ML
10 SOLUTION ORAL EVERY 4 HOURS PRN
COMMUNITY

## 2018-01-02 DIAGNOSIS — Z00.00 HEALTH CARE MAINTENANCE: ICD-10-CM

## 2018-01-02 DIAGNOSIS — M79.2 NEURALGIA AND NEURITIS: ICD-10-CM

## 2018-01-04 RX ORDER — GABAPENTIN 100 MG/1
CAPSULE ORAL
Qty: 30 CAPSULE | Refills: 0 | Status: SHIPPED | OUTPATIENT
Start: 2018-01-04

## 2018-01-04 RX ORDER — ASPIRIN 81 MG/1
TABLET, CHEWABLE ORAL
Qty: 90 TABLET | Refills: 2 | Status: SHIPPED | OUTPATIENT
Start: 2018-01-04 | End: 2018-08-20

## 2018-01-04 NOTE — TELEPHONE ENCOUNTER
Asa 81      Last Written Prescription Date: 10/23/17  Last Fill Quantity: 90,  # refills: 3   Last Office Visit with [facility] or [facility] prescribing provider: 11/13/17    gabapentin     Last Written Prescription Date: 5/6/17  Last Fill Quantity: 180,  # refills: 2   Last Office Visit with [facility], [facility] or [facility] prescribing provider: 11/13/17

## 2018-01-05 DIAGNOSIS — M25.561 RIGHT KNEE PAIN: Primary | ICD-10-CM

## 2018-01-08 ENCOUNTER — MEDICAL CORRESPONDENCE (OUTPATIENT)
Dept: HEALTH INFORMATION MANAGEMENT | Facility: HOSPITAL | Age: 83
End: 2018-01-08

## 2018-01-11 ENCOUNTER — RADIANT APPOINTMENT (OUTPATIENT)
Dept: GENERAL RADIOLOGY | Facility: OTHER | Age: 83
End: 2018-01-11
Attending: ORTHOPAEDIC SURGERY
Payer: MEDICARE

## 2018-01-11 ENCOUNTER — OFFICE VISIT (OUTPATIENT)
Dept: ORTHOPEDICS | Facility: OTHER | Age: 83
End: 2018-01-11
Attending: ORTHOPAEDIC SURGERY
Payer: MEDICARE

## 2018-01-11 VITALS
RESPIRATION RATE: 24 BRPM | OXYGEN SATURATION: 98 % | HEART RATE: 49 BPM | DIASTOLIC BLOOD PRESSURE: 78 MMHG | TEMPERATURE: 97.3 F | SYSTOLIC BLOOD PRESSURE: 128 MMHG

## 2018-01-11 DIAGNOSIS — M25.561 RIGHT KNEE PAIN: ICD-10-CM

## 2018-01-11 DIAGNOSIS — T84.84XA PAIN DUE TO TOTAL KNEE REPLACEMENT, INITIAL ENCOUNTER (H): Primary | ICD-10-CM

## 2018-01-11 DIAGNOSIS — Z96.659 PAIN DUE TO TOTAL KNEE REPLACEMENT, INITIAL ENCOUNTER (H): Primary | ICD-10-CM

## 2018-01-11 PROCEDURE — 99213 OFFICE O/P EST LOW 20 MIN: CPT | Performed by: ORTHOPAEDIC SURGERY

## 2018-01-11 PROCEDURE — 73562 X-RAY EXAM OF KNEE 3: CPT | Mod: TC,RT

## 2018-01-11 PROCEDURE — G0463 HOSPITAL OUTPT CLINIC VISIT: HCPCS

## 2018-01-11 ASSESSMENT — PAIN SCALES - GENERAL: PAINLEVEL: MODERATE PAIN (5)

## 2018-01-11 NOTE — NURSING NOTE
"Chief Complaint   Patient presents with     Musculoskeletal Problem     Bilateral knee pain       Initial /78  Pulse (!) 49  Temp 97.3  F (36.3  C) (Tympanic)  Resp 24  SpO2 98% Estimated body mass index is 22.91 kg/(m^2) as calculated from the following:    Height as of 11/1/17: 5' 2\" (1.575 m).    Weight as of 12/18/17: 125 lb 4 oz (56.8 kg).  Medication Reconciliation: complete     SE XIAO    "

## 2018-01-11 NOTE — MR AVS SNAPSHOT
"              After Visit Summary   1/11/2018    Britney Schaeffer    MRN: [id number]           Patient Information     Date Of Birth          12/29/1929        Visit Information        Provider Department      1/11/2018 2:00 PM Meng Pop MD  ORTHOPEDICS        Today's Diagnoses     Painful joint    -  1    Pain due to total knee replacement, initial encounter (H)           Follow-ups after your visit        Follow-up notes from your care team     Return in about 4 months (around 5/11/2018).      Your next 10 appointments already scheduled     May 11, 2018  2:00 PM CDT   (Arrive by 1:30 PM)   Return Visit with Meng Pop MD    ORTHOPEDICS ([facility] [place] )    [address] [facility] [address] [phone]              Who to contact     If you have questions or need follow up information about today's clinic visit or your schedule please contact  ORTHOPEDICS directly at [phone].  Normal or non-critical lab and imaging results will be communicated to you by Vins[employer], letter or phone within 4 business days after the clinic has received the results. If you do not hear from us within 7 days, please contact the clinic through Vinspihart or phone. If you have a critical or abnormal lab result, we will notify you by phone as soon as possible.  Submit refill requests through [employer] or call your pharmacy and they will forward the refill request to us. Please allow 3 business days for your refill to be completed.          Additional Information About Your Visit        Vinspihart Information     [employer] lets you send messages to your doctor, view your test results, renew your prescriptions, schedule appointments and more. To sign up, go to [email or web address] . Click on \"Log in\" on the left side of the screen, which will take you to the Welcome page. Then click on \"Sign up Now\" on the right side of the page.     You will be asked to enter the access code listed below, as " well as some personal information. Please follow the directions to create your username and password.     Your access code is: [email or web address]  Expires: 2018  1:11 PM     Your access code will  in 90 days. If you need help or a new code, please call your [place] clinic or [phone].        Care EveryWhere ID     This is your Care EveryWhere ID. This could be used by other organizations to access your [place] medical records  [id number]        Your Vitals Were     Pulse Temperature Respirations Pulse Oximetry          49 97.3  F (36.3  C) (Tympanic) 24 98%         Blood Pressure from Last 3 Encounters:   18 128/78   17 120/62   17 100/60    Weight from Last 3 Encounters:   17 125 lb 4 oz (56.8 kg)   17 121 lb 7 oz (55.1 kg)   17 122 lb 8 oz (55.6 kg)              Today, you had the following     No orders found for display         Today's Medication Changes          These changes are accurate as of: 18  2:42 PM.  If you have any questions, ask your nurse or doctor.               These medicines have changed or have updated prescriptions.        Dose/Directions    * SINEMET  MG per tablet   This may have changed:  Another medication with the same name was changed. Make sure you understand how and when to take each.   Generic drug:  carbidopa-levodopa   Changed by:  Mario Fofana MD        Dose:  1 tablet   Take 1 tablet by mouth daily   Refills:  0       * carbidopa-levodopa  MG per tablet   Commonly known as:  SINEMET   This may have changed:  See the new instructions.   Used for:  Parkinsons (H)   Changed by:  Mario Fofana MD        TAKE 1 TABLET THREE TIMES A DAY   Quantity:  270 tablet   Refills:  1       furosemide 20 MG tablet   Commonly known as:  LASIX   This may have changed:    - when to take this  - reasons to take this   Used for:  Edema        Dose:  20 mg   Take 1 tablet (20 mg) by mouth as needed   Quantity:  90 tablet    Refills:  1       lidocaine 5 % Patch   Commonly known as:  LIDODERM   This may have changed:    - how much to take  - how to take this  - when to take this  - additional instructions   Used for:  Polio        APPLY UP TO 3 PATCHES TO PAINFUL AREA AT ONCE FOR UP TO 12 HOURS WITHIN A 24 HOURS PERIOD. REMOVE AFTER 12 HOURS   Quantity:  270 patch   Refills:  3       polyethylene glycol 0.4%- propylene glycol 0.3% 0.4-0.3 % Soln ophthalmic solution   Commonly known as:  SYSTANE   This may have changed:  how much to take   Used for:  Allergic conjunctivitis, unspecified laterality        Dose:  2 drop   Place 2 drops into both eyes 4 times daily as needed for dry eyes   Quantity:  3 Bottle   Refills:  1       polyethylene glycol powder   Commonly known as:  MIRALAX   This may have changed:    - when to take this  - reasons to take this   Used for:  Constipation        Dose:  17 g   Take 17 g by mouth daily   Quantity:  500 g   Refills:  3       * Notice:  This list has 2 medication(s) that are the same as other medications prescribed for you. Read the directions carefully, and ask your doctor or other care provider to review them with you.             Primary Care Provider Office Phone # Fax #    Mario Fofana [phone]       [address] [address]        Equal Access to Services     DES LEES AH: Amy Carrillo, wapaulda tea, qaybta kaalmada adeambrose, celena sandoval. So [facility] [phone].    ATENCIÓN: Si habla español, tiene a holbrook disposición servicios gratuitos de asistencia lingüística. Llame al [phone].    We comply with applicable federal civil rights laws and [place] laws. We do not discriminate on the basis of race, color, national origin, age, disability, sex, sexual orientation, or gender identity.            Thank you!     Thank you for choosing  ORTHOPEDICS  for your care. Our goal is always to provide  you with excellent care. Hearing back from our patients is one way we can continue to improve our services. Please take a few minutes to complete the written survey that you may receive in the mail after your visit with us. Thank you!             Your Updated Medication List - Protect others around you: Learn how to safely use, store and throw away your medicines at www.disposemymeds.org.          This list is accurate as of: 1/11/18  2:42 PM.  Always use your most recent med list.                   Brand Name Dispense Instructions for use Diagnosis    * ACETAMINOPHEN PO      Take 1,000 mg by mouth daily as needed for pain IN ADDITION TO BID DOSE        * acetaminophen 500 MG tablet    TYLENOL    360 tablet    Take 2 tablets (1,000 mg) by mouth 2 times daily as needed for mild pain As needed    Health care maintenance       AMOXICILLIN PO      Take 500 mg by mouth as needed (GIVE ONE HOUR PRIOIR TO DENTAL APTS FOR ISCHEMIC HEART DISEASE) Give 4 tablets as needed        artificial saliva Soln solution     3 Bottle    Swish and spit 5 mLs in mouth as needed for dry mouth    Dry mouth       aspirin 81 MG chewable tablet     90 tablet    TAKE 1 TABLET BY MOUTH DAILY AT 8PM    Health care maintenance       calcium carbonate 500 MG chewable tablet    TUMS     Take 2 chew tab by mouth 2 times daily        * SINEMET  MG per tablet   Generic drug:  carbidopa-levodopa      Take 1 tablet by mouth daily        * carbidopa-levodopa  MG per tablet    SINEMET    270 tablet    TAKE 1 TABLET THREE TIMES A DAY    Parkinsons (H)       chlorhexidine 0.12 % solution    PERIDEX    473 mL    Give 15 ml by mouth twice a day at 8 am and 8pm. Swish 2 min and spitfor Peridex    Diagnosis unknown       clopidogrel 75 MG tablet    PLAVIX    90 tablet    Take 1 tablet (75 mg) by mouth daily    Coronary artery disease involving native coronary artery of native heart without angina pectoris       furosemide 20 MG tablet    LASIX    90  tablet    Take 1 tablet (20 mg) by mouth as needed    Edema       gabapentin 100 MG capsule    NEURONTIN    30 capsule    TAKE 1 CAPSULE BY MOUTH AT BEDTIME    Neuralgia and neuritis       ketoconazole 2 % shampoo    NIZORAL    120 mL    Wash hair, let set for 5 minutes, rinse 1 time per day every Sunday    Seborrheic dermatitis       lactobacillus acidophilus Tabs      Take 1 tablet by mouth daily        Lanolin 30 % Oint    CORONA MULTI-PURPOSE    3 Tube    Externally apply 1 Application topically 2 times daily    Skin irritation       lidocaine 5 % Patch    LIDODERM    270 patch    APPLY UP TO 3 PATCHES TO PAINFUL AREA AT ONCE FOR UP TO 12 HOURS WITHIN A 24 HOURS PERIOD. REMOVE AFTER 12 HOURS    Polio       loratadine 10 MG tablet    ALLERGY RELIEF    90 tablet    Take 1 tablet (10 mg) by mouth daily    Allergic rhinitis, unspecified allergic rhinitis type       metoprolol tartrate 25 MG tablet    LOPRESSOR    90 tablet    TAKE ONE-HALF (1/2) TABLET TWICE A DAY    NSTEMI (non-ST elevated myocardial infarction) (H)       MYRBETRIQ 50 MG 24 hr tablet   Generic drug:  mirabegron     31 tablet    Take one (1) tablet BY MOUTH daily    OAB (overactive bladder)       nystatin cream    MYCOSTATIN    30 g    APPLY TO AFFECTED AREA 2 TIMES DAILY AS NEEDED    Candidiasis of skin and nails       ondansetron 4 MG tablet    ZOFRAN    30 tablet    TAKE 1 TABLET BY MOUTH EVERY 8 HOURS AS NEEDED FOR NAUSEA AND VOMITING    Nausea       order for DME     1 Device    Equipment being ordered: neoprene knee sleeve    Primary osteoarthritis involving multiple joints       pantoprazole 40 MG EC tablet    PROTONIX    90 tablet    TAKE 1 TABLET DAILY    Gastroesophageal reflux disease, esophagitis presence not specified       polyethylene glycol 0.4%- propylene glycol 0.3% 0.4-0.3 % Soln ophthalmic solution    SYSTANE    3 Bottle    Place 2 drops into both eyes 4 times daily as needed for dry eyes    Allergic conjunctivitis, unspecified  laterality       polyethylene glycol powder    MIRALAX    500 g    Take 17 g by mouth daily    Constipation       PRESERVISION AREDS Tabs     120 tablet    TAKE 1 TABLET BY MOUTH 2 TIMES DAILY    Health care maintenance       saline nasal Gel topical gel      Apply into each naris At Bedtime    Chronic cough       senna-docusate 8.6-50 MG per tablet    SENOKOT-S;PERICOLACE     Take 1 tablet by mouth 2 times daily        sodium fluoride dental gel 1.1 % Gel topical gel    PREVIDENT     Apply 1 applicator to affected area At Bedtime        spironolactone 25 MG tablet    ALDACTONE    45 tablet    Take 0.5 tablets (12.5 mg) by mouth daily    Left heart failure (H)       vitamin D3 2000 UNITS Caps     90 capsule    Take 2,000 Units by mouth daily    Osteoporosis       * Notice:  This list has 4 medication(s) that are the same as other medications prescribed for you. Read the directions carefully, and ask your doctor or other care provider to review them with you.

## 2018-01-11 NOTE — PROGRESS NOTES
Established Patient, New Problem    Chief Complaint:   #1.  Follow-up painful loose left TKA   #2.  New complaint: Painful right TKA    Patient Profile: 89-year-old  right-handed nonsmoking walker dependent resident of [facility], patient of Dr. Fofana.  She had polio when she was young leaving her with weakness in both legs.  She is status post left TKA in 1996 in [place], right TKA (she thinks around 2006) by Dr. Plummer in [place], and a right KE sometime thereafter.  She spends most of her time in a wheelchair but does walk with the nursing home staff and ride an exercise bicycle once a day 6 days a week.    History of Present Illness/ Injury: The patient is a very poor historian.  I saw her for the 1st time on 11/29/17 for pain in her left knee.  X-rays showed a press-fit TKA without patellar resurfacing with apparently loose femoral and tibial components that were both in varus.  She was fitted with an OTC hinged knee brace.  She reports that the left knee brace makes her knee feel much more comfortable when ambulating.    Her right knee gives her intermittent pain laterally below the joint line.  She denies an injury to that region.  She does not remember how long it has bothered her but it has been years postop she's getting.  The severity of the pain is much less than the severity of the left knee pain when she presented in November.  It only bothers her when she ambulates.  She denies that the knee swells or gives out.  The pain is of a throbbing quality.    Nothing is changed with respect to her musculoskeletal or neurologic review of systems since seen last.  She has no history of malignancy.    Examination: Elderly female sitting in a wheelchair in no obvious distress.  The skin around the right knee is intact.  There is a well-healed anterior surgical scar present.  The knee has no effusion.  The knee is nontender to palpation, including the lateral tibial flare.  Range of motion of the right knee  is 0-100 .  It is stable to ligamentous stressing.  Extension strength is age-appropriate.  The neurovascular status of the limb is intact.  Because she was sitting in a wheelchair with her pant leg rolled up;  I could therefore not see the skin of the right thigh.  Her right ankle posterior tibial pulse is palpable and normal.  Light touch sensation is intact in the right foot.    X-ray; plain films the right knee taken today show a well aligned triple resurfacing cemented  PS TKA in appropriate alignment.  Above it there is the distal tip of a KE stem as well as a lateral plate and screw construct suggesting that at some point after her KE she had a periprosthetic fracture and subsequent fixation.  She does not recall that event or surgery.  There is subtle radiolucency adjacent to the  tibial keel laterally and anteriorly, and under the tray posteriorly.  This may be disuse osteopenia and could mean the tibial tray is loosening. Vascular calcifications are seen behind the knee.    Labs: A pro calcitonin was normal on 10/22/17.    Impression:  #1.  Failed left TKA, symptoms controlled with OTC brace  #2.  Painful right TKA.  Symptoms infrequent and relatively mild.    Plan:  #1.  I suggest she continue wearing the left knee brace never she is out of bed.  #2.  At this time no treatment is necessary for the right knee discomfort.  She'll return in 4 months for a comparative x-ray of the right knee.

## 2018-01-20 DIAGNOSIS — R69 DIAGNOSIS UNKNOWN: ICD-10-CM

## 2018-01-22 ENCOUNTER — NURSING HOME VISIT (OUTPATIENT)
Dept: FAMILY MEDICINE | Facility: OTHER | Age: 83
End: 2018-01-22
Payer: MEDICARE

## 2018-01-22 DIAGNOSIS — Z78.9 NURSING HOME RESIDENT: Primary | ICD-10-CM

## 2018-01-22 PROCEDURE — 99308 SBSQ NF CARE LOW MDM 20: CPT | Performed by: FAMILY MEDICINE

## 2018-01-22 RX ORDER — CHLORHEXIDINE GLUCONATE ORAL RINSE 1.2 MG/ML
SOLUTION DENTAL
Qty: 473 ML | Refills: 0 | Status: SHIPPED | OUTPATIENT
Start: 2018-01-22 | End: 2018-02-05

## 2018-01-22 NOTE — MR AVS SNAPSHOT
"              After Visit Summary   1/22/2018    Britney Schaeffer    MRN: [id number]           Patient Information     Date Of Birth          12/29/1929        Visit Information        Provider Department      1/22/2018 2:38 PM Mario Fofana MD [facility]        Today's Diagnoses     Nursing Home Visit    -  1       Follow-ups after your visit        Your next 10 appointments already scheduled     May 11, 2018  2:00 PM CDT   (Arrive by 1:30 PM)   Return Visit with Meng Pop MD    ORTHOPEDICS ([facility] )    [address] [facility] [id number] [phone]              Who to contact     If you have questions or need follow up information about today's clinic visit or your schedule please contact [facility] directly at [phone].  Normal or non-critical lab and imaging results will be communicated to you by MyChart, letter or phone within 4 business days after the clinic has received the results. If you do not hear from us within 7 days, please contact the clinic through MyChart or phone. If you have a critical or abnormal lab result, we will notify you by phone as soon as possible.  Submit refill requests through [employer] or call your pharmacy and they will forward the refill request to us. Please allow 3 business days for your refill to be completed.          Additional Information About Your Visit        MyChart Information     [employer] lets you send messages to your doctor, view your test results, renew your prescriptions, schedule appointments and more. To sign up, go to [email or web address] . Click on \"Log in\" on the left side of the screen, which will take you to the Welcome page. Then click on \"Sign up Now\" on the right side of the page.     You will be asked to enter the access code listed below, as well as some personal information. Please follow the directions to create your username and password.     Your access code is: " [email or web address]  Expires: 2018  1:59 PM     Your access code will  in 90 days. If you need help or a new code, please call your [place] clinic or [phone].        Care EveryWhere ID     This is your Care EveryWhere ID. This could be used by other organizations to access your [place] medical records  [id number]        Your Vitals Were     Pulse Temperature Respirations Pulse Oximetry BMI (Body Mass Index)       61 98  F (36.7  C) 16 95% 22.2 kg/m2        Blood Pressure from Last 3 Encounters:   18 120/60   18 128/78   17 120/62    Weight from Last 3 Encounters:   18 121 lb 6 oz (55.1 kg)   17 125 lb 4 oz (56.8 kg)   17 121 lb 7 oz (55.1 kg)              Today, you had the following     No orders found for display         Today's Medication Changes          These changes are accurate as of 18 11:59 PM.  If you have any questions, ask your nurse or doctor.               These medicines have changed or have updated prescriptions.        Dose/Directions    carbidopa-levodopa  MG per tablet   Commonly known as:  SINEMET   This may have changed:  See the new instructions.   Used for:  Parkinsons (H)        TAKE 1 TABLET THREE TIMES A DAY   Quantity:  270 tablet   Refills:  1       furosemide 20 MG tablet   Commonly known as:  LASIX   This may have changed:    - when to take this  - reasons to take this   Used for:  Edema        Dose:  20 mg   Take 1 tablet (20 mg) by mouth as needed   Quantity:  90 tablet   Refills:  1       polyethylene glycol 0.4%- propylene glycol 0.3% 0.4-0.3 % Soln ophthalmic solution   Commonly known as:  SYSTANE   This may have changed:  how much to take   Used for:  Allergic conjunctivitis, unspecified laterality        Dose:  2 drop   Place 2 drops into both eyes 4 times daily as needed for dry eyes   Quantity:  3 Bottle   Refills:  1       polyethylene glycol powder   Commonly known as:  MIRALAX   This may have changed:    - when to  take this  - reasons to take this   Used for:  Constipation        Dose:  17 g   Take 17 g by mouth daily   Quantity:  500 g   Refills:  3                Primary Care Provider Office Phone # Fax #    Mario Fofana [phone]       [facility] [address]  [place] [address]        Equal Access to Services     DES LEES : Hadii aad ku hadasho Soomaali, waaxda luqadaha, qaybta kaalmada adeegyada, waxay idiin hayaan adeeren khsiobhansh lajakobn . So [place] [phone].    ATENCIÓN: Si habla español, tiene a holbrook disposición servicios gratuitos de asistencia lingüística. Lucia al [phone].    We comply with applicable federal civil rights laws and [place] laws. We do not discriminate on the basis of race, color, national origin, age, disability, sex, sexual orientation, or gender identity.            Thank you!     Thank you for choosing [facility]  for your care. Our goal is always to provide you with excellent care. Hearing back from our patients is one way we can continue to improve our services. Please take a few minutes to complete the written survey that you may receive in the mail after your visit with us. Thank you!             Your Updated Medication List - Protect others around you: Learn how to safely use, store and throw away your medicines at www.disposemymeds.org.          This list is accurate as of 1/22/18 11:59 PM.  Always use your most recent med list.                   Brand Name Dispense Instructions for use Diagnosis    * ACETAMINOPHEN PO      Take 1,000 mg by mouth daily as needed for pain IN ADDITION TO BID DOSE        * acetaminophen 500 MG tablet    TYLENOL    360 tablet    Take 2 tablets (1,000 mg) by mouth 2 times daily as needed for mild pain As needed    Health care maintenance       AMOXICILLIN PO      Take 500 mg by mouth as needed (GIVE ONE HOUR PRIOIR TO DENTAL APTS FOR ISCHEMIC HEART DISEASE) Give 4 tablets as needed        artificial saliva Liqd  liquid      Swish and spit 5 mLs in mouth as needed for dry mouth        artificial saliva Soln solution     3 Bottle    Swish and spit 5 mLs in mouth as needed for dry mouth    Dry mouth       aspirin 81 MG chewable tablet     90 tablet    TAKE 1 TABLET BY MOUTH DAILY AT 8PM    Health care maintenance       calcium carbonate 500 MG chewable tablet    TUMS     Take 2 chew tab by mouth 2 times daily        carbidopa-levodopa  MG per tablet    SINEMET    270 tablet    TAKE 1 TABLET THREE TIMES A DAY    Parkinsons (H)       chlorhexidine 0.12 % solution    PERIDEX    473 mL    Give 15 ml by mouth twice a day at 8 am and 8pm. Swish 2 min and spitfor Peridex    Diagnosis unknown       clopidogrel 75 MG tablet    PLAVIX    90 tablet    Take 1 tablet (75 mg) by mouth daily    Coronary artery disease involving native coronary artery of native heart without angina pectoris       furosemide 20 MG tablet    LASIX    90 tablet    Take 1 tablet (20 mg) by mouth as needed    Edema       gabapentin 100 MG capsule    NEURONTIN    30 capsule    TAKE 1 CAPSULE BY MOUTH AT BEDTIME    Neuralgia and neuritis       ketoconazole 2 % shampoo    NIZORAL    120 mL    Wash hair, let set for 5 minutes, rinse 1 time per day every Sunday    Seborrheic dermatitis       lactobacillus acidophilus Tabs      Take 1 tablet by mouth daily        Lanolin 30 % Oint    CORONA MULTI-PURPOSE    3 Tube    Externally apply 1 Application topically 2 times daily    Skin irritation       loratadine 10 MG tablet    ALLERGY RELIEF    90 tablet    Take 1 tablet (10 mg) by mouth daily    Allergic rhinitis, unspecified allergic rhinitis type       magnesium hydroxide 400 MG/5ML suspension    MILK OF MAGNESIA     Take 30 mLs by mouth daily as needed for constipation or heartburn        metoprolol tartrate 25 MG tablet    LOPRESSOR    90 tablet    TAKE ONE-HALF (1/2) TABLET TWICE A DAY    NSTEMI (non-ST elevated myocardial infarction) (H)       MYRBETRIQ 50 MG 24  hr tablet   Generic drug:  mirabegron     31 tablet    Take one (1) tablet BY MOUTH daily    OAB (overactive bladder)       nystatin cream    MYCOSTATIN    30 g    APPLY TO AFFECTED AREA 2 TIMES DAILY AS NEEDED    Candidiasis of skin and nails       ondansetron 4 MG tablet    ZOFRAN    30 tablet    TAKE 1 TABLET BY MOUTH EVERY 8 HOURS AS NEEDED FOR NAUSEA AND VOMITING    Nausea       order for DME     1 Device    Equipment being ordered: neoprene knee sleeve    Primary osteoarthritis involving multiple joints       pantoprazole 40 MG EC tablet    PROTONIX    90 tablet    TAKE 1 TABLET DAILY    Gastroesophageal reflux disease, esophagitis presence not specified       polyethylene glycol 0.4%- propylene glycol 0.3% 0.4-0.3 % Soln ophthalmic solution    SYSTANE    3 Bottle    Place 2 drops into both eyes 4 times daily as needed for dry eyes    Allergic conjunctivitis, unspecified laterality       polyethylene glycol powder    MIRALAX    500 g    Take 17 g by mouth daily    Constipation       PRESERVISION AREDS Tabs     120 tablet    TAKE 1 TABLET BY MOUTH 2 TIMES DAILY    Health care maintenance       saline nasal Gel topical gel      Apply into each naris At Bedtime    Chronic cough       senna-docusate 8.6-50 MG per tablet    SENOKOT-S;PERICOLACE     Take 1 tablet by mouth 2 times daily        sodium fluoride dental gel 1.1 % Gel topical gel    PREVIDENT     Apply 1 applicator to affected area At Bedtime        spironolactone 25 MG tablet    ALDACTONE    45 tablet    Take 0.5 tablets (12.5 mg) by mouth daily    Left heart failure (H)       vitamin D3 2000 UNITS Caps     90 capsule    Take 2,000 Units by mouth daily    Osteoporosis       * Notice:  This list has 2 medication(s) that are the same as other medications prescribed for you. Read the directions carefully, and ask your doctor or other care provider to review them with you.

## 2018-01-22 NOTE — TELEPHONE ENCOUNTER
Peridex  Last Written Prescription Date: 12/27/17  Last Fill Quantity: 473mL # of Refills: 0  Last Office Visit: 11/1/17

## 2018-01-25 ENCOUNTER — MEDICAL CORRESPONDENCE (OUTPATIENT)
Dept: HEALTH INFORMATION MANAGEMENT | Facility: HOSPITAL | Age: 83
End: 2018-01-25

## 2018-01-29 VITALS
RESPIRATION RATE: 16 BRPM | HEART RATE: 61 BPM | TEMPERATURE: 98 F | BODY MASS INDEX: 22.2 KG/M2 | OXYGEN SATURATION: 95 % | SYSTOLIC BLOOD PRESSURE: 120 MMHG | DIASTOLIC BLOOD PRESSURE: 60 MMHG | WEIGHT: 121.38 LBS

## 2018-01-29 RX ORDER — FLUORIDE TOOTHPASTE
5 TOOTHPASTE DENTAL PRN
COMMUNITY
End: 2018-04-02

## 2018-01-30 NOTE — PROGRESS NOTES
HISTORY OF PRESENT ILLNESS:  Britney is a 88 year old female (12/29/1929)  resident of [facility]  who is being seen today for a routine 30 day follow up. Previously her Sinemet was reduced  and her nausea is improved. She has questions about her tremor. This has increased to the point where it is difficult to feed herself. In addition, she notes left knee pain.  She has a history of left total knee arthroplasty.  X rays were performed which showed probable loosening of rhe lateral fixation screw of the tibial component. She has seen Orthopedics. Patient offers no other complaint.  Staff notes no other issues.    Current medications, allergies, and interdisciplinary care plan are reviewed.      Patient Active Problem List    Diagnosis Date Noted     Parkinson disease (H) 08/10/2015     Priority: High     CHD (coronary heart disease) 06/15/2015     Priority: High     03/2015  NSTEMI S/P angioplasty and stenting (ELIZABETH) LAD and D1       HF (heart failure) (H) 06/15/2015     Priority: High     TIA (transient ischemic attack) 03/16/2015     Priority: High     Neuralgia, post-herpetic 03/19/2014     Priority: High     Cerebrovascular disease 04/10/2013     Priority: High     HTN (hypertension) 09/24/2006     Priority: High     Pneumonia 10/22/2017     Priority: Medium     Abdominal pain 10/22/2017     Priority: Medium     Colitis 10/07/2017     Priority: Medium     Glaucoma 12/27/2016     Priority: Medium     History of poliomyelitis 12/27/2016     Priority: Medium     Osteoarthritis, multiple joints 12/13/2015     Priority: Medium     Fibrocystic breast disease, left 12/13/2015     Priority: Medium     Constipation, chronic 04/20/2015     Priority: Medium     Hiatal hernia 03/16/2015     Priority: Medium     Chronic cough 07/15/2014     Priority: Medium     OAB (overactive bladder) 03/19/2014     Priority: Medium     S/P InterStim placement and multiple botox injections       Dyslipidemia 04/10/2013      Priority: Medium     Hemorrhoids 04/10/2013     Priority: Medium     Perennial allergic rhinitis 04/10/2013     Priority: Medium     Asthma, mild persistent 04/10/2013     Priority: Medium              GERD (gastroesophageal reflux disease) 04/10/2013     Priority: Medium     Osteoporosis 04/10/2013     Priority: Medium     Kyphoscoliosis and scoliosis 04/10/2013     Priority: Medium     Insomnia, medical condition 04/10/2013     Priority: Medium     History of colonic polyps 04/10/2013     Priority: Medium     Nursing Home Visit 01/19/2016     Priority: Low     Health Care Home 12/09/2015     Priority: Low     Class: Chronic          Social History     Social History     Marital status:      Spouse name: N/A     Number of children: N/A     Years of education: N/A     Occupational History     Retired [employer]     Social History Main Topics     Smoking status: Never Smoker     Smokeless tobacco: Never Used      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 No     Social History Narrative        Current Outpatient Prescriptions   Medication Sig     artificial saliva (BIOTENE DRY MOUTHWASH) LIQD liquid Swish and spit 5 mLs in mouth as needed for dry mouth     magnesium hydroxide (MILK OF MAGNESIA) 400 MG/5ML suspension Take 30 mLs by mouth daily as needed for constipation or heartburn      chlorhexidine (PERIDEX) 0.12 % solution Give 15 ml by mouth twice a day at 8 am and 8pm. Swish 2 min and spitfor Peridex     aspirin 81 MG chewable tablet TAKE 1 TABLET BY MOUTH DAILY AT 8PM     gabapentin (NEURONTIN) 100 MG capsule TAKE 1 CAPSULE BY MOUTH AT BEDTIME (Patient taking differently: TAKE 1 CAPSULE BY MOUTH twice daily)     metoprolol (LOPRESSOR) 25 MG tablet TAKE ONE-HALF (1/2) TABLET TWICE A DAY     Lactobacillus Acid-Pectin (LACTOBACILLUS ACIDOPHILUS) TABS Take 1 tablet by mouth daily     acetaminophen (TYLENOL) 500 MG tablet Take 2 tablets (1,000 mg) by mouth 2 times daily as needed for mild pain As needed     saline nasal (AYR SALINE) GEL topical gel Apply into each naris At Bedtime     clopidogrel (PLAVIX) 75 MG tablet Take 1 tablet (75 mg) by mouth daily     ACETAMINOPHEN PO Take 1,000 mg by mouth daily as needed for pain IN ADDITION TO BID DOSE     senna-docusate (SENOKOT-S;PERICOLACE) 8.6-50 MG per tablet Take 1 tablet by mouth 2 times daily     AMOXICILLIN PO Take 500 mg by mouth as needed (GIVE ONE HOUR PRIOIR TO DENTAL APTS FOR ISCHEMIC HEART DISEASE) Give 4 tablets as needed      Multiple Vitamins-Minerals (PRESERVISION AREDS) TABS TAKE 1 TABLET BY MOUTH 2 TIMES DAILY     carbidopa-levodopa (SINEMET)  MG per tablet TAKE 1 TABLET THREE TIMES A DAY (Patient taking differently: TAKE 2 TABLETS  at  0800 and 1200 and 1 tab at 1600 and 1800)     pantoprazole (PROTONIX) 40 MG EC tablet TAKE 1 TABLET DAILY     spironolactone (ALDACTONE) 25 MG tablet Take 0.5 tablets (12.5 mg) by mouth daily     MYRBETRIQ 50 MG 24 hr tablet Take one (1) tablet BY MOUTH daily     sodium fluoride dental gel (PREVIDENT) 1.1 % GEL topical gel Apply 1 applicator to affected area At Bedtime     calcium carbonate (TUMS) 500 MG chewable tablet Take 2 chew tab by mouth 2 times daily      Cholecalciferol (VITAMIN D3) 2000 UNITS CAPS Take 2,000 Units by mouth daily     loratadine (ALLERGY RELIEF) 10 MG tablet Take 1  tablet (10 mg) by mouth daily     polyethylene glycol (MIRALAX) powder Take 17 g by mouth daily     artificial saliva, BIOTENE MT, (BIOTENE MT) SOLN Swish and spit 5 mLs in mouth as needed for dry mouth     furosemide (LASIX) 20 MG tablet Take 1 tablet (20 mg) by mouth as needed (Patient taking differently: Take 20 mg by mouth daily as needed (FOR EDEMA GOAL < +3) )     ondansetron (ZOFRAN) 4 MG tablet TAKE 1 TABLET BY MOUTH EVERY 8 HOURS AS NEEDED FOR NAUSEA AND VOMITING     polyethylene glycol 0.4%- propylene glycol 0.3% (SYSTANE) 0.4-0.3 % SOLN ophthalmic solution Place 2 drops into both eyes 4 times daily as needed for dry eyes (Patient taking differently: Place 1 drop into both eyes daily )     ketoconazole (NIZORAL) 2 % shampoo Wash hair, let set for 5 minutes, rinse 1 time per day every Sunday     nystatin (MYCOSTATIN) cream APPLY TO AFFECTED AREA 2 TIMES DAILY AS NEEDED     Lanolin (CORONA MULTI-PURPOSE) 30 % OINT Externally apply 1 Application topically 2 times daily     order for DME Equipment being ordered: neoprene knee sleeve     No current facility-administered medications for this visit.        Allergies   Allergen Reactions     Ambien      Zolpidem Tartrate     Tramadol        I have reviewed the care plan and do agree with the plan.    ROS:  No chest pain, shortness of breath, fever, chills, headache, nausea, vomiting, dysuria, or changes in bowel habits.  Appetite is normal.  Left shoulder/back  pain noted.          OBJECTIVE:  /60  Pulse 61  Temp 98  F (36.7  C)  Resp 16  Wt 121 lb 6 oz (55.1 kg)  SpO2 95%  BMI 22.2 kg/m2    GENERAL:  Chronically ill appearing, alert, and in no acute distress  RESP:  Lungs clear.  No rales, rhonchi, or wheezing  CV:  RRR.  S1 S2 without murmur. No clicks or rubs.  SKIN:  Age-related changes.  No suspicious lesions or rashes.  PSYCH:  Mentation intact, affect bright, and orientation intact.  EXTREM:  Trace edema.  Pulses palpable. There is as small  joint effusion noted left knee.  Some tenderness noted diffusely.            Lab/Diagnostic data:    none    ASSESSMENT/ORDERS:  Nursing Home Visit  Other issues stable.  No changes in current medications or care plan.      Total time spent with patient visit was 25 min including patient visit, review of pertinent clinical information, and treatment plan.      Mario Fofana MD

## 2018-02-01 ENCOUNTER — MEDICAL CORRESPONDENCE (OUTPATIENT)
Dept: HEALTH INFORMATION MANAGEMENT | Facility: HOSPITAL | Age: 83
End: 2018-02-01

## 2018-02-01 DIAGNOSIS — I50.1 LEFT HEART FAILURE (H): ICD-10-CM

## 2018-02-01 DIAGNOSIS — G20.A1 PARKINSONS (H): ICD-10-CM

## 2018-02-02 RX ORDER — CARBIDOPA AND LEVODOPA 25; 100 MG/1; MG/1
TABLET ORAL
Qty: 270 TABLET | Refills: 1 | Status: SHIPPED | OUTPATIENT
Start: 2018-02-02 | End: 2018-08-20 | Stop reason: DRUGHIGH

## 2018-02-02 RX ORDER — SPIRONOLACTONE 25 MG/1
12.5 TABLET ORAL DAILY
Qty: 45 TABLET | Refills: 3 | Status: SHIPPED | OUTPATIENT
Start: 2018-02-02 | End: 2018-04-02

## 2018-02-05 DIAGNOSIS — R69 DIAGNOSIS UNKNOWN: ICD-10-CM

## 2018-02-05 RX ORDER — CHLORHEXIDINE GLUCONATE ORAL RINSE 1.2 MG/ML
SOLUTION DENTAL
Qty: 473 ML | Refills: 0 | Status: SHIPPED | OUTPATIENT
Start: 2018-02-05 | End: 2018-04-02

## 2018-02-05 NOTE — TELEPHONE ENCOUNTER
chlorhexidine (PERIDEX) 0.12 % solution    Last Written Prescription Date:  01/22/2018  Last Fill Quantity: 473ml,   # refills: 0  Last Office Visit: 11/01/2017  Future Office visit:       Routing refill request to provider for review/approval because:

## 2018-02-26 ENCOUNTER — NURSING HOME VISIT (OUTPATIENT)
Dept: FAMILY MEDICINE | Facility: OTHER | Age: 83
End: 2018-02-26
Payer: MEDICARE

## 2018-02-26 DIAGNOSIS — Z78.9 NURSING HOME RESIDENT: Primary | ICD-10-CM

## 2018-02-26 PROCEDURE — 99308 SBSQ NF CARE LOW MDM 20: CPT | Performed by: FAMILY MEDICINE

## 2018-02-26 NOTE — MR AVS SNAPSHOT
"              After Visit Summary   2/26/2018    Britney Schaeffer    MRN: [id number]           Patient Information     Date Of Birth          12/29/1929        Visit Information        Provider Department      2/26/2018 10:33 AM Mario Fofana MD [facility]        Today's Diagnoses     Nursing Home Visit    -  1       Follow-ups after your visit        Your next 10 appointments already scheduled     May 11, 2018  2:00 PM CDT   (Arrive by 1:30 PM)   Return Visit with Meng Pop MD    ORTHOPEDICS ([facility] )    [address] [facility] [id number] [phone]              Who to contact     If you have questions or need follow up information about today's clinic visit or your schedule please contact [facility] directly at [phone].  Normal or non-critical lab and imaging results will be communicated to you by MyChart, letter or phone within 4 business days after the clinic has received the results. If you do not hear from us within 7 days, please contact the clinic through MyChart or phone. If you have a critical or abnormal lab result, we will notify you by phone as soon as possible.  Submit refill requests through [employer] or call your pharmacy and they will forward the refill request to us. Please allow 3 business days for your refill to be completed.          Additional Information About Your Visit        MyChart Information     [employer] lets you send messages to your doctor, view your test results, renew your prescriptions, schedule appointments and more. To sign up, go to [email or web address] . Click on \"Log in\" on the left side of the screen, which will take you to the Welcome page. Then click on \"Sign up Now\" on the right side of the page.     You will be asked to enter the access code listed below, as well as some personal information. Please follow the directions to create your username and password.     Your access code is: " [email or web address]  Expires: 2018  1:59 PM     Your access code will  in 90 days. If you need help or a new code, please call your [place] clinic or [phone].        Care EveryWhere ID     This is your Care EveryWhere ID. This could be used by other organizations to access your [place] medical records  [id number]        Your Vitals Were     Pulse Temperature Respirations Pulse Oximetry BMI (Body Mass Index)       78 98.6  F (37  C) 16 91% 22.31 kg/m2        Blood Pressure from Last 3 Encounters:   18 148/75   18 120/60   18 128/78    Weight from Last 3 Encounters:   18 122 lb (55.3 kg)   18 121 lb 6 oz (55.1 kg)   17 125 lb 4 oz (56.8 kg)              Today, you had the following     No orders found for display         Today's Medication Changes          These changes are accurate as of 18 11:59 PM.  If you have any questions, ask your nurse or doctor.               These medicines have changed or have updated prescriptions.        Dose/Directions    furosemide 20 MG tablet   Commonly known as:  LASIX   This may have changed:    - when to take this  - reasons to take this   Used for:  Edema        Dose:  20 mg   Take 1 tablet (20 mg) by mouth as needed   Quantity:  90 tablet   Refills:  1       gabapentin 100 MG capsule   Commonly known as:  NEURONTIN   This may have changed:  See the new instructions.   Used for:  Neuralgia and neuritis        TAKE 1 CAPSULE BY MOUTH AT BEDTIME   Quantity:  30 capsule   Refills:  0       polyethylene glycol 0.4%- propylene glycol 0.3% 0.4-0.3 % Soln ophthalmic solution   Commonly known as:  SYSTANE   This may have changed:    - how much to take  - when to take this   Used for:  Allergic conjunctivitis, unspecified laterality        Dose:  2 drop   Place 2 drops into both eyes 4 times daily as needed for dry eyes   Quantity:  3 Bottle   Refills:  1                Primary Care Provider Office Phone # Fax #    Mario Fofana MD  [phone]       [address] [facility] [address]        Equal Access to Services     DES LEES : Hadii aad ku hadsantatolu Gerardo, wapaulda luqminnie, qaayeshata kasarinada tana, celena frederickin hayaan lanieeren staples laSurjitherman sandoval. So [facility] [phone].    ATENCIÓN: Si habla español, tiene a [place] disposición servicios gratuitos de asistencia lingüística. Llame al [phone].    We comply with applicable federal civil rights laws and [place] laws. We do not discriminate on the basis of race, color, national origin, age, disability, sex, sexual orientation, or gender identity.            Thank you!     Thank you for choosing [facility]  for your care. Our goal is always to provide you with excellent care. Hearing back from our patients is one way we can continue to improve our services. Please take a few minutes to complete the written survey that you may receive in the mail after your visit with us. Thank you!             Your Updated Medication List - Protect others around you: Learn how to safely use, store and throw away your medicines at www.disposemymeds.org.          This list is accurate as of 2/26/18 11:59 PM.  Always use your most recent med list.                   Brand Name Dispense Instructions for use Diagnosis    * ACETAMINOPHEN PO      Take 1,000 mg by mouth daily as needed for pain IN ADDITION TO BID DOSE        * acetaminophen 500 MG tablet    TYLENOL    360 tablet    Take 2 tablets (1,000 mg) by mouth 2 times daily as needed for mild pain As needed    Health care maintenance       AMOXICILLIN PO      Take 500 mg by mouth as needed (GIVE ONE HOUR PRIOIR TO DENTAL APTS FOR ISCHEMIC HEART DISEASE) Give 4 tablets as needed        artificial saliva Liqd liquid      Swish and spit 5 mLs in mouth as needed for dry mouth        aspirin 81 MG chewable tablet     90 tablet    TAKE 1 TABLET BY MOUTH DAILY AT 8PM    Health care maintenance       calcium carbonate 500 MG chewable tablet     TUMS     Take 2 chew tab by mouth 2 times daily        carbidopa-levodopa  MG per tablet    SINEMET    270 tablet    TAKE 2 TABLETS  at  0800 and 1200 and 1 tab at 1600 and 1800    Parkinsons (H)       chlorhexidine 0.12 % solution    PERIDEX    473 mL    Give 15 ml by mouth twice a day at 8 am and 8pm. Swish 2 min and spitfor Peridex    Diagnosis unknown       clopidogrel 75 MG tablet    PLAVIX    90 tablet    Take 1 tablet (75 mg) by mouth daily    Coronary artery disease involving native coronary artery of native heart without angina pectoris       diclofenac 1 % Gel topical gel    VOLTAREN     Apply 2 g topically 4 times daily APPLY 2 TIMES PER DAY TO FEET AND 1 TIME PER DAY TO LEFT BREAST        DOCUSATE SODIUM PO      Take 100 mg by mouth 2 times daily as needed for constipation        furosemide 20 MG tablet    LASIX    90 tablet    Take 1 tablet (20 mg) by mouth as needed    Edema       gabapentin 100 MG capsule    NEURONTIN    30 capsule    TAKE 1 CAPSULE BY MOUTH AT BEDTIME    Neuralgia and neuritis       ketoconazole 2 % shampoo    NIZORAL    120 mL    Wash hair, let set for 5 minutes, rinse 1 time per day every Sunday    Seborrheic dermatitis       lactobacillus acidophilus Tabs      Take 1 tablet by mouth daily        Lanolin 30 % Oint    CORONA MULTI-PURPOSE    3 Tube    Externally apply 1 Application topically 2 times daily    Skin irritation       loratadine 10 MG tablet    ALLERGY RELIEF    90 tablet    Take 1 tablet (10 mg) by mouth daily    Allergic rhinitis, unspecified allergic rhinitis type       magnesium hydroxide 400 MG/5ML suspension    MILK OF MAGNESIA     Take 30 mLs by mouth daily as needed for constipation or heartburn        metoprolol tartrate 25 MG tablet    LOPRESSOR    90 tablet    TAKE ONE-HALF (1/2) TABLET TWICE A DAY    NSTEMI (non-ST elevated myocardial infarction) (H)       MYRBETRIQ 50 MG 24 hr tablet   Generic drug:  mirabegron     31 tablet    Take one (1) tablet  BY MOUTH daily    OAB (overactive bladder)       nystatin cream    MYCOSTATIN    30 g    APPLY TO AFFECTED AREA 2 TIMES DAILY AS NEEDED    Candidiasis of skin and nails       ondansetron 4 MG tablet    ZOFRAN    30 tablet    TAKE 1 TABLET BY MOUTH EVERY 8 HOURS AS NEEDED FOR NAUSEA AND VOMITING    Nausea       order for DME     1 Device    Equipment being ordered: neoprene knee sleeve    Primary osteoarthritis involving multiple joints       pantoprazole 40 MG EC tablet    PROTONIX    90 tablet    TAKE 1 TABLET DAILY    Gastroesophageal reflux disease, esophagitis presence not specified       polyethylene glycol 0.4%- propylene glycol 0.3% 0.4-0.3 % Soln ophthalmic solution    SYSTANE    3 Bottle    Place 2 drops into both eyes 4 times daily as needed for dry eyes    Allergic conjunctivitis, unspecified laterality       polyethylene glycol powder    MIRALAX    500 g    Take 17 g by mouth daily    Constipation       PRESERVISION AREDS Tabs     120 tablet    TAKE 1 TABLET BY MOUTH 2 TIMES DAILY    Health care maintenance       REFRESH OPTIVE ADVANCED 0.5-1-0.5 % Soln   Generic drug:  Carboxymeth-Glycerin-Polysorb      Apply 1 drop to eye 3 times daily        saline nasal Gel topical gel      Apply into each naris At Bedtime    Chronic cough       senna-docusate 8.6-50 MG per tablet    SENOKOT-S;PERICOLACE     Take 1 tablet by mouth 2 times daily        sodium fluoride dental gel 1.1 % Gel topical gel    PREVIDENT     Apply 1 applicator to affected area At Bedtime        spironolactone 25 MG tablet    ALDACTONE    45 tablet    Take 0.5 tablets (12.5 mg) by mouth daily    Left heart failure (H)       vitamin D3 2000 UNITS Caps     90 capsule    Take 2,000 Units by mouth daily    Osteoporosis       * Notice:  This list has 2 medication(s) that are the same as other medications prescribed for you. Read the directions carefully, and ask your doctor or other care provider to review them with you.

## 2018-02-26 NOTE — MR AVS SNAPSHOT
"              After Visit Summary   10/2/2017    Britney Schaeffer    MRN: [id number]           Patient Information     Date Of Birth          12/29/1929        Visit Information        Provider Department      10/2/2017 4:08 PM Mario Fofana MD [facility]        Today's Diagnoses     Nursing Home Visit    -  1       Follow-ups after your visit        Your next 10 appointments already scheduled     Oct 13, 2017  2:20 PM CDT   (Arrive by 2:05 PM)   SHORT with Mario Fofana MD   [facility] ([facility] )    [address] [place] [address]   [phone]              Who to contact     If you have questions or need follow up information about today's clinic visit or your schedule please contact [facility] directly at [phone].  Normal or non-critical lab and imaging results will be communicated to you by MyChart, letter or phone within 4 business days after the clinic has received the results. If you do not hear from us within 7 days, please contact the clinic through MyChart or phone. If you have a critical or abnormal lab result, we will notify you by phone as soon as possible.  Submit refill requests through [employer] or call your pharmacy and they will forward the refill request to us. Please allow 3 business days for your refill to be completed.          Additional Information About Your Visit        MyChart Information     [employer] lets you send messages to your doctor, view your test results, renew your prescriptions, schedule appointments and more. To sign up, go to [email or web address] . Click on \"Log in\" on the left side of the screen, which will take you to the Welcome page. Then click on \"Sign up Now\" on the right side of the page.     You will be asked to enter the access code listed below, as well as some personal information. Please follow the directions to create your username and password.     Your access code is: " [email or web address]  Expires: 10/24/2017  5:35 AM     Your access code will  in 90 days. If you need help or a new code, please call your [facility] clinic or [phone].        Care EveryWhere ID     This is your Care EveryWhere ID. This could be used by other organizations to access your [facility] medical records  [id number]        Your Vitals Were     Pulse Temperature Respirations Pulse Oximetry BMI (Body Mass Index)       57 97.4  F (36.3  C) 16 95% 24.01 kg/m2        Blood Pressure from Last 3 Encounters:   17 122/58   17 118/60   17 118/60    Weight from Last 3 Encounters:   17 127 lb 1 oz (57.6 kg)   17 129 lb 4.8 oz (58.7 kg)   17 129 lb (58.5 kg)              Today, you had the following     No orders found for display         Today's Medication Changes          These changes are accurate as of: 10/2/17 11:59 PM.  If you have any questions, ask your nurse or doctor.               These medicines have changed or have updated prescriptions.        Dose/Directions    acetaminophen 500 MG tablet   Commonly known as:  TYLENOL   This may have changed:    - when to take this  - additional instructions   Used for:  Osteoarthritis        Dose:  1000 mg   Take 2 tablets (1,000 mg) by mouth 2 times daily as needed for mild pain As needed   Quantity:  360 tablet   Refills:  1       * carbidopa-levodopa  MG per tablet   Commonly known as:  SINEMET   This may have changed:  Another medication with the same name was changed. Make sure you understand how and when to take each.        TAKE 1 TABLET THREE TIMES A DAY   Refills:  0       * carbidopa-levodopa  MG per tablet   Commonly known as:  SINEMET   This may have changed:  See the new instructions.   Used for:  Parkinsons (H)        TAKE 1 TABLET THREE TIMES A DAY   Quantity:  270 tablet   Refills:  1       lidocaine 5 % Patch   Commonly known as:  LIDODERM   This may have changed:    - how much to take  - how to take  this  - additional instructions   Used for:  Polio        APPLY UP TO 3 PATCHES TO PAINFUL AREA AT ONCE FOR UP TO 12 HOURS WITHIN A 24 HOURS PERIOD. REMOVE AFTER 12 HOURS   Quantity:  270 patch   Refills:  3       polyethylene glycol 0.4%- propylene glycol 0.3% 0.4-0.3 % Soln ophthalmic solution   Commonly known as:  SYSTANE   This may have changed:  when to take this   Used for:  Allergic conjunctivitis, unspecified laterality        Dose:  2 drop   Place 2 drops into both eyes 4 times daily as needed for dry eyes   Quantity:  3 Bottle   Refills:  1       polyethylene glycol powder   Commonly known as:  MIRALAX   This may have changed:  when to take this   Used for:  Constipation        Dose:  17 g   Take 17 g by mouth daily   Quantity:  500 g   Refills:  3       * Notice:  This list has 2 medication(s) that are the same as other medications prescribed for you. Read the directions carefully, and ask your doctor or other care provider to review them with you.             Primary Care Provider Office Phone # Fax #    Mario Fofana [phone]       [facility] [address]  [place] [address]        Equal Access to Services     [facility]: Hadii josé colono Soad, waaxda luqadaha, qaybta kaalmamohsen fajardo, celena antony . So [facility] [phone].    ATENCIÓN: Si habla español, tiene a holbrook disposición servicios gratuitos de asistencia lingüística. [facility] [phone].    We comply with applicable federal civil rights laws and [place] laws. We do not discriminate on the basis of race, color, national origin, age, disability, sex, sexual orientation, or gender identity.            Thank you!     Thank you for choosing [facility]  for your care. Our goal is always to provide you with excellent care. Hearing back from our patients is one way we can continue to improve our services. Please take a few minutes to complete the written survey that you  may receive in the mail after your visit with us. Thank you!             Your Updated Medication List - Protect others around you: Learn how to safely use, store and throw away your medicines at www.disposemymeds.org.          This list is accurate as of: 10/2/17 11:59 PM.  Always use your most recent med list.                   Brand Name Dispense Instructions for use Diagnosis    acetaminophen 500 MG tablet    TYLENOL    360 tablet    Take 2 tablets (1,000 mg) by mouth 2 times daily as needed for mild pain As needed    Osteoarthritis       AMOXICILLIN PO      Take 500 mg by mouth Give 4 tablets as needed        artificial saliva Soln solution     3 Bottle    Swish and spit 5 mLs in mouth as needed for dry mouth    Dry mouth       aspirin 81 MG chewable tablet     90 tablet    Take 1 tablet (81 mg) by mouth daily    Health care maintenance       atorvastatin 40 MG tablet    LIPITOR    90 tablet    Take 1 tablet (40 mg) by mouth daily    Mixed hyperlipidemia       calcium carbonate 500 MG chewable tablet    TUMS     Take 2 chew tab by mouth 2 times daily        * carbidopa-levodopa  MG per tablet    SINEMET     TAKE 1 TABLET THREE TIMES A DAY        * carbidopa-levodopa  MG per tablet    SINEMET    270 tablet    TAKE 1 TABLET THREE TIMES A DAY    Parkinsons (H)       chlorhexidine 0.12 % solution    PERIDEX     Swish and spit 15 mLs in mouth 2 times daily        clopidogrel 75 MG tablet    PLAVIX    90 tablet    TAKE 1 TABLET DAILY    Coronary artery disease involving native coronary artery of native heart without angina pectoris       furosemide 20 MG tablet    LASIX    90 tablet    Take 1 tablet (20 mg) by mouth as needed    Edema       gabapentin 100 MG capsule    NEURONTIN    180 capsule    TAKE 1 CAPSULE TWICE A DAY    Neuralgia and neuritis       guaiFENesin-codeine 100-10 MG/5ML Soln solution    guaiFENesin AC    473 mL    TAKE 2 TEASPOONFULS (10ML) BY MOUTH EVERY 4 HOURS AS NEEDED FOR COUGH     Cough       ketoconazole 2 % shampoo    NIZORAL    120 mL    Wash hair, let set for 5 minutes, rinse 1 time per day every Sunday    Seborrheic dermatitis       Lanolin 30 % Oint    CORONA MULTI-PURPOSE    3 Tube    Externally apply 1 Application topically 2 times daily    Skin irritation       lidocaine 5 % Patch    LIDODERM    270 patch    APPLY UP TO 3 PATCHES TO PAINFUL AREA AT ONCE FOR UP TO 12 HOURS WITHIN A 24 HOURS PERIOD. REMOVE AFTER 12 HOURS    Polio       loratadine 10 MG tablet    ALLERGY RELIEF    90 tablet    Take 1 tablet (10 mg) by mouth daily    Allergic rhinitis, unspecified allergic rhinitis type       metoprolol 25 MG tablet    LOPRESSOR    90 tablet    Take 0.5 tablets (12.5 mg) by mouth 2 times daily    NSTEMI (non-ST elevated myocardial infarction) (H)       MYRBETRIQ 50 MG 24 hr tablet   Generic drug:  mirabegron     31 tablet    Take one (1) tablet BY MOUTH daily    OAB (overactive bladder)       nystatin cream    MYCOSTATIN    30 g    APPLY TO AFFECTED AREA 2 TIMES DAILY AS NEEDED    Candidiasis of skin and nails       olopatadine 0.1 % ophthalmic solution    PATANOL    3 Bottle    INSTILL 1 DROP INTO BOTH EYES 2 TIMES DAILY    Allergic conjunctivitis, unspecified laterality       ondansetron 4 MG tablet    ZOFRAN    30 tablet    TAKE 1 TABLET BY MOUTH EVERY 8 HOURS AS NEEDED FOR NAUSEA AND VOMITING    Nausea       order for DME     1 Device    Equipment being ordered: neoprene knee sleeve    Primary osteoarthritis involving multiple joints       pantoprazole 40 MG EC tablet    PROTONIX    90 tablet    TAKE 1 TABLET DAILY    Gastroesophageal reflux disease, esophagitis presence not specified       polyethylene glycol 0.4%- propylene glycol 0.3% 0.4-0.3 % Soln ophthalmic solution    SYSTANE    3 Bottle    Place 2 drops into both eyes 4 times daily as needed for dry eyes    Allergic conjunctivitis, unspecified laterality       polyethylene glycol powder    MIRALAX    500 g    Take 17 g by  mouth daily    Constipation       PRESERVISION AREDS Tabs     120 tablet    TAKE 1 TABLET BY MOUTH 2 TIMES DAILY    Health care maintenance       saline nasal Gel topical gel      Apply into each nare At Bedtime        sodium fluoride dental gel 1.1 % Gel topical gel    PREVIDENT     Apply 1 applicator to affected area At Bedtime        spironolactone 25 MG tablet    ALDACTONE    45 tablet    Take 0.5 tablets (12.5 mg) by mouth daily    Left heart failure (H)       vitamin D3 2000 UNITS Caps     90 capsule    Take 2,000 Units by mouth daily    Osteoporosis       * Notice:  This list has 2 medication(s) that are the same as other medications prescribed for you. Read the directions carefully, and ask your doctor or other care provider to review them with you.       normal...

## 2018-03-01 VITALS
BODY MASS INDEX: 22.31 KG/M2 | SYSTOLIC BLOOD PRESSURE: 148 MMHG | OXYGEN SATURATION: 91 % | WEIGHT: 122 LBS | HEART RATE: 78 BPM | RESPIRATION RATE: 16 BRPM | DIASTOLIC BLOOD PRESSURE: 75 MMHG | TEMPERATURE: 98.6 F

## 2018-03-01 NOTE — NURSING NOTE
"Chief Complaint   Patient presents with     Care Coordination Nursing Home       Initial /75  Pulse 78  Temp 98.6  F (37  C)  Resp 16  Wt 122 lb (55.3 kg)  SpO2 91%  BMI 22.31 kg/m2 Estimated body mass index is 22.31 kg/(m^2) as calculated from the following:    Height as of 11/1/17: 5' 2\" (1.575 m).    Weight as of this encounter: 122 lb (55.3 kg).  Medication Reconciliation: complete   Melinda Schreiber LPN      "

## 2018-03-02 NOTE — PROGRESS NOTES
HISTORY OF PRESENT ILLNESS:  Britney is a 88 year old female (12/29/1929)  resident of [facility]  who is being seen today for a routine 30 day follow up. Previously her Sinemet was reduced  and her nausea is improved. She has questions about her tremor. This has increased to the point where it is difficult to feed herself.  Patient offers no other complaint.  Staff notes no other issues.    Current medications, allergies, and interdisciplinary care plan are reviewed.      Patient Active Problem List    Diagnosis Date Noted     Parkinson disease (H) 08/10/2015     Priority: High     CHD (coronary heart disease) 06/15/2015     Priority: High     03/2015  NSTEMI S/P angioplasty and stenting (ELIZABETH) LAD and D1       HF (heart failure) (H) 06/15/2015     Priority: High     TIA (transient ischemic attack) 03/16/2015     Priority: High     Neuralgia, post-herpetic 03/19/2014     Priority: High     Cerebrovascular disease 04/10/2013     Priority: High     HTN (hypertension) 09/24/2006     Priority: High     Pneumonia 10/22/2017     Priority: Medium     Abdominal pain 10/22/2017     Priority: Medium     Colitis 10/07/2017     Priority: Medium     Glaucoma 12/27/2016     Priority: Medium     History of poliomyelitis 12/27/2016     Priority: Medium     Osteoarthritis, multiple joints 12/13/2015     Priority: Medium     Fibrocystic breast disease, left 12/13/2015     Priority: Medium     Constipation, chronic 04/20/2015     Priority: Medium     Hiatal hernia 03/16/2015     Priority: Medium     Chronic cough 07/15/2014     Priority: Medium     OAB (overactive bladder) 03/19/2014     Priority: Medium     S/P InterStim placement and multiple botox injections       Dyslipidemia 04/10/2013     Priority: Medium     Hemorrhoids 04/10/2013     Priority: Medium     Perennial allergic rhinitis 04/10/2013     Priority: Medium     Asthma, mild persistent 04/10/2013     Priority: Medium              GERD  (gastroesophageal reflux disease) 04/10/2013     Priority: Medium     Osteoporosis 04/10/2013     Priority: Medium     Kyphoscoliosis and scoliosis 04/10/2013     Priority: Medium     Insomnia, medical condition 04/10/2013     Priority: Medium     History of colonic polyps 04/10/2013     Priority: Medium     Nursing Home Visit 01/19/2016     Priority: Low     Health Care Home 12/09/2015     Priority: Low     Class: Chronic          Social History     Social History     Marital status:      Spouse name: N/A     Number of children: N/A     Years of education: N/A     Occupational History     Retired [employer]     Social History Main Topics     Smoking status: Never Smoker     Smokeless tobacco: Never Used      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 No     Social History Narrative        Current Outpatient Prescriptions   Medication Sig     Carboxymeth-Glycerin-Polysorb (REFRESH OPTIVE ADVANCED) 0.5-1-0.5 % SOLN Apply 1 drop to eye 3 times daily     DOCUSATE SODIUM PO Take 100 mg by mouth 2 times daily as needed for constipation     diclofenac (VOLTAREN) 1 % GEL topical gel Apply 2 g topically 4 times daily APPLY 2 TIMES PER DAY TO FEET AND 1 TIME PER DAY TO LEFT BREAST     chlorhexidine (PERIDEX) 0.12 % solution Give 15 ml by mouth twice a day at 8 am and 8pm. Swish 2 min and spitfor Peridex      carbidopa-levodopa (SINEMET)  MG per tablet TAKE 2 TABLETS  at  0800 and 1200 and 1 tab at 1600 and 1800     spironolactone (ALDACTONE) 25 MG tablet Take 0.5 tablets (12.5 mg) by mouth daily     artificial saliva (BIOTENE DRY MOUTHWASH) LIQD liquid Swish and spit 5 mLs in mouth as needed for dry mouth     magnesium hydroxide (MILK OF MAGNESIA) 400 MG/5ML suspension Take 30 mLs by mouth daily as needed for constipation or heartburn     aspirin 81 MG chewable tablet TAKE 1 TABLET BY MOUTH DAILY AT 8PM     gabapentin (NEURONTIN) 100 MG capsule TAKE 1 CAPSULE BY MOUTH AT BEDTIME (Patient taking differently: TAKE 1 CAPSULE BY MOUTH twice daily)     metoprolol (LOPRESSOR) 25 MG tablet TAKE ONE-HALF (1/2) TABLET TWICE A DAY     Lactobacillus Acid-Pectin (LACTOBACILLUS ACIDOPHILUS) TABS Take 1 tablet by mouth daily     acetaminophen (TYLENOL) 500 MG tablet Take 2 tablets (1,000 mg) by mouth 2 times daily as needed for mild pain As needed     saline nasal (AYR SALINE) GEL topical gel Apply into each naris At Bedtime     clopidogrel (PLAVIX) 75 MG tablet Take 1 tablet (75 mg) by mouth daily     ACETAMINOPHEN PO Take 1,000 mg by mouth daily as needed for pain IN ADDITION TO BID DOSE     senna-docusate (SENOKOT-S;PERICOLACE) 8.6-50 MG per tablet Take 1 tablet by mouth 2 times daily     AMOXICILLIN PO Take 500 mg by mouth as needed (GIVE ONE HOUR PRIOIR TO DENTAL APTS FOR ISCHEMIC HEART DISEASE) Give 4 tablets as needed      Multiple Vitamins-Minerals (PRESERVISION AREDS) TABS TAKE 1 TABLET BY MOUTH 2 TIMES DAILY     pantoprazole (PROTONIX) 40 MG EC tablet TAKE 1 TABLET DAILY     MYRBETRIQ 50 MG 24 hr tablet Take one (1) tablet BY MOUTH daily     sodium fluoride dental gel (PREVIDENT) 1.1 % GEL topical gel Apply 1 applicator to affected area At Bedtime     calcium carbonate (TUMS) 500 MG chewable tablet Take 2 chew tab by mouth 2 times daily      Cholecalciferol (VITAMIN D3) 2000 UNITS CAPS Take 2,000 Units by mouth daily      loratadine (ALLERGY RELIEF) 10 MG tablet Take 1 tablet (10 mg) by mouth daily     polyethylene glycol (MIRALAX) powder Take 17 g by mouth daily     furosemide (LASIX) 20 MG tablet Take 1 tablet (20 mg) by mouth as needed     ondansetron (ZOFRAN) 4 MG tablet TAKE 1 TABLET BY MOUTH EVERY 8 HOURS AS NEEDED FOR NAUSEA AND VOMITING     polyethylene glycol 0.4%- propylene glycol 0.3% (SYSTANE) 0.4-0.3 % SOLN ophthalmic solution Place 2 drops into both eyes 4 times daily as needed for dry eyes (Patient taking differently: Place 1 drop into both eyes daily )     ketoconazole (NIZORAL) 2 % shampoo Wash hair, let set for 5 minutes, rinse 1 time per day every Sunday     nystatin (MYCOSTATIN) cream APPLY TO AFFECTED AREA 2 TIMES DAILY AS NEEDED     Lanolin (CORONA MULTI-PURPOSE) 30 % OINT Externally apply 1 Application topically 2 times daily     order for DME Equipment being ordered: neoprene knee sleeve     No current facility-administered medications for this visit.        Allergies   Allergen Reactions     Ambien      Zolpidem Tartrate     Tramadol        I have reviewed the care plan and do agree with the plan.    ROS:  No chest pain, shortness of breath, fever, chills, headache, nausea, vomiting, dysuria, or changes in bowel habits.  Appetite is normal.  Left shoulder/back  pain noted.          OBJECTIVE:  /75  Pulse 78  Temp 98.6  F (37  C)  Resp 16  Wt 122 lb (55.3 kg)  SpO2 91%  BMI 22.31 kg/m2    GENERAL:  Chronically ill appearing, alert, and in no acute distress  RESP:  Lungs clear.  No rales, rhonchi, or wheezing  CV:  RRR.  S1 S2 without murmur. No clicks or rubs.  SKIN:  Age-related changes.  No suspicious lesions or rashes.  PSYCH:  Mentation intact, affect bright, and orientation intact.  EXTREM:  Trace edema.  Pulses palpable. There is as small joint effusion noted left knee.  Some tenderness noted diffusely.            Lab/Diagnostic data:    none    ASSESSMENT/ORDERS:  Nursing Home  Visit  Other issues stable.  No changes in current medications or care plan.      Total time spent with patient visit was 25 min including patient visit, review of pertinent clinical information, and treatment plan.      Mario Fofana MD

## 2018-03-05 DIAGNOSIS — K59.00 CONSTIPATION: ICD-10-CM

## 2018-03-05 DIAGNOSIS — K59.09 CHRONIC CONSTIPATION: Primary | ICD-10-CM

## 2018-03-06 RX ORDER — ASPIRIN 81 MG
TABLET, DELAYED RELEASE (ENTERIC COATED) ORAL
Qty: 100 TABLET | Refills: 6 | Status: SHIPPED | OUTPATIENT
Start: 2018-03-06 | End: 2018-04-02

## 2018-03-17 ENCOUNTER — HOSPITAL ENCOUNTER (EMERGENCY)
Facility: HOSPITAL | Age: 83
Discharge: STILL ADMITTED OR ADMINISTRATIVE | End: 2018-03-17
Payer: MEDICARE

## 2018-03-17 ENCOUNTER — TRANSFERRED RECORDS (OUTPATIENT)
Dept: HEALTH INFORMATION MANAGEMENT | Facility: CLINIC | Age: 83
End: 2018-03-17

## 2018-03-18 ENCOUNTER — TRANSFERRED RECORDS (OUTPATIENT)
Dept: HEALTH INFORMATION MANAGEMENT | Facility: CLINIC | Age: 83
End: 2018-03-18

## 2018-03-26 ENCOUNTER — MEDICAL CORRESPONDENCE (OUTPATIENT)
Dept: HEALTH INFORMATION MANAGEMENT | Facility: CLINIC | Age: 83
End: 2018-03-26

## 2018-04-02 ENCOUNTER — NURSING HOME VISIT (OUTPATIENT)
Dept: FAMILY MEDICINE | Facility: OTHER | Age: 83
End: 2018-04-02
Payer: MEDICARE

## 2018-04-02 VITALS
BODY MASS INDEX: 22.13 KG/M2 | OXYGEN SATURATION: 92 % | HEART RATE: 60 BPM | DIASTOLIC BLOOD PRESSURE: 51 MMHG | SYSTOLIC BLOOD PRESSURE: 121 MMHG | WEIGHT: 121 LBS | TEMPERATURE: 98.4 F | RESPIRATION RATE: 18 BRPM

## 2018-04-02 DIAGNOSIS — Z78.9 NURSING HOME RESIDENT: Primary | ICD-10-CM

## 2018-04-02 PROCEDURE — 99308 SBSQ NF CARE LOW MDM 20: CPT | Performed by: FAMILY MEDICINE

## 2018-04-03 PROBLEM — I48.0 PAROXYSMAL ATRIAL FIBRILLATION (H): Status: ACTIVE | Noted: 2018-04-03

## 2018-04-03 NOTE — PROGRESS NOTES
HISTORY OF PRESENT ILLNESS:  Britney is a 88 year old female (12/29/1929)  resident of [facility]  who is being seen today for a routine 30 day follow up. Previously her Sinemet was reduced  and her nausea is improved. She has questions about her tremor. This has increased to the point where it is difficult to feed herself.  She recently was hospitalized with atrial fibrillation as well as non STEMI. Her symptoms are improved.  Records are reviewed.  She continues with dry mouth. Patient offers no other complaint.  Staff notes no other issues.    Current medications, allergies, and interdisciplinary care plan are reviewed.      Patient Active Problem List    Diagnosis Date Noted     Parkinson disease (H) 08/10/2015     Priority: High     CHD (coronary heart disease) 06/15/2015     Priority: High     03/2015  NSTEMI S/P angioplasty and stenting (ELIZABETH) LAD and D1       HF (heart failure) (H) 06/15/2015     Priority: High     TIA (transient ischemic attack) 03/16/2015     Priority: High     Neuralgia, post-herpetic 03/19/2014     Priority: High     Cerebrovascular disease 04/10/2013     Priority: High     HTN (hypertension) 09/24/2006     Priority: High     Pneumonia 10/22/2017     Priority: Medium     Abdominal pain 10/22/2017     Priority: Medium     Colitis 10/07/2017     Priority: Medium     Glaucoma 12/27/2016     Priority: Medium     History of poliomyelitis 12/27/2016     Priority: Medium     Osteoarthritis, multiple joints 12/13/2015     Priority: Medium     Fibrocystic breast disease, left 12/13/2015     Priority: Medium     Constipation, chronic 04/20/2015     Priority: Medium     Hiatal hernia 03/16/2015     Priority: Medium     Chronic cough 07/15/2014     Priority: Medium     OAB (overactive bladder) 03/19/2014     Priority: Medium     S/P InterStim placement and multiple botox injections       Dyslipidemia 04/10/2013     Priority: Medium     Hemorrhoids 04/10/2013     Priority: Medium      Perennial allergic rhinitis 04/10/2013     Priority: Medium     Asthma, mild persistent 04/10/2013     Priority: Medium              GERD (gastroesophageal reflux disease) 04/10/2013     Priority: Medium     Osteoporosis 04/10/2013     Priority: Medium     Kyphoscoliosis and scoliosis 04/10/2013     Priority: Medium     Insomnia, medical condition 04/10/2013     Priority: Medium     History of colonic polyps 04/10/2013     Priority: Medium     Nursing Home Visit 01/19/2016     Priority: Low     Health Care Home 12/09/2015     Priority: Low     Class: Chronic          Social History     Social History     Marital status:      Spouse name: N/A     Number of children: N/A     Years of education: N/A     Occupational History     Retired [employer]     Social History Main Topics     Smoking status: Never Smoker     Smokeless tobacco: Never Used      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 No     Social History Narrative        Current Outpatient Prescriptions   Medication Sig     Rivaroxaban (XARELTO PO) Take 20 mg by mouth daily     ATORVASTATIN CALCIUM PO Take 40 mg by mouth daily     Carboxymeth-Glycerin-Polysorb (REFRESH OPTIVE ADVANCED) 0.5-1-0.5 % SOLN Apply 1 drop to eye 3 times daily     carbidopa-levodopa (SINEMET)  MG per tablet TAKE 2 TABLETS  at  0800 and 1200 and 1 tab at 1600 and  1800 (Patient taking differently: Take 2 tablets by mouth 2 times daily )     aspirin 81 MG chewable tablet TAKE 1 TABLET BY MOUTH DAILY AT 8PM     gabapentin (NEURONTIN) 100 MG capsule TAKE 1 CAPSULE BY MOUTH AT BEDTIME     acetaminophen (TYLENOL) 500 MG tablet Take 2 tablets (1,000 mg) by mouth 2 times daily as needed for mild pain As needed (Patient taking differently: Take 1,000 mg by mouth 2 times daily As needed)     ACETAMINOPHEN PO Take 1,000 mg by mouth daily as needed for pain IN ADDITION TO BID DOSE     senna-docusate (SENOKOT-S;PERICOLACE) 8.6-50 MG per tablet Take 1 tablet by mouth 2 times daily     AMOXICILLIN PO Take 500 mg by mouth as needed (GIVE ONE HOUR PRIOIR TO DENTAL APTS FOR ISCHEMIC HEART DISEASE) Give 4 tablets as needed      pantoprazole (PROTONIX) 40 MG EC tablet TAKE 1 TABLET DAILY     MYRBETRIQ 50 MG 24 hr tablet Take one (1) tablet BY MOUTH daily     calcium carbonate (TUMS) 500 MG chewable tablet Take 2 chew tab by mouth 2 times daily      Cholecalciferol (VITAMIN D3) 2000 UNITS CAPS Take 2,000 Units by mouth daily     polyethylene glycol (MIRALAX) powder Take 17 g by mouth daily     polyethylene glycol 0.4%- propylene glycol 0.3% (SYSTANE) 0.4-0.3 % SOLN ophthalmic solution Place 2 drops into both eyes 4 times daily as needed for dry eyes (Patient taking differently: Place 1 drop into both eyes daily )     ketoconazole (NIZORAL) 2 % shampoo Wash hair, let set for 5 minutes, rinse 1 time per day every Sunday     nystatin (MYCOSTATIN) cream APPLY TO AFFECTED AREA 2 TIMES DAILY AS NEEDED     Lanolin (CORONA MULTI-PURPOSE) 30 % OINT Externally apply 1 Application topically 2 times daily     order for DME Equipment being ordered: neoprene knee sleeve     No current facility-administered medications for this visit.        Allergies   Allergen Reactions     Ambien      Zolpidem Tartrate     Tramadol        I have reviewed the care plan and do agree with the plan.    ROS:  No chest pain,  shortness of breath, fever, chills, headache, nausea, vomiting, dysuria, or changes in bowel habits.  Appetite is normal.  Left shoulder/back  pain noted.          OBJECTIVE:  /51  Pulse 60  Temp 98.4  F (36.9  C)  Resp 18  Wt 121 lb (54.9 kg)  SpO2 92%  BMI 22.13 kg/m2    GENERAL:  Chronically ill appearing, alert, and in no acute distress  RESP:  Lungs clear.  No rales, rhonchi, or wheezing  CV:  RRR.  S1 S2 without murmur. No clicks or rubs.  SKIN:  Age-related changes.  No suspicious lesions or rashes.  PSYCH:  Mentation intact, affect bright, and orientation intact.  EXTREM:  Trace edema.  Pulses palpable. There is as small joint effusion noted left knee.  Some tenderness noted diffusely.            Lab/Diagnostic data:    none    ASSESSMENT/ORDERS:  Nursing Home Visit  Other issues stable.  No changes in current medications or care plan.      Total time spent with patient visit was 25 min including patient visit, review of pertinent clinical information, and treatment plan.      Mario Fofana MD

## 2018-04-05 ENCOUNTER — TRANSFERRED RECORDS (OUTPATIENT)
Dept: HEALTH INFORMATION MANAGEMENT | Facility: CLINIC | Age: 83
End: 2018-04-05

## 2018-04-05 LAB
CREAT SERPL-MCNC: 0.76 MG/DL (ref 0.4–1)
GLUCOSE SERPL-MCNC: 83 MG/DL (ref 70–100)
POTASSIUM SERPL-SCNC: 4.1 MEQ/L (ref 3.4–5.1)

## 2018-04-16 DIAGNOSIS — G45.9 TRANSIENT CEREBRAL ISCHEMIA, UNSPECIFIED TYPE: Primary | ICD-10-CM

## 2018-04-16 NOTE — TELEPHONE ENCOUNTER
Xarelto 20mg    Last Written Prescription Date:    Last Fill Quantity: ,   # refills:   Last Office Visit: 4/2/18 Nursing Home Visit  Future Office visit:    Next 5 appointments (look out 90 days)     May 11, 2018  2:00 PM CDT   (Arrive by 1:30 PM)   Return Visit with Meng Pop MD    ORTHOPEDICS ([facility] )    [address] [facility] [address] [phone]                   Routing refill request to provider for review/approval because:  Medication is reported/historical

## 2018-04-17 RX ORDER — RIVAROXABAN 20 MG/1
TABLET, FILM COATED ORAL
Qty: 30 TABLET | Refills: 11 | Status: SHIPPED | OUTPATIENT
Start: 2018-04-17 | End: 2018-04-17

## 2018-04-17 NOTE — TELEPHONE ENCOUNTER
Do you want patient to continue this medication, hospital started patient on this? Please advise. Thank you

## 2018-04-25 DIAGNOSIS — R69 DIAGNOSIS UNKNOWN: Primary | ICD-10-CM

## 2018-04-27 RX ORDER — CHLORHEXIDINE GLUCONATE ORAL RINSE 1.2 MG/ML
SOLUTION DENTAL
Qty: 473 ML | Refills: 1 | Status: SHIPPED | OUTPATIENT
Start: 2018-04-27 | End: 2018-06-01

## 2018-05-07 ENCOUNTER — NURSING HOME VISIT (OUTPATIENT)
Dept: FAMILY MEDICINE | Facility: OTHER | Age: 83
End: 2018-05-07
Payer: MEDICARE

## 2018-05-07 VITALS
HEART RATE: 51 BPM | SYSTOLIC BLOOD PRESSURE: 116 MMHG | WEIGHT: 117 LBS | BODY MASS INDEX: 21.4 KG/M2 | TEMPERATURE: 97.8 F | OXYGEN SATURATION: 98 % | RESPIRATION RATE: 16 BRPM | DIASTOLIC BLOOD PRESSURE: 51 MMHG

## 2018-05-07 DIAGNOSIS — K21.9 GASTROESOPHAGEAL REFLUX DISEASE, ESOPHAGITIS PRESENCE NOT SPECIFIED: ICD-10-CM

## 2018-05-07 DIAGNOSIS — Z78.9 NURSING HOME RESIDENT: Primary | ICD-10-CM

## 2018-05-07 PROCEDURE — 99308 SBSQ NF CARE LOW MDM 20: CPT | Performed by: FAMILY MEDICINE

## 2018-05-07 RX ORDER — PANTOPRAZOLE SODIUM 40 MG/1
TABLET, DELAYED RELEASE ORAL
Qty: 90 TABLET | Refills: 3 | Status: SHIPPED | OUTPATIENT
Start: 2018-05-07

## 2018-05-07 NOTE — TELEPHONE ENCOUNTER
Protonix      Last Written Prescription Date:  6/8/17  Last Fill Quantity: 90,   # refills: 1  Last Office Visit: NH visit today.  Future Office visit:    Next 5 appointments (look out 90 days)     May 11, 2018  2:00 PM CDT   (Arrive by 1:30 PM)   Return Visit with Meng Pop MD    ORTHOPEDICS ([facility] )    [address] [facility] [address] [phone]                   Routing refill request to provider for review/approval because:  Fails refill protocol due to having a diagnosis of osteoporesis.

## 2018-05-07 NOTE — MR AVS SNAPSHOT
After Visit Summary   5/7/2018    Britney Schaeffer    MRN: [id number]           Patient Information     Date Of Birth          12/29/1929        Visit Information        Provider Department      5/7/2018 2:47 PM Mario Fofana MD [facility]        Today's Diagnoses     Nursing Home Visit    -  1       Follow-ups after your visit        Your next 10 appointments already scheduled     May 11, 2018  1:00 PM CDT   (Arrive by 12:45 PM)   Xray with HCXR1   [facility] [place] [facility] ([facility] [place] )    [address]  [place] [address]   [phone]            May 11, 2018  1:45 PM CDT   (Arrive by 1:30 PM)   Xray with HCXR1   [facility] ([facility] [place] )    [address]  [place] [address]   [phone]            May 11, 2018  2:00 PM CDT   (Arrive by 1:30 PM)   Return Visit with Meng Pop MD    ORTHOPEDICS ([facility] )    [address]  [place] [address] [phone]              Who to contact     If you have questions or need follow up information about today's clinic visit or your schedule please contact [facility] directly at [phone].  Normal or non-critical lab and imaging results will be communicated to you by [employer], letter or phone within 4 business days after the clinic has received the results. If you do not hear from us within 7 days, please contact the clinic through MyChart or phone. If you have a critical or abnormal lab result, we will notify you by phone as soon as possible.  Submit refill requests through [employer] or call your pharmacy and they will forward the refill request to us. Please allow 3 business days for your refill to be completed.          Additional Information About Your Visit        [employer] Information     [employer] lets you send messages to your doctor, view your test results, renew your prescriptions, schedule appointments and  "more. To sign up, go to [email or web address] . Click on \"Log in\" on the left side of the screen, which will take you to the Welcome page. Then click on \"Sign up Now\" on the right side of the page.     You will be asked to enter the access code listed below, as well as some personal information. Please follow the directions to create your username and password.     Your access code is: [email or web address]  Expires: 2018  5:33 AM     Your access code will  in 90 days. If you need help or a new code, please call your [facility] clinic or [phone].        Care EveryWhere ID     This is your Care EveryWhere ID. This could be used by other organizations to access your [facility] medical records  [id number]        Your Vitals Were     Pulse Temperature Respirations Pulse Oximetry BMI (Body Mass Index)       51 97.8  F (36.6  C) 16 98% 21.4 kg/m2        Blood Pressure from Last 3 Encounters:   18 116/51   18 121/51   18 148/75    Weight from Last 3 Encounters:   18 117 lb (53.1 kg)   18 121 lb (54.9 kg)   18 122 lb (55.3 kg)              Today, you had the following     No orders found for display         Today's Medication Changes          These changes are accurate as of 18 11:59 PM.  If you have any questions, ask your nurse or doctor.               These medicines have changed or have updated prescriptions.        Dose/Directions    acetaminophen 500 MG tablet   Commonly known as:  TYLENOL   This may have changed:    - when to take this  - additional instructions  - Another medication with the same name was removed. Continue taking this medication, and follow the directions you see here.   Used for:  Health care maintenance        Dose:  1000 mg   Take 2 tablets (1,000 mg) by mouth 2 times daily as needed for mild pain As needed   Quantity:  360 tablet   Refills:  1       carbidopa-levodopa  MG per tablet   Commonly known as:  SINEMET   This may have changed:    - " how much to take  - how to take this  - when to take this  - additional instructions   Used for:  Parkinsons (H)        TAKE 2 TABLETS  at  0800 and 1200 and 1 tab at 1600 and 1800   Quantity:  270 tablet   Refills:  1       MYRBETRIQ 50 MG 24 hr tablet   This may have changed:  See the new instructions.   Used for:  OAB (overactive bladder)   Generic drug:  mirabegron        Take one (1) tablet BY MOUTH daily   Quantity:  31 tablet   Refills:  0       pantoprazole 40 MG EC tablet   Commonly known as:  PROTONIX   This may have changed:  See the new instructions.   Used for:  Gastroesophageal reflux disease, esophagitis presence not specified   Changed by:  Mario Fofana MD        TAKE ONE (1) TABLET BY MOUTH DAILY FOR PROTONIX   Quantity:  90 tablet   Refills:  3       polyethylene glycol 0.4%- propylene glycol 0.3% 0.4-0.3 % Soln ophthalmic solution   Commonly known as:  SYSTANE   This may have changed:    - how much to take  - when to take this   Used for:  Allergic conjunctivitis, unspecified laterality        Dose:  2 drop   Place 2 drops into both eyes 4 times daily as needed for dry eyes   Quantity:  3 Bottle   Refills:  1            Where to get your medicines      [place] [facility] [place] [place] [address] [address]     Phone:  [phone]     pantoprazole 40 MG EC tablet                Primary Care Provider Office Phone # Fax #    Mario Fofana [phone]       [address] [place] [address]        Equal Access to Services     [facility] NABEEL AH: Hadii josé farfan hadasho Somichelleali, waaxda luqadaha, qaybta kaalmada adeegyada, celena sandoval. So [facility] [phone].    ATENCIÓN: Si habla español, tiene a holbrook disposición servicios gratuitos de asistencia lingüística. Llame al [phone].    We comply with applicable federal civil rights laws and [place] laws. We do not  discriminate on the basis of race, color, national origin, age, disability, sex, sexual orientation, or gender identity.            Thank you!     Thank you for choosing [facility]  for your care. Our goal is always to provide you with excellent care. Hearing back from our patients is one way we can continue to improve our services. Please take a few minutes to complete the written survey that you may receive in the mail after your visit with us. Thank you!             Your Updated Medication List - Protect others around you: Learn how to safely use, store and throw away your medicines at www.disposemymeds.org.          This list is accurate as of 5/7/18 11:59 PM.  Always use your most recent med list.                   Brand Name Dispense Instructions for use Diagnosis    acetaminophen 500 MG tablet    TYLENOL    360 tablet    Take 2 tablets (1,000 mg) by mouth 2 times daily as needed for mild pain As needed    Health care maintenance       AMOXICILLIN PO      Take 500 mg by mouth as needed (GIVE ONE HOUR PRIOIR TO DENTAL APTS FOR ISCHEMIC HEART DISEASE) Give 4 tablets as needed        aspirin 81 MG chewable tablet     90 tablet    TAKE 1 TABLET BY MOUTH DAILY AT 8PM    Health care maintenance       ATORVASTATIN CALCIUM PO      Take 40 mg by mouth daily        BIOTENE MOISTURIZING MOUTH MT      Take 1 spray by mouth every 2 hours as needed        calcium carbonate 500 MG chewable tablet    TUMS     Take 2 chew tab by mouth 2 times daily        carbidopa-levodopa  MG per tablet    SINEMET    270 tablet    TAKE 2 TABLETS  at  0800 and 1200 and 1 tab at 1600 and 1800    Parkinsons (H)       chlorhexidine 0.12 % solution    PERIDEX    473 mL    GIVE 15 ML BY MOUTH TWICE A DAY AT 8 AM AND 8PM. SWISH 2 MIN AND SPITFOR PERIDEX    Diagnosis unknown       gabapentin 100 MG capsule    NEURONTIN    30 capsule    TAKE 1 CAPSULE BY MOUTH AT BEDTIME    Neuralgia and neuritis       ketoconazole 2 % shampoo     NIZORAL    120 mL    Wash hair, let set for 5 minutes, rinse 1 time per day every Sunday    Seborrheic dermatitis       MYRBETRIQ 50 MG 24 hr tablet   Generic drug:  mirabegron     31 tablet    Take one (1) tablet BY MOUTH daily    OAB (overactive bladder)       nystatin cream    MYCOSTATIN    30 g    APPLY TO AFFECTED AREA 2 TIMES DAILY AS NEEDED    Candidiasis of skin and nails       order for DME     1 Device    Equipment being ordered: neoprene knee sleeve    Primary osteoarthritis involving multiple joints       pantoprazole 40 MG EC tablet    PROTONIX    90 tablet    TAKE ONE (1) TABLET BY MOUTH DAILY FOR PROTONIX    Gastroesophageal reflux disease, esophagitis presence not specified       polyethylene glycol 0.4%- propylene glycol 0.3% 0.4-0.3 % Soln ophthalmic solution    SYSTANE    3 Bottle    Place 2 drops into both eyes 4 times daily as needed for dry eyes    Allergic conjunctivitis, unspecified laterality       polyethylene glycol powder    MIRALAX    500 g    Take 17 g by mouth daily    Constipation       REFRESH OPTIVE ADVANCED 0.5-1-0.5 % Soln   Generic drug:  Carboxymeth-Glycerin-Polysorb      Apply 1 drop to eye 3 times daily        senna-docusate 8.6-50 MG per tablet    SENOKOT-S;PERICOLACE     Take 1 tablet by mouth 2 times daily        vitamin D3 2000 units Caps     90 capsule    Take 2,000 Units by mouth daily    Osteoporosis       XARELTO PO      Take 20 mg by mouth daily

## 2018-05-09 NOTE — PROGRESS NOTES
HISTORY OF PRESENT ILLNESS:  Britney is a 88 year old female (12/29/1929)  resident of [facility]  who is being seen today for a routine 30 day follow up. Previously her Sinemet was reduced  and her nausea is improved. She has questions about her tremor. This has increased to the point where it is difficult to feed herself.  She continues with dry mouth. Patient offers no other complaint.  Staff notes no other issues.    Current medications, allergies, and interdisciplinary care plan are reviewed.      Patient Active Problem List    Diagnosis Date Noted     Paroxysmal atrial fibrillation (H) 04/03/2018     Priority: High     Parkinson disease (H) 08/10/2015     Priority: High     CHD (coronary heart disease) 06/15/2015     Priority: High     03/2015  NSTEMI S/P angioplasty and stenting (ELIZABETH) LAD and D1       HF (heart failure) (H) 06/15/2015     Priority: High     TIA (transient ischemic attack) 03/16/2015     Priority: High     Neuralgia, post-herpetic 03/19/2014     Priority: High     Cerebrovascular disease 04/10/2013     Priority: High     HTN (hypertension) 09/24/2006     Priority: High     Pneumonia 10/22/2017     Priority: Medium     Abdominal pain 10/22/2017     Priority: Medium     Colitis 10/07/2017     Priority: Medium     Glaucoma 12/27/2016     Priority: Medium     History of poliomyelitis 12/27/2016     Priority: Medium     Osteoarthritis, multiple joints 12/13/2015     Priority: Medium     Fibrocystic breast disease, left 12/13/2015     Priority: Medium     Constipation, chronic 04/20/2015     Priority: Medium     Hiatal hernia 03/16/2015     Priority: Medium     Chronic cough 07/15/2014     Priority: Medium     OAB (overactive bladder) 03/19/2014     Priority: Medium     S/P InterStim placement and multiple botox injections       Dyslipidemia 04/10/2013     Priority: Medium     Hemorrhoids 04/10/2013     Priority: Medium     Perennial allergic rhinitis 04/10/2013     Priority:  Medium     Asthma, mild persistent 04/10/2013     Priority: Medium              GERD (gastroesophageal reflux disease) 04/10/2013     Priority: Medium     Osteoporosis 04/10/2013     Priority: Medium     Kyphoscoliosis and scoliosis 04/10/2013     Priority: Medium     Insomnia, medical condition 04/10/2013     Priority: Medium     History of colonic polyps 04/10/2013     Priority: Medium     Nursing Home Visit 01/19/2016     Priority: Low     Health Care Home 12/09/2015     Priority: Low     Class: Chronic          Social History     Social History     Marital status:      Spouse name: N/A     Number of children: N/A     Years of education: N/A     Occupational History     Retired [employer]     Social History Main Topics     Smoking status: Never Smoker     Smokeless tobacco: Never Used      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 No     Social History Narrative        Current Outpatient Prescriptions   Medication Sig     Artificial Saliva (BIOTENE MOISTURIZING MOUTH MT) Take 1 spray by mouth every 2 hours as needed     acetaminophen (TYLENOL) 500 MG tablet Take 2 tablets (1,000 mg) by mouth 2 times daily as needed for mild pain As needed (Patient taking differently: Take 1,000 mg by mouth 2 times daily As needed)     AMOXICILLIN PO Take 500 mg by mouth as needed (GIVE ONE HOUR PRIOIR TO DENTAL  APTS FOR ISCHEMIC HEART DISEASE) Give 4 tablets as needed      aspirin 81 MG chewable tablet TAKE 1 TABLET BY MOUTH DAILY AT 8PM     ATORVASTATIN CALCIUM PO Take 40 mg by mouth daily     calcium carbonate (TUMS) 500 MG chewable tablet Take 2 chew tab by mouth 2 times daily      carbidopa-levodopa (SINEMET)  MG per tablet TAKE 2 TABLETS  at  0800 and 1200 and 1 tab at 1600 and 1800 (Patient taking differently: Take 2 tablets by mouth 2 times daily )     Carboxymeth-Glycerin-Polysorb (REFRESH OPTIVE ADVANCED) 0.5-1-0.5 % SOLN Apply 1 drop to eye 3 times daily     chlorhexidine (PERIDEX) 0.12 % solution GIVE 15 ML BY MOUTH TWICE A DAY AT 8 AM AND 8PM. SWISH 2 MIN AND SPITFOR PERIDEX     Cholecalciferol (VITAMIN D3) 2000 UNITS CAPS Take 2,000 Units by mouth daily     gabapentin (NEURONTIN) 100 MG capsule TAKE 1 CAPSULE BY MOUTH AT BEDTIME     ketoconazole (NIZORAL) 2 % shampoo Wash hair, let set for 5 minutes, rinse 1 time per day every Sunday     MYRBETRIQ 50 MG 24 hr tablet Take one (1) tablet BY MOUTH daily (Patient taking differently: Take  half of a  tablet BY MOUTH daily)     nystatin (MYCOSTATIN) cream APPLY TO AFFECTED AREA 2 TIMES DAILY AS NEEDED     order for DME Equipment being ordered: neoprene knee sleeve     pantoprazole (PROTONIX) 40 MG EC tablet TAKE ONE (1) TABLET BY MOUTH DAILY FOR PROTONIX     polyethylene glycol (MIRALAX) powder Take 17 g by mouth daily     polyethylene glycol 0.4%- propylene glycol 0.3% (SYSTANE) 0.4-0.3 % SOLN ophthalmic solution Place 2 drops into both eyes 4 times daily as needed for dry eyes (Patient taking differently: Place 1 drop into both eyes 2 times daily )     Rivaroxaban (XARELTO PO) Take 20 mg by mouth daily     senna-docusate (SENOKOT-S;PERICOLACE) 8.6-50 MG per tablet Take 1 tablet by mouth 2 times daily     No current facility-administered medications for this visit.        Allergies   Allergen Reactions     Ambien      Zolpidem Tartrate     Tramadol        I  have reviewed the care plan and do agree with the plan.    ROS:  No chest pain, shortness of breath, fever, chills, headache, nausea, vomiting, dysuria, or changes in bowel habits.  Appetite is normal.  Left shoulder/back  pain noted.          OBJECTIVE:  /51  Pulse 51  Temp 97.8  F (36.6  C)  Resp 16  Wt 117 lb (53.1 kg)  SpO2 98%  BMI 21.4 kg/m2    GENERAL:  Chronically ill appearing, alert, and in no acute distress  RESP:  Lungs clear.  No rales, rhonchi, or wheezing  CV:  RRR.  S1 S2 without murmur. No clicks or rubs.  SKIN:  Age-related changes.  No suspicious lesions or rashes.  PSYCH:  Mentation intact, affect bright, and orientation intact.  EXTREM:  Trace edema.  Pulses palpable. There is as small joint effusion noted left knee.  Some tenderness noted diffusely.            Lab/Diagnostic data:    none    ASSESSMENT/ORDERS:  Nursing Home Visit  Other issues stable.  No changes in current medications or care plan.      Total time spent with patient visit was 25 min including patient visit, review of pertinent clinical information, and treatment plan.      Mario Fofana MD

## 2018-05-11 ENCOUNTER — APPOINTMENT (OUTPATIENT)
Dept: GENERAL RADIOLOGY | Facility: OTHER | Age: 83
End: 2018-05-11
Attending: ORTHOPAEDIC SURGERY
Payer: MEDICARE

## 2018-05-11 ENCOUNTER — TELEPHONE (OUTPATIENT)
Dept: ORTHOPEDICS | Facility: OTHER | Age: 83
End: 2018-05-11

## 2018-05-11 ENCOUNTER — OFFICE VISIT (OUTPATIENT)
Dept: ORTHOPEDICS | Facility: OTHER | Age: 83
End: 2018-05-11
Attending: ORTHOPAEDIC SURGERY
Payer: MEDICARE

## 2018-05-11 VITALS
DIASTOLIC BLOOD PRESSURE: 70 MMHG | HEART RATE: 78 BPM | TEMPERATURE: 97.1 F | OXYGEN SATURATION: 97 % | SYSTOLIC BLOOD PRESSURE: 100 MMHG

## 2018-05-11 DIAGNOSIS — M25.561 CHRONIC PAIN OF RIGHT KNEE: Primary | ICD-10-CM

## 2018-05-11 DIAGNOSIS — T84.84XD PAIN DUE TO TOTAL RIGHT KNEE REPLACEMENT, SUBSEQUENT ENCOUNTER: ICD-10-CM

## 2018-05-11 DIAGNOSIS — T84.093A FAILED TOTAL KNEE, LEFT, INITIAL ENCOUNTER (H): Primary | ICD-10-CM

## 2018-05-11 DIAGNOSIS — G89.29 CHRONIC PAIN OF RIGHT KNEE: ICD-10-CM

## 2018-05-11 DIAGNOSIS — M25.561 CHRONIC PAIN OF RIGHT KNEE: ICD-10-CM

## 2018-05-11 DIAGNOSIS — G89.29 CHRONIC PAIN OF RIGHT KNEE: Primary | ICD-10-CM

## 2018-05-11 DIAGNOSIS — Z96.651 PAIN DUE TO TOTAL RIGHT KNEE REPLACEMENT, SUBSEQUENT ENCOUNTER: ICD-10-CM

## 2018-05-11 PROCEDURE — 99213 OFFICE O/P EST LOW 20 MIN: CPT | Performed by: ORTHOPAEDIC SURGERY

## 2018-05-11 PROCEDURE — 73562 X-RAY EXAM OF KNEE 3: CPT | Mod: TC,RT

## 2018-05-11 PROCEDURE — G0463 HOSPITAL OUTPT CLINIC VISIT: HCPCS

## 2018-05-11 ASSESSMENT — PAIN SCALES - GENERAL: PAINLEVEL: NO PAIN (0)

## 2018-05-11 NOTE — NURSING NOTE
"Chief Complaint   Patient presents with     RECHECK     Right knee       Initial /70  Pulse 78  Temp 97.1  F (36.2  C) (Tympanic)  SpO2 97% Estimated body mass index is 21.4 kg/(m^2) as calculated from the following:    Height as of 11/1/17: 5' 2\" (1.575 m).    Weight as of 5/7/18: 117 lb (53.1 kg).  Medication Reconciliation: complete    Eliza Espinoza LPN    "

## 2018-05-11 NOTE — PROGRESS NOTES
Chief complaint: Bilateral painful total knee replacements    Chief Complaint:   #1.  Follow-up painful loose left TKA   #2.  New complaint: Painful right TKA     Patient Profile: 88-year-old  right-handed nonsmoking walker dependent resident of [facility], patient of Dr. Fofana.  She had polio when she was young leaving her with weakness in both legs.  She is status post left TKA in 1996 in [place], right TKA (she thinks around 2006) by Dr. Plummer in [place], and a right KE sometime thereafter.    At some point she sustained a right periprosthetic femur fracture (she does not recall the event) which was fixed with a femoral plate.  She spends most of her time in a wheelchair but does walk with the nursing home staff and ride an exercise bicycle once a day 6 days a week.     History of Present Illness/ Injury: The patient is a very poor historian.      Left knee: I saw her for the 1st time for this problem on 11/29/17.  X-rays showed a press-fit TKA without patellar resurfacing with apparently loose femoral and tibial components that were both in varus.  She was fitted with an OTC hinged knee brace which significantly lessened than her pain.    Right knee: This knee was initially addressed on 1/11/2018.  The pain was primarily lateral below the joint line, provoked by weightbearing activity.  X-rays showed a triple resurfacing cemented PS TKA in appropriate alignment, but radiolucency beneath the tibial component consistent with either disuse osteopenia or loosening of the tibial component.  I suggested she return at this time for a comparison x-ray to look for progression of the radiolucency.    Subjective: Today she reports that both knees are doing well.  She has minimal discomfort from either.     Nothing has changed with respect to her musculoskeletal or neurologic review of systems since seen last    Objective: Elderly female in a wheelchair in no obvious distress. Blood pressure 100/70, pulse 78,  temperature 97.1  F (36.2  C), temperature source Tympanic, SpO2 97 %, not currently breastfeeding.   Neither knee has an effusion.  Neither knee is tender to palpation.  There is a well fitting hinged knee brace on the left knee.  The neurovascular status of both legs is intact.    X-ray; plain films of the right knee were taken today and compared with films taken on 1/11/2018.  There is no difference between the films.  Pacifically, there is no progression of the ostial lysis seen beneath the tray of the tibial component.    Impression:   #1.  Failed left TKA, symptoms controlled with OTC brace  #2.  Occasionally painful right TKA, symptoms now infrequent and mild.    Plan: She will continue wearing the left knee brace whenever she is out of bed.  She will return to this clinic as needed should her knee pains increase again or should her brace need replacement.

## 2018-05-11 NOTE — MR AVS SNAPSHOT
"              After Visit Summary   2018    Britney Schaeffer    MRN: [id number]           Patient Information     Date Of Birth          1929        Visit Information        Provider Department      2018 2:00 PM Meng Pop MD  [facility]         Follow-ups after your visit        Who to contact     If you have questions or need follow up information about today's clinic visit or your schedule please contact  [facility] directly at [phone].  Normal or non-critical lab and imaging results will be communicated to you by MyChart, letter or phone within 4 business days after the clinic has received the results. If you do not hear from us within 7 days, please contact the clinic through [facility] or phone. If you have a critical or abnormal lab result, we will notify you by phone as soon as possible.  Submit refill requests through [facility] or call your pharmacy and they will forward the refill request to us. Please allow 3 business days for your refill to be completed.          Additional Information About Your Visit        [facility] Information     [facility] lets you send messages to your doctor, view your test results, renew your prescriptions, schedule appointments and more. To sign up, go to [email or web address] . Click on \"Log in\" on the left side of the screen, which will take you to the Welcome page. Then click on \"Sign up Now\" on the right side of the page.     You will be asked to enter the access code listed below, as well as some personal information. Please follow the directions to create your username and password.     Your access code is: [email or web address]  Expires: 2018  5:33 AM     Your access code will  in 90 days. If you need help or a new code, please call your [facility] clinic or [phone].        Care EveryWhere ID     This is your Care EveryWhere ID. This could be used by other organizations to access your [facility] medical records  [id number]        Your " Vitals Were     Pulse Temperature Pulse Oximetry             78 97.1  F (36.2  C) (Tympanic) 97%          Blood Pressure from Last 3 Encounters:   05/11/18 100/70   05/07/18 116/51   04/02/18 121/51    Weight from Last 3 Encounters:   05/07/18 117 lb (53.1 kg)   04/02/18 121 lb (54.9 kg)   03/01/18 122 lb (55.3 kg)              Today, you had the following     No orders found for display         Today's Medication Changes          These changes are accurate as of 5/11/18  2:23 PM.  If you have any questions, ask your nurse or doctor.               These medicines have changed or have updated prescriptions.        Dose/Directions    acetaminophen 500 MG tablet   Commonly known as:  TYLENOL   This may have changed:    - when to take this  - additional instructions   Used for:  Health care maintenance        Dose:  1000 mg   Take 2 tablets (1,000 mg) by mouth 2 times daily as needed for mild pain As needed   Quantity:  360 tablet   Refills:  1       carbidopa-levodopa  MG per tablet   Commonly known as:  SINEMET   This may have changed:    - how much to take  - how to take this  - when to take this  - additional instructions   Used for:  Parkinsons (H)        TAKE 2 TABLETS  at  0800 and 1200 and 1 tab at 1600 and 1800   Quantity:  270 tablet   Refills:  1       MYRBETRIQ 50 MG 24 hr tablet   This may have changed:  See the new instructions.   Used for:  OAB (overactive bladder)   Generic drug:  mirabegron        Take one (1) tablet BY MOUTH daily   Quantity:  31 tablet   Refills:  0       polyethylene glycol 0.4%- propylene glycol 0.3% 0.4-0.3 % Soln ophthalmic solution   Commonly known as:  SYSTANE   This may have changed:    - how much to take  - when to take this   Used for:  Allergic conjunctivitis, unspecified laterality        Dose:  2 drop   Place 2 drops into both eyes 4 times daily as needed for dry eyes   Quantity:  3 Bottle   Refills:  1                Primary Care Provider Office Phone # Fax #     Mario Fofana [phone]       [address] [place] [address]        Equal Access to Services     DES LEES : Hadii aad ku hadsantatolu Carrillo, starrmohsen metcalf, marcojerald weisssarinamohsen dela cruzrayshawnmohsen, celena frederickin hayaaalexia dela cruzeren staples laSurjitherman sandoval. So [facility] [phone].    ATENCIÓN: Si habla español, tiene a holbrook disposición servicios gratuitos de asistencia lingüística. Llame al [phone].    We comply with applicable federal civil rights laws and [place] laws. We do not discriminate on the basis of race, color, national origin, age, disability, sex, sexual orientation, or gender identity.            Thank you!     Thank you for choosing  [facility]  for your care. Our goal is always to provide you with excellent care. Hearing back from our patients is one way we can continue to improve our services. Please take a few minutes to complete the written survey that you may receive in the mail after your visit with us. Thank you!             Your Updated Medication List - Protect others around you: Learn how to safely use, store and throw away your medicines at www.disposemymeds.org.          This list is accurate as of 5/11/18  2:23 PM.  Always use your most recent med list.                   Brand Name Dispense Instructions for use Diagnosis    acetaminophen 500 MG tablet    TYLENOL    360 tablet    Take 2 tablets (1,000 mg) by mouth 2 times daily as needed for mild pain As needed    Health care maintenance       AMOXICILLIN PO      Take 500 mg by mouth as needed (GIVE ONE HOUR PRIOIR TO DENTAL APTS FOR ISCHEMIC HEART DISEASE) Give 4 tablets as needed        aspirin 81 MG chewable tablet     90 tablet    TAKE 1 TABLET BY MOUTH DAILY AT 8PM    Health care maintenance       ATORVASTATIN CALCIUM PO      Take 40 mg by mouth daily        BIOTENE MOISTURIZING MOUTH MT      Take 1 spray by mouth every 2 hours as needed        calcium carbonate 500 MG chewable tablet    TUMS     Take 2 chew tab by mouth 2  times daily        carbidopa-levodopa  MG per tablet    SINEMET    270 tablet    TAKE 2 TABLETS  at  0800 and 1200 and 1 tab at 1600 and 1800    Parkinsons (H)       chlorhexidine 0.12 % solution    PERIDEX    473 mL    GIVE 15 ML BY MOUTH TWICE A DAY AT 8 AM AND 8PM. SWISH 2 MIN AND SPITFOR PERIDEX    Diagnosis unknown       gabapentin 100 MG capsule    NEURONTIN    30 capsule    TAKE 1 CAPSULE BY MOUTH AT BEDTIME    Neuralgia and neuritis       ketoconazole 2 % shampoo    NIZORAL    120 mL    Wash hair, let set for 5 minutes, rinse 1 time per day every Sunday    Seborrheic dermatitis       MYRBETRIQ 50 MG 24 hr tablet   Generic drug:  mirabegron     31 tablet    Take one (1) tablet BY MOUTH daily    OAB (overactive bladder)       nystatin cream    MYCOSTATIN    30 g    APPLY TO AFFECTED AREA 2 TIMES DAILY AS NEEDED    Candidiasis of skin and nails       order for DME     1 Device    Equipment being ordered: neoprene knee sleeve    Primary osteoarthritis involving multiple joints       pantoprazole 40 MG EC tablet    PROTONIX    90 tablet    TAKE ONE (1) TABLET BY MOUTH DAILY FOR PROTONIX    Gastroesophageal reflux disease, esophagitis presence not specified       polyethylene glycol 0.4%- propylene glycol 0.3% 0.4-0.3 % Soln ophthalmic solution    SYSTANE    3 Bottle    Place 2 drops into both eyes 4 times daily as needed for dry eyes    Allergic conjunctivitis, unspecified laterality       polyethylene glycol powder    MIRALAX    500 g    Take 17 g by mouth daily    Constipation       REFRESH OPTIVE ADVANCED 0.5-1-0.5 % Soln   Generic drug:  Carboxymeth-Glycerin-Polysorb      Apply 1 drop to eye 3 times daily        senna-docusate 8.6-50 MG per tablet    SENOKOT-S;PERICOLACE     Take 1 tablet by mouth 2 times daily        vitamin D3 2000 units Caps     90 capsule    Take 2,000 Units by mouth daily    Osteoporosis       XARELTO PO      Take 20 mg by mouth daily

## 2018-06-01 DIAGNOSIS — R69 DIAGNOSIS UNKNOWN: ICD-10-CM

## 2018-06-01 NOTE — TELEPHONE ENCOUNTER
chlorhexidine (PERIDEX) 0.12 % solution     Last Written Prescription Date:  04/27/2018  Last Fill Quantity: 473 ml,   # refills: 1  Last Office Visit: 05/07/2018  Future Office visit:       Routing refill request to provider for review/approval because:

## 2018-06-04 RX ORDER — CHLORHEXIDINE GLUCONATE ORAL RINSE 1.2 MG/ML
SOLUTION DENTAL
Qty: 473 ML | Refills: 1 | Status: SHIPPED | OUTPATIENT
Start: 2018-06-04 | End: 2018-06-26

## 2018-06-11 ENCOUNTER — NURSING HOME VISIT (OUTPATIENT)
Dept: FAMILY MEDICINE | Facility: OTHER | Age: 83
End: 2018-06-11
Payer: MEDICARE

## 2018-06-11 DIAGNOSIS — Z78.9 NURSING HOME RESIDENT: Primary | ICD-10-CM

## 2018-06-11 PROCEDURE — 99308 SBSQ NF CARE LOW MDM 20: CPT | Performed by: FAMILY MEDICINE

## 2018-06-11 NOTE — MR AVS SNAPSHOT
"              After Visit Summary   2018    Britney Schaeffer    MRN: [id number]           Patient Information     Date Of Birth          1929        Visit Information        Provider Department      2018 1:41 PM Mario Fofana MD [facility]        Today's [facility]     Nursing Home Visit    -  1       Follow-ups after your visit        Who to contact     If you have questions or need follow up information about today's clinic visit or your schedule please contact [facility] directly at [phone].  Normal or non-critical lab and imaging results will be communicated to you by MyChart, letter or phone within 4 business days after the clinic has received the results. If you do not hear from us within 7 days, please contact the clinic through e-SENShart or phone. If you have a critical or abnormal lab result, we will notify you by phone as soon as possible.  Submit refill requests through Elastagen or call your pharmacy and they will forward the refill request to us. Please allow 3 business days for your refill to be completed.          Additional Information About Your Visit        MyChart Information     Elastagen lets you send messages to your doctor, view your test results, renew your prescriptions, schedule appointments and more. To sign up, go to [email or web address] . Click on \"Log in\" on the left side of the screen, which will take you to the Welcome page. Then click on \"Sign up Now\" on the right side of the page.     You will be asked to enter the access code listed below, as well as some personal information. Please follow the directions to create your username and password.     Your access code is: [email or web address]  Expires: 2018  5:33 AM     Your access code will  in 90 days. If you need help or a new code, please call your [facility] or [phone].        Care EveryWhere ID     This is your Care EveryWhere ID. This could be used by other " organizations to access your [place] medical records  [id number]        Your Vitals Were     Pulse Temperature Respirations Pulse Oximetry BMI (Body Mass Index)       56 98.6  F (37  C) 16 96% 21.95 kg/m2        Blood Pressure from Last 3 Encounters:   06/13/18 136/52   05/11/18 100/70   05/07/18 116/51    Weight from Last 3 Encounters:   06/13/18 120 lb (54.4 kg)   05/07/18 117 lb (53.1 kg)   04/02/18 121 lb (54.9 kg)              Today, you had the following     No orders found for display         Today's Medication Changes          These changes are accurate as of 6/11/18 11:59 PM.  If you have any questions, ask your nurse or doctor.               These medicines have changed or have updated prescriptions.        Dose/Directions    acetaminophen 500 MG tablet   Commonly known as:  TYLENOL   This may have changed:    - when to take this  - additional instructions   Used for:  Health care maintenance        Dose:  1000 mg   Take 2 tablets (1,000 mg) by mouth 2 times daily as needed for mild pain As needed   Quantity:  360 tablet   Refills:  1       carbidopa-levodopa  MG per tablet   Commonly known as:  SINEMET   This may have changed:    - how much to take  - how to take this  - when to take this  - additional instructions   Used for:  Parkinsons (H)        TAKE 2 TABLETS  at  0800 and 1200 and 1 tab at 1600 and 1800   Quantity:  270 tablet   Refills:  1       MYRBETRIQ 50 MG 24 hr tablet   This may have changed:  See the new instructions.   Used for:  OAB (overactive bladder)   Generic drug:  mirabegron        Take one (1) tablet BY MOUTH daily   Quantity:  31 tablet   Refills:  0       polyethylene glycol 0.4%- propylene glycol 0.3% 0.4-0.3 % Soln ophthalmic solution   Commonly known as:  SYSTANE   This may have changed:    - how much to take  - when to take this   Used for:  Allergic conjunctivitis, unspecified laterality        Dose:  2 drop   Place 2 drops into both eyes 4 times daily as needed for  dry eyes   Quantity:  3 Bottle   Refills:  1                Primary Care Provider Office Phone # Fax #    Mario Fofana [phone] [facility] [address]        Equal Access to Services     DES LEES AH: Hadjemal josé farfan edison Soad, waaxda luqadaha, qaybta kaalmada adeambrose, celena staples arpan sandoval. So [facility] [phone].    ATENCIÓN: Si habla español, tiene a holbrook disposición servicios gratuitos de asistencia lingüística. Llame al [phone].    We comply with applicable federal civil rights laws and [place] laws. We do not discriminate on the basis of race, color, national origin, age, disability, sex, sexual orientation, or gender identity.            Thank you!     Thank you for choosing [facility]  for your care. Our goal is always to provide you with excellent care. Hearing back from our patients is one way we can continue to improve our services. Please take a few minutes to complete the written survey that you may receive in the mail after your visit with us. Thank you!             Your Updated Medication List - Protect others around you: Learn how to safely use, store and throw away your medicines at www.disposemymeds.org.          This list is accurate as of 6/11/18 11:59 PM.  Always use your most recent med list.                   Brand Name Dispense Instructions for use Diagnosis    acetaminophen 500 MG tablet    TYLENOL    360 tablet    Take 2 tablets (1,000 mg) by mouth 2 times daily as needed for mild pain As needed    Health care maintenance       AMOXICILLIN PO      Take 500 mg by mouth as needed (GIVE ONE HOUR PRIOIR TO DENTAL APTS FOR ISCHEMIC HEART DISEASE) Give 4 tablets as needed        aspirin 81 MG chewable tablet     90 tablet    TAKE 1 TABLET BY MOUTH DAILY AT 8PM    Health care maintenance       ATORVASTATIN CALCIUM PO      Take 40 mg by mouth daily        BIOTENE MOISTURIZING MOUTH MT      Take 1 spray by  mouth every 2 hours as needed        calcium carbonate 500 MG chewable tablet    TUMS     Take 2 chew tab by mouth 2 times daily        carbidopa-levodopa  MG per tablet    SINEMET    270 tablet    TAKE 2 TABLETS  at  0800 and 1200 and 1 tab at 1600 and 1800    Parkinsons (H)       chlorhexidine 0.12 % solution    PERIDEX    473 mL    GIVE 15 ML BY MOUTH TWICE A DAY AT 8 AM AND 8PM. SWISH 2 MIN AND SPITFOR PERIDEX    Diagnosis unknown       gabapentin 100 MG capsule    NEURONTIN    30 capsule    TAKE 1 CAPSULE BY MOUTH AT BEDTIME    Neuralgia and neuritis       MYRBETRIQ 50 MG 24 hr tablet   Generic drug:  mirabegron     31 tablet    Take one (1) tablet BY MOUTH daily    OAB (overactive bladder)       order for DME     1 Device    Equipment being ordered: neoprene knee sleeve    Primary osteoarthritis involving multiple joints       pantoprazole 40 MG EC tablet    PROTONIX    90 tablet    TAKE ONE (1) TABLET BY MOUTH DAILY FOR PROTONIX    Gastroesophageal reflux disease, esophagitis presence not specified       polyethylene glycol 0.4%- propylene glycol 0.3% 0.4-0.3 % Soln ophthalmic solution    SYSTANE    3 Bottle    Place 2 drops into both eyes 4 times daily as needed for dry eyes    Allergic conjunctivitis, unspecified laterality       polyethylene glycol powder    MIRALAX    500 g    Take 17 g by mouth daily    Constipation       REFRESH OPTIVE ADVANCED 0.5-1-0.5 % Soln   Generic drug:  Carboxymeth-Glycerin-Polysorb      Apply 1 drop to eye daily        senna-docusate 8.6-50 MG per tablet    SENOKOT-S;PERICOLACE     Take 1 tablet by mouth 2 times daily        vitamin D3 2000 units Caps     90 capsule    Take 2,000 Units by mouth daily    Osteoporosis       XARELTO PO      Take 20 mg by mouth daily

## 2018-06-13 VITALS
OXYGEN SATURATION: 96 % | SYSTOLIC BLOOD PRESSURE: 136 MMHG | BODY MASS INDEX: 21.95 KG/M2 | DIASTOLIC BLOOD PRESSURE: 52 MMHG | TEMPERATURE: 98.6 F | HEART RATE: 56 BPM | RESPIRATION RATE: 16 BRPM | WEIGHT: 120 LBS

## 2018-06-13 NOTE — NURSING NOTE
"Chief Complaint   Patient presents with     Care Coordination Nursing Home       Initial /52  Pulse 56  Temp 98.6  F (37  C)  Resp 16  Wt 120 lb (54.4 kg)  SpO2 96%  BMI 21.95 kg/m2 Estimated body mass index is 21.95 kg/(m^2) as calculated from the following:    Height as of 11/1/17: 5' 2\" (1.575 m).    Weight as of this encounter: 120 lb (54.4 kg).  Medication Reconciliation: complete    Melinda Schreiber LPN    "

## 2018-06-15 NOTE — PROGRESS NOTES
HISTORY OF PRESENT ILLNESS:  Britney is a 88 year old female (12/29/1929)  resident of [facility]  who is being seen today for a routine 30 day follow up. Previously her Sinemet was reduced  and her nausea is improved. She has questions about her tremor. This has increased to the point where it is difficult to feed herself.  She continues with dry mouth. Patient offers no other complaint.  Staff notes no other issues.    Current medications, allergies, and interdisciplinary care plan are reviewed.      Patient Active Problem List    Diagnosis Date Noted     Paroxysmal atrial fibrillation (H) 04/03/2018     Priority: High     Parkinson disease (H) 08/10/2015     Priority: High     CHD (coronary heart disease) 06/15/2015     Priority: High     03/2015  NSTEMI S/P angioplasty and stenting (ELIZABETH) LAD and D1       HF (heart failure) (H) 06/15/2015     Priority: High     TIA (transient ischemic attack) 03/16/2015     Priority: High     Neuralgia, post-herpetic 03/19/2014     Priority: High     Cerebrovascular disease 04/10/2013     Priority: High     HTN (hypertension) 09/24/2006     Priority: High     Pneumonia 10/22/2017     Priority: Medium     Abdominal pain 10/22/2017     Priority: Medium     Colitis 10/07/2017     Priority: Medium     Glaucoma 12/27/2016     Priority: Medium     History of poliomyelitis 12/27/2016     Priority: Medium     Osteoarthritis, multiple joints 12/13/2015     Priority: Medium     Fibrocystic breast disease, left 12/13/2015     Priority: Medium     Constipation, chronic 04/20/2015     Priority: Medium     Hiatal hernia 03/16/2015     Priority: Medium     Chronic cough 07/15/2014     Priority: Medium     OAB (overactive bladder) 03/19/2014     Priority: Medium     S/P InterStim placement and multiple botox injections       Dyslipidemia 04/10/2013     Priority: Medium     Hemorrhoids 04/10/2013     Priority: Medium     Perennial allergic rhinitis 04/10/2013     Priority:  Medium     Asthma, mild persistent 04/10/2013     Priority: Medium              GERD (gastroesophageal reflux disease) 04/10/2013     Priority: Medium     Osteoporosis 04/10/2013     Priority: Medium     Kyphoscoliosis and scoliosis 04/10/2013     Priority: Medium     Insomnia, medical condition 04/10/2013     Priority: Medium     History of colonic polyps 04/10/2013     Priority: Medium     Nursing Home Visit 01/19/2016     Priority: Low     Health Care Home 12/09/2015     Priority: Low     Class: Chronic          Social History     Social History     Marital status:      Spouse name: N/A     Number of children: N/A     Years of education: N/A     Occupational History     Retired [employer]     Social History Main Topics     Smoking status: Never Smoker     Smokeless tobacco: Never Used      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 No     Social History Narrative        Current Outpatient Prescriptions   Medication Sig     acetaminophen (TYLENOL) 500 MG tablet Take 2 tablets (1,000 mg) by mouth 2 times daily as needed for mild pain As needed (Patient taking differently: Take 1,000 mg by mouth 2 times daily As needed)     AMOXICILLIN PO Take 500 mg by mouth as needed (GIVE ONE HOUR PRIOIR TO DENTAL APTS FOR ISCHEMIC HEART DISEASE) Give 4 tablets as needed      Artificial Saliva (BIOTENE MOISTURIZING  MOUTH MT) Take 1 spray by mouth every 2 hours as needed     aspirin 81 MG chewable tablet TAKE 1 TABLET BY MOUTH DAILY AT 8PM (Patient taking differently: TAKE 1 TABLET BY MOUTH DAILY AT 8PM EVERY 2 DAYS)     ATORVASTATIN CALCIUM PO Take 40 mg by mouth daily     calcium carbonate (TUMS) 500 MG chewable tablet Take 2 chew tab by mouth 2 times daily      carbidopa-levodopa (SINEMET)  MG per tablet TAKE 2 TABLETS  at  0800 and 1200 and 1 tab at 1600 and 1800 (Patient taking differently: Take 2 tablets by mouth 2 times daily )     Carboxymeth-Glycerin-Polysorb (REFRESH OPTIVE ADVANCED) 0.5-1-0.5 % SOLN Apply 1 drop to eye daily      chlorhexidine (PERIDEX) 0.12 % solution GIVE 15 ML BY MOUTH TWICE A DAY AT 8 AM AND 8PM. SWISH 2 MIN AND SPITFOR PERIDEX     Cholecalciferol (VITAMIN D3) 2000 UNITS CAPS Take 2,000 Units by mouth daily     gabapentin (NEURONTIN) 100 MG capsule TAKE 1 CAPSULE BY MOUTH AT BEDTIME     MYRBETRIQ 50 MG 24 hr tablet Take one (1) tablet BY MOUTH daily (Patient taking differently: Take  half of a  tablet BY MOUTH daily)     order for DME Equipment being ordered: neoprene knee sleeve     pantoprazole (PROTONIX) 40 MG EC tablet TAKE ONE (1) TABLET BY MOUTH DAILY FOR PROTONIX     polyethylene glycol (MIRALAX) powder Take 17 g by mouth daily     polyethylene glycol 0.4%- propylene glycol 0.3% (SYSTANE) 0.4-0.3 % SOLN ophthalmic solution Place 2 drops into both eyes 4 times daily as needed for dry eyes (Patient taking differently: Place 1 drop into both eyes 2 times daily )     Rivaroxaban (XARELTO PO) Take 20 mg by mouth daily     senna-docusate (SENOKOT-S;PERICOLACE) 8.6-50 MG per tablet Take 1 tablet by mouth 2 times daily     No current facility-administered medications for this visit.        Allergies   Allergen Reactions     Ambien      Zolpidem Tartrate     Tramadol        I have reviewed the care plan and do agree with the plan.    ROS:  No chest pain, shortness of breath, fever, chills,  headache, nausea, vomiting, dysuria, or changes in bowel habits.  Appetite is normal.  Left shoulder/back  pain noted.          OBJECTIVE:  /52  Pulse 56  Temp 98.6  F (37  C)  Resp 16  Wt 120 lb (54.4 kg)  SpO2 96%  BMI 21.95 kg/m2    GENERAL:  Chronically ill appearing, alert, and in no acute distress  RESP:  Lungs clear.  No rales, rhonchi, or wheezing  CV:  RRR.  S1 S2 without murmur. No clicks or rubs.  SKIN:  Age-related changes.  No suspicious lesions or rashes.  PSYCH:  Mentation intact, affect bright, and orientation intact.  EXTREM:  Trace edema.  Pulses palpable. There is as small joint effusion noted left knee.  Some tenderness noted diffusely.            Lab/Diagnostic data:    none    ASSESSMENT/ORDERS:  Nursing Home Visit  Other issues stable.  No changes in current medications or care plan.      Total time spent with patient visit was 25 min including patient visit, review of pertinent clinical information, and treatment plan.      Mario Fofana MD

## 2018-06-26 DIAGNOSIS — R69 DIAGNOSIS UNKNOWN: ICD-10-CM

## 2018-06-27 NOTE — TELEPHONE ENCOUNTER
peridex      Last Written Prescription Date:  6/4/18  Last Fill Quantity: 473 ml,   # refills: 1  Last Office Visit: 6/11/18  Future Office visit:  none

## 2018-06-28 RX ORDER — CHLORHEXIDINE GLUCONATE ORAL RINSE 1.2 MG/ML
SOLUTION DENTAL
Qty: 473 ML | Refills: 5 | Status: SHIPPED | OUTPATIENT
Start: 2018-06-28

## 2018-07-13 VITALS
WEIGHT: 119 LBS | HEART RATE: 57 BPM | RESPIRATION RATE: 16 BRPM | TEMPERATURE: 98.2 F | BODY MASS INDEX: 21.77 KG/M2 | DIASTOLIC BLOOD PRESSURE: 76 MMHG | OXYGEN SATURATION: 99 % | SYSTOLIC BLOOD PRESSURE: 144 MMHG

## 2018-07-13 NOTE — NURSING NOTE
"Chief Complaint   Patient presents with     Care Coordination Nursing Home       Initial /76  Pulse 57  Temp 98.2  F (36.8  C)  Resp 16  Wt 119 lb (54 kg)  SpO2 99%  BMI 21.77 kg/m2 Estimated body mass index is 21.77 kg/(m^2) as calculated from the following:    Height as of 11/1/17: 5' 2\" (1.575 m).    Weight as of this encounter: 119 lb (54 kg).  Medication Reconciliation: complete    Melinda Schreiber LPN    "

## 2018-07-16 ENCOUNTER — OFFICE VISIT (OUTPATIENT)
Dept: FAMILY MEDICINE | Facility: OTHER | Age: 83
End: 2018-07-16
Attending: FAMILY MEDICINE
Payer: MEDICARE

## 2018-07-16 DIAGNOSIS — Z78.9 NURSING HOME RESIDENT: Primary | ICD-10-CM

## 2018-07-16 PROCEDURE — 99308 SBSQ NF CARE LOW MDM 20: CPT | Performed by: FAMILY MEDICINE

## 2018-07-16 NOTE — MR AVS SNAPSHOT
"              After Visit Summary   2018    Britney Schaeffer    MRN: [id number]           Patient Information     Date Of Birth          1929        Visit Information        Provider Department      2018 1:40 PM Mario Fofana MD [facility] [place]        Today's [facility]     Nursing Home Visit    -  1       Follow-ups after your visit        Who to contact     If you have questions or need follow up information about today's clinic visit or your schedule please contact [facility] at [phone].  Normal or non-critical lab and imaging results will be communicated to you by MyChart, letter or phone within 4 business days after the clinic has received the results. If you do not hear from us within 7 days, please contact the clinic through LOGIDOC-Solutionshart or phone. If you have a critical or abnormal lab result, we will notify you by phone as soon as possible.  Submit refill requests through [email or web address] or call your pharmacy and they will forward the refill request to us. Please allow 3 business days for your refill to be completed.          Additional Information About Your Visit        MyChart Information     [email or web address] lets you send messages to your doctor, view your test results, renew your prescriptions, schedule appointments and more. To sign up, go to [email or web address] . Click on \"Log in\" on the left side of the screen, which will take you to the Welcome page. Then click on \"Sign up Now\" on the right side of the page.     You will be asked to enter the access code listed below, as well as some personal information. Please follow the directions to create your username and password.     Your access code is: [email or web address]  Expires: 2018  5:33 AM     Your access code will  in 90 days. If you need help or a new code, please call your [facility] or [phone].        Care EveryWhere ID     This is your Care EveryWhere ID. This could be used by other " organizations to access your [place] medical records  [id number]        Your Vitals Were     Pulse Temperature Respirations Pulse Oximetry BMI (Body Mass Index)       57 98.2  F (36.8  C) 16 99% 21.77 kg/m2        Blood Pressure from Last 3 Encounters:   07/13/18 144/76   06/13/18 136/52   05/11/18 100/70    Weight from Last 3 Encounters:   07/13/18 119 lb (54 kg)   06/13/18 120 lb (54.4 kg)   05/07/18 117 lb (53.1 kg)              Today, you had the following     No orders found for display         Today's Medication Changes          These changes are accurate as of 7/16/18 11:59 PM.  If you have any questions, ask your nurse or doctor.               These medicines have changed or have updated prescriptions.        Dose/Directions    acetaminophen 500 MG tablet   Commonly known as:  TYLENOL   This may have changed:    - when to take this  - additional instructions   Used for:  Health care maintenance        Dose:  1000 mg   Take 2 tablets (1,000 mg) by mouth 2 times daily as needed for mild pain As needed   Quantity:  360 tablet   Refills:  1       aspirin 81 MG chewable tablet   This may have changed:  See the new instructions.   Used for:  Health care maintenance        TAKE 1 TABLET BY MOUTH DAILY AT 8PM   Quantity:  90 tablet   Refills:  2       carbidopa-levodopa  MG per tablet   Commonly known as:  SINEMET   This may have changed:    - how much to take  - how to take this  - when to take this  - additional instructions   Used for:  Parkinsons (H)        TAKE 2 TABLETS  at  0800 and 1200 and 1 tab at 1600 and 1800   Quantity:  270 tablet   Refills:  1       MYRBETRIQ 50 MG 24 hr tablet   This may have changed:  See the new instructions.   Used for:  OAB (overactive bladder)   Generic drug:  mirabegron        Take one (1) tablet BY MOUTH daily   Quantity:  31 tablet   Refills:  0       polyethylene glycol 0.4%- propylene glycol 0.3% 0.4-0.3 % Soln ophthalmic solution   Commonly known as:  SYSTANE    This may have changed:    - how much to take  - when to take this   Used for:  Allergic conjunctivitis, unspecified laterality        Dose:  2 drop   Place 2 drops into both eyes 4 times daily as needed for dry eyes   Quantity:  3 Bottle   Refills:  1                Primary Care Provider Office Phone # Fax #    Mario Fofana [phone] [id number]        Equal Access to Services     DES LEES AH: Hadii aad ku hadasho Soomaali, waaxda luqadaha, qaybta kaalmada adeegyada, waxay idiin hayaan adeeg khsiobhansh lajakobalexia ah. So [facility] [phone].    ATENCIÓN: Si fran wade, tiene a holbrook disposición servicios gratuitos de asistencia lingüística. Llame al [phone].    We comply with applicable federal civil rights laws and [place] laws. We do not discriminate on the basis of race, color, national origin, age, disability, sex, sexual orientation, or gender identity.            Thank you!     Thank you for choosing [facility]  for your care. Our goal is always to provide you with excellent care. Hearing back from our patients is one way we can continue to improve our services. Please take a few minutes to complete the written survey that you may receive in the mail after your visit with us. Thank you!             Your Updated Medication List - Protect others around you: Learn how to safely use, store and throw away your medicines at www.disposemymeds.org.          This list is accurate as of 7/16/18 11:59 PM.  Always use your most recent med list.                   Brand Name Dispense Instructions for use Diagnosis    acetaminophen 500 MG tablet    TYLENOL    360 tablet    Take 2 tablets (1,000 mg) by mouth 2 times daily as needed for mild pain As needed    Health care maintenance       AMOXICILLIN PO      Take 500 mg by mouth as needed (GIVE ONE HOUR PRIOIR TO DENTAL APTS FOR ISCHEMIC HEART DISEASE) Give 4 tablets as needed        aspirin 81 MG chewable  tablet     90 tablet    TAKE 1 TABLET BY MOUTH DAILY AT 8PM    Health care maintenance       ATORVASTATIN CALCIUM PO      Take 40 mg by mouth daily        BIOTENE MOISTURIZING MOUTH MT      Take 1 spray by mouth every 2 hours as needed        calcium carbonate 500 MG chewable tablet    TUMS     Take 2 chew tab by mouth 2 times daily        carbidopa-levodopa  MG per tablet    SINEMET    270 tablet    TAKE 2 TABLETS  at  0800 and 1200 and 1 tab at 1600 and 1800    Parkinsons (H)       chlorhexidine 0.12 % solution    PERIDEX    473 mL    GIVE 15 ML BY MOUTH TWICE A DAY AT 8 AM AND 8PM. SWISH 2 MIN AND SPITFOR PERIDEX    Diagnosis unknown       gabapentin 100 MG capsule    NEURONTIN    30 capsule    TAKE 1 CAPSULE BY MOUTH AT BEDTIME    Neuralgia and neuritis       MYRBETRIQ 50 MG 24 hr tablet   Generic drug:  mirabegron     31 tablet    Take one (1) tablet BY MOUTH daily    OAB (overactive bladder)       order for DME     1 Device    Equipment being ordered: neoprene knee sleeve    Primary osteoarthritis involving multiple joints       pantoprazole 40 MG EC tablet    PROTONIX    90 tablet    TAKE ONE (1) TABLET BY MOUTH DAILY FOR PROTONIX    Gastroesophageal reflux disease, esophagitis presence not specified       polyethylene glycol 0.4%- propylene glycol 0.3% 0.4-0.3 % Soln ophthalmic solution    SYSTANE    3 Bottle    Place 2 drops into both eyes 4 times daily as needed for dry eyes    Allergic conjunctivitis, unspecified laterality       polyethylene glycol powder    MIRALAX    500 g    Take 17 g by mouth daily    Constipation       senna-docusate 8.6-50 MG per tablet    SENOKOT-S;PERICOLACE     Take 1 tablet by mouth 2 times daily        vitamin D3 2000 units Caps     90 capsule    Take 2,000 Units by mouth daily    Osteoporosis       XARELTO PO      Take 20 mg by mouth daily

## 2018-07-17 NOTE — PROGRESS NOTES
HISTORY OF PRESENT ILLNESS:  Britney is a 88 year old female (12/29/1929)  resident of [facility]  who is being seen today for a routine 30 day follow up. Previously her Sinemet was reduced  and her nausea is improved. She has questions about her tremor. This has increased to the point where it is difficult to feed herself.  She continues with dry mouth. She also notes left lateral foot pain as well as reddened right conjunctiva. Patient offers no other complaint.  Staff notes no other issues.    Current medications, allergies, and interdisciplinary care plan are reviewed.      Patient Active Problem List    Diagnosis Date Noted     Paroxysmal atrial fibrillation (H) 04/03/2018     Priority: High     Parkinson disease (H) 08/10/2015     Priority: High     CHD (coronary heart disease) 06/15/2015     Priority: High     03/2015  NSTEMI S/P angioplasty and stenting ([facility]) LAD and D1       HF (heart failure) (H) 06/15/2015     Priority: High     TIA (transient ischemic attack) 03/16/2015     Priority: High     Neuralgia, post-herpetic 03/19/2014     Priority: High     Cerebrovascular disease 04/10/2013     Priority: High     HTN (hypertension) 09/24/2006     Priority: High     Pneumonia 10/22/2017     Priority: Medium     Abdominal pain 10/22/2017     Priority: Medium     Colitis 10/07/2017     Priority: Medium     Glaucoma 12/27/2016     Priority: Medium     History of poliomyelitis 12/27/2016     Priority: Medium     Osteoarthritis, multiple joints 12/13/2015     Priority: Medium     Fibrocystic breast disease, left 12/13/2015     Priority: Medium     Constipation, chronic 04/20/2015     Priority: Medium     Hiatal hernia 03/16/2015     Priority: Medium     Chronic cough 07/15/2014     Priority: Medium     OAB (overactive bladder) 03/19/2014     Priority: Medium     S/P InterStim placement and multiple botox injections       Dyslipidemia 04/10/2013     Priority: Medium     Hemorrhoids 04/10/2013      Priority: Medium     Perennial allergic rhinitis 04/10/2013     Priority: Medium     Asthma, mild persistent 04/10/2013     Priority: Medium              GERD (gastroesophageal reflux disease) 04/10/2013     Priority: Medium     Osteoporosis 04/10/2013     Priority: Medium     Kyphoscoliosis and scoliosis 04/10/2013     Priority: Medium     Insomnia, medical condition 04/10/2013     Priority: Medium     History of colonic polyps 04/10/2013     Priority: Medium     Nursing Home Visit 01/19/2016     Priority: Low     Health Care Home 12/09/2015     Priority: Low     Class: Chronic          Social History     Social History     Marital status:      Spouse name: N/A     Number of children: N/A     Years of education: N/A     Occupational History     Retired 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 No     Social History Narrative        Current Outpatient Prescriptions   Medication Sig     acetaminophen (TYLENOL) 500 MG tablet Take 2 tablets (1,000 mg) by mouth 2 times daily as needed for mild pain As needed (Patient taking differently: Take 1,000 mg by mouth 2 times daily As needed)     AMOXICILLIN PO Take 500 mg by mouth as needed (GIVE ONE HOUR PRIOIR TO DENTAL APTS FOR ISCHEMIC HEART  DISEASE) Give 4 tablets as needed      Artificial Saliva (BIOTENE MOISTURIZING MOUTH MT) Take 1 spray by mouth every 2 hours as needed     aspirin 81 MG chewable tablet TAKE 1 TABLET BY MOUTH DAILY AT 8PM (Patient taking differently: TAKE 1 TABLET BY MOUTH DAILY AT 8PM EVERY 2 DAYS)     ATORVASTATIN CALCIUM PO Take 40 mg by mouth daily     calcium carbonate (TUMS) 500 MG chewable tablet Take 2 chew tab by mouth 2 times daily      carbidopa-levodopa (SINEMET)  MG per tablet TAKE 2 TABLETS  at  0800 and 1200 and 1 tab at 1600 and 1800 (Patient taking differently: Take 2 tablets by mouth 2 times daily )     chlorhexidine (PERIDEX) 0.12 % solution GIVE 15 ML BY MOUTH TWICE A DAY AT 8 AM AND 8PM. SWISH 2 MIN AND SPITFOR PERIDEX     Cholecalciferol (VITAMIN D3) 2000 UNITS CAPS Take 2,000 Units by mouth daily     gabapentin (NEURONTIN) 100 MG capsule TAKE 1 CAPSULE BY MOUTH AT BEDTIME     MYRBETRIQ 50 MG 24 hr tablet Take one (1) tablet BY MOUTH daily (Patient taking differently: Take  half of a  tablet BY MOUTH daily)     order for DME Equipment being ordered: neoprene knee sleeve     pantoprazole (PROTONIX) 40 MG EC tablet TAKE ONE (1) TABLET BY MOUTH DAILY FOR PROTONIX     polyethylene glycol (MIRALAX) powder Take 17 g by mouth daily     polyethylene glycol 0.4%- propylene glycol 0.3% (SYSTANE) 0.4-0.3 % SOLN ophthalmic solution Place 2 drops into both eyes 4 times daily as needed for dry eyes (Patient taking differently: Place 1 drop into both eyes 2 times daily )     Rivaroxaban (XARELTO PO) Take 20 mg by mouth daily     senna-docusate (SENOKOT-S;PERICOLACE) 8.6-50 MG per tablet Take 1 tablet by mouth 2 times daily     No current facility-administered medications for this visit.        Allergies   Allergen Reactions     Ambien      Zolpidem Tartrate     Tramadol        I have reviewed the care plan and do agree with the plan.    ROS:  No chest pain, shortness of breath, fever, chills, headache, nausea, vomiting,  dysuria, or changes in bowel habits.  Appetite is normal.  Left shoulder/back  pain noted.          OBJECTIVE:  /76  Pulse 57  Temp 98.2  F (36.8  C)  Resp 16  Wt 119 lb (54 kg)  SpO2 99%  BMI 21.77 kg/m2    GENERAL:  Chronically ill appearing, alert, and in no acute distress  RESP:  Lungs clear.  No rales, rhonchi, or wheezing  CV:  RRR.  S1 S2 without murmur. No clicks or rubs.  SKIN:  Age-related changes.  No suspicious lesions or rashes.  PSYCH:  Mentation intact, affect bright, and orientation intact.  EXTREM:  Trace edema.  Pulses palpable. There is as small joint effusion noted left knee.  Some tenderness noted diffusely.            Lab/Diagnostic data:    none    ASSESSMENT/ORDERS:  Nursing Home Visit  Will have Orthotist evaluate left shoe. Other issues stable.  No changes in current medications or care plan.      Total time spent with patient visit was 25 min including patient visit, review of pertinent clinical information, and treatment plan.      Mario Fofana MD

## 2018-07-20 ENCOUNTER — TELEPHONE (OUTPATIENT)
Dept: FAMILY MEDICINE | Facility: OTHER | Age: 83
End: 2018-07-20

## 2018-07-20 NOTE — TELEPHONE ENCOUNTER
1:33 PM    Reason for Call: Phone Call    Description: Pt called and states that she would like a call back regarding her referral she was supposed to get for her shoe fitting to correct her polio and she still has not heard anything on this     Was an appointment offered for this call? No  If yes : Appointment type              Date    Preferred method for responding to this message: Telephone Call  What is your phone number ?[phone]    If we cannot reach you directly, may we leave a detailed response at the number you provided? Yes    Can this message wait until your PCP/provider returns, if available today? No, PCP is out     Gilma Norris

## 2018-08-20 VITALS
RESPIRATION RATE: 16 BRPM | TEMPERATURE: 98.2 F | SYSTOLIC BLOOD PRESSURE: 117 MMHG | DIASTOLIC BLOOD PRESSURE: 55 MMHG | OXYGEN SATURATION: 96 % | HEART RATE: 72 BPM | WEIGHT: 119 LBS | BODY MASS INDEX: 21.77 KG/M2

## 2018-08-20 RX ORDER — CARBIDOPA AND LEVODOPA 25; 100 MG/1; MG/1
2 TABLET ORAL 2 TIMES DAILY
COMMUNITY
Start: 2018-07-01

## 2018-08-23 ENCOUNTER — OFFICE VISIT (OUTPATIENT)
Dept: FAMILY MEDICINE | Facility: OTHER | Age: 83
End: 2018-08-23
Attending: FAMILY MEDICINE
Payer: MEDICARE

## 2018-08-23 DIAGNOSIS — Z78.9 NURSING HOME RESIDENT: Primary | ICD-10-CM

## 2018-08-23 PROCEDURE — 99308 SBSQ NF CARE LOW MDM 20: CPT | Performed by: FAMILY MEDICINE

## 2018-08-23 NOTE — MR AVS SNAPSHOT
"              After Visit Summary   8/23/2018    Britney Schaeffer    MRN: [id number]           Patient Information     Date Of Birth          12/29/1929        Visit Information        Provider Department      8/23/2018 2:00 PM Mario Fofana MD [facility] [place]        Today's Diagnoses     Nursing Home Visit    -  1       Follow-ups after your visit        Your next 10 appointments already scheduled     Aug 23, 2018  2:00 PM CDT   (Arrive by 1:45 PM)   Office Visit with Mario Fofana MD   [facility] ([facility] [place] ([facility] [place] )    [address] [place] [address]   [phone]           Bring a current list of meds and any records pertaining to this visit. For Physicals, please bring immunization records and any forms needing to be filled out. Please arrive 10 minutes early to complete paperwork.              Who to contact     If you have questions or need follow up information about today's clinic visit or your schedule please contact [facility] directly at [phone].  Normal or non-critical lab and imaging results will be communicated to you by [facility], letter or phone within 4 business days after the clinic has received the results. If you do not hear from us within 7 days, please contact the clinic through [facility] or phone. If you have a critical or abnormal lab result, we will notify you by phone as soon as possible.  Submit refill requests through [email or web address] or call your pharmacy and they will forward the refill request to us. Please allow 3 business days for your refill to be completed.          Additional Information About Your Visit        [facility] Information     [email or web address] lets you send messages to your doctor, view your test results, renew your prescriptions, schedule appointments and more. To sign up, go to [email or web address] . Click on \"Log in\" on the left side of the screen, which will take you to the Welcome page. Then click on \"Sign up Now\" " on the right side of the page.     You will be asked to enter the access code listed below, as well as some personal information. Please follow the directions to create your username and password.     Your access code is: [email or web address]  Expires: 2018 10:57 AM     Your access code will  in 90 days. If you need help or a new code, please call your [place] clinic or [phone].        Care EveryWhere ID     This is your Care EveryWhere ID. This could be used by other organizations to access your [place] medical records  [id number]        Your Vitals Were     Pulse Temperature Respirations Pulse Oximetry BMI (Body Mass Index)       72 98.2  F (36.8  C) 16 96% 21.77 kg/m2        Blood Pressure from Last 3 Encounters:   18 117/55   18 144/76   18 136/52    Weight from Last 3 Encounters:   18 119 lb (54 kg)   18 119 lb (54 kg)   18 120 lb (54.4 kg)              Today, you had the following     No orders found for display         Today's Medication Changes          These changes are accurate as of 18 10:57 AM.  If you have any questions, ask your nurse or doctor.               These medicines have changed or have updated prescriptions.        Dose/Directions    carbidopa-levodopa  MG per tablet   Commonly known as:  SINEMET   This may have changed:  Another medication with the same name was removed. Continue taking this medication, and follow the directions you see here.        Dose:  2 tablet   Take 2 tablets by mouth 2 times daily   Refills:  0       MYRBETRIQ PO   This may have changed:  Another medication with the same name was removed. Continue taking this medication, and follow the directions you see here.        Dose:  25 mg   Take 25 mg by mouth daily   Refills:  0       polyethylene glycol 0.4%- propylene glycol 0.3% 0.4-0.3 % Soln ophthalmic solution   Commonly known as:  SYSTANE   This may have changed:  when to take this   Used for:  Allergic  conjunctivitis, unspecified laterality        Dose:  2 drop   Place 2 drops into both eyes 4 times daily as needed for dry eyes   Quantity:  3 Bottle   Refills:  1                Primary Care Provider Office Phone # Fax #    Mario Fofana [phone] [address] [facility] [address]        Equal Access to Services     [facility] NABEEL : Hadii aad ku hadasho Soomaali, waaxda luqadaha, qaybta kaalmada adeegyada, waxay frederickin hayleonidesn adeeren dawsonvega antony . So [facility] [phone].    ATENCIÓN: Si habla español, tiene a holbrook disposición servicios gratuitos de asistencia lingüística. Lucia al [phone].    We comply with applicable federal civil rights laws and [place] laws. We do not discriminate on the basis of race, color, national origin, age, disability, sex, sexual orientation, or gender identity.            Thank you!     Thank you for choosing [facility]  for your care. Our goal is always to provide you with excellent care. Hearing back from our patients is one way we can continue to improve our services. Please take a few minutes to complete the written survey that you may receive in the mail after your visit with us. Thank you!             Your Updated Medication List - Protect others around you: Learn how to safely use, store and throw away your medicines at www.disposemymeds.org.          This list is accurate as of 8/23/18 10:57 AM.  Always use your most recent med list.                   Brand Name Dispense Instructions for use Diagnosis    * ACETAMINOPHEN PO      Take 1,000 mg by mouth 2 times daily        * ACETAMINOPHEN PO      Take 650 mg by mouth every 4 hours as needed for pain        AMOXICILLIN PO      Take 500 mg by mouth as needed (GIVE ONE HOUR PRIOIR TO DENTAL APTS FOR ISCHEMIC HEART DISEASE) Give 4 tablets as needed        ASPIRIN PO      Take 81 mg by mouth TAKE 1 TABLET PO EVERY 2 DAYS        ATORVASTATIN CALCIUM PO      Take 40 mg by mouth daily         BIOTENE MOISTURIZING MOUTH MT      Take 1 spray by mouth every 2 hours as needed        calcium carbonate 500 MG chewable tablet    TUMS     Take 2 chew tab by mouth 2 times daily        carbidopa-levodopa  MG per tablet    SINEMET     Take 2 tablets by mouth 2 times daily        chlorhexidine 0.12 % solution    PERIDEX    473 mL    GIVE 15 ML BY MOUTH TWICE A DAY AT 8 AM AND 8PM. SWISH 2 MIN AND SPITFOR PERIDEX    Diagnosis unknown       gabapentin 100 MG capsule    NEURONTIN    30 capsule    TAKE 1 CAPSULE BY MOUTH AT BEDTIME    Neuralgia and neuritis       MYRBETRIQ PO      Take 25 mg by mouth daily        order for DME     1 Device    Equipment being ordered: neoprene knee sleeve    Primary osteoarthritis involving multiple joints       pantoprazole 40 MG EC tablet    PROTONIX    90 tablet    TAKE ONE (1) TABLET BY MOUTH DAILY FOR PROTONIX    Gastroesophageal reflux disease, esophagitis presence not specified       polyethylene glycol 0.4%- propylene glycol 0.3% 0.4-0.3 % Soln ophthalmic solution    SYSTANE    3 Bottle    Place 2 drops into both eyes 4 times daily as needed for dry eyes    Allergic conjunctivitis, unspecified laterality       polyethylene glycol powder    MIRALAX    500 g    Take 17 g by mouth daily    Constipation       senna-docusate 8.6-50 MG per tablet    SENOKOT-S;PERICOLACE     Take 1 tablet by mouth 2 times daily        vitamin D3 2000 units Caps     90 capsule    Take 2,000 Units by mouth daily    Osteoporosis       XARELTO PO      Take 20 mg by mouth daily        * Notice:  This list has 2 medication(s) that are the same as other medications prescribed for you. Read the directions carefully, and ask your doctor or other care provider to review them with you.

## 2018-08-23 NOTE — PROGRESS NOTES
HISTORY OF PRESENT ILLNESS:  Britney is a 88 year old female (12/29/1929)  resident of [facility]  who is being seen today for a routine 30 day follow up. Previously her Sinemet was reduced  and her nausea is improved. She has questions about her tremor. a. Patient offers no other complaint.  Staff notes no other issues.    Current medications, allergies, and interdisciplinary care plan are reviewed.      Patient Active Problem List    Diagnosis Date Noted     Paroxysmal atrial fibrillation (H) 04/03/2018     Priority: High     Parkinson disease (H) 08/10/2015     Priority: High     CHD (coronary heart disease) 06/15/2015     Priority: High     03/2015  NSTEMI S/P angioplasty and stenting ([facility]) LAD and D1       HF (heart failure) (H) 06/15/2015     Priority: High     TIA (transient ischemic attack) 03/16/2015     Priority: High     Neuralgia, post-herpetic 03/19/2014     Priority: High     Cerebrovascular disease 04/10/2013     Priority: High     HTN (hypertension) 09/24/2006     Priority: High     Pneumonia 10/22/2017     Priority: Medium     Abdominal pain 10/22/2017     Priority: Medium     Colitis 10/07/2017     Priority: Medium     Glaucoma 12/27/2016     Priority: Medium     History of poliomyelitis 12/27/2016     Priority: Medium     Osteoarthritis, multiple joints 12/13/2015     Priority: Medium     Fibrocystic breast disease, left 12/13/2015     Priority: Medium     Constipation, chronic 04/20/2015     Priority: Medium     Hiatal hernia 03/16/2015     Priority: Medium     Chronic cough 07/15/2014     Priority: Medium     OAB (overactive bladder) 03/19/2014     Priority: Medium     S/P InterStim placement and multiple botox injections       Dyslipidemia 04/10/2013     Priority: Medium     Hemorrhoids 04/10/2013     Priority: Medium     Perennial allergic rhinitis 04/10/2013     Priority: Medium     Asthma, mild persistent 04/10/2013     Priority: Medium              GERD  (gastroesophageal reflux disease) 04/10/2013     Priority: Medium     Osteoporosis 04/10/2013     Priority: Medium     Kyphoscoliosis and scoliosis 04/10/2013     Priority: Medium     Insomnia, medical condition 04/10/2013     Priority: Medium     History of colonic polyps 04/10/2013     Priority: Medium     Nursing Home Visit 01/19/2016     Priority: Low     Health Care Home 12/09/2015     Priority: Low     Class: Chronic          Social History     Social History     Marital status:      Spouse name: N/A     Number of children: N/A     Years of education: N/A     Occupational History     Retired [employer]     Social History Main Topics     Smoking status: Never Smoker     Smokeless tobacco: Never Used      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 No     Social History Narrative        Current Outpatient Prescriptions   Medication Sig     ACETAMINOPHEN PO Take 1,000 mg by mouth 2 times daily     ACETAMINOPHEN PO Take 650 mg by mouth every 4 hours as needed for pain     ASPIRIN PO Take 81 mg by mouth TAKE 1 TABLET PO EVERY 2 DAYS     Mirabegron (MYRBETRIQ PO) Take 25 mg by mouth daily     AMOXICILLIN PO Take 500 mg by mouth as needed (GIVE ONE HOUR PRIOIR TO DENTAL APTS FOR ISCHEMIC HEART DISEASE) Give 4 tablets as needed      Artificial Saliva (BIOTENE MOISTURIZING MOUTH MT) Take 1 spray by mouth  every 2 hours as needed     ATORVASTATIN CALCIUM PO Take 40 mg by mouth daily     calcium carbonate (TUMS) 500 MG chewable tablet Take 2 chew tab by mouth 2 times daily      carbidopa-levodopa (SINEMET)  MG per tablet Take 2 tablets by mouth 2 times daily     chlorhexidine (PERIDEX) 0.12 % solution GIVE 15 ML BY MOUTH TWICE A DAY AT 8 AM AND 8PM. SWISH 2 MIN AND SPITFOR PERIDEX     Cholecalciferol (VITAMIN D3) 2000 UNITS CAPS Take 2,000 Units by mouth daily     gabapentin (NEURONTIN) 100 MG capsule TAKE 1 CAPSULE BY MOUTH AT BEDTIME     order for DME Equipment being ordered: neoprene knee sleeve     pantoprazole (PROTONIX) 40 MG EC tablet TAKE ONE (1) TABLET BY MOUTH DAILY FOR PROTONIX     polyethylene glycol (MIRALAX) powder Take 17 g by mouth daily     polyethylene glycol 0.4%- propylene glycol 0.3% (SYSTANE) 0.4-0.3 % SOLN ophthalmic solution Place 2 drops into both eyes 4 times daily as needed for dry eyes (Patient taking differently: Place 2 drops into both eyes 4 times daily )     Rivaroxaban (XARELTO PO) Take 20 mg by mouth daily     senna-docusate (SENOKOT-S;PERICOLACE) 8.6-50 MG per tablet Take 1 tablet by mouth 2 times daily     No current facility-administered medications for this visit.        Allergies   Allergen Reactions     Ambien      Zolpidem Tartrate     Tramadol        I have reviewed the care plan and do agree with the plan.    ROS:  No chest pain, shortness of breath, fever, chills, headache, nausea, vomiting, dysuria, or changes in bowel habits.  Appetite is normal.  Left shoulder/back  pain noted.          OBJECTIVE:  /55  Pulse 72  Temp 98.2  F (36.8  C)  Resp 16  Wt 119 lb (54 kg)  SpO2 96%  BMI 21.77 kg/m2    GENERAL:  Chronically ill appearing, alert, and in no acute distress  RESP:  Lungs clear.  No rales, rhonchi, or wheezing  CV:  RRR.  S1 S2 without murmur. No clicks or rubs.  SKIN:  Age-related changes.  No suspicious lesions or rashes.  PSYCH:  Mentation intact,  affect bright, and orientation intact.  EXTREM:  Trace edema.  Pulses palpable. There is as small joint effusion noted left knee.  Some tenderness noted diffusely.            Lab/Diagnostic data:    none    ASSESSMENT/ORDERS:  Nursing Home Visit  Will have Orthotist evaluate left shoe. Other issues stable.  No changes in current medications or care plan.      Total time spent with patient visit was 25 min including patient visit, review of pertinent clinical information, and treatment plan.      Mario Fofana MD

## 2018-09-26 VITALS
DIASTOLIC BLOOD PRESSURE: 51 MMHG | BODY MASS INDEX: 22.13 KG/M2 | SYSTOLIC BLOOD PRESSURE: 136 MMHG | TEMPERATURE: 98.4 F | OXYGEN SATURATION: 96 % | WEIGHT: 121 LBS | HEART RATE: 72 BPM | RESPIRATION RATE: 13 BRPM

## 2018-09-26 RX ORDER — KETOCONAZOLE 20 MG/ML
SHAMPOO TOPICAL WEEKLY
COMMUNITY
End: 2019-01-01

## 2018-09-26 NOTE — NURSING NOTE
"Chief Complaint   Patient presents with     Care Coordination Nursing Home       Initial /51  Pulse 72  Temp 98.4  F (36.9  C)  Resp 13  Wt 121 lb (54.9 kg)  SpO2 96%  BMI 22.13 kg/m2 Estimated body mass index is 22.13 kg/(m^2) as calculated from the following:    Height as of 11/1/17: 5' 2\" (1.575 m).    Weight as of this encounter: 121 lb (54.9 kg).  Medication Reconciliation: complete    Melinda Schreiber LPN  "

## 2018-09-27 ENCOUNTER — NURSING HOME VISIT (OUTPATIENT)
Dept: FAMILY MEDICINE | Facility: OTHER | Age: 83
End: 2018-09-27
Payer: MEDICARE

## 2018-09-27 DIAGNOSIS — Z78.9 NURSING HOME RESIDENT: Primary | ICD-10-CM

## 2018-09-27 PROCEDURE — 99308 SBSQ NF CARE LOW MDM 20: CPT | Performed by: FAMILY MEDICINE

## 2018-09-27 NOTE — MR AVS SNAPSHOT
"              After Visit Summary   2018    Britney Schaeffer    MRN: [id number]           Patient Information     Date Of Birth          1929        Visit Information        Provider Department      2018 3:21 PM Mario Fofana MD [facility] [place]        Today's Diagnoses     Nursing Home Visit    -  1       Follow-ups after your visit        Who to contact     If you have questions or need follow up information about today's clinic visit or your schedule please contact [facility] directly at [phone].  Normal or non-critical lab and imaging results will be communicated to you by [employer], letter or phone within 4 business days after the clinic has received the results. If you do not hear from us within 7 days, please contact the clinic through [employer] or phone. If you have a critical or abnormal lab result, we will notify you by phone as soon as possible.  Submit refill requests through [employer] or call your pharmacy and they will forward the refill request to us. Please allow 3 business days for your refill to be completed.          Additional Information About Your Visit        MyChart Information     [employer] lets you send messages to your doctor, view your test results, renew your prescriptions, schedule appointments and more. To sign up, go to [email or web address] . Click on \"Log in\" on the left side of the screen, which will take you to the Welcome page. Then click on \"Sign up Now\" on the right side of the page.     You will be asked to enter the access code listed below, as well as some personal information. Please follow the directions to create your username and password.     Your access code is: [email or web address]  Expires: 2018 10:57 AM     Your access code will  in 90 days. If you need help or a new code, please call your [place] clinic or [phone].        Care EveryWhere ID     This is your Care EveryWhere ID. This could be used " by other organizations to access your [place] medical records  [id number]        Your Vitals Were     Pulse Temperature Respirations Pulse Oximetry BMI (Body Mass Index)       72 98.4  F (36.9  C) 13 96% 22.13 kg/m2        Blood Pressure from Last 3 Encounters:   09/26/18 136/51   08/20/18 117/55   07/13/18 144/76    Weight from Last 3 Encounters:   09/26/18 121 lb (54.9 kg)   08/20/18 119 lb (54 kg)   07/13/18 119 lb (54 kg)              Today, you had the following     No orders found for display         Today's Medication Changes          These changes are accurate as of 9/27/18 11:59 PM.  If you have any questions, ask your nurse or doctor.               These medicines have changed or have updated prescriptions.        Dose/Directions    polyethylene glycol 0.4%- propylene glycol 0.3% 0.4-0.3 % Soln ophthalmic solution   Commonly known as:  SYSTANE   This may have changed:  when to take this   Used for:  Allergic conjunctivitis, unspecified laterality        Dose:  2 drop   Place 2 drops into both eyes 4 times daily as needed for dry eyes   Quantity:  3 Bottle   Refills:  1                Primary Care Provider Office Phone # Fax #    Mario Fofana [phone]       [facility] [place] [address]        Equal Access to Services     DES LEES AH: Amy colono Soad, waaxda luqadaha, qaybta kaalmada adeegyamohsen, celena sandoval. So [facility] [phone].    ATENCIÓN: Si habla español, tiene a holbrook disposición servicios gratuitos de asistencia lingüística. Llame al [phone].    We comply with applicable federal civil rights laws and [place] laws. We do not discriminate on the basis of race, color, national origin, age, disability, sex, sexual orientation, or gender identity.            Thank you!     Thank you for choosing [facility]  for your care. Our goal is always to provide you with excellent care. Hearing back from our  patients is one way we can continue to improve our services. Please take a few minutes to complete the written survey that you may receive in the mail after your visit with us. Thank you!             Your Updated Medication List - Protect others around you: Learn how to safely use, store and throw away your medicines at www.disposemymeds.org.          This list is accurate as of 9/27/18 11:59 PM.  Always use your most recent med list.                   Brand Name Dispense Instructions for use Diagnosis    * ACETAMINOPHEN PO      Take 1,000 mg by mouth 2 times daily        * ACETAMINOPHEN PO      Take 650 mg by mouth every 4 hours as needed for pain        AMOXICILLIN PO      Take 500 mg by mouth as needed (GIVE ONE HOUR PRIOIR TO DENTAL APTS FOR ISCHEMIC HEART DISEASE) Give 4 tablets as needed        ASPIRIN PO      Take 81 mg by mouth TAKE 1 TABLET PO EVERY 2 DAYS        ATORVASTATIN CALCIUM PO      Take 40 mg by mouth daily        BIOTENE MOISTURIZING MOUTH MT      Take 1 spray by mouth every 2 hours as needed        calcium carbonate 500 MG chewable tablet    TUMS     Take 2 chew tab by mouth 2 times daily        carbidopa-levodopa  MG per tablet    SINEMET     Take 2 tablets by mouth 2 times daily        chlorhexidine 0.12 % solution    PERIDEX    473 mL    GIVE 15 ML BY MOUTH TWICE A DAY AT 8 AM AND 8PM. SWISH 2 MIN AND SPITFOR PERIDEX    Diagnosis unknown       gabapentin 100 MG capsule    NEURONTIN    30 capsule    TAKE 1 CAPSULE BY MOUTH AT BEDTIME    Neuralgia and neuritis       ketoconazole 2 % shampoo    NIZORAL     Apply topically once a week        MYRBETRIQ PO      Take 25 mg by mouth daily        order for DME     1 Device    Equipment being ordered: neoprene knee sleeve    Primary osteoarthritis involving multiple joints       pantoprazole 40 MG EC tablet    PROTONIX    90 tablet    TAKE ONE (1) TABLET BY MOUTH DAILY FOR PROTONIX    Gastroesophageal reflux disease, esophagitis presence not  specified       polyethylene glycol 0.4%- propylene glycol 0.3% 0.4-0.3 % Soln ophthalmic solution    SYSTANE    3 Bottle    Place 2 drops into both eyes 4 times daily as needed for dry eyes    Allergic conjunctivitis, unspecified laterality       polyethylene glycol powder    MIRALAX    500 g    Take 17 g by mouth daily    Constipation       senna-docusate 8.6-50 MG per tablet    SENOKOT-S;PERICOLACE     Take 1 tablet by mouth 2 times daily        vitamin D3 2000 units Caps     90 capsule    Take 2,000 Units by mouth daily    Osteoporosis       XARELTO PO      Take 20 mg by mouth daily        * Notice:  This list has 2 medication(s) that are the same as other medications prescribed for you. Read the directions carefully, and ask your doctor or other care provider to review them with you.

## 2018-09-28 NOTE — PROGRESS NOTES
HISTORY OF PRESENT ILLNESS:  Britney is a 88 year old female (12/29/1929)  resident of [facility]  who is being seen today for a routine 30 day follow up. Previously her Sinemet was reduced  and her nausea is improved. She continues to have questions about her tremor. a. Patient offers no other complaint.  Staff notes no other issues.    Current medications, allergies, and interdisciplinary care plan are reviewed.      Patient Active Problem List    Diagnosis Date Noted     Paroxysmal atrial fibrillation (H) 04/03/2018     Priority: High     Parkinson disease (H) 08/10/2015     Priority: High     CHD (coronary heart disease) 06/15/2015     Priority: High     03/2015  NSTEMI S/P angioplasty and stenting (ELIZABETH) LAD and D1       HF (heart failure) (H) 06/15/2015     Priority: High     TIA (transient ischemic attack) 03/16/2015     Priority: High     Neuralgia, post-herpetic 03/19/2014     Priority: High     Cerebrovascular disease 04/10/2013     Priority: High     HTN (hypertension) 09/24/2006     Priority: High     Pneumonia 10/22/2017     Priority: Medium     Abdominal pain 10/22/2017     Priority: Medium     Colitis 10/07/2017     Priority: Medium     Glaucoma 12/27/2016     Priority: Medium     History of poliomyelitis 12/27/2016     Priority: Medium     Osteoarthritis, multiple joints 12/13/2015     Priority: Medium     Fibrocystic breast disease, left 12/13/2015     Priority: Medium     Constipation, chronic 04/20/2015     Priority: Medium     Hiatal hernia 03/16/2015     Priority: Medium     Chronic cough 07/15/2014     Priority: Medium     OAB (overactive bladder) 03/19/2014     Priority: Medium     S/P InterStim placement and multiple botox injections       Dyslipidemia 04/10/2013     Priority: Medium     Hemorrhoids 04/10/2013     Priority: Medium     Perennial allergic rhinitis 04/10/2013     Priority: Medium     Asthma, mild persistent 04/10/2013     Priority: Medium              GERD  (gastroesophageal reflux disease) 04/10/2013     Priority: Medium     Osteoporosis 04/10/2013     Priority: Medium     Kyphoscoliosis and scoliosis 04/10/2013     Priority: Medium     Insomnia, medical condition 04/10/2013     Priority: Medium     History of colonic polyps 04/10/2013     Priority: Medium     Nursing Home Visit 01/19/2016     Priority: Low     Health Care Home 12/09/2015     Priority: Low     Class: Chronic          Social History     Social History     Marital status:      Spouse name: N/A     Number of children: N/A     Years of education: N/A     Occupational History     Retired [employer]     Social History Main Topics     Smoking status: Never Smoker     Smokeless tobacco: Never Used      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 No     Social History Narrative        Current Outpatient Prescriptions   Medication Sig     ketoconazole (NIZORAL) 2 % shampoo Apply topically once a week     ACETAMINOPHEN PO Take 1,000 mg by mouth 2 times daily     ACETAMINOPHEN PO Take 650 mg by mouth every 4 hours as needed for pain     AMOXICILLIN PO Take 500 mg by mouth as needed (GIVE ONE HOUR PRIOIR TO DENTAL APTS FOR ISCHEMIC HEART DISEASE) Give 4 tablets as needed      Artificial Saliva (BIOTENE MOISTURIZING MOUTH MT) Take 1 spray by mouth every 2 hours as needed     ASPIRIN PO Take 81 mg by  mouth TAKE 1 TABLET PO EVERY 2 DAYS     ATORVASTATIN CALCIUM PO Take 40 mg by mouth daily     calcium carbonate (TUMS) 500 MG chewable tablet Take 2 chew tab by mouth 2 times daily      carbidopa-levodopa (SINEMET)  MG per tablet Take 2 tablets by mouth 2 times daily     chlorhexidine (PERIDEX) 0.12 % solution GIVE 15 ML BY MOUTH TWICE A DAY AT 8 AM AND 8PM. SWISH 2 MIN AND SPITFOR PERIDEX     Cholecalciferol (VITAMIN D3) 2000 UNITS CAPS Take 2,000 Units by mouth daily     gabapentin (NEURONTIN) 100 MG capsule TAKE 1 CAPSULE BY MOUTH AT BEDTIME     Mirabegron (MYRBETRIQ PO) Take 25 mg by mouth daily     order for DME Equipment being ordered: neoprene knee sleeve     pantoprazole (PROTONIX) 40 MG EC tablet TAKE ONE (1) TABLET BY MOUTH DAILY FOR PROTONIX     polyethylene glycol (MIRALAX) powder Take 17 g by mouth daily     polyethylene glycol 0.4%- propylene glycol 0.3% (SYSTANE) 0.4-0.3 % SOLN ophthalmic solution Place 2 drops into both eyes 4 times daily as needed for dry eyes (Patient taking differently: Place 2 drops into both eyes 4 times daily )     Rivaroxaban (XARELTO PO) Take 20 mg by mouth daily     senna-docusate (SENOKOT-S;PERICOLACE) 8.6-50 MG per tablet Take 1 tablet by mouth 2 times daily     No current facility-administered medications for this visit.        Allergies   Allergen Reactions     Ambien      Zolpidem Tartrate     Tramadol        I have reviewed the care plan and do agree with the plan.    ROS:  No chest pain, shortness of breath, fever, chills, headache, nausea, vomiting, dysuria, or changes in bowel habits.  Appetite is normal.  Left shoulder/back  pain noted.          OBJECTIVE:  /51  Pulse 72  Temp 98.4  F (36.9  C)  Resp 13  Wt 121 lb (54.9 kg)  SpO2 96%  BMI 22.13 kg/m2    GENERAL:  Chronically ill appearing, alert, and in no acute distress  RESP:  Lungs clear.  No rales, rhonchi, or wheezing  CV:  RRR.  S1 S2 without murmur. No clicks or rubs.  SKIN:  Age-related  changes.  No suspicious lesions or rashes.  PSYCH:  Mentation intact, affect bright, and orientation intact.  EXTREM:  Trace edema.  Pulses palpable. There is as small joint effusion noted left knee.  Some tenderness noted diffusely.            Lab/Diagnostic data:    none    ASSESSMENT/ORDERS:  Nursing Home Visit  Other issues stable.  No changes in current medications or care plan.      Total time spent with patient visit was 25 min including patient visit, review of pertinent clinical information, and treatment plan.      Mario Fofana MD

## 2018-10-04 ENCOUNTER — TRANSFERRED RECORDS (OUTPATIENT)
Dept: HEALTH INFORMATION MANAGEMENT | Facility: CLINIC | Age: 83
End: 2018-10-04

## 2018-10-04 LAB
CREAT SERPL-MCNC: 0.78 MG/DL (ref 0.4–1)
GLUCOSE SERPL-MCNC: 78 MG/DL (ref 70–99)
POTASSIUM SERPL-SCNC: 3.9 MEQ/L (ref 3.4–5.1)

## 2018-10-15 DIAGNOSIS — R11.0 NAUSEA: Primary | ICD-10-CM

## 2018-10-16 NOTE — TELEPHONE ENCOUNTER
Zofran      Last Written Prescription Date:  5/6/15  Last Fill Quantity: 10,   # refills: 0  Last Office Visit: 8/23/18  Future Office visit:       Not on current med list, last written prescription was 5/6/15

## 2018-10-17 RX ORDER — ONDANSETRON 4 MG/1
TABLET, FILM COATED ORAL
Qty: 30 TABLET | Refills: 3 | Status: SHIPPED | OUTPATIENT
Start: 2018-10-17 | End: 2018-01-01

## 2018-11-01 NOTE — MR AVS SNAPSHOT
"              After Visit Summary   2018    Britney Schaeffer    MRN: [id number]           Patient Information     Date Of Birth          1929        Visit Information        Provider Department      2018 10:55 AM Mario Fofana MD [facility] [place]        Today's Diagnoses     Nursing Home Visit    -  1       Follow-ups after your visit        Who to contact     If you have questions or need follow up information about today's clinic visit or your schedule please contact [facility] directly at [phone].  Normal or non-critical lab and imaging results will be communicated to you by [employer]hart, letter or phone within 4 business days after the clinic has received the results. If you do not hear from us within 7 days, please contact the clinic through [employer] or phone. If you have a critical or abnormal lab result, we will notify you by phone as soon as possible.  Submit refill requests through [employer] or call your pharmacy and they will forward the refill request to us. Please allow 3 business days for your refill to be completed.          Additional Information About Your Visit        MyChart Information     [employer] lets you send messages to your doctor, view your test results, renew your prescriptions, schedule appointments and more. To sign up, go to [email or web address] . Click on \"Log in\" on the left side of the screen, which will take you to the Welcome page. Then click on \"Sign up Now\" on the right side of the page.     You will be asked to enter the access code listed below, as well as some personal information. Please follow the directions to create your username and password.     Your access code is: [email or web address]  Expires: 2018 10:57 AM     Your access code will  in 90 days. If you need help or a new code, please call your [place] clinic or [phone].        Care EveryWhere ID     This is your Care EveryWhere ID. This could be " used by other organizations to access your [place] medical records  [id number]        Your Vitals Were     Pulse Temperature Respirations BMI (Body Mass Index)          60 97.8  F (36.6  C) 18 21.58 kg/m2         Blood Pressure from Last 3 Encounters:   11/01/18 (!) 83/64   09/26/18 136/51   08/20/18 117/55    Weight from Last 3 Encounters:   11/01/18 118 lb (53.5 kg)   09/26/18 121 lb (54.9 kg)   08/20/18 119 lb (54 kg)              Today, you had the following     No orders found for display         Today's Medication Changes          These changes are accurate as of 11/1/18 11:59 PM.  If you have any questions, ask your nurse or doctor.               These medicines have changed or have updated prescriptions.        Dose/Directions    polyethylene glycol 0.4%- propylene glycol 0.3% 0.4-0.3 % Soln ophthalmic solution   Commonly known as:  SYSTANE   This may have changed:  when to take this   Used for:  Allergic conjunctivitis, unspecified laterality        Dose:  2 drop   Place 2 drops into both eyes 4 times daily as needed for dry eyes   Quantity:  3 Bottle   Refills:  1                Primary Care Provider Office Phone # Fax #    Mario Fofana [phone]       [facility] [address]        Equal Access to Services     DES LEES AH: Hadii aad ku hadasho Soomaali, waaxda luqadaha, qaybta kaalmada adeegyada, celena sandoval. So [facility] [phone].    ATENCIÓN: Si habla español, tiene a holbrook disposición servicios gratuitos de asistencia lingüística. Llame al [phone].    We comply with applicable federal civil rights laws and [place] laws. We do not discriminate on the basis of race, color, national origin, age, disability, sex, sexual orientation, or gender identity.            Thank you!     Thank you for choosing [facility]  for your care. Our goal is always to provide you with excellent care. Hearing back from our  patients is one way we can continue to improve our services. Please take a few minutes to complete the written survey that you may receive in the mail after your visit with us. Thank you!             Your Updated Medication List - Protect others around you: Learn how to safely use, store and throw away your medicines at www.disposemymeds.org.          This list is accurate as of 11/1/18 11:59 PM.  Always use your most recent med list.                   Brand Name Dispense Instructions for use Diagnosis    * ACETAMINOPHEN PO      Take 1,000 mg by mouth 2 times daily        * ACETAMINOPHEN PO      Take 650 mg by mouth every 4 hours as needed for pain        AMOXICILLIN PO      Take 500 mg by mouth as needed (GIVE ONE HOUR PRIOIR TO DENTAL APTS FOR ISCHEMIC HEART DISEASE) Give 4 tablets as needed        ASPIRIN PO      Take 81 mg by mouth TAKE 1 TABLET PO EVERY 2 DAYS        ATORVASTATIN CALCIUM PO      Take 40 mg by mouth daily        BIOTENE MOISTURIZING MOUTH MT      Take 1 spray by mouth every 2 hours as needed        calcium carbonate 500 MG chewable tablet    TUMS     Take 2 chew tab by mouth 2 times daily        carbidopa-levodopa  MG per tablet    SINEMET     Take 2 tablets by mouth 2 times daily        chlorhexidine 0.12 % solution    PERIDEX    473 mL    GIVE 15 ML BY MOUTH TWICE A DAY AT 8 AM AND 8PM. SWISH 2 MIN AND SPITFOR PERIDEX    Diagnosis unknown       gabapentin 100 MG capsule    NEURONTIN    30 capsule    TAKE 1 CAPSULE BY MOUTH AT BEDTIME    Neuralgia and neuritis       ketoconazole 2 % shampoo    NIZORAL     Apply topically once a week        MYRBETRIQ PO      Take 25 mg by mouth daily        order for DME     1 Device    Equipment being ordered: neoprene knee sleeve    Primary osteoarthritis involving multiple joints       pantoprazole 40 MG EC tablet    PROTONIX    90 tablet    TAKE ONE (1) TABLET BY MOUTH DAILY FOR PROTONIX    Gastroesophageal reflux disease, esophagitis presence not  specified       polyethylene glycol 0.4%- propylene glycol 0.3% 0.4-0.3 % Soln ophthalmic solution    SYSTANE    3 Bottle    Place 2 drops into both eyes 4 times daily as needed for dry eyes    Allergic conjunctivitis, unspecified laterality       polyethylene glycol powder    MIRALAX    500 g    Take 17 g by mouth daily    Constipation       senna-docusate 8.6-50 MG per tablet    SENOKOT-S;PERICOLACE     Take 1 tablet by mouth 2 times daily        vitamin D3 2000 units Caps     90 capsule    Take 2,000 Units by mouth daily    Osteoporosis       XARELTO PO      Take 20 mg by mouth daily        * Notice:  This list has 2 medication(s) that are the same as other medications prescribed for you. Read the directions carefully, and ask your doctor or other care provider to review them with you.

## 2018-11-01 NOTE — NURSING NOTE
"Chief Complaint   Patient presents with     Care Coordination Nursing Home       Initial BP (!) 83/64  Pulse 60  Temp 97.8  F (36.6  C)  Resp 18  Wt 118 lb (53.5 kg)  BMI 21.58 kg/m2 Estimated body mass index is 21.58 kg/(m^2) as calculated from the following:    Height as of 11/1/17: 5' 2\" (1.575 m).    Weight as of this encounter: 118 lb (53.5 kg).  Medication Reconciliation: complete    Melinda Schreiber LPN  "

## 2018-11-02 NOTE — PROGRESS NOTES
HISTORY OF PRESENT ILLNESS:  Britney is a 88 year old female (12/29/1929)  resident of [facility]  who is being seen today for a routine 30 day follow up. Previously her Sinemet was reduced  and her nausea is improved. She continues to have questions about her tremor. In addition she complains of dry mouth Patient offers no other complaint.  Staff notes no other issues.    Current medications, allergies, and interdisciplinary care plan are reviewed.      Patient Active Problem List    Diagnosis Date Noted     Paroxysmal atrial fibrillation (H) 04/03/2018     Priority: High     Parkinson disease (H) 08/10/2015     Priority: High     CHD (coronary heart disease) 06/15/2015     Priority: High     03/2015  NSTEMI S/P angioplasty and stenting (ELIZABETH) LAD and D1       HF (heart failure) (H) 06/15/2015     Priority: High     TIA (transient ischemic attack) 03/16/2015     Priority: High     Neuralgia, post-herpetic 03/19/2014     Priority: High     Cerebrovascular disease 04/10/2013     Priority: High     HTN (hypertension) 09/24/2006     Priority: High     Pneumonia 10/22/2017     Priority: Medium     Abdominal pain 10/22/2017     Priority: Medium     Colitis 10/07/2017     Priority: Medium     Glaucoma 12/27/2016     Priority: Medium     History of poliomyelitis 12/27/2016     Priority: Medium     Osteoarthritis, multiple joints 12/13/2015     Priority: Medium     Fibrocystic breast disease, left 12/13/2015     Priority: Medium     Constipation, chronic 04/20/2015     Priority: Medium     Hiatal hernia 03/16/2015     Priority: Medium     Chronic cough 07/15/2014     Priority: Medium     OAB (overactive bladder) 03/19/2014     Priority: Medium     S/P InterStim placement and multiple botox injections       Dyslipidemia 04/10/2013     Priority: Medium     Hemorrhoids 04/10/2013     Priority: Medium     Perennial allergic rhinitis 04/10/2013     Priority: Medium     Asthma, mild persistent 04/10/2013      Priority: Medium              GERD (gastroesophageal reflux disease) 04/10/2013     Priority: Medium     Osteoporosis 04/10/2013     Priority: Medium     Kyphoscoliosis and scoliosis 04/10/2013     Priority: Medium     Insomnia, medical condition 04/10/2013     Priority: Medium     History of colonic polyps 04/10/2013     Priority: Medium     Nursing Home Visit 01/19/2016     Priority: Low     Health Care Home 12/09/2015     Priority: Low     Class: Chronic          Social History     Social History     Marital status:      Spouse name: N/A     Number of children: N/A     Years of education: N/A     Occupational History     Retired [employer]     Social History Main Topics     Smoking status: Never Smoker     Smokeless tobacco: Never Used      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 No     Social History Narrative        Current Outpatient Prescriptions   Medication Sig     ACETAMINOPHEN PO Take 1,000 mg by mouth 2 times daily     ACETAMINOPHEN PO Take 650 mg by mouth every 4 hours as needed for pain     AMOXICILLIN PO Take 500 mg by mouth as needed (GIVE ONE HOUR PRIOIR TO DENTAL APTS FOR ISCHEMIC HEART DISEASE) Give 4 tablets as needed      Artificial Saliva (BIOTENE MOISTURIZING MOUTH MT) Take 1 spray by mouth every 2 hours as needed     ASPIRIN PO Take 81 mg by mouth TAKE 1 TABLET PO EVERY 2  DAYS     ATORVASTATIN CALCIUM PO Take 40 mg by mouth daily     calcium carbonate (TUMS) 500 MG chewable tablet Take 2 chew tab by mouth 2 times daily      carbidopa-levodopa (SINEMET)  MG per tablet Take 2 tablets by mouth 2 times daily     chlorhexidine (PERIDEX) 0.12 % solution GIVE 15 ML BY MOUTH TWICE A DAY AT 8 AM AND 8PM. SWISH 2 MIN AND SPITFOR PERIDEX     Cholecalciferol (VITAMIN D3) 2000 UNITS CAPS Take 2,000 Units by mouth daily     gabapentin (NEURONTIN) 100 MG capsule TAKE 1 CAPSULE BY MOUTH AT BEDTIME     ketoconazole (NIZORAL) 2 % shampoo Apply topically once a week     Mirabegron (MYRBETRIQ PO) Take 25 mg by mouth daily     order for DME Equipment being ordered: neoprene knee sleeve     pantoprazole (PROTONIX) 40 MG EC tablet TAKE ONE (1) TABLET BY MOUTH DAILY FOR PROTONIX     polyethylene glycol (MIRALAX) powder Take 17 g by mouth daily     polyethylene glycol 0.4%- propylene glycol 0.3% (SYSTANE) 0.4-0.3 % SOLN ophthalmic solution Place 2 drops into both eyes 4 times daily as needed for dry eyes (Patient taking differently: Place 2 drops into both eyes 4 times daily )     Rivaroxaban (XARELTO PO) Take 20 mg by mouth daily     senna-docusate (SENOKOT-S;PERICOLACE) 8.6-50 MG per tablet Take 1 tablet by mouth 2 times daily     No current facility-administered medications for this visit.        Allergies   Allergen Reactions     Ambien      Zolpidem Tartrate     Tramadol        I have reviewed the care plan and do agree with the plan.    ROS:  No chest pain, shortness of breath, fever, chills, headache, nausea, vomiting, dysuria, or changes in bowel habits.  Appetite is normal.  Left shoulder/back  pain noted.          OBJECTIVE:  BP (!) 83/64  Pulse 60  Temp 97.8  F (36.6  C)  Resp 18  Wt 118 lb (53.5 kg)  BMI 21.58 kg/m2    GENERAL:  Chronically ill appearing, alert, and in no acute distress  RESP:  Lungs clear.  No rales, rhonchi, or wheezing  CV:  RRR.  S1 S2 without murmur. No clicks or  rubs.  SKIN:  Age-related changes.  No suspicious lesions or rashes.  PSYCH:  Mentation intact, affect bright, and orientation intact.  EXTREM:  Trace edema.  Pulses palpable. There is as small joint effusion noted left knee.  Some tenderness noted diffusely.            Lab/Diagnostic data:    none    ASSESSMENT/ORDERS:  Nursing Home Visit  Other issues stable.  No changes in current medications or care plan.      Total time spent with patient visit was 25 min including patient visit, review of pertinent clinical information, and treatment plan.      Mario Fofana MD

## 2018-12-12 NOTE — NURSING NOTE
"Chief Complaint   Patient presents with     Care Coordination Nursing Home       Initial /68   Pulse 68   Temp 98.2  F (36.8  C)   Resp 12   Wt 50.8 kg (112 lb)   SpO2 96%   BMI 20.49 kg/m   Estimated body mass index is 20.49 kg/m  as calculated from the following:    Height as of 11/1/17: 1.575 m (5' 2\").    Weight as of this encounter: 50.8 kg (112 lb).  Medication Reconciliation: complete    Melinda Schreiber LPN  "

## 2018-12-18 NOTE — PROGRESS NOTES
HISTORY OF PRESENT ILLNESS:  Britney is a 88 year old female (12/29/1929)  resident of [facility]  who is being seen today for a routine 30 day follow up. he complains of dry mouth Patient offers no other complaint.  Hek does noted left foot pain with a callous formation on the lateral midfoot. Staff notes no other issues.    Current medications, allergies, and interdisciplinary care plan are reviewed.      Patient Active Problem List    Diagnosis Date Noted     Paroxysmal atrial fibrillation (H) 04/03/2018     Priority: High     Parkinson disease (H) 08/10/2015     Priority: High     CHD (coronary heart disease) 06/15/2015     Priority: High     03/2015  NSTEMI S/P angioplasty and stenting (ELIZABETH) LAD and D1       HF (heart failure) (H) 06/15/2015     Priority: High     TIA (transient ischemic attack) 03/16/2015     Priority: High     Neuralgia, post-herpetic 03/19/2014     Priority: High     Cerebrovascular disease 04/10/2013     Priority: High     HTN (hypertension) 09/24/2006     Priority: High     Pneumonia 10/22/2017     Priority: Medium     Abdominal pain 10/22/2017     Priority: Medium     Colitis 10/07/2017     Priority: Medium     Glaucoma 12/27/2016     Priority: Medium     History of poliomyelitis 12/27/2016     Priority: Medium     Osteoarthritis, multiple joints 12/13/2015     Priority: Medium     Fibrocystic breast disease, left 12/13/2015     Priority: Medium     Constipation, chronic 04/20/2015     Priority: Medium     Hiatal hernia 03/16/2015     Priority: Medium     Chronic cough 07/15/2014     Priority: Medium     OAB (overactive bladder) 03/19/2014     Priority: Medium     S/P InterStim placement and multiple botox injections       Dyslipidemia 04/10/2013     Priority: Medium     Hemorrhoids 04/10/2013     Priority: Medium     Perennial allergic rhinitis 04/10/2013     Priority: Medium     Asthma, mild persistent 04/10/2013     Priority: Medium              GERD  (gastroesophageal reflux disease) 04/10/2013     Priority: Medium     Osteoporosis 04/10/2013     Priority: Medium     Kyphoscoliosis and scoliosis 04/10/2013     Priority: Medium     Insomnia, medical condition 04/10/2013     Priority: Medium     History of colonic polyps 04/10/2013     Priority: Medium     Nursing Home Visit 01/19/2016     Priority: Low     Health Care Home 12/09/2015     Priority: Low     Class: Chronic          Social History     Socioeconomic History     Marital status:      Spouse name: Not on file     Number of children: Not on file     Years of education: Not on file     Highest education level: Not on file   Social Needs     Financial resource strain: Not on file     Food insecurity - worry: Not on file     Food insecurity - inability: Not on file     Transportation needs - medical: Not on file     Transportation needs - non-medical: Not on file   Occupational History     Occupation: Retired     Employer: [employer]   Tobacco Use     Smoking status: Never Smoker     Smokeless tobacco: Never Used     Tobacco comment: 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   Substance and Sexual Activity     Alcohol use: Yes     Alcohol/week: 0.0 oz     Comment: Previously drank 4 beers daily, now only occasional drinks     Drug use: No     Sexual activity: No     Comment:    Other Topics Concern      Service No     Blood Transfusions Yes     Comment: Permits if needed     Caffeine Concern Yes     Comment: Coffee, 2 cups daily     Occupational Exposure No     Hobby Hazards No     Sleep Concern No     Stress Concern No     Weight Concern No     Special Diet No     Back Care No     Exercise No     Bike Helmet Not Asked     Seat Belt Yes     Self-Exams Yes     Parent/sibling w/ CABG, MI or angioplasty before 65F 55M? No   Social History Narrative     Not on file        Current Outpatient Medications   Medication Sig     Dextromethorphan-guaiFENesin   MG/5ML syrup Take 5 mLs by mouth every 4 hours as needed for cough     ACETAMINOPHEN PO Take 1,000 mg by mouth 2 times daily     ACETAMINOPHEN PO Take 650 mg by mouth every 4 hours as needed for pain     AMOXICILLIN PO Take 500 mg by mouth as needed (GIVE ONE HOUR PRIOIR TO DENTAL APTS FOR ISCHEMIC HEART DISEASE) Give 4 tablets as needed      Artificial Saliva (BIOTENE MOISTURIZING MOUTH MT) Take 1 spray by mouth every 2 hours as needed     ASPIRIN PO Take 81 mg by mouth TAKE 1 TABLET PO EVERY 2 DAYS     ATORVASTATIN CALCIUM PO Take 40 mg by mouth daily     calcium carbonate (TUMS) 500 MG chewable tablet Take 2 chew tab by mouth 2 times daily      carbidopa-levodopa (SINEMET)  MG per tablet Take 2 tablets by mouth 2 times daily     chlorhexidine (PERIDEX) 0.12 % solution GIVE 15 ML BY MOUTH TWICE A DAY AT 8 AM AND 8PM. SWISH 2 MIN AND SPITFOR PERIDEX     Cholecalciferol (VITAMIN D3) 2000 UNITS CAPS Take 2,000 Units by mouth daily     gabapentin (NEURONTIN) 100 MG capsule TAKE 1 CAPSULE BY MOUTH AT BEDTIME     ketoconazole (NIZORAL) 2 % shampoo Apply topically once a week     Mirabegron (MYRBETRIQ PO) Take 25 mg by mouth daily     order for DME Equipment being ordered: neoprene knee sleeve     pantoprazole (PROTONIX) 40 MG EC tablet TAKE ONE (1) TABLET BY MOUTH DAILY FOR PROTONIX     polyethylene glycol (MIRALAX) powder Take 17 g by mouth daily     polyethylene glycol 0.4%- propylene glycol 0.3% (SYSTANE) 0.4-0.3 % SOLN ophthalmic solution Place 2 drops into both eyes 4 times daily as needed for dry eyes (Patient taking differently: Place 2 drops into both eyes 4 times daily )     Rivaroxaban (XARELTO PO) Take 20 mg by mouth daily     senna-docusate (SENOKOT-S;PERICOLACE) 8.6-50 MG per tablet Take 1 tablet by mouth 2 times daily     No current facility-administered medications for this visit.        Allergies   Allergen Reactions     Ambien      Zolpidem Tartrate     Tramadol        I have reviewed the care  plan and do agree with the plan.    ROS:  No chest pain, shortness of breath, fever, chills, headache, nausea, vomiting, dysuria, or changes in bowel habits.  Appetite is normal.  Left shoulder/back  pain noted.          OBJECTIVE:  /68   Pulse 68   Temp 98.2  F (36.8  C)   Resp 12   Wt 50.8 kg (112 lb)   SpO2 96%   BMI 20.49 kg/m      GENERAL:  Chronically ill appearing, alert, and in no acute distress  RESP:  Lungs clear.  No rales, rhonchi, or wheezing  CV:  RRR.  S1 S2 without murmur. No clicks or rubs.  SKIN:  Age-related changes.  No suspicious lesions or rashes.  PSYCH:  Mentation intact, affect bright, and orientation intact.  EXTREM:  Trace edema.  Pulses palpable. There is as small joint effusion noted left knee.  Some tenderness noted diffusely.            Lab/Diagnostic data:    none    ASSESSMENT/ORDERS:  Nursing Home Visit  Other issues stable.  No changes in current medications or care plan.      Total time spent with patient visit was 25 min including patient visit, review of pertinent clinical information, and treatment plan.      Mario Fofana MD

## 2019-01-01 ENCOUNTER — HOSPITAL ENCOUNTER (EMERGENCY)
Facility: HOSPITAL | Age: 84
Discharge: NURSING, REHAB OR LONG-TERM CARE | End: 2019-10-05
Attending: FAMILY MEDICINE | Admitting: FAMILY MEDICINE
Payer: MEDICARE

## 2019-01-01 ENCOUNTER — MEDICAL CORRESPONDENCE (OUTPATIENT)
Dept: HEALTH INFORMATION MANAGEMENT | Facility: CLINIC | Age: 84
End: 2019-01-01

## 2019-01-01 ENCOUNTER — OFFICE VISIT (OUTPATIENT)
Dept: FAMILY MEDICINE | Facility: OTHER | Age: 84
End: 2019-01-01
Attending: FAMILY MEDICINE
Payer: MEDICARE

## 2019-01-01 ENCOUNTER — ANCILLARY PROCEDURE (OUTPATIENT)
Dept: GENERAL RADIOLOGY | Facility: OTHER | Age: 84
End: 2019-01-01
Attending: FAMILY MEDICINE
Payer: MEDICARE

## 2019-01-01 ENCOUNTER — TRANSFERRED RECORDS (OUTPATIENT)
Dept: HEALTH INFORMATION MANAGEMENT | Facility: CLINIC | Age: 84
End: 2019-01-01

## 2019-01-01 ENCOUNTER — RESULTS ONLY (OUTPATIENT)
Dept: LAB | Age: 84
End: 2019-01-01

## 2019-01-01 ENCOUNTER — TELEPHONE (OUTPATIENT)
Dept: FAMILY MEDICINE | Facility: OTHER | Age: 84
End: 2019-01-01

## 2019-01-01 ENCOUNTER — APPOINTMENT (OUTPATIENT)
Dept: GENERAL RADIOLOGY | Facility: HOSPITAL | Age: 84
End: 2019-01-01
Attending: FAMILY MEDICINE
Payer: MEDICARE

## 2019-01-01 ENCOUNTER — HOSPITAL ENCOUNTER (EMERGENCY)
Facility: HOSPITAL | Age: 84
Discharge: HOME | End: 2019-10-01
Attending: PHYSICIAN ASSISTANT | Admitting: PHYSICIAN ASSISTANT
Payer: MEDICARE

## 2019-01-01 ENCOUNTER — NURSING HOME VISIT (OUTPATIENT)
Dept: FAMILY MEDICINE | Facility: OTHER | Age: 84
End: 2019-01-01
Payer: MEDICARE

## 2019-01-01 ENCOUNTER — APPOINTMENT (OUTPATIENT)
Dept: GENERAL RADIOLOGY | Facility: HOSPITAL | Age: 84
End: 2019-01-01
Attending: PHYSICIAN ASSISTANT
Payer: MEDICARE

## 2019-01-01 VITALS
SYSTOLIC BLOOD PRESSURE: 191 MMHG | DIASTOLIC BLOOD PRESSURE: 76 MMHG | TEMPERATURE: 99.1 F | HEART RATE: 61 BPM | OXYGEN SATURATION: 93 %

## 2019-01-01 VITALS
OXYGEN SATURATION: 95 % | TEMPERATURE: 97 F | RESPIRATION RATE: 20 BRPM | DIASTOLIC BLOOD PRESSURE: 96 MMHG | HEART RATE: 76 BPM | SYSTOLIC BLOOD PRESSURE: 194 MMHG

## 2019-01-01 VITALS
RESPIRATION RATE: 12 BRPM | DIASTOLIC BLOOD PRESSURE: 64 MMHG | OXYGEN SATURATION: 96 % | HEART RATE: 71 BPM | BODY MASS INDEX: 20.49 KG/M2 | SYSTOLIC BLOOD PRESSURE: 124 MMHG | WEIGHT: 112 LBS | TEMPERATURE: 98.9 F

## 2019-01-01 VITALS
OXYGEN SATURATION: 95 % | SYSTOLIC BLOOD PRESSURE: 108 MMHG | DIASTOLIC BLOOD PRESSURE: 58 MMHG | TEMPERATURE: 97.4 F | RESPIRATION RATE: 16 BRPM | HEART RATE: 72 BPM

## 2019-01-01 DIAGNOSIS — R53.83 FATIGUE: ICD-10-CM

## 2019-01-01 DIAGNOSIS — Z78.9 NURSING HOME RESIDENT: Primary | ICD-10-CM

## 2019-01-01 DIAGNOSIS — M62.81 GENERALIZED MUSCLE WEAKNESS: ICD-10-CM

## 2019-01-01 DIAGNOSIS — G89.4 CHRONIC PAIN SYNDROME: Primary | ICD-10-CM

## 2019-01-01 DIAGNOSIS — I25.10 CORONARY ARTERY DISEASE INVOLVING NATIVE CORONARY ARTERY OF NATIVE HEART, ANGINA PRESENCE UNSPECIFIED: Primary | ICD-10-CM

## 2019-01-01 DIAGNOSIS — R40.4 TRANSIENT ALTERATION OF AWARENESS: ICD-10-CM

## 2019-01-01 DIAGNOSIS — M79.672 LEFT FOOT PAIN: Primary | ICD-10-CM

## 2019-01-01 DIAGNOSIS — M79.672 LEFT FOOT PAIN: ICD-10-CM

## 2019-01-01 DIAGNOSIS — J18.9 PNEUMONIA OF BOTH LOWER LOBES DUE TO INFECTIOUS ORGANISM: ICD-10-CM

## 2019-01-01 DIAGNOSIS — J18.9 PNEUMONIA: ICD-10-CM

## 2019-01-01 LAB
ALBUMIN SERPL-MCNC: 3.1 G/DL (ref 3.4–5)
ALBUMIN SERPL-MCNC: 3.7 G/DL (ref 3.4–5)
ALP SERPL-CCNC: 59 U/L (ref 40–150)
ALP SERPL-CCNC: 63 U/L (ref 40–150)
ALT SERPL W P-5'-P-CCNC: 12 U/L (ref 0–50)
ALT SERPL W P-5'-P-CCNC: 14 U/L (ref 0–50)
ANION GAP SERPL CALCULATED.3IONS-SCNC: 5 MMOL/L (ref 3–14)
ANION GAP SERPL CALCULATED.3IONS-SCNC: 7 MMOL/L (ref 3–14)
AST SERPL W P-5'-P-CCNC: 14 U/L (ref 0–45)
AST SERPL W P-5'-P-CCNC: 14 U/L (ref 0–45)
BASOPHILS # BLD AUTO: 0 10E9/L (ref 0–0.2)
BASOPHILS # BLD AUTO: 0 10E9/L (ref 0–0.2)
BASOPHILS NFR BLD AUTO: 0 %
BASOPHILS NFR BLD AUTO: 0.1 %
BILIRUB SERPL-MCNC: 0.4 MG/DL (ref 0.2–1.3)
BILIRUB SERPL-MCNC: 0.6 MG/DL (ref 0.2–1.3)
BUN SERPL-MCNC: 16 MG/DL (ref 7–30)
BUN SERPL-MCNC: 18 MG/DL (ref 7–30)
CALCIUM SERPL-MCNC: 10.2 MG/DL (ref 8.5–10.1)
CALCIUM SERPL-MCNC: 9.5 MG/DL (ref 8.5–10.1)
CHLORIDE SERPL-SCNC: 113 MMOL/L (ref 94–109)
CHLORIDE SERPL-SCNC: 113 MMOL/L (ref 94–109)
CO2 SERPL-SCNC: 27 MMOL/L (ref 20–32)
CO2 SERPL-SCNC: 28 MMOL/L (ref 20–32)
CREAT SERPL-MCNC: 0.64 MG/DL (ref 0.4–1)
CREAT SERPL-MCNC: 0.65 MG/DL (ref 0.52–1.04)
CREAT SERPL-MCNC: 0.69 MG/DL (ref 0.52–1.04)
CREAT SERPL-MCNC: 0.73 MG/DL (ref 0.4–1)
CREAT SERPL-MCNC: 0.78 MG/DL (ref 0.4–1)
DIFFERENTIAL METHOD BLD: ABNORMAL
DIFFERENTIAL METHOD BLD: ABNORMAL
EOSINOPHIL # BLD AUTO: 0.1 10E9/L (ref 0–0.7)
EOSINOPHIL # BLD AUTO: 0.2 10E9/L (ref 0–0.7)
EOSINOPHIL NFR BLD AUTO: 0.9 %
EOSINOPHIL NFR BLD AUTO: 2.2 %
ERYTHROCYTE [DISTWIDTH] IN BLOOD BY AUTOMATED COUNT: 13.5 % (ref 10–15)
ERYTHROCYTE [DISTWIDTH] IN BLOOD BY AUTOMATED COUNT: 13.6 % (ref 10–15)
FLUAV+FLUBV RNA SPEC QL NAA+PROBE: NEGATIVE
FLUAV+FLUBV RNA SPEC QL NAA+PROBE: NEGATIVE
GFR SERPL CREATININE-BSD FRML MDRD: 77 ML/MIN/{1.73_M2}
GFR SERPL CREATININE-BSD FRML MDRD: 78 ML/MIN/{1.73_M2}
GFR SERPL CREATININE-BSD FRML MDRD: >60 ML/MIN/1.73M2
GLUCOSE SERPL-MCNC: 76 MG/DL (ref 70–99)
GLUCOSE SERPL-MCNC: 77 MG/DL (ref 70–99)
GLUCOSE SERPL-MCNC: 78 MG/DL (ref 70–99)
GLUCOSE SERPL-MCNC: 82 MG/DL (ref 70–99)
GLUCOSE SERPL-MCNC: 99 MG/DL (ref 70–99)
HCT VFR BLD AUTO: 43.2 % (ref 35–47)
HCT VFR BLD AUTO: 46 % (ref 35–47)
HGB BLD-MCNC: 14.2 G/DL (ref 11.7–15.7)
HGB BLD-MCNC: 14.3 G/DL (ref 11.7–15.7)
IMM GRANULOCYTES # BLD: 0 10E9/L (ref 0–0.4)
IMM GRANULOCYTES # BLD: 0 10E9/L (ref 0–0.4)
IMM GRANULOCYTES NFR BLD: 0.2 %
IMM GRANULOCYTES NFR BLD: 0.3 %
LACTATE BLD-SCNC: 1.6 MMOL/L (ref 0.7–2)
LYMPHOCYTES # BLD AUTO: 1.5 10E9/L (ref 0.8–5.3)
LYMPHOCYTES # BLD AUTO: 1.9 10E9/L (ref 0.8–5.3)
LYMPHOCYTES NFR BLD AUTO: 15 %
LYMPHOCYTES NFR BLD AUTO: 28.4 %
MCH RBC QN AUTO: 30.6 PG (ref 26.5–33)
MCH RBC QN AUTO: 30.9 PG (ref 26.5–33)
MCHC RBC AUTO-ENTMCNC: 31.1 G/DL (ref 31.5–36.5)
MCHC RBC AUTO-ENTMCNC: 32.9 G/DL (ref 31.5–36.5)
MCV RBC AUTO: 94 FL (ref 78–100)
MCV RBC AUTO: 98 FL (ref 78–100)
MONOCYTES # BLD AUTO: 0.5 10E9/L (ref 0–1.3)
MONOCYTES # BLD AUTO: 0.7 10E9/L (ref 0–1.3)
MONOCYTES NFR BLD AUTO: 6.6 %
MONOCYTES NFR BLD AUTO: 7 %
NEUTROPHILS # BLD AUTO: 4.3 10E9/L (ref 1.6–8.3)
NEUTROPHILS # BLD AUTO: 7.5 10E9/L (ref 1.6–8.3)
NEUTROPHILS NFR BLD AUTO: 62.4 %
NEUTROPHILS NFR BLD AUTO: 76.9 %
NRBC # BLD AUTO: 0 10*3/UL
NRBC # BLD AUTO: 0 10*3/UL
NRBC BLD AUTO-RTO: 0 /100
NRBC BLD AUTO-RTO: 0 /100
PLATELET # BLD AUTO: 108 10E9/L (ref 150–450)
PLATELET # BLD AUTO: 147 10E9/L (ref 150–450)
POTASSIUM SERPL-SCNC: 3.1 MEQ/L (ref 3.4–5.1)
POTASSIUM SERPL-SCNC: 3.4 MMOL/L (ref 3.4–5.3)
POTASSIUM SERPL-SCNC: 3.5 MMOL/L (ref 3.4–5.3)
POTASSIUM SERPL-SCNC: 3.8 MEQ/L (ref 3.4–5.1)
POTASSIUM SERPL-SCNC: 4.1 MEQ/L (ref 3.4–5.1)
PROT SERPL-MCNC: 6.4 G/DL (ref 6.8–8.8)
PROT SERPL-MCNC: 7.4 G/DL (ref 6.8–8.8)
RBC # BLD AUTO: 4.6 10E12/L (ref 3.8–5.2)
RBC # BLD AUTO: 4.68 10E12/L (ref 3.8–5.2)
RSV RNA SPEC NAA+PROBE: NEGATIVE
SODIUM SERPL-SCNC: 146 MMOL/L (ref 133–144)
SODIUM SERPL-SCNC: 147 MMOL/L (ref 133–144)
SPECIMEN SOURCE: NORMAL
WBC # BLD AUTO: 6.8 10E9/L (ref 4–11)
WBC # BLD AUTO: 9.7 10E9/L (ref 4–11)

## 2019-01-01 PROCEDURE — 71045 X-RAY EXAM CHEST 1 VIEW: CPT | Mod: TC

## 2019-01-01 PROCEDURE — 93005 ELECTROCARDIOGRAM TRACING: CPT

## 2019-01-01 PROCEDURE — 36415 COLL VENOUS BLD VENIPUNCTURE: CPT | Performed by: PHYSICIAN ASSISTANT

## 2019-01-01 PROCEDURE — 80053 COMPREHEN METABOLIC PANEL: CPT | Performed by: PHYSICIAN ASSISTANT

## 2019-01-01 PROCEDURE — 25000128 H RX IP 250 OP 636: Performed by: PHYSICIAN ASSISTANT

## 2019-01-01 PROCEDURE — 99284 EMERGENCY DEPT VISIT MOD MDM: CPT | Mod: 25

## 2019-01-01 PROCEDURE — 99213 OFFICE O/P EST LOW 20 MIN: CPT | Performed by: FAMILY MEDICINE

## 2019-01-01 PROCEDURE — 99308 SBSQ NF CARE LOW MDM 20: CPT | Performed by: FAMILY MEDICINE

## 2019-01-01 PROCEDURE — 25800030 ZZH RX IP 258 OP 636: Performed by: FAMILY MEDICINE

## 2019-01-01 PROCEDURE — 99284 EMERGENCY DEPT VISIT MOD MDM: CPT | Mod: Z6 | Performed by: PHYSICIAN ASSISTANT

## 2019-01-01 PROCEDURE — 96365 THER/PROPH/DIAG IV INF INIT: CPT

## 2019-01-01 PROCEDURE — 99284 EMERGENCY DEPT VISIT MOD MDM: CPT | Mod: Z6 | Performed by: FAMILY MEDICINE

## 2019-01-01 PROCEDURE — G0463 HOSPITAL OUTPT CLINIC VISIT: HCPCS

## 2019-01-01 PROCEDURE — 96361 HYDRATE IV INFUSION ADD-ON: CPT

## 2019-01-01 PROCEDURE — 96360 HYDRATION IV INFUSION INIT: CPT

## 2019-01-01 PROCEDURE — 25000132 ZZH RX MED GY IP 250 OP 250 PS 637: Mod: GY | Performed by: PHYSICIAN ASSISTANT

## 2019-01-01 PROCEDURE — 80053 COMPREHEN METABOLIC PANEL: CPT | Performed by: FAMILY MEDICINE

## 2019-01-01 PROCEDURE — 85025 COMPLETE CBC W/AUTO DIFF WBC: CPT | Performed by: FAMILY MEDICINE

## 2019-01-01 PROCEDURE — 73630 X-RAY EXAM OF FOOT: CPT | Mod: TC,LT

## 2019-01-01 PROCEDURE — 85025 COMPLETE CBC W/AUTO DIFF WBC: CPT | Performed by: PHYSICIAN ASSISTANT

## 2019-01-01 PROCEDURE — 83605 ASSAY OF LACTIC ACID: CPT | Performed by: FAMILY MEDICINE

## 2019-01-01 PROCEDURE — 36415 COLL VENOUS BLD VENIPUNCTURE: CPT | Performed by: FAMILY MEDICINE

## 2019-01-01 RX ORDER — SODIUM CHLORIDE 9 MG/ML
1000 INJECTION, SOLUTION INTRAVENOUS CONTINUOUS
Status: DISCONTINUED | OUTPATIENT
Start: 2019-01-01 | End: 2019-01-01 | Stop reason: HOSPADM

## 2019-01-01 RX ORDER — LEVOFLOXACIN 500 MG/1
500 TABLET, FILM COATED ORAL ONCE
Status: COMPLETED | OUTPATIENT
Start: 2019-01-01 | End: 2019-01-01

## 2019-01-01 RX ORDER — LEVOFLOXACIN 500 MG/1
500 TABLET, FILM COATED ORAL DAILY
Qty: 7 TABLET | Refills: 0 | Status: SHIPPED | OUTPATIENT
Start: 2019-01-01

## 2019-01-01 RX ORDER — CEFTRIAXONE SODIUM 2 G/50ML
2 INJECTION, SOLUTION INTRAVENOUS ONCE
Status: COMPLETED | OUTPATIENT
Start: 2019-01-01 | End: 2019-01-01

## 2019-01-01 RX ORDER — CLONIDINE HYDROCHLORIDE 0.1 MG/1
0.1 TABLET ORAL ONCE
Status: COMPLETED | OUTPATIENT
Start: 2019-01-01 | End: 2019-01-01

## 2019-01-01 RX ORDER — SODIUM CHLORIDE, SODIUM LACTATE, POTASSIUM CHLORIDE, CALCIUM CHLORIDE 600; 310; 30; 20 MG/100ML; MG/100ML; MG/100ML; MG/100ML
INJECTION, SOLUTION INTRAVENOUS CONTINUOUS
Status: DISCONTINUED | OUTPATIENT
Start: 2019-01-01 | End: 2019-01-01 | Stop reason: HOSPADM

## 2019-01-01 RX ORDER — RIVAROXABAN 20 MG/1
TABLET, FILM COATED ORAL
Qty: 30 TABLET | Refills: 5 | Status: SHIPPED | OUTPATIENT
Start: 2019-01-01

## 2019-01-01 RX ORDER — IPRATROPIUM BROMIDE AND ALBUTEROL SULFATE 2.5; .5 MG/3ML; MG/3ML
3 SOLUTION RESPIRATORY (INHALATION) ONCE
Status: DISCONTINUED | OUTPATIENT
Start: 2019-01-01 | End: 2019-01-01 | Stop reason: HOSPADM

## 2019-01-01 RX ADMIN — CEFTRIAXONE SODIUM 2 G: 2 INJECTION, SOLUTION INTRAVENOUS at 19:17

## 2019-01-01 RX ADMIN — LEVOFLOXACIN 500 MG: 500 TABLET, FILM COATED ORAL at 19:57

## 2019-01-01 RX ADMIN — CLONIDINE HYDROCHLORIDE 0.1 MG: 0.1 TABLET ORAL at 19:33

## 2019-01-01 RX ADMIN — SODIUM CHLORIDE, POTASSIUM CHLORIDE, SODIUM LACTATE AND CALCIUM CHLORIDE: 600; 310; 30; 20 INJECTION, SOLUTION INTRAVENOUS at 12:11

## 2019-01-01 RX ADMIN — SODIUM CHLORIDE 1000 ML: 9 INJECTION, SOLUTION INTRAVENOUS at 18:01

## 2019-01-01 ASSESSMENT — ENCOUNTER SYMPTOMS
FATIGUE: 1
DYSPHORIC MOOD: 1
FEVER: 0
ABDOMINAL PAIN: 0
BRUISES/BLEEDS EASILY: 1
VOMITING: 0
PALPITATIONS: 0
ABDOMINAL PAIN: 0
COUGH: 0
NEUROLOGICAL NEGATIVE: 1
ARTHRALGIAS: 1
SHORTNESS OF BREATH: 0
ACTIVITY CHANGE: 1
NAUSEA: 0
HEADACHES: 0
WEAKNESS: 1
SHORTNESS OF BREATH: 1

## 2019-01-01 ASSESSMENT — PAIN SCALES - GENERAL: PAINLEVEL: MODERATE PAIN (5)

## 2019-02-18 NOTE — TELEPHONE ENCOUNTER
acetaminophen-codeine (TYLENOL #3) 300-30 MG tablet      Last Written Prescription Date:  Historic med  Last Fill Quantity: -,   # refills: -  Last Office Visit: 1/17/19  Future Office visit:       Routing refill request to provider for review/approval because:  Drug not on the [facility], P or [facility] refill protocol or controlled substance  Medication is reported/historical

## 2019-02-20 NOTE — NURSING NOTE
"Chief Complaint   Patient presents with     Care Coordination Nursing Home       Initial /64   Pulse 71   Temp 98.9  F (37.2  C)   Resp 12   Wt 50.8 kg (112 lb)   SpO2 96%   BMI 20.49 kg/m   Estimated body mass index is 20.49 kg/m  as calculated from the following:    Height as of 11/1/17: 1.575 m (5' 2\").    Weight as of this encounter: 50.8 kg (112 lb).  Medication Reconciliation: complete    Melinda Schreiber LPN  "

## 2019-02-22 NOTE — PROGRESS NOTES
HISTORY OF PRESENT ILLNESS:  Britney is a 89 year old female (12/29/1929)  resident of [facility]  who is being seen today for a routine 30 day follow up. She continues with dry nose and has developed left eye irritation and discharge.  Staff notes no other issues.    Current medications, allergies, and interdisciplinary care plan are reviewed.      Patient Active Problem List    Diagnosis Date Noted     Paroxysmal atrial fibrillation (H) 04/03/2018     Priority: High     Parkinson disease (H) 08/10/2015     Priority: High     CHD (coronary heart disease) 06/15/2015     Priority: High     03/2015  NSTEMI S/P angioplasty and stenting (ELIZABETH) LAD and D1       HF (heart failure) (H) 06/15/2015     Priority: High     TIA (transient ischemic attack) 03/16/2015     Priority: High     Neuralgia, post-herpetic 03/19/2014     Priority: High     Cerebrovascular disease 04/10/2013     Priority: High     HTN (hypertension) 09/24/2006     Priority: High     Pneumonia 10/22/2017     Priority: Medium     Abdominal pain 10/22/2017     Priority: Medium     Colitis 10/07/2017     Priority: Medium     Glaucoma 12/27/2016     Priority: Medium     History of poliomyelitis 12/27/2016     Priority: Medium     Osteoarthritis, multiple joints 12/13/2015     Priority: Medium     Fibrocystic breast disease, left 12/13/2015     Priority: Medium     Constipation, chronic 04/20/2015     Priority: Medium     Hiatal hernia 03/16/2015     Priority: Medium     Chronic cough 07/15/2014     Priority: Medium     OAB (overactive bladder) 03/19/2014     Priority: Medium     S/P InterStim placement and multiple botox injections       Dyslipidemia 04/10/2013     Priority: Medium     Hemorrhoids 04/10/2013     Priority: Medium     Perennial allergic rhinitis 04/10/2013     Priority: Medium     Asthma, mild persistent 04/10/2013     Priority: Medium              GERD (gastroesophageal reflux disease) 04/10/2013     Priority: Medium      Osteoporosis 04/10/2013     Priority: Medium     Kyphoscoliosis and scoliosis 04/10/2013     Priority: Medium     Insomnia, medical condition 04/10/2013     Priority: Medium     History of colonic polyps 04/10/2013     Priority: Medium     Nursing Home Visit 01/19/2016     Priority: Low     Health Care Home 12/09/2015     Priority: Low     Class: Chronic          Social History     Socioeconomic History     Marital status:      Spouse name: Not on file     Number of children: Not on file     Years of education: Not on file     Highest education level: Not on file   Occupational History     Occupation: Retired     Employer: [employer]   Social Needs     Financial resource strain: Not on file     Food insecurity:     Worry: Not on file     Inability: Not on file     Transportation needs:     Medical: Not on file     Non-medical: Not on file   Tobacco Use     Smoking status: Never Smoker     Smokeless tobacco: Never Used     Tobacco comment: 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   Substance and Sexual Activity     Alcohol use: Yes     Alcohol/week: 0.0 oz     Comment: Previously drank 4 beers daily, now only occasional drinks     Drug use: No     Sexual activity: No     Comment:    Lifestyle     Physical activity:     Days per week: Not on file     Minutes per session: Not on file     Stress: Not on file   Relationships     Social connections:     Talks on phone: Not on file     Gets together: Not on file     Attends Quaker service: Not on file     Active member of club or organization: Not on file     Attends meetings of clubs or organizations: Not on file     Relationship status: Not on file     Intimate partner violence:     Fear of current or ex partner: Not on file     Emotionally abused: Not on file     Physically abused: Not on file     Forced sexual activity: Not on file   Other Topics Concern      Service No     Blood Transfusions Yes     Comment:  Permits if needed     Caffeine Concern Yes     Comment: Coffee, 2 cups daily     Occupational Exposure No     Hobby Hazards No     Sleep Concern No     Stress Concern No     Weight Concern No     Special Diet No     Back Care No     Exercise No     Bike Helmet Not Asked     Seat Belt Yes     Self-Exams Yes     Parent/sibling w/ CABG, MI or angioplasty before 65F 55M? No   Social History Narrative     Not on file        Current Outpatient Medications   Medication Sig     acetaminophen-codeine (TYLENOL #3) 300-30 MG tablet TAKE ONE TABLET BY MOUTH TWICE DAILY FOR tylenol#3     AMOXICILLIN PO Take 500 mg by mouth as needed (GIVE ONE HOUR PRIOIR TO DENTAL APTS FOR ISCHEMIC HEART DISEASE) Give 4 tablets as needed      Artificial Saliva (BIOTENE MOISTURIZING MOUTH MT) Take 1 spray by mouth every 2 hours as needed     ASPIRIN PO Take 81 mg by mouth TAKE 1 TABLET PO EVERY 2 DAYS     ATORVASTATIN CALCIUM PO Take 40 mg by mouth daily     calcium carbonate (TUMS) 500 MG chewable tablet Take 2 chew tab by mouth 2 times daily      carbidopa-levodopa (SINEMET)  MG per tablet Take 2 tablets by mouth 2 times daily     chlorhexidine (PERIDEX) 0.12 % solution GIVE 15 ML BY MOUTH TWICE A DAY AT 8 AM AND 8PM. SWISH 2 MIN AND SPITFOR PERIDEX     Cholecalciferol (VITAMIN D3) 2000 UNITS CAPS Take 2,000 Units by mouth daily     Dextromethorphan-guaiFENesin  MG/5ML syrup Take 5 mLs by mouth every 4 hours as needed for cough     gabapentin (NEURONTIN) 100 MG capsule TAKE 1 CAPSULE BY MOUTH AT BEDTIME     ketoconazole (NIZORAL) 2 % shampoo Apply topically once a week     Mirabegron (MYRBETRIQ PO) Take 25 mg by mouth daily     Multiple Vitamins-Minerals (PRESERVISION AREDS 2) CAPS Take 1 capsule by mouth 2 times daily     multivitamin w/minerals (THERA-VIT-M) tablet Take 1 tablet by mouth daily     order for DME Equipment being ordered: neoprene knee sleeve     pantoprazole (PROTONIX) 40 MG EC tablet TAKE ONE (1) TABLET BY MOUTH  DAILY FOR PROTONIX     polyethylene glycol (MIRALAX) powder Take 17 g by mouth daily     polyethylene glycol 0.4%- propylene glycol 0.3% (SYSTANE) 0.4-0.3 % SOLN ophthalmic solution Place 2 drops into both eyes 4 times daily as needed for dry eyes (Patient taking differently: Place 2 drops into both eyes 4 times daily )     Rivaroxaban (XARELTO PO) Take 20 mg by mouth daily     senna-docusate (SENOKOT-S;PERICOLACE) 8.6-50 MG per tablet Take 1 tablet by mouth 2 times daily     No current facility-administered medications for this visit.        Allergies   Allergen Reactions     Ambien      Zolpidem Tartrate     Tramadol        I have reviewed the care plan and do agree with the plan.    ROS:  No chest pain, shortness of breath, fever, chills, headache, nausea, vomiting, dysuria, or changes in bowel habits.  Appetite is normal.  Left shoulder/back  pain noted.          OBJECTIVE:  /64   Pulse 71   Temp 98.9  F (37.2  C)   Resp 12   Wt 50.8 kg (112 lb)   SpO2 96%   BMI 20.49 kg/m      GENERAL:  Chronically ill appearing, alert, and in no acute distress  RESP:  Lungs clear.  No rales, rhonchi, or wheezing  CV:  RRR.  S1 S2 without murmur. No clicks or rubs.  SKIN:  Age-related changes.  No suspicious lesions or rashes.  PSYCH:  Mentation intact, affect bright, and orientation intact.  EXTREM:  Trace edema.  Pulses palpable. There is as small joint effusion noted left knee.  Some tenderness noted diffusely. Examination of the left foot sows a callous with central eschar noted over the base of the 5th metatarsal.            Lab/Diagnostic data:    none    ASSESSMENT/ORDERS:  Nursing Home Visit  Will continue protection as there is no open area. Other issues stable.  No changes in current medications or care plan.      Total time spent with patient visit was 25 min including patient visit, review of pertinent clinical information, and treatment plan.      Mario Fofana MD

## 2019-03-27 NOTE — TELEPHONE ENCOUNTER
XARELTO 20 MG TABS tablet  Last Written Prescription Date:  Historical medication   Last Fill Quantity: 0,   # refills: 0  Last Office Visit: 3/25/19  Future Office visit:       Routing refill request to provider for review/approval because:  Medication is reported/historical

## 2019-04-04 NOTE — TELEPHONE ENCOUNTER
GENERIC ADULT  Britney Schaeffer is a 89 year old female who calls with left foot injury.  Staff from [facility] called stating a battery from a lift fell on patient's foot on the afternoon of April 2nd. Patient foot is slightly swollen with a bruise on top of the foot.  Staff states foot was previously distorted due to a history or polio.  Staff states she is unable to ambulate on leg due to history of Polio.  Staff states foot doesn't look infected and patient doesn't have a fever.    RECOMMENDED DISPOSITION:  Visit clinic today or tomorrow. Patient scheduled with Dr. Fofana 04/05/19 at 1:40. Staff advised if symptoms worsened to have patient seen sooner in Urgent Care.    Will comply with recommendation: Yes    If further questions/concerns or if symptoms do not improve, worsen or new symptoms develop, call your PCP or [facility] as soon as possible.      Guideline used: Adult Telephone Protocols Office Version 3rd Edition - Aron Peterson MD, FACEP      Nayely Galan RN

## 2019-04-05 NOTE — PROGRESS NOTES
SUBJECTIVE:   Britney Schaeffer is a 89 year old female who presents to clinic today for the following health issues:    Musculoskeletal problem/pain      Duration: 1 day    Description  Location: left foot    Intensity:  5/10    Accompanying signs and symptoms: none    History  Previous similar problem: no   Previous evaluation:  none    Precipitating or alleviating factors:  Trauma or overuse: YES  Aggravating factors include: none    Therapies tried and outcome: nothing    Britney had a piece of equipment fall on her left foot.  She has noted mild discomfort with swelling.    Problem list and histories reviewed & adjusted, as indicated.  Additional history: as documented    Patient Active Problem List   Diagnosis     HTN (hypertension)     Dyslipidemia     Cerebrovascular disease     Hemorrhoids     Perennial allergic rhinitis     Asthma, mild persistent     GERD (gastroesophageal reflux disease)     Osteoporosis     Kyphoscoliosis and scoliosis     Insomnia, medical condition     History of colonic polyps     OAB (overactive bladder)     Neuralgia, post-herpetic     Chronic cough     Hiatal hernia     TIA (transient ischemic attack)     Constipation, chronic     CHD (coronary heart disease)     HF (heart failure) (H)     Parkinson disease (H)     Health Care Home     Osteoarthritis, multiple joints     Fibrocystic breast disease, left     Nursing Home Visit     Glaucoma     History of poliomyelitis     Colitis     Pneumonia     Abdominal pain     Paroxysmal atrial fibrillation (H)     Past Surgical History:   Procedure Laterality Date     BREAST BIOPSY, RT/LT       BUNIONECTOMY      Bunion     COLONOSCOPY  2007     GENITOURINARY SURGERY      botox     JOINT REPLACEMENT      LT     JOINT REPLACEMENT, HIP RT/LT  2010     KERATOPLASTY PENETRATING, VITRECTOMY ANTERIOR, EXTRACAPSULAR CATARACT EXTRACTION, COMBINED  2010    LT, Cataracts       Social History     Tobacco Use     Smoking status: Never Smoker      Smokeless tobacco: Never Used     Tobacco comment: 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   Substance Use Topics     Alcohol use: Yes     Alcohol/week: 0.0 oz     Comment: Previously drank 4 beers daily, now only occasional drinks     Family History   Problem Relation Age of Onset     Cerebrovascular Disease Mother         CVA (cause of death)     Cerebrovascular Disease Father         CVA     Asthma No family hx of          Current Outpatient Medications   Medication Sig Dispense Refill     acetaminophen-codeine (TYLENOL #3) 300-30 MG tablet TAKE ONE TABLET BY MOUTH TWICE DAILY FOR tylenol#3 120 tablet 0     AMOXICILLIN PO Take 500 mg by mouth as needed (GIVE ONE HOUR PRIOIR TO DENTAL APTS FOR ISCHEMIC HEART DISEASE) Give 4 tablets as needed        Artificial Saliva (BIOTENE MOISTURIZING MOUTH MT) Take 1 spray by mouth every 2 hours as needed       ASPIRIN PO Take 81 mg by mouth TAKE 1 TABLET PO EVERY 2 DAYS       ATORVASTATIN CALCIUM PO Take 40 mg by mouth daily       calcium carbonate (TUMS) 500 MG chewable tablet Take 2 chew tab by mouth 2 times daily        carbidopa-levodopa (SINEMET)  MG per tablet Take 2 tablets by mouth 2 times daily       chlorhexidine (PERIDEX) 0.12 % solution GIVE 15 ML BY MOUTH TWICE A DAY AT 8 AM AND 8PM. SWISH 2 MIN AND SPITFOR PERIDEX 473 mL 5     Cholecalciferol (VITAMIN D3) 2000 UNITS CAPS Take 2,000 Units by mouth daily 90 capsule 3     Dextromethorphan-guaiFENesin  MG/5ML syrup Take 5 mLs by mouth every 4 hours as needed for cough       gabapentin (NEURONTIN) 100 MG capsule TAKE 1 CAPSULE BY MOUTH AT BEDTIME 30 capsule 0     ketoconazole (NIZORAL) 2 % shampoo Apply topically once a week       Mirabegron (MYRBETRIQ PO) Take 25 mg by mouth daily       Multiple Vitamins-Minerals (PRESERVISION AREDS 2) CAPS Take 1 capsule by mouth 2 times daily       multivitamin w/minerals (THERA-VIT-M) tablet Take 1 tablet by mouth daily       order for DME  Equipment being ordered: neoprene knee sleeve 1 Device 0     pantoprazole (PROTONIX) 40 MG EC tablet TAKE ONE (1) TABLET BY MOUTH DAILY FOR PROTONIX 90 tablet 3     polyethylene glycol (MIRALAX) powder Take 17 g by mouth daily 500 g 3     polyethylene glycol 0.4%- propylene glycol 0.3% (SYSTANE) 0.4-0.3 % SOLN ophthalmic solution Place 2 drops into both eyes 4 times daily as needed for dry eyes (Patient taking differently: Place 2 drops into both eyes 4 times daily ) 3 Bottle 1     Rivaroxaban (XARELTO PO) Take 20 mg by mouth daily       senna-docusate (SENOKOT-S;PERICOLACE) 8.6-50 MG per tablet Take 1 tablet by mouth 2 times daily       XARELTO 20 MG TABS tablet TAKE ONE (1) TABLET BY MOUTH DAILY WITH SUPPER FOR XARELTO 30 tablet 5     Allergies   Allergen Reactions     Ambien      Zolpidem Tartrate     Tramadol      BP Readings from Last 3 Encounters:   04/05/19 108/58   02/20/19 124/64   01/16/19 110/56    Wt Readings from Last 3 Encounters:   02/20/19 50.8 kg (112 lb)   01/16/19 49.5 kg (109 lb 1.6 oz)   12/12/18 50.8 kg (112 lb)                    Reviewed and updated as needed this visit by clinical staff  Tobacco  Allergies  Meds  Problems  Med Hx  Surg Hx  Fam Hx       Reviewed and updated as needed this visit by Provider         ROS:  Constitutional, HEENT, cardiovascular, pulmonary, gi and gu systems are negative, except as otherwise noted.    OBJECTIVE:     /58 (BP Location: Right arm, Patient Position: Sitting, Cuff Size: Adult Regular)   Pulse 72   Temp 97.4  F (36.3  C) (Tympanic)   Resp 16   SpO2 95%   There is no height or weight on file to calculate BMI.  Physical Exam   Constitutional: She is oriented to person, place, and time. She appears well-developed and well-nourished. No distress.   Musculoskeletal:   Examination of the left foot shows post polio changes.  There is ecchymoses noted on the dorsal aspect.  Mild swelling.  Tenderness noted forefoot.   Neurological: She is alert  and oriented to person, place, and time.   Psychiatric: She has a normal mood and affect.       Other exam not repeated.  Diagnostic Test Results:  Results for orders placed or performed in visit on 03/11/19   Influenza A and B and RSV PCR   Result Value Ref Range    Specimen Description Nasal     Influenza A PCR Negative NEG^Negative    Influenza B PCR Negative NEG^Negative    Resp Syncytial Virus Negative NEG^Negative       ASSESSMENT/PLAN:     Left foot pain  X ray reviewed and negative.  Suspect contusion with ecchymoses.  Discussed local cares.  Await formal Radiology review.  Follow up with persistent symptoms.  - XR Foot Left G/E 3 Views; Future            Mario Fofana MD  [facility] - [place]

## 2019-04-05 NOTE — NURSING NOTE
"Chief Complaint   Patient presents with     Musculoskeletal Problem       Initial /58 (BP Location: Right arm, Patient Position: Sitting, Cuff Size: Adult Regular)   Pulse 72   Temp 97.4  F (36.3  C) (Tympanic)   Resp 16   SpO2 95%  Estimated body mass index is 20.49 kg/m  as calculated from the following:    Height as of 11/1/17: 1.575 m (5' 2\").    Weight as of 2/20/19: 50.8 kg (112 lb).  Medication Reconciliation: complete    Melinda Schreiber LPN  "

## 2019-10-01 NOTE — TELEPHONE ENCOUNTER
"Patient is not acting her self today   Extremely fatigued is not opening eyes with the exception of response to verbal commands.  Have upgraded from Pal lift to tena lift.    Patient states that she feels like she has a cold, but staff are not noting cold symptoms.  Patient is repetitive statement \"bathroom\"     Breathing is labored but Oxygen is   94 room air   Vitals were with in normal limits   Staff concerned and family willing to do what is needed.  Staff looking for recommendations or VO for ER authorization.  Please advise   "

## 2019-10-01 NOTE — ED AVS SNAPSHOT
[facility]  [address]  [place] [address]  Phone:  [phone]                                    Britney Schaeffer   MRN: [id number]    Department:  [facility]   Date of Visit:  10/1/2019           After Visit Summary Signature Page    I have received my discharge instructions, and my questions have been answered. I have discussed any challenges I see with this plan with the nurse or doctor.    ..........................................................................................................................................  Patient/Patient Representative Signature      ..........................................................................................................................................  Patient Representative Print Name and Relationship to Patient    ..................................................               ................................................  Date                                   Time    ..........................................................................................................................................  Reviewed by Signature/Title    ...................................................              ..............................................  Date                                               Time          22EPIC Rev 08/18

## 2019-10-02 NOTE — ED NOTES
Face to face report given with opportunity to observe patient.    Report given to WENDI Tavarez RN   10/1/2019  7:04 PM

## 2019-10-02 NOTE — DISCHARGE INSTRUCTIONS
Start Levaquin daily.     Increase fluids.    Advance diet as tolerated.     Return here for any other concerns.

## 2019-10-02 NOTE — ED PROVIDER NOTES
History     Chief Complaint   Patient presents with     Fatigue     The history is provided by the patient, the nursing home and a relative.     Britney Schaeffer is a 89 year old female who presented to the emergency department via EMS for evaluation of an approximate 4 to 6-week history of progressive deconditioning and weakness.  Patient lives at a local nursing home.  She was visited by her daughter today and found her to be frail and weak.  They were concerned about her current condition and wanted her checked at the emergency department.  DNR/DNI status.  Patient is alert and oriented x4.  Pleasant and talkative.  Slightly lethargic and falls asleep easily.  Denies any questions or concerns whatsoever.    Allergies:  Allergies   Allergen Reactions     Ambien      Zolpidem Tartrate     Tramadol        Problem List:    Patient Active Problem List    Diagnosis Date Noted     Paroxysmal atrial fibrillation (H) 04/03/2018     Priority: High     Pneumonia 10/22/2017     Priority: Medium     Abdominal pain 10/22/2017     Priority: Medium     Colitis 10/07/2017     Priority: Medium     Glaucoma 12/27/2016     Priority: Medium     History of poliomyelitis 12/27/2016     Priority: Medium     Nursing Home Visit 01/19/2016     Priority: Medium     Osteoarthritis, multiple joints 12/13/2015     Priority: Medium     Fibrocystic breast disease, left 12/13/2015     Priority: Medium     Parkinson disease (H) 08/10/2015     Priority: Medium     CHD (coronary heart disease) 06/15/2015     Priority: Medium     03/2015  NSTEMI S/P angioplasty and stenting (ELIZABETH) LAD and D1       HF (heart failure) (H) 06/15/2015     Priority: Medium     Constipation, chronic 04/20/2015     Priority: Medium     Hiatal hernia 03/16/2015     Priority: Medium     TIA (transient ischemic attack) 03/16/2015     Priority: Medium     Chronic cough 07/15/2014     Priority: Medium     OAB (overactive bladder) 03/19/2014     Priority: Medium     S/P  InterStim placement and multiple botox injections       Neuralgia, post-herpetic 03/19/2014     Priority: Medium     Dyslipidemia 04/10/2013     Priority: Medium     Cerebrovascular disease 04/10/2013     Priority: Medium     Hemorrhoids 04/10/2013     Priority: Medium     Perennial allergic rhinitis 04/10/2013     Priority: Medium     Asthma, mild persistent 04/10/2013     Priority: Medium              GERD (gastroesophageal reflux disease) 04/10/2013     Priority: Medium     Osteoporosis 04/10/2013     Priority: Medium     Kyphoscoliosis and scoliosis 04/10/2013     Priority: Medium     Insomnia, medical condition 04/10/2013     Priority: Medium     History of colonic polyps 04/10/2013     Priority: Medium     HTN (hypertension) 09/24/2006     Priority: Medium     Health Care Home 12/09/2015     Priority: Low     Class: Chronic        Past Medical History:    Past Medical History:   Diagnosis Date     Allergic rhinitis, Seasonal 06/19/2000     Colonic polyps 09/28/2000     Corns and callosities 01/20/2004     dyslipidemia 09/28/2000     Fibrocystic Breast Disease 03/01/2011     GERD 03/01/2011     hemorrhoids 03/01/2011     hypertension, benign 11/22/1999     Insomnia, unspecified 02/05/2004     Osteoarthritis, generalized 11/07/2008     Osteoporosis, unspecified 04/20/2009     Polio 1931     Scoliosis (and kyphoscoliosis), idiopathic 03/01/2011     TIA 03/09/2005     Unspecified asthma(493.90) 03/11/2003       Past Surgical History:    Past Surgical History:   Procedure Laterality Date     BREAST BIOPSY, RT/LT       BUNIONECTOMY      Bunion     COLONOSCOPY  2007     GENITOURINARY SURGERY      botox     JOINT REPLACEMENT      LT     JOINT REPLACEMENT, HIP RT/LT  2010     KERATOPLASTY PENETRATING, VITRECTOMY ANTERIOR, EXTRACAPSULAR CATARACT EXTRACTION, COMBINED  2010    LT, Cataracts       Family History:    Family History   Problem Relation Age of Onset     Cerebrovascular Disease Mother         CVA (cause of  death)     Cerebrovascular Disease Father         CVA     Asthma No family hx of        Social History:  Marital Status:   [5]  Social History     Tobacco Use     Smoking status: Never Smoker     Smokeless tobacco: Never Used     Tobacco comment: 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   Substance Use Topics     Alcohol use: Yes     Alcohol/week: 0.0 standard drinks     Comment: Previously drank 4 beers daily, now only occasional drinks     Drug use: No        Medications:    acetaminophen (TYLENOL) 500 MG tablet  ATORVASTATIN CALCIUM PO  calcium carbonate (TUMS) 500 MG chewable tablet  carbidopa-levodopa (SINEMET)  MG per tablet  chlorhexidine (PERIDEX) 0.12 % solution  Cholecalciferol (VITAMIN D3) 2000 UNITS CAPS  levofloxacin (LEVAQUIN) 500 MG tablet  Mirabegron (MYRBETRIQ PO)  mirtazapine (REMERON) 7.5 MG tablet  Multiple Vitamins-Minerals (PRESERVISION AREDS 2 PO)  multivitamin w/minerals (THERA-VIT-M) tablet  pantoprazole (PROTONIX) 40 MG EC tablet  polyethylene glycol (MIRALAX) powder  senna-docusate (SENOKOT-S;PERICOLACE) 8.6-50 MG per tablet  XARELTO 20 MG TABS tablet  AMOXICILLIN PO  Artificial Saliva (BIOTENE MOISTURIZING MOUTH MT)  ASPIRIN PO  Dextromethorphan-guaiFENesin  MG/5ML syrup  gabapentin (NEURONTIN) 100 MG capsule  order for DME  polyethylene glycol 0.4%- propylene glycol 0.3% (SYSTANE) 0.4-0.3 % SOLN ophthalmic solution          Review of Systems   Constitutional: Negative for fever.   Respiratory: Negative for cough and shortness of breath.    Cardiovascular: Negative for chest pain.   Gastrointestinal: Negative for abdominal pain and vomiting.   Genitourinary: Negative.    Skin: Negative.    Neurological: Positive for weakness. Negative for headaches.   Hematological: Bruises/bleeds easily.       Physical Exam   BP: (!) 196/84  Heart Rate: 58  Temp: 97.3  F (36.3  C)  SpO2: 96 %      Physical Exam  Vitals signs and nursing note reviewed.    Constitutional:       General: She is not in acute distress.     Appearance: She is not ill-appearing, toxic-appearing or diaphoretic.      Comments: Frail   HENT:      Head: Normocephalic and atraumatic.   Cardiovascular:      Rate and Rhythm: Normal rate.      Pulses: Normal pulses.   Pulmonary:      Effort: Pulmonary effort is normal.      Comments: Diminished bilaterally  Active cough in the exam room  Abdominal:      General: Abdomen is flat. There is no distension.      Tenderness: There is no tenderness. There is no guarding.   Skin:     General: Skin is warm and dry.      Capillary Refill: Capillary refill takes less than 2 seconds.      Coloration: Skin is not jaundiced or pale.      Findings: No bruising, erythema, lesion or rash.   Neurological:      General: No focal deficit present.      Mental Status: She is alert.      Comments: Responses to every question appropriately is pleasant and talkative.  She has no focal findings.   Psychiatric:         Mood and Affect: Mood normal.         ED Course        Procedures               Critical Care time:  none               Results for orders placed or performed during the hospital encounter of 10/01/19 (from the past 24 hour(s))   CBC with platelets differential   Result Value Ref Range    WBC 9.7 4.0 - 11.0 10e9/L    RBC Count 4.60 3.8 - 5.2 10e12/L    Hemoglobin 14.2 11.7 - 15.7 g/dL    Hematocrit 43.2 35.0 - 47.0 %    MCV 94 78 - 100 fl    MCH 30.9 26.5 - 33.0 pg    MCHC 32.9 31.5 - 36.5 g/dL    RDW 13.6 10.0 - 15.0 %    Platelet Count 147 (L) 150 - 450 10e9/L    Diff Method Automated Method     % Neutrophils 76.9 %    % Lymphocytes 15.0 %    % Monocytes 7.0 %    % Eosinophils 0.9 %    % Basophils 0.0 %    % Immature Granulocytes 0.2 %    Nucleated RBCs 0 0 /100    Absolute Neutrophil 7.5 1.6 - 8.3 10e9/L    Absolute Lymphocytes 1.5 0.8 - 5.3 10e9/L    Absolute Monocytes 0.7 0.0 - 1.3 10e9/L    Absolute Eosinophils 0.1 0.0 - 0.7 10e9/L    Absolute  Basophils 0.0 0.0 - 0.2 10e9/L    Abs Immature Granulocytes 0.0 0 - 0.4 10e9/L    Absolute Nucleated RBC 0.0    Comprehensive metabolic panel   Result Value Ref Range    Sodium 147 (H) 133 - 144 mmol/L    Potassium 3.5 3.4 - 5.3 mmol/L    Chloride 113 (H) 94 - 109 mmol/L    Carbon Dioxide 27 20 - 32 mmol/L    Anion Gap 7 3 - 14 mmol/L    Glucose 99 70 - 99 mg/dL    Urea Nitrogen 18 7 - 30 mg/dL    Creatinine 0.69 0.52 - 1.04 mg/dL    GFR Estimate 77 >60 mL/min/[1.73_m2]    GFR Estimate If Black 89 >60 mL/min/[1.73_m2]    Calcium 10.2 (H) 8.5 - 10.1 mg/dL    Bilirubin Total 0.6 0.2 - 1.3 mg/dL    Albumin 3.7 3.4 - 5.0 g/dL    Protein Total 7.4 6.8 - 8.8 g/dL    Alkaline Phosphatase 63 40 - 150 U/L    ALT 14 0 - 50 U/L    AST 14 0 - 45 U/L   XR Chest Port 1 View    Narrative    PROCEDURE:  XR CHEST PORT 1 VW    HISTORY: weakness. .    COMPARISON:  10/22/2017    FINDINGS:    The cardiomediastinal contours are mostly obscured. A hiatal hernia is  again present.   There is a progressive left pleural effusion. There are ill-defined  and reticular opacities at both lung bases, new since the immediate  prior.      Impression    IMPRESSION:  Possible bibasilar pneumonia with a left parapneumonic  effusion versus CHF exacerbation. Correlate clinically.      JAZLYN LEE MD       Medications   0.9% sodium chloride BOLUS (0 mLs Intravenous Stopped 10/1/19 1916)     Followed by   sodium chloride 0.9% infusion (has no administration in time range)   levofloxacin (LEVAQUIN) tablet 500 mg (has no administration in time range)   cefTRIAXone IN D5W (ROCEPHIN) intermittent infusion 2 g (0 g Intravenous Stopped 10/1/19 1953)   cloNIDine (CATAPRES) tablet 0.1 mg (0.1 mg Oral Given 10/1/19 1933)       Assessments & Plan (with Medical Decision Making)   Given the x-ray and respiratory findings, I do believe that it is appropriate to treat her with antibiotics.  I had a long detailed discussion with the daughter about the patient's  current condition.  Obviously the progressive decline of her general health over the last 1 or 2 months, I am concerned that she likely will not clinically improve as well as we hope.  The daughter is very appreciative to our care and understands the possibility of further decline and does not want to pursue further work-up as far as CT scanning or other interventions.  This is entirely reasonable, and I believe that this would likely be in the patient's best interest and follow her wishes.  She was treated with IV fluids and IV Rocephin and will be placed on oral Levaquin.  Return here for any worsening symptoms or other concerns whatsoever.    This document was prepared using a combination of typing and voice generated software.  While every attempt was made for accuracy, spelling and grammatical errors may exist.      I have reviewed the nursing notes.    I have reviewed the findings, diagnosis, plan and need for follow up with the patient.       New Prescriptions    LEVOFLOXACIN (LEVAQUIN) 500 MG TABLET    Take 1 tablet (500 mg) by mouth daily       Final diagnoses:   Pneumonia   Generalized muscle weakness   Fatigue       10/1/2019   [facility]     Arleen Bocanegra PA-C  10/01/19 1954

## 2019-10-05 NOTE — ED NOTES
"Discharge instructions given. Patient verbalized understanding but states she is unable to sign. In good spirits now that she is going home. States, \"you did such a good job, thank you for letting me go home.\" Assisted into [employer] wheelchair. Wheeled out of ED in care of [employer].   "

## 2019-10-05 NOTE — ED AVS SNAPSHOT
[facility]  [address]  [place] [address]  Phone:  [phone]                                    Britney Schaeffer   MRN: [id number]    Department:  [facility]   Date of Visit:  10/5/2019           After Visit Summary Signature Page    I have received my discharge instructions, and my questions have been answered. I have discussed any challenges I see with this plan with the nurse or doctor.    ..........................................................................................................................................  Patient/Patient Representative Signature      ..........................................................................................................................................  Patient Representative Print Name and Relationship to Patient    ..................................................               ................................................  Date                                   Time    ..........................................................................................................................................  Reviewed by Signature/Title    ...................................................              ..............................................  Date                                               Time          22EPIC Rev 08/18

## 2019-10-05 NOTE — ED PROVIDER NOTES
"  History     Chief Complaint   Patient presents with     Altered Mental Status     nursing home reports patient has been very lethargic all day which is not her usual, no exact time frame given.      HPI  Britney Schaeffer is a 89 year old female who was sent from [facility] due to increasing lethargy.  She has a history of recent diagnosis of pneumonia, is on Levaquin for that.  Discussion was had with her daughter at her last visit and they determined that she did not want any heroic measures taken.  I spoke with the daughter after the patient arrived and she said that she thought perhaps her mother was dying, I reassured her that she is awake here and interacting with staff.  Patient is cachectic, does not wish to be here and is telling us that in no uncertain terms.  She wants to go back to the nursing home, and she wants us to \"leave me alone\".    Allergies:  Allergies   Allergen Reactions     Ambien      Zolpidem Tartrate     Tramadol        Problem List:    Patient Active Problem List    Diagnosis Date Noted     Paroxysmal atrial fibrillation (H) 04/03/2018     Priority: High     Pneumonia 10/22/2017     Priority: Medium     Abdominal pain 10/22/2017     Priority: Medium     Colitis 10/07/2017     Priority: Medium     Glaucoma 12/27/2016     Priority: Medium     History of poliomyelitis 12/27/2016     Priority: Medium     Nursing Home Visit 01/19/2016     Priority: Medium     Osteoarthritis, multiple joints 12/13/2015     Priority: Medium     Fibrocystic breast disease, left 12/13/2015     Priority: Medium     Parkinson disease (H) 08/10/2015     Priority: Medium     CHD (coronary heart disease) 06/15/2015     Priority: Medium     03/2015  NSTEMI S/P angioplasty and stenting (ELIZABETH) LAD and D1       HF (heart failure) (H) 06/15/2015     Priority: Medium     Constipation, chronic 04/20/2015     Priority: Medium     Hiatal hernia 03/16/2015     Priority: Medium     TIA (transient ischemic attack) 03/16/2015 "     Priority: Medium     Chronic cough 07/15/2014     Priority: Medium     OAB (overactive bladder) 03/19/2014     Priority: Medium     S/P InterStim placement and multiple botox injections       Neuralgia, post-herpetic 03/19/2014     Priority: Medium     Dyslipidemia 04/10/2013     Priority: Medium     Cerebrovascular disease 04/10/2013     Priority: Medium     Hemorrhoids 04/10/2013     Priority: Medium     Perennial allergic rhinitis 04/10/2013     Priority: Medium     Asthma, mild persistent 04/10/2013     Priority: Medium              GERD (gastroesophageal reflux disease) 04/10/2013     Priority: Medium     Osteoporosis 04/10/2013     Priority: Medium     Kyphoscoliosis and scoliosis 04/10/2013     Priority: Medium     Insomnia, medical condition 04/10/2013     Priority: Medium     History of colonic polyps 04/10/2013     Priority: Medium     HTN (hypertension) 09/24/2006     Priority: Medium     Health Care Home 12/09/2015     Priority: Low     Class: Chronic        Past Medical History:    Past Medical History:   Diagnosis Date     Allergic rhinitis, Seasonal 06/19/2000     Colonic polyps 09/28/2000     Corns and callosities 01/20/2004     dyslipidemia 09/28/2000     Fibrocystic Breast Disease 03/01/2011     GERD 03/01/2011     hemorrhoids 03/01/2011     hypertension, benign 11/22/1999     Insomnia, unspecified 02/05/2004     Osteoarthritis, generalized 11/07/2008     Osteoporosis, unspecified 04/20/2009     Polio 1931     Scoliosis (and kyphoscoliosis), idiopathic 03/01/2011     TIA 03/09/2005     Unspecified asthma(493.90) 03/11/2003       Past Surgical History:    Past Surgical History:   Procedure Laterality Date     BREAST BIOPSY, RT/LT       BUNIONECTOMY      Bunion     COLONOSCOPY  2007     GENITOURINARY SURGERY      botox     JOINT REPLACEMENT      LT     JOINT REPLACEMENT, HIP RT/LT  2010     KERATOPLASTY PENETRATING, VITRECTOMY ANTERIOR, EXTRACAPSULAR CATARACT EXTRACTION, COMBINED  2010    LT,  Cataracts       Family History:    Family History   Problem Relation Age of Onset     Cerebrovascular Disease Mother         CVA (cause of death)     Cerebrovascular Disease Father         CVA     Asthma No family hx of        Social History:  Marital Status:   [5]  Social History     Tobacco Use     Smoking status: Never Smoker     Smokeless tobacco: Never Used     Tobacco comment: 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   Substance Use Topics     Alcohol use: Yes     Alcohol/week: 0.0 standard drinks     Comment: Previously drank 4 beers daily, now only occasional drinks     Drug use: No        Medications:    acetaminophen (TYLENOL) 500 MG tablet  AMOXICILLIN PO  Artificial Saliva (BIOTENE MOISTURIZING MOUTH MT)  ASPIRIN PO  ATORVASTATIN CALCIUM PO  calcium carbonate (TUMS) 500 MG chewable tablet  carbidopa-levodopa (SINEMET)  MG per tablet  chlorhexidine (PERIDEX) 0.12 % solution  Cholecalciferol (VITAMIN D3) 2000 UNITS CAPS  Dextromethorphan-guaiFENesin  MG/5ML syrup  gabapentin (NEURONTIN) 100 MG capsule  levofloxacin (LEVAQUIN) 500 MG tablet  Mirabegron (MYRBETRIQ PO)  mirtazapine (REMERON) 7.5 MG tablet  Multiple Vitamins-Minerals (PRESERVISION AREDS 2 PO)  multivitamin w/minerals (THERA-VIT-M) tablet  order for DME  pantoprazole (PROTONIX) 40 MG EC tablet  polyethylene glycol (MIRALAX) powder  polyethylene glycol 0.4%- propylene glycol 0.3% (SYSTANE) 0.4-0.3 % SOLN ophthalmic solution  senna-docusate (SENOKOT-S;PERICOLACE) 8.6-50 MG per tablet  XARELTO 20 MG TABS tablet          Review of Systems   Constitutional: Positive for activity change and fatigue.   HENT: Negative.    Respiratory: Positive for shortness of breath.    Cardiovascular: Negative for chest pain and palpitations.   Gastrointestinal: Negative for abdominal pain and nausea.   Musculoskeletal: Positive for arthralgias.        Generalized, unchanged   Neurological: Negative.     Psychiatric/Behavioral: Positive for dysphoric mood.       Physical Exam   BP: (!) 190/72  Pulse: 57  Heart Rate: 57  Temp: 97.9  F (36.6  C)  Resp: 16  SpO2: 97 %      Physical Exam  Vitals signs and nursing note reviewed.   Constitutional:       Appearance: She is well-developed. She is cachectic. She is ill-appearing.   HENT:      Head: Normocephalic and atraumatic.   Neck:      Musculoskeletal: Normal range of motion and neck supple.   Cardiovascular:      Rate and Rhythm: Normal rate and regular rhythm.      Heart sounds: Normal heart sounds. No murmur.   Pulmonary:      Effort: Pulmonary effort is normal.      Breath sounds: Examination of the right-lower field reveals decreased breath sounds. Examination of the left-lower field reveals decreased breath sounds. Decreased breath sounds present.   Abdominal:      General: Bowel sounds are normal. There is no distension.      Palpations: Abdomen is soft.      Tenderness: There is no tenderness.   Musculoskeletal: Normal range of motion.   Skin:     General: Skin is warm and dry.      Capillary Refill: Capillary refill takes less than 2 seconds.   Neurological:      Mental Status: She is alert.   Psychiatric:         Mood and Affect: Mood normal.         Behavior: Behavior normal.         ED Course        Procedures    Results for orders placed or performed during the hospital encounter of 10/05/19 (from the past 24 hour(s))   CBC with platelets differential   Result Value Ref Range    WBC 6.8 4.0 - 11.0 10e9/L    RBC Count 4.68 3.8 - 5.2 10e12/L    Hemoglobin 14.3 11.7 - 15.7 g/dL    Hematocrit 46.0 35.0 - 47.0 %    MCV 98 78 - 100 fl    MCH 30.6 26.5 - 33.0 pg    MCHC 31.1 (L) 31.5 - 36.5 g/dL    RDW 13.5 10.0 - 15.0 %    Platelet Count 108 (L) 150 - 450 10e9/L    Diff Method Automated Method     % Neutrophils 62.4 %    % Lymphocytes 28.4 %    % Monocytes 6.6 %    % Eosinophils 2.2 %    % Basophils 0.1 %    % Immature Granulocytes 0.3 %    Nucleated RBCs 0 0  /100    Absolute Neutrophil 4.3 1.6 - 8.3 10e9/L    Absolute Lymphocytes 1.9 0.8 - 5.3 10e9/L    Absolute Monocytes 0.5 0.0 - 1.3 10e9/L    Absolute Eosinophils 0.2 0.0 - 0.7 10e9/L    Absolute Basophils 0.0 0.0 - 0.2 10e9/L    Abs Immature Granulocytes 0.0 0 - 0.4 10e9/L    Absolute Nucleated RBC 0.0    Comprehensive metabolic panel   Result Value Ref Range    Sodium 146 (H) 133 - 144 mmol/L    Potassium 3.4 3.4 - 5.3 mmol/L    Chloride 113 (H) 94 - 109 mmol/L    Carbon Dioxide 28 20 - 32 mmol/L    Anion Gap 5 3 - 14 mmol/L    Glucose 78 70 - 99 mg/dL    Urea Nitrogen 16 7 - 30 mg/dL    Creatinine 0.65 0.52 - 1.04 mg/dL    GFR Estimate 78 >60 mL/min/[1.73_m2]    GFR Estimate If Black >90 >60 mL/min/[1.73_m2]    Calcium 9.5 8.5 - 10.1 mg/dL    Bilirubin Total 0.4 0.2 - 1.3 mg/dL    Albumin 3.1 (L) 3.4 - 5.0 g/dL    Protein Total 6.4 (L) 6.8 - 8.8 g/dL    Alkaline Phosphatase 59 40 - 150 U/L    ALT 12 0 - 50 U/L    AST 14 0 - 45 U/L   Lactic acid whole blood   Result Value Ref Range    Lactic Acid 1.6 0.7 - 2.0 mmol/L   XR Chest Port 1 View    Narrative    PROCEDURE:  XR CHEST PORT 1 VW    HISTORY:  dyspnea.     COMPARISON:  October 1, 2019    FINDINGS:   There are abnormal areas of increased density both lung bases. the  opacities are worse on the left than the right.  There is blunting of  left costophrenic angle. The lungs are unchanged as compared October 1, 2019      Impression    IMPRESSION:  Bibasilar areas of increased density stable from previous  examination October 1      DYLAN ROMERO MD       Medications   lactated ringers infusion ( Intravenous New Bag 10/5/19 1211)   ipratropium - albuterol 0.5 mg/2.5 mg/3 mL (DUONEB) neb solution 3 mL (3 mLs Nebulization Not Given 10/5/19 1204)       Assessments & Plan (with Medical Decision Making)   Patient awake, alert and talking.  She has had IV fluids but no other intervention.  Her chest x-ray looks unchanged from her previous, labs are improved over  previous.  Spoke with patient's daughter, and informed her of what has been done and that she was going back to [facility].  We will get transport to bring her back.    I have reviewed the nursing notes.    I have reviewed the findings, diagnosis, plan and need for follow up with the patient.  New Prescriptions    No medications on file       Final diagnoses:   Transient alteration of awareness   Pneumonia of both lower lobes due to infectious organism (H)       10/5/2019   [facility]     Hanna Zaidi MD  10/05/19 1535

## 2019-10-05 NOTE — ED NOTES
"Arrived to ED room 9 via [place] [facility] from [facility] with reports of being more lethargic than usual. Patient was recently diagnosed with pneumonia and is on Levaquin PO. Patient is very upset with being in ED. States, \"I don't need to be here, leave me alone, I just want to go home.\" Patient repeatedly states she does not want to be here and just wants to be at home in bed. Yelling out at staff during assessment. Assisted into gown. Placed on monitors. Cardiac monitor is showing a SB in the 50's. Call light within reach.   "

## 2019-10-28 NOTE — TELEPHONE ENCOUNTER
Transitioning to palliative care actively passing would like to get morphine orders and hyscopamine tablets.  Would like an order to discontinue all other oral medications       [phone]  Please advise

## 2019-11-08 ENCOUNTER — MEDICAL CORRESPONDENCE (OUTPATIENT)
Dept: HEALTH INFORMATION MANAGEMENT | Facility: CLINIC | Age: 84
End: 2019-11-08
